# Patient Record
Sex: FEMALE | Race: WHITE | NOT HISPANIC OR LATINO | Employment: UNEMPLOYED | URBAN - NONMETROPOLITAN AREA
[De-identification: names, ages, dates, MRNs, and addresses within clinical notes are randomized per-mention and may not be internally consistent; named-entity substitution may affect disease eponyms.]

---

## 2017-01-09 ENCOUNTER — HOSPITAL ENCOUNTER (EMERGENCY)
Facility: HOSPITAL | Age: 82
Discharge: HOME | End: 2017-01-09
Attending: FAMILY MEDICINE | Admitting: FAMILY MEDICINE

## 2017-01-09 ENCOUNTER — APPOINTMENT (OUTPATIENT)
Dept: GENERAL RADIOLOGY | Facility: HOSPITAL | Age: 82
End: 2017-01-09

## 2017-01-09 VITALS
WEIGHT: 170 LBS | DIASTOLIC BLOOD PRESSURE: 64 MMHG | RESPIRATION RATE: 18 BRPM | TEMPERATURE: 98.2 F | BODY MASS INDEX: 30.12 KG/M2 | OXYGEN SATURATION: 96 % | HEART RATE: 73 BPM | HEIGHT: 63 IN | SYSTOLIC BLOOD PRESSURE: 122 MMHG

## 2017-01-09 DIAGNOSIS — J18.9 COMMUNITY ACQUIRED PNEUMONIA: Primary | ICD-10-CM

## 2017-01-09 LAB
ALBUMIN SERPL-MCNC: 4.1 G/DL (ref 3.4–4.8)
ALBUMIN/GLOB SERPL: 1.3 G/DL (ref 1.5–2.5)
ALP SERPL-CCNC: 57 U/L (ref 46–116)
ALT SERPL W P-5'-P-CCNC: 15 U/L (ref 10–36)
ANION GAP SERPL CALCULATED.3IONS-SCNC: 7.7 MMOL/L (ref 3.6–11.2)
AST SERPL-CCNC: 26 U/L (ref 10–30)
BACTERIA UR QL AUTO: ABNORMAL /HPF
BASOPHILS # BLD AUTO: 0.01 10*3/MM3 (ref 0–0.3)
BASOPHILS NFR BLD AUTO: 0.2 % (ref 0–2)
BILIRUB SERPL-MCNC: 0.7 MG/DL (ref 0.2–1.8)
BILIRUB UR QL STRIP: NEGATIVE
BUN BLD-MCNC: 18 MG/DL (ref 7–21)
BUN/CREAT SERPL: 16.2 (ref 7–25)
CALCIUM SPEC-SCNC: 9 MG/DL (ref 7.7–10)
CHLORIDE SERPL-SCNC: 101 MMOL/L (ref 99–112)
CLARITY UR: ABNORMAL
CO2 SERPL-SCNC: 29.3 MMOL/L (ref 24.3–31.9)
COLOR UR: YELLOW
CREAT BLD-MCNC: 1.11 MG/DL (ref 0.43–1.29)
DEPRECATED RDW RBC AUTO: 52.6 FL (ref 37–54)
EOSINOPHIL # BLD AUTO: 0.04 10*3/MM3 (ref 0–0.7)
EOSINOPHIL NFR BLD AUTO: 1 % (ref 0–7)
ERYTHROCYTE [DISTWIDTH] IN BLOOD BY AUTOMATED COUNT: 15.9 % (ref 11.5–14.5)
FLUAV AG NPH QL: NEGATIVE
FLUBV AG NPH QL IA: NEGATIVE
GFR SERPL CREATININE-BSD FRML MDRD: 47 ML/MIN/1.73
GLOBULIN UR ELPH-MCNC: 3.2 GM/DL
GLUCOSE BLD-MCNC: 94 MG/DL (ref 70–110)
GLUCOSE UR STRIP-MCNC: NEGATIVE MG/DL
HCT VFR BLD AUTO: 32.3 % (ref 37–47)
HGB BLD-MCNC: 10.8 G/DL (ref 12–16)
HGB UR QL STRIP.AUTO: NEGATIVE
HYALINE CASTS UR QL AUTO: ABNORMAL /LPF
IMM GRANULOCYTES # BLD: 0.02 10*3/MM3 (ref 0–0.03)
IMM GRANULOCYTES NFR BLD: 0.5 % (ref 0–0.5)
KETONES UR QL STRIP: NEGATIVE
LEUKOCYTE ESTERASE UR QL STRIP.AUTO: ABNORMAL
LYMPHOCYTES # BLD AUTO: 0.61 10*3/MM3 (ref 1–3)
LYMPHOCYTES NFR BLD AUTO: 14.9 % (ref 16–46)
MCH RBC QN AUTO: 29.9 PG (ref 27–33)
MCHC RBC AUTO-ENTMCNC: 33.4 G/DL (ref 33–37)
MCV RBC AUTO: 89.5 FL (ref 80–94)
MONOCYTES # BLD AUTO: 0.52 10*3/MM3 (ref 0.1–0.9)
MONOCYTES NFR BLD AUTO: 12.7 % (ref 0–12)
NEUTROPHILS # BLD AUTO: 2.89 10*3/MM3 (ref 1.4–6.5)
NEUTROPHILS NFR BLD AUTO: 70.7 % (ref 40–75)
NITRITE UR QL STRIP: NEGATIVE
OSMOLALITY SERPL CALC.SUM OF ELEC: 277.3 MOSM/KG (ref 273–305)
PH UR STRIP.AUTO: 5.5 [PH] (ref 5–8)
PLATELET # BLD AUTO: 130 10*3/MM3 (ref 130–400)
PMV BLD AUTO: 11 FL (ref 6–10)
POTASSIUM BLD-SCNC: 3.7 MMOL/L (ref 3.5–5.3)
PROT SERPL-MCNC: 7.3 G/DL (ref 6–8)
PROT UR QL STRIP: NEGATIVE
RBC # BLD AUTO: 3.61 10*6/MM3 (ref 4.2–5.4)
RBC # UR: ABNORMAL /HPF
REF LAB TEST METHOD: ABNORMAL
S PYO AG THROAT QL: NEGATIVE
SODIUM BLD-SCNC: 138 MMOL/L (ref 135–153)
SP GR UR STRIP: 1.02 (ref 1–1.03)
SQUAMOUS #/AREA URNS HPF: ABNORMAL /HPF
UROBILINOGEN UR QL STRIP: ABNORMAL
WBC NRBC COR # BLD: 4.09 10*3/MM3 (ref 4.5–12.5)
WBC UR QL AUTO: ABNORMAL /HPF

## 2017-01-09 PROCEDURE — 71020 HC CHEST PA AND LATERAL: CPT

## 2017-01-09 PROCEDURE — 94640 AIRWAY INHALATION TREATMENT: CPT

## 2017-01-09 PROCEDURE — 94799 UNLISTED PULMONARY SVC/PX: CPT

## 2017-01-09 PROCEDURE — 87086 URINE CULTURE/COLONY COUNT: CPT | Performed by: PHYSICIAN ASSISTANT

## 2017-01-09 PROCEDURE — 71020 XR CHEST 2 VW: CPT | Performed by: RADIOLOGY

## 2017-01-09 PROCEDURE — 99284 EMERGENCY DEPT VISIT MOD MDM: CPT

## 2017-01-09 PROCEDURE — 81001 URINALYSIS AUTO W/SCOPE: CPT | Performed by: PHYSICIAN ASSISTANT

## 2017-01-09 PROCEDURE — 87880 STREP A ASSAY W/OPTIC: CPT | Performed by: FAMILY MEDICINE

## 2017-01-09 PROCEDURE — 80053 COMPREHEN METABOLIC PANEL: CPT | Performed by: PHYSICIAN ASSISTANT

## 2017-01-09 PROCEDURE — 87081 CULTURE SCREEN ONLY: CPT | Performed by: FAMILY MEDICINE

## 2017-01-09 PROCEDURE — 85025 COMPLETE CBC W/AUTO DIFF WBC: CPT | Performed by: PHYSICIAN ASSISTANT

## 2017-01-09 PROCEDURE — 87804 INFLUENZA ASSAY W/OPTIC: CPT | Performed by: PHYSICIAN ASSISTANT

## 2017-01-09 PROCEDURE — 96365 THER/PROPH/DIAG IV INF INIT: CPT

## 2017-01-09 PROCEDURE — 25010000002 CEFTRIAXONE: Performed by: PHYSICIAN ASSISTANT

## 2017-01-09 RX ORDER — SODIUM CHLORIDE 0.9 % (FLUSH) 0.9 %
10 SYRINGE (ML) INJECTION AS NEEDED
Status: DISCONTINUED | OUTPATIENT
Start: 2017-01-09 | End: 2017-01-10 | Stop reason: HOSPADM

## 2017-01-09 RX ORDER — ALBUTEROL SULFATE 1.25 MG/3ML
2.5 SOLUTION RESPIRATORY (INHALATION) EVERY 6 HOURS PRN
Status: DISCONTINUED | OUTPATIENT
Start: 2017-01-09 | End: 2017-01-10 | Stop reason: HOSPADM

## 2017-01-09 RX ORDER — ALBUTEROL SULFATE 1.25 MG/3ML
1 SOLUTION RESPIRATORY (INHALATION) EVERY 6 HOURS PRN
Qty: 25 VIAL | Refills: 0 | Status: ON HOLD | OUTPATIENT
Start: 2017-01-09 | End: 2019-07-30

## 2017-01-09 RX ORDER — DOCUSATE SODIUM 250 MG
250 CAPSULE ORAL 2 TIMES DAILY PRN
COMMUNITY

## 2017-01-09 RX ORDER — DOXYCYCLINE HYCLATE 100 MG/1
100 TABLET, DELAYED RELEASE ORAL 2 TIMES DAILY
Qty: 20 TABLET | Refills: 0 | Status: ON HOLD | OUTPATIENT
Start: 2017-01-09 | End: 2017-01-12

## 2017-01-09 RX ORDER — BISOPROLOL FUMARATE AND HYDROCHLOROTHIAZIDE 2.5; 6.25 MG/1; MG/1
1 TABLET ORAL EVERY MORNING
Status: ON HOLD | COMMUNITY
End: 2019-07-30

## 2017-01-09 RX ORDER — QUINAPRIL 10 MG/1
10 TABLET ORAL DAILY
COMMUNITY
End: 2021-03-04 | Stop reason: ALTCHOICE

## 2017-01-09 RX ORDER — GABAPENTIN 300 MG/1
300 CAPSULE ORAL 2 TIMES DAILY
COMMUNITY

## 2017-01-09 RX ORDER — FUROSEMIDE 20 MG/1
20 TABLET ORAL DAILY
COMMUNITY
End: 2020-06-04 | Stop reason: ALTCHOICE

## 2017-01-09 RX ORDER — GUAIFENESIN/DEXTROMETHORPHAN 100-10MG/5
5 SYRUP ORAL ONCE
Status: COMPLETED | OUTPATIENT
Start: 2017-01-09 | End: 2017-01-09

## 2017-01-09 RX ORDER — DIGOXIN 125 MCG
125 TABLET ORAL EVERY MORNING
COMMUNITY
End: 2020-06-04 | Stop reason: ALTCHOICE

## 2017-01-09 RX ORDER — OXYCODONE AND ACETAMINOPHEN 10; 325 MG/1; MG/1
1 TABLET ORAL EVERY 12 HOURS PRN
COMMUNITY
End: 2021-09-19

## 2017-01-09 RX ORDER — LEVOTHYROXINE SODIUM 0.07 MG/1
75 TABLET ORAL DAILY
COMMUNITY
End: 2021-09-19

## 2017-01-09 RX ORDER — PANTOPRAZOLE SODIUM 40 MG/1
40 TABLET, DELAYED RELEASE ORAL DAILY
COMMUNITY

## 2017-01-09 RX ADMIN — GUAIFENESIN AND DEXTROMETHORPHAN 5 ML: 100; 10 SYRUP ORAL at 21:15

## 2017-01-09 RX ADMIN — ALBUTEROL SULFATE 2.5 MG: 1.25 SOLUTION RESPIRATORY (INHALATION) at 20:58

## 2017-01-09 RX ADMIN — CEFTRIAXONE 1 G: 1 INJECTION, POWDER, FOR SOLUTION INTRAMUSCULAR; INTRAVENOUS at 21:08

## 2017-01-09 NOTE — ED NOTES
Pt's family states that pt is feeling worse and she isn't going to be able to remain sitting up for much longer due to her feeling so weak and tired     Chayo Mcdowell RN  01/09/17 0090

## 2017-01-10 NOTE — ED PROVIDER NOTES
Subjective   Patient is a 83 y.o. female presenting with flu symptoms.   History provided by:  Patient   used: No    Flu Symptoms   Presenting symptoms: cough, fever, nausea and rhinorrhea    Presenting symptoms: no fatigue, no sore throat and no vomiting    Severity:  Moderate  Onset quality:  Sudden  Duration:  4 weeks  Chronicity:  New  Relieved by:  Nothing  Ineffective treatments:  Decongestant and prescription medications  Associated symptoms: nasal congestion    Associated symptoms: no chills, no decreased appetite, no neck stiffness and no syncope    Risk factors: heart disease    Risk factors: no diabetes problem and no kidney disease        Review of Systems   Constitutional: Positive for fever. Negative for chills, decreased appetite and fatigue.   HENT: Positive for congestion and rhinorrhea. Negative for sore throat.    Respiratory: Positive for cough.    Gastrointestinal: Positive for nausea. Negative for vomiting.   Musculoskeletal: Negative for neck stiffness.   All other systems reviewed and are negative.      History reviewed. No pertinent past medical history.    No Known Allergies    Past Surgical History   Procedure Laterality Date   • Breast cyst excision Left        History reviewed. No pertinent family history.    Social History     Social History   • Marital status: Unknown     Spouse name: N/A   • Number of children: N/A   • Years of education: N/A     Social History Main Topics   • Smoking status: Never Smoker   • Smokeless tobacco: None   • Alcohol use No   • Drug use: No   • Sexual activity: Not Asked     Other Topics Concern   • None     Social History Narrative   • None           Objective   Physical Exam   Constitutional: She is oriented to person, place, and time. She appears well-developed and well-nourished.   HENT:   Head: Normocephalic and atraumatic.   Right Ear: External ear normal.   Left Ear: External ear normal.   Nose: Rhinorrhea present. No sinus  tenderness or nasal deformity.   Mouth/Throat: Oropharynx is clear and moist.   Eyes: Conjunctivae and EOM are normal. Pupils are equal, round, and reactive to light. Right eye exhibits no discharge. Left eye exhibits no discharge.   Neck: Normal range of motion. Neck supple.   Cardiovascular: Normal rate, regular rhythm and normal heart sounds.    Pulmonary/Chest: Effort normal and breath sounds normal.   Abdominal: Soft. Bowel sounds are normal.   Musculoskeletal: Normal range of motion.   Neurological: She is alert and oriented to person, place, and time. She has normal reflexes.   Skin: Skin is warm and dry.   Psychiatric: She has a normal mood and affect. Her behavior is normal. Judgment and thought content normal.   Nursing note and vitals reviewed.      Procedures         ED Course  ED Course   Comment By Time   83-year-old female comes in with chief complaint cough, congestion, shortness of air times one month.  Patient states she has tried over-the-counter decongestants and was prescribed an antibiotic approximately 2 weeks ago. Da Richard PA-C 01/09 1922   I did discuss care with patient at this time patient does not wish to be admitted to the hospital for IV antibiotics and neb treatments.  I have discussed risk of not being admitted with patient. She understands and wishes to go home. I recommended patient f/u with pcp in next 1-2 days or return to E.D. If symotoms worsens.  Da Richard PA-C 01/09 2123                  MDM  Number of Diagnoses or Management Options  Community acquired pneumonia: new and requires workup     Amount and/or Complexity of Data Reviewed  Tests in the radiology section of CPT®: ordered and reviewed  Independent visualization of images, tracings, or specimens: yes    Risk of Complications, Morbidity, and/or Mortality  Presenting problems: moderate  Diagnostic procedures: moderate  Management options: moderate        Final diagnoses:   Community acquired  pneumonia            Da Richard PA-C  01/09/17 3666

## 2017-01-11 LAB — BACTERIA SPEC AEROBE CULT: NORMAL

## 2017-01-12 ENCOUNTER — HOSPITAL ENCOUNTER (INPATIENT)
Facility: HOSPITAL | Age: 82
LOS: 5 days | Discharge: HOME | End: 2017-01-18
Attending: INTERNAL MEDICINE | Admitting: INTERNAL MEDICINE

## 2017-01-12 ENCOUNTER — APPOINTMENT (OUTPATIENT)
Dept: GENERAL RADIOLOGY | Facility: HOSPITAL | Age: 82
End: 2017-01-12

## 2017-01-12 DIAGNOSIS — D64.9 ANEMIA, UNSPECIFIED TYPE: ICD-10-CM

## 2017-01-12 DIAGNOSIS — Z86.010 HX OF COLONIC POLYPS: ICD-10-CM

## 2017-01-12 DIAGNOSIS — R11.0 NAUSEA: ICD-10-CM

## 2017-01-12 DIAGNOSIS — R13.10 DYSPHAGIA, UNSPECIFIED TYPE: Primary | ICD-10-CM

## 2017-01-12 DIAGNOSIS — Z80.0 FAMILY HISTORY OF COLON CANCER: ICD-10-CM

## 2017-01-12 LAB
A-A DO2: 23.2 MMHG (ref 0–300)
ALBUMIN SERPL-MCNC: 3.9 G/DL (ref 3.4–4.8)
ALBUMIN/GLOB SERPL: 1.3 G/DL (ref 1.5–2.5)
ALP SERPL-CCNC: 60 U/L (ref 46–116)
ALT SERPL W P-5'-P-CCNC: 10 U/L (ref 10–36)
ANION GAP SERPL CALCULATED.3IONS-SCNC: 6.8 MMOL/L (ref 3.6–11.2)
ARTERIAL PATENCY WRIST A: ABNORMAL
AST SERPL-CCNC: 20 U/L (ref 10–30)
ATMOSPHERIC PRESS: 728 MMHG
BACTERIA SPEC AEROBE CULT: NORMAL
BASE EXCESS BLDA CALC-SCNC: 2.4 MMOL/L
BASOPHILS # BLD AUTO: 0.01 10*3/MM3 (ref 0–0.3)
BASOPHILS NFR BLD AUTO: 0.2 % (ref 0–2)
BDY SITE: ABNORMAL
BILIRUB SERPL-MCNC: 0.7 MG/DL (ref 0.2–1.8)
BODY TEMPERATURE: 98.6 C
BUN BLD-MCNC: 18 MG/DL (ref 7–21)
BUN/CREAT SERPL: 16.4 (ref 7–25)
CALCIUM SPEC-SCNC: 9.1 MG/DL (ref 7.7–10)
CHLORIDE SERPL-SCNC: 103 MMOL/L (ref 99–112)
CO2 SERPL-SCNC: 28.2 MMOL/L (ref 24.3–31.9)
COHGB MFR BLD: 0.6 % (ref 0–5)
CREAT BLD-MCNC: 1.1 MG/DL (ref 0.43–1.29)
DEPRECATED RDW RBC AUTO: 51.6 FL (ref 37–54)
EOSINOPHIL # BLD AUTO: 0.03 10*3/MM3 (ref 0–0.7)
EOSINOPHIL NFR BLD AUTO: 0.5 % (ref 0–7)
ERYTHROCYTE [DISTWIDTH] IN BLOOD BY AUTOMATED COUNT: 15.8 % (ref 11.5–14.5)
GFR SERPL CREATININE-BSD FRML MDRD: 47 ML/MIN/1.73
GLOBULIN UR ELPH-MCNC: 3.1 GM/DL
GLUCOSE BLD-MCNC: 103 MG/DL (ref 70–110)
HCO3 BLDA-SCNC: 26.8 MMOL/L (ref 22–26)
HCT VFR BLD AUTO: 31.6 % (ref 37–47)
HCT VFR BLD CALC: 34 % (ref 37–47)
HGB BLD-MCNC: 10.6 G/DL (ref 12–16)
HGB BLDA-MCNC: 11.5 G/DL (ref 12–16)
HOROWITZ INDEX BLD+IHG-RTO: 21 %
IMM GRANULOCYTES # BLD: 0.01 10*3/MM3 (ref 0–0.03)
IMM GRANULOCYTES NFR BLD: 0.2 % (ref 0–0.5)
LYMPHOCYTES # BLD AUTO: 0.94 10*3/MM3 (ref 1–3)
LYMPHOCYTES NFR BLD AUTO: 16.3 % (ref 16–46)
MCH RBC QN AUTO: 30.1 PG (ref 27–33)
MCHC RBC AUTO-ENTMCNC: 33.5 G/DL (ref 33–37)
MCV RBC AUTO: 89.8 FL (ref 80–94)
METHGB BLD QL: 0.2 % (ref 0–3)
MODALITY: ABNORMAL
MONOCYTES # BLD AUTO: 0.85 10*3/MM3 (ref 0.1–0.9)
MONOCYTES NFR BLD AUTO: 14.7 % (ref 0–12)
NEUTROPHILS # BLD AUTO: 3.94 10*3/MM3 (ref 1.4–6.5)
NEUTROPHILS NFR BLD AUTO: 68.1 % (ref 40–75)
NRBC BLD MANUAL-RTO: 0 /100 WBC (ref 0–0)
OSMOLALITY SERPL CALC.SUM OF ELEC: 277.8 MOSM/KG (ref 273–305)
OXYHGB MFR BLDV: 92.3 % (ref 85–100)
PCO2 BLDA: 40.9 MM HG (ref 35–45)
PH BLDA: 7.43 PH UNITS (ref 7.35–7.45)
PLATELET # BLD AUTO: 137 10*3/MM3 (ref 130–400)
PMV BLD AUTO: 11.3 FL (ref 6–10)
PO2 BLDA: 70.8 MM HG (ref 80–100)
POTASSIUM BLD-SCNC: 3.8 MMOL/L (ref 3.5–5.3)
PROT SERPL-MCNC: 7 G/DL (ref 6–8)
RBC # BLD AUTO: 3.52 10*6/MM3 (ref 4.2–5.4)
SAO2 % BLDCOA: 93 % (ref 90–100)
SODIUM BLD-SCNC: 138 MMOL/L (ref 135–153)
WBC NRBC COR # BLD: 5.78 10*3/MM3 (ref 4.5–12.5)

## 2017-01-12 PROCEDURE — 93005 ELECTROCARDIOGRAM TRACING: CPT | Performed by: INTERNAL MEDICINE

## 2017-01-12 PROCEDURE — 71020 XR CHEST PA AND LATERAL: CPT | Performed by: RADIOLOGY

## 2017-01-12 PROCEDURE — 25010000002 CEFTRIAXONE: Performed by: INTERNAL MEDICINE

## 2017-01-12 PROCEDURE — 25010000002 METHYLPREDNISOLONE PER 125 MG: Performed by: INTERNAL MEDICINE

## 2017-01-12 PROCEDURE — 94799 UNLISTED PULMONARY SVC/PX: CPT

## 2017-01-12 PROCEDURE — 85025 COMPLETE CBC W/AUTO DIFF WBC: CPT | Performed by: INTERNAL MEDICINE

## 2017-01-12 PROCEDURE — 71020 HC CHEST PA AND LATERAL: CPT

## 2017-01-12 PROCEDURE — 82805 BLOOD GASES W/O2 SATURATION: CPT | Performed by: INTERNAL MEDICINE

## 2017-01-12 PROCEDURE — 80053 COMPREHEN METABOLIC PANEL: CPT | Performed by: INTERNAL MEDICINE

## 2017-01-12 PROCEDURE — 25010000002 AZITHROMYCIN: Performed by: INTERNAL MEDICINE

## 2017-01-12 PROCEDURE — 83050 HGB METHEMOGLOBIN QUAN: CPT | Performed by: INTERNAL MEDICINE

## 2017-01-12 PROCEDURE — 94640 AIRWAY INHALATION TREATMENT: CPT

## 2017-01-12 PROCEDURE — 36600 WITHDRAWAL OF ARTERIAL BLOOD: CPT | Performed by: INTERNAL MEDICINE

## 2017-01-12 PROCEDURE — 85007 BL SMEAR W/DIFF WBC COUNT: CPT | Performed by: INTERNAL MEDICINE

## 2017-01-12 PROCEDURE — 82375 ASSAY CARBOXYHB QUANT: CPT | Performed by: INTERNAL MEDICINE

## 2017-01-12 RX ORDER — BISOPROLOL FUMARATE AND HYDROCHLOROTHIAZIDE 2.5; 6.25 MG/1; MG/1
1 TABLET ORAL EVERY MORNING
Status: CANCELLED | OUTPATIENT
Start: 2017-01-13

## 2017-01-12 RX ORDER — LISINOPRIL 10 MG/1
10 TABLET ORAL DAILY
Status: CANCELLED | OUTPATIENT
Start: 2017-01-13

## 2017-01-12 RX ORDER — LEVOTHYROXINE SODIUM 0.07 MG/1
75 TABLET ORAL EVERY EVENING
Status: CANCELLED | OUTPATIENT
Start: 2017-01-12

## 2017-01-12 RX ORDER — ALBUTEROL SULFATE 1.25 MG/3ML
1 SOLUTION RESPIRATORY (INHALATION) EVERY 6 HOURS PRN
Status: CANCELLED | OUTPATIENT
Start: 2017-01-12

## 2017-01-12 RX ORDER — IPRATROPIUM BROMIDE AND ALBUTEROL SULFATE 2.5; .5 MG/3ML; MG/3ML
3 SOLUTION RESPIRATORY (INHALATION)
Status: DISCONTINUED | OUTPATIENT
Start: 2017-01-12 | End: 2017-01-16

## 2017-01-12 RX ORDER — DOXYCYCLINE 100 MG/1
100 CAPSULE ORAL 2 TIMES DAILY
Status: CANCELLED | OUTPATIENT
Start: 2017-01-12

## 2017-01-12 RX ORDER — DOCUSATE SODIUM 250 MG
250 CAPSULE ORAL DAILY
Status: CANCELLED | OUTPATIENT
Start: 2017-01-13

## 2017-01-12 RX ORDER — DOXYCYCLINE 50 MG/1
100 CAPSULE ORAL 2 TIMES DAILY
COMMUNITY
End: 2017-01-18 | Stop reason: HOSPADM

## 2017-01-12 RX ORDER — GABAPENTIN 300 MG/1
300 CAPSULE ORAL 2 TIMES DAILY
Status: DISCONTINUED | OUTPATIENT
Start: 2017-01-12 | End: 2017-01-18 | Stop reason: HOSPADM

## 2017-01-12 RX ORDER — OXYCODONE AND ACETAMINOPHEN 10; 325 MG/1; MG/1
1 TABLET ORAL 2 TIMES DAILY
Status: CANCELLED | OUTPATIENT
Start: 2017-01-12

## 2017-01-12 RX ORDER — FUROSEMIDE 20 MG/1
20 TABLET ORAL DAILY
Status: CANCELLED | OUTPATIENT
Start: 2017-01-13

## 2017-01-12 RX ORDER — ALBUTEROL SULFATE 2.5 MG/3ML
2.5 SOLUTION RESPIRATORY (INHALATION) EVERY 6 HOURS PRN
Status: CANCELLED | OUTPATIENT
Start: 2017-01-12

## 2017-01-12 RX ORDER — DIGOXIN 125 MCG
125 TABLET ORAL EVERY MORNING
Status: CANCELLED | OUTPATIENT
Start: 2017-01-13

## 2017-01-12 RX ORDER — METHYLPREDNISOLONE SODIUM SUCCINATE 125 MG/2ML
62 INJECTION, POWDER, LYOPHILIZED, FOR SOLUTION INTRAMUSCULAR; INTRAVENOUS EVERY 6 HOURS
Status: DISCONTINUED | OUTPATIENT
Start: 2017-01-12 | End: 2017-01-14

## 2017-01-12 RX ORDER — SODIUM CHLORIDE 450 MG/100ML
50 INJECTION, SOLUTION INTRAVENOUS CONTINUOUS
Status: DISCONTINUED | OUTPATIENT
Start: 2017-01-12 | End: 2017-01-15

## 2017-01-12 RX ORDER — HYDROCHLOROTHIAZIDE 25 MG/1
25 TABLET ORAL EVERY MORNING
COMMUNITY
End: 2019-08-06 | Stop reason: HOSPADM

## 2017-01-12 RX ORDER — OXYCODONE AND ACETAMINOPHEN 10; 325 MG/1; MG/1
1 TABLET ORAL 2 TIMES DAILY
Status: DISCONTINUED | OUTPATIENT
Start: 2017-01-12 | End: 2017-01-15

## 2017-01-12 RX ORDER — LEVOTHYROXINE SODIUM 0.07 MG/1
75 TABLET ORAL EVERY EVENING
Status: DISCONTINUED | OUTPATIENT
Start: 2017-01-12 | End: 2017-01-18 | Stop reason: HOSPADM

## 2017-01-12 RX ORDER — ONDANSETRON 2 MG/ML
4 INJECTION INTRAMUSCULAR; INTRAVENOUS EVERY 6 HOURS PRN
Status: DISCONTINUED | OUTPATIENT
Start: 2017-01-12 | End: 2017-01-18 | Stop reason: HOSPADM

## 2017-01-12 RX ORDER — PANTOPRAZOLE SODIUM 40 MG/1
40 TABLET, DELAYED RELEASE ORAL 2 TIMES DAILY
Status: DISCONTINUED | OUTPATIENT
Start: 2017-01-12 | End: 2017-01-18 | Stop reason: HOSPADM

## 2017-01-12 RX ORDER — GABAPENTIN 300 MG/1
300 CAPSULE ORAL 2 TIMES DAILY
Status: CANCELLED | OUTPATIENT
Start: 2017-01-12

## 2017-01-12 RX ORDER — HYDROCHLOROTHIAZIDE 25 MG/1
25 TABLET ORAL EVERY MORNING
Status: CANCELLED | OUTPATIENT
Start: 2017-01-13

## 2017-01-12 RX ORDER — PANTOPRAZOLE SODIUM 40 MG/1
40 TABLET, DELAYED RELEASE ORAL 2 TIMES DAILY
Status: CANCELLED | OUTPATIENT
Start: 2017-01-12

## 2017-01-12 RX ADMIN — PANTOPRAZOLE SODIUM 40 MG: 40 TABLET, DELAYED RELEASE ORAL at 20:38

## 2017-01-12 RX ADMIN — CEFTRIAXONE 1 G: 1 INJECTION, POWDER, FOR SOLUTION INTRAMUSCULAR; INTRAVENOUS at 17:05

## 2017-01-12 RX ADMIN — LEVOTHYROXINE SODIUM 75 MCG: 75 TABLET ORAL at 20:38

## 2017-01-12 RX ADMIN — METHYLPREDNISOLONE SODIUM SUCCINATE 62 MG: 125 INJECTION, POWDER, FOR SOLUTION INTRAMUSCULAR; INTRAVENOUS at 17:05

## 2017-01-12 RX ADMIN — IPRATROPIUM BROMIDE AND ALBUTEROL SULFATE 3 ML: .5; 3 SOLUTION RESPIRATORY (INHALATION) at 18:57

## 2017-01-12 RX ADMIN — GABAPENTIN 300 MG: 300 CAPSULE ORAL at 20:38

## 2017-01-12 RX ADMIN — METHYLPREDNISOLONE SODIUM SUCCINATE 62 MG: 125 INJECTION, POWDER, FOR SOLUTION INTRAMUSCULAR; INTRAVENOUS at 20:42

## 2017-01-12 RX ADMIN — SODIUM CHLORIDE 75 ML/HR: 4.5 INJECTION, SOLUTION INTRAVENOUS at 16:39

## 2017-01-12 RX ADMIN — RIVAROXABAN 15 MG: 15 TABLET, FILM COATED ORAL at 20:38

## 2017-01-12 RX ADMIN — IPRATROPIUM BROMIDE AND ALBUTEROL SULFATE 3 ML: .5; 3 SOLUTION RESPIRATORY (INHALATION) at 22:30

## 2017-01-12 RX ADMIN — AZITHROMYCIN 500 MG: 500 INJECTION, POWDER, LYOPHILIZED, FOR SOLUTION INTRAVENOUS at 17:05

## 2017-01-12 RX ADMIN — OXYCODONE HYDROCHLORIDE AND ACETAMINOPHEN 1 TABLET: 10; 325 TABLET ORAL at 20:38

## 2017-01-12 RX ADMIN — SODIUM CHLORIDE 75 ML/HR: 4.5 INJECTION, SOLUTION INTRAVENOUS at 20:39

## 2017-01-13 PROBLEM — J18.9 PNEUMONIA: Status: ACTIVE | Noted: 2017-01-13

## 2017-01-13 PROBLEM — I48.91 ATRIAL FIBRILLATION (HCC): Status: ACTIVE | Noted: 2017-01-13

## 2017-01-13 PROBLEM — D64.9 ANEMIA: Status: ACTIVE | Noted: 2017-01-13

## 2017-01-13 LAB
BILIRUB UR QL STRIP: NEGATIVE
CK MB SERPL-CCNC: 0.88 NG/ML (ref 0–5)
CK MB SERPL-RTO: 2.1 % (ref 0–3)
CK SERPL-CCNC: 42 U/L (ref 24–173)
CLARITY UR: CLEAR
COLOR UR: YELLOW
GLUCOSE BLDC GLUCOMTR-MCNC: 161 MG/DL (ref 70–130)
GLUCOSE BLDC GLUCOMTR-MCNC: 168 MG/DL (ref 70–130)
GLUCOSE BLDC GLUCOMTR-MCNC: 170 MG/DL (ref 70–130)
GLUCOSE UR STRIP-MCNC: NEGATIVE MG/DL
HGB UR QL STRIP.AUTO: NEGATIVE
KETONES UR QL STRIP: NEGATIVE
LEUKOCYTE ESTERASE UR QL STRIP.AUTO: NEGATIVE
NITRITE UR QL STRIP: NEGATIVE
PH UR STRIP.AUTO: 5.5 [PH] (ref 5–8)
PROT UR QL STRIP: NEGATIVE
SP GR UR STRIP: <=1.005 (ref 1–1.03)
TROPONIN I SERPL-MCNC: 0.02 NG/ML
UROBILINOGEN UR QL STRIP: NORMAL

## 2017-01-13 PROCEDURE — 81003 URINALYSIS AUTO W/O SCOPE: CPT | Performed by: INTERNAL MEDICINE

## 2017-01-13 PROCEDURE — 94799 UNLISTED PULMONARY SVC/PX: CPT

## 2017-01-13 PROCEDURE — 84484 ASSAY OF TROPONIN QUANT: CPT | Performed by: INTERNAL MEDICINE

## 2017-01-13 PROCEDURE — 82553 CREATINE MB FRACTION: CPT | Performed by: INTERNAL MEDICINE

## 2017-01-13 PROCEDURE — 25010000002 ONDANSETRON PER 1 MG: Performed by: INTERNAL MEDICINE

## 2017-01-13 PROCEDURE — 82550 ASSAY OF CK (CPK): CPT | Performed by: INTERNAL MEDICINE

## 2017-01-13 PROCEDURE — 99222 1ST HOSP IP/OBS MODERATE 55: CPT | Performed by: INTERNAL MEDICINE

## 2017-01-13 PROCEDURE — 94640 AIRWAY INHALATION TREATMENT: CPT

## 2017-01-13 PROCEDURE — 25010000002 CEFTRIAXONE: Performed by: INTERNAL MEDICINE

## 2017-01-13 PROCEDURE — 93005 ELECTROCARDIOGRAM TRACING: CPT | Performed by: INTERNAL MEDICINE

## 2017-01-13 PROCEDURE — 25010000002 AZITHROMYCIN: Performed by: INTERNAL MEDICINE

## 2017-01-13 PROCEDURE — 25010000002 METHYLPREDNISOLONE PER 125 MG: Performed by: INTERNAL MEDICINE

## 2017-01-13 PROCEDURE — 82962 GLUCOSE BLOOD TEST: CPT

## 2017-01-13 RX ORDER — HYDROCHLOROTHIAZIDE 25 MG/1
6.25 TABLET ORAL
Status: DISCONTINUED | OUTPATIENT
Start: 2017-01-13 | End: 2017-01-18 | Stop reason: HOSPADM

## 2017-01-13 RX ORDER — HYDROCHLOROTHIAZIDE 25 MG/1
25 TABLET ORAL EVERY MORNING
Status: DISCONTINUED | OUTPATIENT
Start: 2017-01-13 | End: 2017-01-18 | Stop reason: HOSPADM

## 2017-01-13 RX ORDER — LISINOPRIL 10 MG/1
10 TABLET ORAL
Status: DISCONTINUED | OUTPATIENT
Start: 2017-01-13 | End: 2017-01-18 | Stop reason: HOSPADM

## 2017-01-13 RX ORDER — SODIUM CHLORIDE 0.9 % (FLUSH) 0.9 %
1-10 SYRINGE (ML) INJECTION AS NEEDED
Status: DISCONTINUED | OUTPATIENT
Start: 2017-01-13 | End: 2017-01-18 | Stop reason: HOSPADM

## 2017-01-13 RX ORDER — BISOPROLOL FUMARATE 5 MG/1
2.5 TABLET, FILM COATED ORAL
Status: DISCONTINUED | OUTPATIENT
Start: 2017-01-13 | End: 2017-01-14

## 2017-01-13 RX ORDER — BISOPROLOL FUMARATE AND HYDROCHLOROTHIAZIDE 2.5; 6.25 MG/1; MG/1
1 TABLET ORAL EVERY MORNING
Status: DISCONTINUED | OUTPATIENT
Start: 2017-01-13 | End: 2017-01-13 | Stop reason: SDUPTHER

## 2017-01-13 RX ORDER — ALBUTEROL SULFATE 1.25 MG/3ML
1 SOLUTION RESPIRATORY (INHALATION) EVERY 6 HOURS PRN
Status: DISCONTINUED | OUTPATIENT
Start: 2017-01-13 | End: 2017-01-18 | Stop reason: HOSPADM

## 2017-01-13 RX ORDER — DIGOXIN 125 MCG
125 TABLET ORAL
Status: DISCONTINUED | OUTPATIENT
Start: 2017-01-13 | End: 2017-01-13

## 2017-01-13 RX ORDER — DOCUSATE SODIUM 100 MG/1
200 CAPSULE, LIQUID FILLED ORAL DAILY
Status: DISCONTINUED | OUTPATIENT
Start: 2017-01-13 | End: 2017-01-18 | Stop reason: HOSPADM

## 2017-01-13 RX ORDER — FUROSEMIDE 20 MG/1
20 TABLET ORAL DAILY
Status: DISCONTINUED | OUTPATIENT
Start: 2017-01-13 | End: 2017-01-18 | Stop reason: HOSPADM

## 2017-01-13 RX ADMIN — LISINOPRIL 10 MG: 10 TABLET ORAL at 07:13

## 2017-01-13 RX ADMIN — BISOPROLOL FUMARATE 2.5 MG: 5 TABLET, FILM COATED ORAL at 07:13

## 2017-01-13 RX ADMIN — RIVAROXABAN 15 MG: 15 TABLET, FILM COATED ORAL at 17:11

## 2017-01-13 RX ADMIN — GABAPENTIN 300 MG: 300 CAPSULE ORAL at 17:11

## 2017-01-13 RX ADMIN — OXYCODONE HYDROCHLORIDE AND ACETAMINOPHEN 1 TABLET: 10; 325 TABLET ORAL at 07:13

## 2017-01-13 RX ADMIN — ONDANSETRON 4 MG: 2 INJECTION, SOLUTION INTRAMUSCULAR; INTRAVENOUS at 15:47

## 2017-01-13 RX ADMIN — LEVOTHYROXINE SODIUM 75 MCG: 75 TABLET ORAL at 17:11

## 2017-01-13 RX ADMIN — PANTOPRAZOLE SODIUM 40 MG: 40 TABLET, DELAYED RELEASE ORAL at 07:14

## 2017-01-13 RX ADMIN — OXYCODONE HYDROCHLORIDE AND ACETAMINOPHEN 1 TABLET: 10; 325 TABLET ORAL at 17:11

## 2017-01-13 RX ADMIN — FUROSEMIDE 20 MG: 20 TABLET ORAL at 07:13

## 2017-01-13 RX ADMIN — SODIUM CHLORIDE 75 ML/HR: 4.5 INJECTION, SOLUTION INTRAVENOUS at 07:16

## 2017-01-13 RX ADMIN — CEFTRIAXONE 1 G: 1 INJECTION, POWDER, FOR SOLUTION INTRAMUSCULAR; INTRAVENOUS at 17:11

## 2017-01-13 RX ADMIN — METHYLPREDNISOLONE SODIUM SUCCINATE 62 MG: 125 INJECTION, POWDER, FOR SOLUTION INTRAMUSCULAR; INTRAVENOUS at 10:19

## 2017-01-13 RX ADMIN — IPRATROPIUM BROMIDE AND ALBUTEROL SULFATE 3 ML: .5; 3 SOLUTION RESPIRATORY (INHALATION) at 02:50

## 2017-01-13 RX ADMIN — METHYLPREDNISOLONE SODIUM SUCCINATE 62 MG: 125 INJECTION, POWDER, FOR SOLUTION INTRAMUSCULAR; INTRAVENOUS at 05:55

## 2017-01-13 RX ADMIN — HYDROCHLOROTHIAZIDE 25 MG: 25 TABLET ORAL at 07:14

## 2017-01-13 RX ADMIN — ONDANSETRON 4 MG: 2 INJECTION, SOLUTION INTRAMUSCULAR; INTRAVENOUS at 09:09

## 2017-01-13 RX ADMIN — METHYLPREDNISOLONE SODIUM SUCCINATE 62 MG: 125 INJECTION, POWDER, FOR SOLUTION INTRAMUSCULAR; INTRAVENOUS at 22:07

## 2017-01-13 RX ADMIN — IPRATROPIUM BROMIDE AND ALBUTEROL SULFATE 3 ML: .5; 3 SOLUTION RESPIRATORY (INHALATION) at 15:48

## 2017-01-13 RX ADMIN — METHYLPREDNISOLONE SODIUM SUCCINATE 62 MG: 125 INJECTION, POWDER, FOR SOLUTION INTRAMUSCULAR; INTRAVENOUS at 17:11

## 2017-01-13 RX ADMIN — PANTOPRAZOLE SODIUM 40 MG: 40 TABLET, DELAYED RELEASE ORAL at 17:11

## 2017-01-13 RX ADMIN — IPRATROPIUM BROMIDE AND ALBUTEROL SULFATE 3 ML: .5; 3 SOLUTION RESPIRATORY (INHALATION) at 18:24

## 2017-01-13 RX ADMIN — DOCUSATE SODIUM 200 MG: 100 CAPSULE, LIQUID FILLED ORAL at 07:13

## 2017-01-13 RX ADMIN — AZITHROMYCIN 500 MG: 500 INJECTION, POWDER, LYOPHILIZED, FOR SOLUTION INTRAVENOUS at 17:11

## 2017-01-13 RX ADMIN — IPRATROPIUM BROMIDE AND ALBUTEROL SULFATE 3 ML: .5; 3 SOLUTION RESPIRATORY (INHALATION) at 07:09

## 2017-01-13 RX ADMIN — SODIUM CHLORIDE 75 ML/HR: 4.5 INJECTION, SOLUTION INTRAVENOUS at 22:11

## 2017-01-13 RX ADMIN — IPRATROPIUM BROMIDE AND ALBUTEROL SULFATE 3 ML: .5; 3 SOLUTION RESPIRATORY (INHALATION) at 11:12

## 2017-01-13 RX ADMIN — GABAPENTIN 300 MG: 300 CAPSULE ORAL at 07:14

## 2017-01-14 PROCEDURE — 99232 SBSQ HOSP IP/OBS MODERATE 35: CPT | Performed by: INTERNAL MEDICINE

## 2017-01-14 PROCEDURE — 25010000002 CEFTRIAXONE: Performed by: INTERNAL MEDICINE

## 2017-01-14 PROCEDURE — 25010000002 METHYLPREDNISOLONE PER 40 MG: Performed by: INTERNAL MEDICINE

## 2017-01-14 PROCEDURE — 25010000002 METHYLPREDNISOLONE PER 125 MG: Performed by: INTERNAL MEDICINE

## 2017-01-14 PROCEDURE — 94799 UNLISTED PULMONARY SVC/PX: CPT

## 2017-01-14 PROCEDURE — 25010000002 AZITHROMYCIN: Performed by: INTERNAL MEDICINE

## 2017-01-14 PROCEDURE — 94640 AIRWAY INHALATION TREATMENT: CPT

## 2017-01-14 RX ORDER — METHYLPREDNISOLONE SODIUM SUCCINATE 40 MG/ML
20 INJECTION, POWDER, LYOPHILIZED, FOR SOLUTION INTRAMUSCULAR; INTRAVENOUS EVERY 6 HOURS
Status: DISCONTINUED | OUTPATIENT
Start: 2017-01-14 | End: 2017-01-15

## 2017-01-14 RX ORDER — ONDANSETRON 2 MG/ML
4 INJECTION INTRAMUSCULAR; INTRAVENOUS EVERY 6 HOURS PRN
Status: DISCONTINUED | OUTPATIENT
Start: 2017-01-14 | End: 2017-01-18 | Stop reason: HOSPADM

## 2017-01-14 RX ADMIN — HYDROCHLOROTHIAZIDE 25 MG: 25 TABLET ORAL at 06:11

## 2017-01-14 RX ADMIN — IPRATROPIUM BROMIDE AND ALBUTEROL SULFATE 3 ML: .5; 3 SOLUTION RESPIRATORY (INHALATION) at 11:49

## 2017-01-14 RX ADMIN — GABAPENTIN 300 MG: 300 CAPSULE ORAL at 08:09

## 2017-01-14 RX ADMIN — LISINOPRIL 10 MG: 10 TABLET ORAL at 08:00

## 2017-01-14 RX ADMIN — PANTOPRAZOLE SODIUM 40 MG: 40 TABLET, DELAYED RELEASE ORAL at 17:11

## 2017-01-14 RX ADMIN — IPRATROPIUM BROMIDE AND ALBUTEROL SULFATE 3 ML: .5; 3 SOLUTION RESPIRATORY (INHALATION) at 03:29

## 2017-01-14 RX ADMIN — METHYLPREDNISOLONE SODIUM SUCCINATE 20 MG: 40 INJECTION, POWDER, FOR SOLUTION INTRAMUSCULAR; INTRAVENOUS at 11:53

## 2017-01-14 RX ADMIN — OXYCODONE HYDROCHLORIDE AND ACETAMINOPHEN 1 TABLET: 10; 325 TABLET ORAL at 17:11

## 2017-01-14 RX ADMIN — HYDROCHLOROTHIAZIDE 6.03 MG: 25 TABLET ORAL at 08:17

## 2017-01-14 RX ADMIN — DOCUSATE SODIUM 200 MG: 100 CAPSULE, LIQUID FILLED ORAL at 08:07

## 2017-01-14 RX ADMIN — FUROSEMIDE 20 MG: 20 TABLET ORAL at 08:07

## 2017-01-14 RX ADMIN — BISOPROLOL FUMARATE 2.5 MG: 5 TABLET, FILM COATED ORAL at 08:20

## 2017-01-14 RX ADMIN — AZITHROMYCIN 500 MG: 500 INJECTION, POWDER, LYOPHILIZED, FOR SOLUTION INTRAVENOUS at 18:12

## 2017-01-14 RX ADMIN — RIVAROXABAN 15 MG: 15 TABLET, FILM COATED ORAL at 17:11

## 2017-01-14 RX ADMIN — PANTOPRAZOLE SODIUM 40 MG: 40 TABLET, DELAYED RELEASE ORAL at 08:00

## 2017-01-14 RX ADMIN — CEFTRIAXONE 1 G: 1 INJECTION, POWDER, FOR SOLUTION INTRAMUSCULAR; INTRAVENOUS at 17:11

## 2017-01-14 RX ADMIN — LEVOTHYROXINE SODIUM 75 MCG: 75 TABLET ORAL at 17:11

## 2017-01-14 RX ADMIN — IPRATROPIUM BROMIDE AND ALBUTEROL SULFATE 3 ML: .5; 3 SOLUTION RESPIRATORY (INHALATION) at 23:21

## 2017-01-14 RX ADMIN — GABAPENTIN 300 MG: 300 CAPSULE ORAL at 17:11

## 2017-01-14 RX ADMIN — METHYLPREDNISOLONE SODIUM SUCCINATE 62 MG: 125 INJECTION, POWDER, FOR SOLUTION INTRAMUSCULAR; INTRAVENOUS at 04:17

## 2017-01-14 RX ADMIN — METHYLPREDNISOLONE SODIUM SUCCINATE 20 MG: 40 INJECTION, POWDER, FOR SOLUTION INTRAMUSCULAR; INTRAVENOUS at 17:12

## 2017-01-14 RX ADMIN — IPRATROPIUM BROMIDE AND ALBUTEROL SULFATE 3 ML: .5; 3 SOLUTION RESPIRATORY (INHALATION) at 06:46

## 2017-01-14 RX ADMIN — SODIUM CHLORIDE 50 ML/HR: 4.5 INJECTION, SOLUTION INTRAVENOUS at 11:40

## 2017-01-14 RX ADMIN — IPRATROPIUM BROMIDE AND ALBUTEROL SULFATE 3 ML: .5; 3 SOLUTION RESPIRATORY (INHALATION) at 15:02

## 2017-01-14 RX ADMIN — METHYLPREDNISOLONE SODIUM SUCCINATE 20 MG: 40 INJECTION, POWDER, FOR SOLUTION INTRAMUSCULAR; INTRAVENOUS at 22:53

## 2017-01-15 ENCOUNTER — APPOINTMENT (OUTPATIENT)
Dept: CARDIOLOGY | Facility: HOSPITAL | Age: 82
End: 2017-01-15
Attending: INTERNAL MEDICINE

## 2017-01-15 ENCOUNTER — APPOINTMENT (OUTPATIENT)
Dept: GENERAL RADIOLOGY | Facility: HOSPITAL | Age: 82
End: 2017-01-15

## 2017-01-15 LAB
ANION GAP SERPL CALCULATED.3IONS-SCNC: 9.8 MMOL/L (ref 3.6–11.2)
BASOPHILS # BLD AUTO: 0.01 10*3/MM3 (ref 0–0.3)
BASOPHILS NFR BLD AUTO: 0.1 % (ref 0–2)
BUN BLD-MCNC: 22 MG/DL (ref 7–21)
BUN/CREAT SERPL: 25.9 (ref 7–25)
CALCIUM SPEC-SCNC: 8.7 MG/DL (ref 7.7–10)
CHLORIDE SERPL-SCNC: 104 MMOL/L (ref 99–112)
CO2 SERPL-SCNC: 25.2 MMOL/L (ref 24.3–31.9)
CREAT BLD-MCNC: 0.85 MG/DL (ref 0.43–1.29)
DEPRECATED RDW RBC AUTO: 49.6 FL (ref 37–54)
EOSINOPHIL # BLD AUTO: 0 10*3/MM3 (ref 0–0.7)
EOSINOPHIL NFR BLD AUTO: 0 % (ref 0–7)
ERYTHROCYTE [DISTWIDTH] IN BLOOD BY AUTOMATED COUNT: 15.5 % (ref 11.5–14.5)
GFR SERPL CREATININE-BSD FRML MDRD: 64 ML/MIN/1.73
GLUCOSE BLD-MCNC: 161 MG/DL (ref 70–110)
HCT VFR BLD AUTO: 31 % (ref 37–47)
HGB BLD-MCNC: 10.1 G/DL (ref 12–16)
IMM GRANULOCYTES # BLD: 0.06 10*3/MM3 (ref 0–0.03)
IMM GRANULOCYTES NFR BLD: 0.6 % (ref 0–0.5)
LYMPHOCYTES # BLD AUTO: 0.36 10*3/MM3 (ref 1–3)
LYMPHOCYTES NFR BLD AUTO: 3.9 % (ref 16–46)
MAGNESIUM SERPL-MCNC: 2 MG/DL (ref 1.7–2.6)
MCH RBC QN AUTO: 29.9 PG (ref 27–33)
MCHC RBC AUTO-ENTMCNC: 32.6 G/DL (ref 33–37)
MCV RBC AUTO: 91.7 FL (ref 80–94)
MONOCYTES # BLD AUTO: 0.39 10*3/MM3 (ref 0.1–0.9)
MONOCYTES NFR BLD AUTO: 4.2 % (ref 0–12)
NEUTROPHILS # BLD AUTO: 8.52 10*3/MM3 (ref 1.4–6.5)
NEUTROPHILS NFR BLD AUTO: 91.2 % (ref 40–75)
OSMOLALITY SERPL CALC.SUM OF ELEC: 284.3 MOSM/KG (ref 273–305)
PLATELET # BLD AUTO: 189 10*3/MM3 (ref 130–400)
PMV BLD AUTO: 11.5 FL (ref 6–10)
POTASSIUM BLD-SCNC: 3.7 MMOL/L (ref 3.5–5.3)
RBC # BLD AUTO: 3.38 10*6/MM3 (ref 4.2–5.4)
SODIUM BLD-SCNC: 139 MMOL/L (ref 135–153)
WBC NRBC COR # BLD: 9.34 10*3/MM3 (ref 4.5–12.5)

## 2017-01-15 PROCEDURE — 25010000002 CEFTRIAXONE: Performed by: INTERNAL MEDICINE

## 2017-01-15 PROCEDURE — 99232 SBSQ HOSP IP/OBS MODERATE 35: CPT | Performed by: INTERNAL MEDICINE

## 2017-01-15 PROCEDURE — 25010000002 AZITHROMYCIN: Performed by: INTERNAL MEDICINE

## 2017-01-15 PROCEDURE — 93306 TTE W/DOPPLER COMPLETE: CPT

## 2017-01-15 PROCEDURE — 93306 TTE W/DOPPLER COMPLETE: CPT | Performed by: INTERNAL MEDICINE

## 2017-01-15 PROCEDURE — 83735 ASSAY OF MAGNESIUM: CPT | Performed by: INTERNAL MEDICINE

## 2017-01-15 PROCEDURE — 94799 UNLISTED PULMONARY SVC/PX: CPT

## 2017-01-15 PROCEDURE — 94640 AIRWAY INHALATION TREATMENT: CPT

## 2017-01-15 PROCEDURE — 71010 HC CHEST AP: CPT

## 2017-01-15 PROCEDURE — 71010 XR CHEST AP: CPT | Performed by: RADIOLOGY

## 2017-01-15 PROCEDURE — 85025 COMPLETE CBC W/AUTO DIFF WBC: CPT | Performed by: INTERNAL MEDICINE

## 2017-01-15 PROCEDURE — 80048 BASIC METABOLIC PNL TOTAL CA: CPT | Performed by: INTERNAL MEDICINE

## 2017-01-15 PROCEDURE — 63710000001 PREDNISONE PER 5 MG: Performed by: INTERNAL MEDICINE

## 2017-01-15 RX ORDER — PREDNISONE 20 MG/1
20 TABLET ORAL
Status: DISCONTINUED | OUTPATIENT
Start: 2017-01-15 | End: 2017-01-16

## 2017-01-15 RX ORDER — OXYCODONE AND ACETAMINOPHEN 10; 325 MG/1; MG/1
1 TABLET ORAL EVERY 6 HOURS PRN
Status: DISCONTINUED | OUTPATIENT
Start: 2017-01-15 | End: 2017-01-18 | Stop reason: HOSPADM

## 2017-01-15 RX ADMIN — IPRATROPIUM BROMIDE AND ALBUTEROL SULFATE 3 ML: .5; 3 SOLUTION RESPIRATORY (INHALATION) at 06:58

## 2017-01-15 RX ADMIN — RIVAROXABAN 15 MG: 15 TABLET, FILM COATED ORAL at 17:45

## 2017-01-15 RX ADMIN — FUROSEMIDE 20 MG: 20 TABLET ORAL at 08:07

## 2017-01-15 RX ADMIN — PREDNISONE 20 MG: 20 TABLET ORAL at 08:08

## 2017-01-15 RX ADMIN — GABAPENTIN 300 MG: 300 CAPSULE ORAL at 17:45

## 2017-01-15 RX ADMIN — LEVOTHYROXINE SODIUM 75 MCG: 75 TABLET ORAL at 17:45

## 2017-01-15 RX ADMIN — CEFTRIAXONE 1 G: 1 INJECTION, POWDER, FOR SOLUTION INTRAMUSCULAR; INTRAVENOUS at 17:45

## 2017-01-15 RX ADMIN — DOCUSATE SODIUM 200 MG: 100 CAPSULE, LIQUID FILLED ORAL at 08:08

## 2017-01-15 RX ADMIN — PANTOPRAZOLE SODIUM 40 MG: 40 TABLET, DELAYED RELEASE ORAL at 17:45

## 2017-01-15 RX ADMIN — IPRATROPIUM BROMIDE AND ALBUTEROL SULFATE 3 ML: .5; 3 SOLUTION RESPIRATORY (INHALATION) at 14:47

## 2017-01-15 RX ADMIN — IPRATROPIUM BROMIDE AND ALBUTEROL SULFATE 3 ML: .5; 3 SOLUTION RESPIRATORY (INHALATION) at 11:07

## 2017-01-15 RX ADMIN — HYDROCHLOROTHIAZIDE 6.25 MG: 25 TABLET ORAL at 08:08

## 2017-01-15 RX ADMIN — PANTOPRAZOLE SODIUM 40 MG: 40 TABLET, DELAYED RELEASE ORAL at 08:07

## 2017-01-15 RX ADMIN — GABAPENTIN 300 MG: 300 CAPSULE ORAL at 08:08

## 2017-01-15 RX ADMIN — AZITHROMYCIN 500 MG: 500 INJECTION, POWDER, LYOPHILIZED, FOR SOLUTION INTRAVENOUS at 18:48

## 2017-01-15 RX ADMIN — HYDROCHLOROTHIAZIDE 25 MG: 25 TABLET ORAL at 06:25

## 2017-01-15 RX ADMIN — LISINOPRIL 10 MG: 10 TABLET ORAL at 08:07

## 2017-01-15 RX ADMIN — IPRATROPIUM BROMIDE AND ALBUTEROL SULFATE 3 ML: .5; 3 SOLUTION RESPIRATORY (INHALATION) at 18:38

## 2017-01-16 PROCEDURE — 63710000001 PREDNISONE PER 5 MG: Performed by: INTERNAL MEDICINE

## 2017-01-16 PROCEDURE — 94640 AIRWAY INHALATION TREATMENT: CPT

## 2017-01-16 PROCEDURE — 94799 UNLISTED PULMONARY SVC/PX: CPT

## 2017-01-16 PROCEDURE — 99222 1ST HOSP IP/OBS MODERATE 55: CPT | Performed by: INTERNAL MEDICINE

## 2017-01-16 PROCEDURE — 99232 SBSQ HOSP IP/OBS MODERATE 35: CPT | Performed by: INTERNAL MEDICINE

## 2017-01-16 RX ORDER — CEFPODOXIME PROXETIL 200 MG/1
200 TABLET, FILM COATED ORAL EVERY 12 HOURS SCHEDULED
Status: DISCONTINUED | OUTPATIENT
Start: 2017-01-16 | End: 2017-01-18 | Stop reason: HOSPADM

## 2017-01-16 RX ORDER — IPRATROPIUM BROMIDE AND ALBUTEROL SULFATE 2.5; .5 MG/3ML; MG/3ML
3 SOLUTION RESPIRATORY (INHALATION)
Status: DISCONTINUED | OUTPATIENT
Start: 2017-01-16 | End: 2017-01-18 | Stop reason: HOSPADM

## 2017-01-16 RX ORDER — POLYETHYLENE GLYCOL 3350, SODIUM CHLORIDE, POTASSIUM CHLORIDE, SODIUM BICARBONATE, AND SODIUM SULFATE 240; 5.84; 2.98; 6.72; 22.72 G/4L; G/4L; G/4L; G/4L; G/4L
4000 POWDER, FOR SOLUTION ORAL ONCE
Status: COMPLETED | OUTPATIENT
Start: 2017-01-16 | End: 2017-01-16

## 2017-01-16 RX ADMIN — PANTOPRAZOLE SODIUM 40 MG: 40 TABLET, DELAYED RELEASE ORAL at 17:26

## 2017-01-16 RX ADMIN — PREDNISONE 15 MG: 5 TABLET ORAL at 09:04

## 2017-01-16 RX ADMIN — GABAPENTIN 300 MG: 300 CAPSULE ORAL at 17:26

## 2017-01-16 RX ADMIN — DOCUSATE SODIUM 200 MG: 100 CAPSULE, LIQUID FILLED ORAL at 09:04

## 2017-01-16 RX ADMIN — LEVOTHYROXINE SODIUM 75 MCG: 75 TABLET ORAL at 17:27

## 2017-01-16 RX ADMIN — CEFPODOXIME PROXETIL 200 MG: 200 TABLET, FILM COATED ORAL at 20:20

## 2017-01-16 RX ADMIN — IPRATROPIUM BROMIDE AND ALBUTEROL SULFATE 3 ML: .5; 3 SOLUTION RESPIRATORY (INHALATION) at 19:11

## 2017-01-16 RX ADMIN — LISINOPRIL 10 MG: 10 TABLET ORAL at 09:04

## 2017-01-16 RX ADMIN — FUROSEMIDE 20 MG: 20 TABLET ORAL at 09:03

## 2017-01-16 RX ADMIN — POLYETHYLENE GLYCOL 3350, SODIUM CHLORIDE, POTASSIUM CHLORIDE, SODIUM BICARBONATE, AND SODIUM SULFATE 4000 ML: 240; 5.84; 2.98; 6.72; 22.72 POWDER, FOR SOLUTION ORAL at 15:04

## 2017-01-16 RX ADMIN — IPRATROPIUM BROMIDE AND ALBUTEROL SULFATE 3 ML: .5; 3 SOLUTION RESPIRATORY (INHALATION) at 06:18

## 2017-01-16 RX ADMIN — CEFPODOXIME PROXETIL 200 MG: 200 TABLET, FILM COATED ORAL at 09:04

## 2017-01-16 RX ADMIN — HYDROCHLOROTHIAZIDE 25 MG: 25 TABLET ORAL at 05:30

## 2017-01-16 RX ADMIN — IPRATROPIUM BROMIDE AND ALBUTEROL SULFATE 3 ML: .5; 3 SOLUTION RESPIRATORY (INHALATION) at 11:15

## 2017-01-16 RX ADMIN — IPRATROPIUM BROMIDE AND ALBUTEROL SULFATE 3 ML: .5; 3 SOLUTION RESPIRATORY (INHALATION) at 03:10

## 2017-01-16 RX ADMIN — BISACODYL 10 MG: 5 TABLET, COATED ORAL at 15:03

## 2017-01-16 RX ADMIN — GABAPENTIN 300 MG: 300 CAPSULE ORAL at 09:04

## 2017-01-16 RX ADMIN — PANTOPRAZOLE SODIUM 40 MG: 40 TABLET, DELAYED RELEASE ORAL at 09:04

## 2017-01-16 RX ADMIN — HYDROCHLOROTHIAZIDE 6.25 MG: 25 TABLET ORAL at 09:05

## 2017-01-17 ENCOUNTER — ANESTHESIA EVENT (OUTPATIENT)
Dept: PERIOP | Facility: HOSPITAL | Age: 82
End: 2017-01-17

## 2017-01-17 ENCOUNTER — ANESTHESIA (OUTPATIENT)
Dept: PERIOP | Facility: HOSPITAL | Age: 82
End: 2017-01-17

## 2017-01-17 LAB
BH CV ECHO MEAS - % IVS THICK: 7.7 %
BH CV ECHO MEAS - % LVPW THICK: -4.8 %
BH CV ECHO MEAS - ACS: 1.8 CM
BH CV ECHO MEAS - AO ROOT AREA (BSA CORRECTED): 1.7
BH CV ECHO MEAS - AO ROOT AREA: 6.8 CM^2
BH CV ECHO MEAS - AO ROOT DIAM: 2.9 CM
BH CV ECHO MEAS - BSA(HAYCOCK): 1.8 M^2
BH CV ECHO MEAS - BSA: 1.8 M^2
BH CV ECHO MEAS - BZI_BMI: 28.9 KILOGRAMS/M^2
BH CV ECHO MEAS - BZI_METRIC_HEIGHT: 160 CM
BH CV ECHO MEAS - BZI_METRIC_WEIGHT: 73.9 KG
BH CV ECHO MEAS - CONTRAST EF 4CH: 55 ML/M^2
BH CV ECHO MEAS - EDV(CUBED): 126.7 ML
BH CV ECHO MEAS - EDV(MOD-SP4): 40 ML
BH CV ECHO MEAS - EDV(TEICH): 119.5 ML
BH CV ECHO MEAS - EF(CUBED): 65.6 %
BH CV ECHO MEAS - EF(MOD-SP4): 55 %
BH CV ECHO MEAS - EF(TEICH): 56.9 %
BH CV ECHO MEAS - ESV(CUBED): 43.6 ML
BH CV ECHO MEAS - ESV(MOD-SP4): 18 ML
BH CV ECHO MEAS - ESV(TEICH): 51.6 ML
BH CV ECHO MEAS - FS: 29.9 %
BH CV ECHO MEAS - IVS/LVPW: 0.93
BH CV ECHO MEAS - IVSD: 1.4 CM
BH CV ECHO MEAS - IVSS: 1.5 CM
BH CV ECHO MEAS - LA DIMENSION: 4.1 CM
BH CV ECHO MEAS - LA/AO: 1.4
BH CV ECHO MEAS - LV DIASTOLIC VOL/BSA (35-75): 22.6 ML/M^2
BH CV ECHO MEAS - LV MASS(C)D: 320 GRAMS
BH CV ECHO MEAS - LV MASS(C)DI: 180.5 GRAMS/M^2
BH CV ECHO MEAS - LV MASS(C)S: 195.6 GRAMS
BH CV ECHO MEAS - LV MASS(C)SI: 110.3 GRAMS/M^2
BH CV ECHO MEAS - LV SYSTOLIC VOL/BSA (12-30): 10.2 ML/M^2
BH CV ECHO MEAS - LVIDD: 5 CM
BH CV ECHO MEAS - LVIDS: 3.5 CM
BH CV ECHO MEAS - LVLD AP4: 6.2 CM
BH CV ECHO MEAS - LVLS AP4: 5.5 CM
BH CV ECHO MEAS - LVOT AREA (M): 2.5 CM^2
BH CV ECHO MEAS - LVOT AREA: 2.4 CM^2
BH CV ECHO MEAS - LVOT DIAM: 1.8 CM
BH CV ECHO MEAS - LVPWD: 1.5 CM
BH CV ECHO MEAS - LVPWS: 1.5 CM
BH CV ECHO MEAS - MV DEC SLOPE: 740.5 CM/SEC^2
BH CV ECHO MEAS - MV E MAX VEL: 117.8 CM/SEC
BH CV ECHO MEAS - MV P1/2T MAX VEL: 119.1 CM/SEC
BH CV ECHO MEAS - MV P1/2T: 47.1 MSEC
BH CV ECHO MEAS - MVA P1/2T LCG: 1.8 CM^2
BH CV ECHO MEAS - MVA(P1/2T): 4.7 CM^2
BH CV ECHO MEAS - RAP SYSTOLE: 10 MMHG
BH CV ECHO MEAS - RVDD: 1.2 CM
BH CV ECHO MEAS - RVSP: 40.4 MMHG
BH CV ECHO MEAS - SI(CUBED): 46.9 ML/M^2
BH CV ECHO MEAS - SI(MOD-SP4): 12.4 ML/M^2
BH CV ECHO MEAS - SI(TEICH): 38.3 ML/M^2
BH CV ECHO MEAS - SV(CUBED): 83.1 ML
BH CV ECHO MEAS - SV(MOD-SP4): 22 ML
BH CV ECHO MEAS - SV(TEICH): 68 ML
BH CV ECHO MEAS - TR MAX VEL: 275.5 CM/SEC

## 2017-01-17 PROCEDURE — 25010000002 PROPOFOL 10 MG/ML EMULSION: Performed by: NURSE ANESTHETIST, CERTIFIED REGISTERED

## 2017-01-17 PROCEDURE — 43239 EGD BIOPSY SINGLE/MULTIPLE: CPT | Performed by: INTERNAL MEDICINE

## 2017-01-17 PROCEDURE — 25010000002 MIDAZOLAM PER 1 MG: Performed by: NURSE ANESTHETIST, CERTIFIED REGISTERED

## 2017-01-17 PROCEDURE — 45385 COLONOSCOPY W/LESION REMOVAL: CPT | Performed by: INTERNAL MEDICINE

## 2017-01-17 PROCEDURE — 0DBP8ZZ EXCISION OF RECTUM, VIA NATURAL OR ARTIFICIAL OPENING ENDOSCOPIC: ICD-10-PCS | Performed by: INTERNAL MEDICINE

## 2017-01-17 PROCEDURE — 94799 UNLISTED PULMONARY SVC/PX: CPT

## 2017-01-17 PROCEDURE — 43248 EGD GUIDE WIRE INSERTION: CPT | Performed by: INTERNAL MEDICINE

## 2017-01-17 PROCEDURE — 0DB58ZX EXCISION OF ESOPHAGUS, VIA NATURAL OR ARTIFICIAL OPENING ENDOSCOPIC, DIAGNOSTIC: ICD-10-PCS | Performed by: INTERNAL MEDICINE

## 2017-01-17 PROCEDURE — 63710000001 PREDNISONE PER 5 MG: Performed by: INTERNAL MEDICINE

## 2017-01-17 PROCEDURE — 0DB68ZX EXCISION OF STOMACH, VIA NATURAL OR ARTIFICIAL OPENING ENDOSCOPIC, DIAGNOSTIC: ICD-10-PCS | Performed by: INTERNAL MEDICINE

## 2017-01-17 PROCEDURE — 94640 AIRWAY INHALATION TREATMENT: CPT

## 2017-01-17 PROCEDURE — 0DB98ZX EXCISION OF DUODENUM, VIA NATURAL OR ARTIFICIAL OPENING ENDOSCOPIC, DIAGNOSTIC: ICD-10-PCS | Performed by: INTERNAL MEDICINE

## 2017-01-17 PROCEDURE — 99232 SBSQ HOSP IP/OBS MODERATE 35: CPT | Performed by: INTERNAL MEDICINE

## 2017-01-17 PROCEDURE — 25010000002 ONDANSETRON PER 1 MG: Performed by: INTERNAL MEDICINE

## 2017-01-17 PROCEDURE — 88305 TISSUE EXAM BY PATHOLOGIST: CPT | Performed by: INTERNAL MEDICINE

## 2017-01-17 PROCEDURE — 25010000002 FENTANYL CITRATE (PF) 100 MCG/2ML SOLUTION: Performed by: NURSE ANESTHETIST, CERTIFIED REGISTERED

## 2017-01-17 RX ORDER — FENTANYL CITRATE 50 UG/ML
50 INJECTION, SOLUTION INTRAMUSCULAR; INTRAVENOUS
Status: DISCONTINUED | OUTPATIENT
Start: 2017-01-17 | End: 2017-01-17 | Stop reason: HOSPADM

## 2017-01-17 RX ORDER — MIDAZOLAM HYDROCHLORIDE 1 MG/ML
INJECTION INTRAMUSCULAR; INTRAVENOUS AS NEEDED
Status: DISCONTINUED | OUTPATIENT
Start: 2017-01-17 | End: 2017-01-17 | Stop reason: SURG

## 2017-01-17 RX ORDER — IPRATROPIUM BROMIDE AND ALBUTEROL SULFATE 2.5; .5 MG/3ML; MG/3ML
3 SOLUTION RESPIRATORY (INHALATION) ONCE AS NEEDED
Status: DISCONTINUED | OUTPATIENT
Start: 2017-01-17 | End: 2017-01-17 | Stop reason: HOSPADM

## 2017-01-17 RX ORDER — LIDOCAINE HYDROCHLORIDE 20 MG/ML
INJECTION, SOLUTION INFILTRATION; PERINEURAL AS NEEDED
Status: DISCONTINUED | OUTPATIENT
Start: 2017-01-17 | End: 2017-01-17 | Stop reason: SURG

## 2017-01-17 RX ORDER — PROPOFOL 10 MG/ML
VIAL (ML) INTRAVENOUS AS NEEDED
Status: DISCONTINUED | OUTPATIENT
Start: 2017-01-17 | End: 2017-01-17 | Stop reason: SURG

## 2017-01-17 RX ORDER — MEPERIDINE HYDROCHLORIDE 25 MG/ML
12.5 INJECTION INTRAMUSCULAR; INTRAVENOUS; SUBCUTANEOUS
Status: DISCONTINUED | OUTPATIENT
Start: 2017-01-17 | End: 2017-01-17 | Stop reason: HOSPADM

## 2017-01-17 RX ORDER — SODIUM CHLORIDE, SODIUM LACTATE, POTASSIUM CHLORIDE, CALCIUM CHLORIDE 600; 310; 30; 20 MG/100ML; MG/100ML; MG/100ML; MG/100ML
125 INJECTION, SOLUTION INTRAVENOUS CONTINUOUS
Status: DISCONTINUED | OUTPATIENT
Start: 2017-01-17 | End: 2017-01-18 | Stop reason: HOSPADM

## 2017-01-17 RX ORDER — SODIUM CHLORIDE 0.9 % (FLUSH) 0.9 %
1-10 SYRINGE (ML) INJECTION AS NEEDED
Status: DISCONTINUED | OUTPATIENT
Start: 2017-01-17 | End: 2017-01-17 | Stop reason: HOSPADM

## 2017-01-17 RX ORDER — OXYCODONE HYDROCHLORIDE AND ACETAMINOPHEN 5; 325 MG/1; MG/1
1 TABLET ORAL ONCE AS NEEDED
Status: DISCONTINUED | OUTPATIENT
Start: 2017-01-17 | End: 2017-01-17 | Stop reason: HOSPADM

## 2017-01-17 RX ORDER — FENTANYL CITRATE 50 UG/ML
INJECTION, SOLUTION INTRAMUSCULAR; INTRAVENOUS AS NEEDED
Status: DISCONTINUED | OUTPATIENT
Start: 2017-01-17 | End: 2017-01-17 | Stop reason: SURG

## 2017-01-17 RX ADMIN — ONDANSETRON 4 MG: 2 INJECTION, SOLUTION INTRAMUSCULAR; INTRAVENOUS at 12:16

## 2017-01-17 RX ADMIN — PANTOPRAZOLE SODIUM 40 MG: 40 TABLET, DELAYED RELEASE ORAL at 13:28

## 2017-01-17 RX ADMIN — DOCUSATE SODIUM 200 MG: 100 CAPSULE, LIQUID FILLED ORAL at 13:28

## 2017-01-17 RX ADMIN — CEFPODOXIME PROXETIL 200 MG: 200 TABLET, FILM COATED ORAL at 21:17

## 2017-01-17 RX ADMIN — LIDOCAINE HYDROCHLORIDE 60 MG: 20 INJECTION, SOLUTION INFILTRATION; PERINEURAL at 10:46

## 2017-01-17 RX ADMIN — HYDROCHLOROTHIAZIDE 6.25 MG: 25 TABLET ORAL at 13:35

## 2017-01-17 RX ADMIN — IPRATROPIUM BROMIDE AND ALBUTEROL SULFATE 3 ML: .5; 3 SOLUTION RESPIRATORY (INHALATION) at 12:25

## 2017-01-17 RX ADMIN — FENTANYL CITRATE 25 MCG: 50 INJECTION INTRAMUSCULAR; INTRAVENOUS at 10:43

## 2017-01-17 RX ADMIN — LEVOTHYROXINE SODIUM 75 MCG: 75 TABLET ORAL at 17:48

## 2017-01-17 RX ADMIN — GABAPENTIN 300 MG: 300 CAPSULE ORAL at 17:49

## 2017-01-17 RX ADMIN — FENTANYL CITRATE 25 MCG: 50 INJECTION INTRAMUSCULAR; INTRAVENOUS at 10:46

## 2017-01-17 RX ADMIN — LISINOPRIL 10 MG: 10 TABLET ORAL at 13:28

## 2017-01-17 RX ADMIN — IPRATROPIUM BROMIDE AND ALBUTEROL SULFATE 3 ML: .5; 3 SOLUTION RESPIRATORY (INHALATION) at 06:32

## 2017-01-17 RX ADMIN — PROPOFOL 30 MG: 10 INJECTION, EMULSION INTRAVENOUS at 11:05

## 2017-01-17 RX ADMIN — MIDAZOLAM HYDROCHLORIDE 2 MG: 1 INJECTION, SOLUTION INTRAMUSCULAR; INTRAVENOUS at 10:42

## 2017-01-17 RX ADMIN — FENTANYL CITRATE 25 MCG: 50 INJECTION INTRAMUSCULAR; INTRAVENOUS at 10:55

## 2017-01-17 RX ADMIN — FUROSEMIDE 20 MG: 20 TABLET ORAL at 13:29

## 2017-01-17 RX ADMIN — GABAPENTIN 300 MG: 300 CAPSULE ORAL at 13:28

## 2017-01-17 RX ADMIN — PREDNISONE 15 MG: 5 TABLET ORAL at 13:28

## 2017-01-17 RX ADMIN — PANTOPRAZOLE SODIUM 40 MG: 40 TABLET, DELAYED RELEASE ORAL at 17:49

## 2017-01-17 RX ADMIN — SODIUM CHLORIDE, POTASSIUM CHLORIDE, SODIUM LACTATE AND CALCIUM CHLORIDE: 600; 310; 30; 20 INJECTION, SOLUTION INTRAVENOUS at 10:42

## 2017-01-17 RX ADMIN — PROPOFOL 30 MG: 10 INJECTION, EMULSION INTRAVENOUS at 10:55

## 2017-01-17 RX ADMIN — IPRATROPIUM BROMIDE AND ALBUTEROL SULFATE 3 ML: .5; 3 SOLUTION RESPIRATORY (INHALATION) at 19:19

## 2017-01-17 RX ADMIN — RIVAROXABAN 15 MG: 15 TABLET, FILM COATED ORAL at 17:48

## 2017-01-17 RX ADMIN — FENTANYL CITRATE 25 MCG: 50 INJECTION INTRAMUSCULAR; INTRAVENOUS at 10:50

## 2017-01-17 RX ADMIN — IPRATROPIUM BROMIDE AND ALBUTEROL SULFATE 3 ML: .5; 3 SOLUTION RESPIRATORY (INHALATION) at 00:49

## 2017-01-17 RX ADMIN — PROPOFOL 30 MG: 10 INJECTION, EMULSION INTRAVENOUS at 11:00

## 2017-01-17 RX ADMIN — PROPOFOL 30 MG: 10 INJECTION, EMULSION INTRAVENOUS at 10:50

## 2017-01-17 RX ADMIN — PROPOFOL 30 MG: 10 INJECTION, EMULSION INTRAVENOUS at 10:46

## 2017-01-17 RX ADMIN — CEFPODOXIME PROXETIL 200 MG: 200 TABLET, FILM COATED ORAL at 13:29

## 2017-01-17 NOTE — ANESTHESIA PREPROCEDURE EVALUATION
Anesthesia Evaluation      Airway   Mallampati: II  TM distance: >3 FB  Neck ROM: full  no difficulty expected  Dental    (+) edentulous    Pulmonary - normal exam   (+) pneumonia , asthma,   Cardiovascular - normal exam  (+) hypertension, CAD, dysrhythmias, CHF,     Neuro/Psych  GI/Hepatic/Renal/Endo      Musculoskeletal     Abdominal  - normal exam   Substance History      OB/GYN          Other                             Anesthesia Plan    ASA 3     general     intravenous induction   Anesthetic plan and risks discussed with patient.

## 2017-01-17 NOTE — ANESTHESIA POSTPROCEDURE EVALUATION
Patient: Desiree LAND [place]    Procedure Summary     Date Anesthesia Start Anesthesia Stop Room / Location    01/17/17 1042 1110 [facility] 10 / [facility]       Procedure Diagnosis Surgeon Provider    COLONOSCOPY (N/A ); ESOPHAGOGASTRODUODENOSCOPY (N/A Esophagus) Nausea; Hx of colonic polyps; Family history of colon cancer; Anemia, unspecified type; Dysphagia, unspecified type  (Nausea [R11.0]; Hx of colonic polyps [Z86.010]; Family history of colon cancer [Z80.0]; Anemia, unspecified type [D64.9]; Dysphagia, unspecified type [R13.10]) MD Hugo John III, MD          Anesthesia Type: general  Last vitals  /62 (01/17/17 1112)    Temp 98 °F (36.7 °C) (01/17/17 1112)    Pulse 75 (01/17/17 1112)   Resp 20 (01/17/17 1112)    SpO2 100 % (01/17/17 1112)      Post Anesthesia Care and Evaluation    Patient location during evaluation: PHASE II  Patient participation: complete - patient participated  Level of consciousness: awake and alert  Pain score: 1  Pain management: adequate  Airway patency: patent  Anesthetic complications: No anesthetic complications    Cardiovascular status: acceptable  Respiratory status: acceptable  Hydration status: acceptable

## 2017-01-18 VITALS
DIASTOLIC BLOOD PRESSURE: 82 MMHG | WEIGHT: 162.1 LBS | OXYGEN SATURATION: 98 % | BODY MASS INDEX: 28.72 KG/M2 | HEIGHT: 63 IN | SYSTOLIC BLOOD PRESSURE: 159 MMHG | TEMPERATURE: 97.4 F | RESPIRATION RATE: 18 BRPM | HEART RATE: 63 BPM

## 2017-01-18 LAB
LAB AP CASE REPORT: NORMAL
Lab: NORMAL
PATH REPORT.FINAL DX SPEC: NORMAL

## 2017-01-18 PROCEDURE — 94640 AIRWAY INHALATION TREATMENT: CPT

## 2017-01-18 PROCEDURE — 63710000001 PREDNISONE PER 5 MG: Performed by: INTERNAL MEDICINE

## 2017-01-18 PROCEDURE — 94799 UNLISTED PULMONARY SVC/PX: CPT

## 2017-01-18 RX ORDER — CEFPODOXIME PROXETIL 200 MG/1
200 TABLET, FILM COATED ORAL EVERY 12 HOURS SCHEDULED
Qty: 10 TABLET | Refills: 0 | OUTPATIENT
Start: 2017-01-18 | End: 2019-03-14

## 2017-01-18 RX ORDER — PREDNISONE 1 MG/1
10 TABLET ORAL
Qty: 30 TABLET | Refills: 0 | Status: ON HOLD | OUTPATIENT
Start: 2017-01-18 | End: 2019-07-30

## 2017-01-18 RX ADMIN — HYDROCHLOROTHIAZIDE 25 MG: 25 TABLET ORAL at 06:04

## 2017-01-18 RX ADMIN — PANTOPRAZOLE SODIUM 40 MG: 40 TABLET, DELAYED RELEASE ORAL at 09:07

## 2017-01-18 RX ADMIN — CEFPODOXIME PROXETIL 200 MG: 200 TABLET, FILM COATED ORAL at 09:08

## 2017-01-18 RX ADMIN — LISINOPRIL 10 MG: 10 TABLET ORAL at 09:07

## 2017-01-18 RX ADMIN — FUROSEMIDE 20 MG: 20 TABLET ORAL at 09:08

## 2017-01-18 RX ADMIN — DOCUSATE SODIUM 200 MG: 100 CAPSULE, LIQUID FILLED ORAL at 09:07

## 2017-01-18 RX ADMIN — PREDNISONE 15 MG: 5 TABLET ORAL at 09:07

## 2017-01-18 RX ADMIN — GABAPENTIN 300 MG: 300 CAPSULE ORAL at 09:07

## 2017-01-18 RX ADMIN — IPRATROPIUM BROMIDE AND ALBUTEROL SULFATE 3 ML: .5; 3 SOLUTION RESPIRATORY (INHALATION) at 06:53

## 2017-01-18 RX ADMIN — IPRATROPIUM BROMIDE AND ALBUTEROL SULFATE 3 ML: .5; 3 SOLUTION RESPIRATORY (INHALATION) at 01:20

## 2018-03-16 ENCOUNTER — APPOINTMENT (OUTPATIENT)
Dept: GENERAL RADIOLOGY | Facility: HOSPITAL | Age: 83
End: 2018-03-16

## 2018-03-16 ENCOUNTER — HOSPITAL ENCOUNTER (EMERGENCY)
Facility: HOSPITAL | Age: 83
Discharge: HOME | End: 2018-03-16
Attending: EMERGENCY MEDICINE | Admitting: EMERGENCY MEDICINE

## 2018-03-16 ENCOUNTER — APPOINTMENT (OUTPATIENT)
Dept: CT IMAGING | Facility: HOSPITAL | Age: 83
End: 2018-03-16

## 2018-03-16 VITALS
HEART RATE: 107 BPM | RESPIRATION RATE: 20 BRPM | BODY MASS INDEX: 29.02 KG/M2 | DIASTOLIC BLOOD PRESSURE: 77 MMHG | OXYGEN SATURATION: 98 % | WEIGHT: 170 LBS | SYSTOLIC BLOOD PRESSURE: 132 MMHG | TEMPERATURE: 98.7 F | HEIGHT: 64 IN

## 2018-03-16 DIAGNOSIS — K52.9 GASTROENTERITIS, ACUTE: Primary | ICD-10-CM

## 2018-03-16 LAB
ALBUMIN SERPL-MCNC: 4.6 G/DL (ref 3.4–4.8)
ALBUMIN/GLOB SERPL: 1.4 G/DL (ref 1.5–2.5)
ALP SERPL-CCNC: 62 U/L (ref 35–104)
ALT SERPL W P-5'-P-CCNC: 23 U/L (ref 10–36)
ANION GAP SERPL CALCULATED.3IONS-SCNC: 11.3 MMOL/L (ref 3.6–11.2)
AST SERPL-CCNC: 28 U/L (ref 10–30)
BACTERIA UR QL AUTO: ABNORMAL /HPF
BASOPHILS # BLD AUTO: 0.01 10*3/MM3 (ref 0–0.3)
BASOPHILS NFR BLD AUTO: 0.2 % (ref 0–2)
BILIRUB SERPL-MCNC: 0.9 MG/DL (ref 0.2–1.8)
BILIRUB UR QL STRIP: ABNORMAL
BNP SERPL-MCNC: 68 PG/ML (ref 0–100)
BUN BLD-MCNC: 27 MG/DL (ref 7–21)
BUN/CREAT SERPL: 19.7 (ref 7–25)
CALCIUM SPEC-SCNC: 9.7 MG/DL (ref 7.7–10)
CHLORIDE SERPL-SCNC: 101 MMOL/L (ref 99–112)
CLARITY UR: ABNORMAL
CO2 SERPL-SCNC: 25.7 MMOL/L (ref 24.3–31.9)
COLOR UR: ABNORMAL
CREAT BLD-MCNC: 1.37 MG/DL (ref 0.43–1.29)
CRP SERPL-MCNC: 0.99 MG/DL (ref 0–0.99)
DEPRECATED RDW RBC AUTO: 48.4 FL (ref 37–54)
EOSINOPHIL # BLD AUTO: 0.04 10*3/MM3 (ref 0–0.7)
EOSINOPHIL NFR BLD AUTO: 0.7 % (ref 0–7)
ERYTHROCYTE [DISTWIDTH] IN BLOOD BY AUTOMATED COUNT: 14.9 % (ref 11.5–14.5)
GFR SERPL CREATININE-BSD FRML MDRD: 37 ML/MIN/1.73
GLOBULIN UR ELPH-MCNC: 3.2 GM/DL
GLUCOSE BLD-MCNC: 113 MG/DL (ref 70–110)
GLUCOSE UR STRIP-MCNC: NEGATIVE MG/DL
HCT VFR BLD AUTO: 39 % (ref 37–47)
HGB BLD-MCNC: 13.5 G/DL (ref 12–16)
HGB UR QL STRIP.AUTO: NEGATIVE
HOLD SPECIMEN: NORMAL
HOLD SPECIMEN: NORMAL
HYALINE CASTS UR QL AUTO: ABNORMAL /LPF
IMM GRANULOCYTES # BLD: 0.04 10*3/MM3 (ref 0–0.03)
IMM GRANULOCYTES NFR BLD: 0.7 % (ref 0–0.5)
KETONES UR QL STRIP: ABNORMAL
LEUKOCYTE ESTERASE UR QL STRIP.AUTO: ABNORMAL
LIPASE SERPL-CCNC: 37 U/L (ref 13–60)
LYMPHOCYTES # BLD AUTO: 0.59 10*3/MM3 (ref 1–3)
LYMPHOCYTES NFR BLD AUTO: 9.7 % (ref 16–46)
MCH RBC QN AUTO: 32 PG (ref 27–33)
MCHC RBC AUTO-ENTMCNC: 34.6 G/DL (ref 33–37)
MCV RBC AUTO: 92.4 FL (ref 80–94)
MONOCYTES # BLD AUTO: 0.84 10*3/MM3 (ref 0.1–0.9)
MONOCYTES NFR BLD AUTO: 13.8 % (ref 0–12)
NEUTROPHILS # BLD AUTO: 4.58 10*3/MM3 (ref 1.4–6.5)
NEUTROPHILS NFR BLD AUTO: 74.9 % (ref 40–75)
NITRITE UR QL STRIP: NEGATIVE
OSMOLALITY SERPL CALC.SUM OF ELEC: 281.6 MOSM/KG (ref 273–305)
PH UR STRIP.AUTO: <=5 [PH] (ref 5–8)
PLATELET # BLD AUTO: 180 10*3/MM3 (ref 130–400)
PMV BLD AUTO: 11.5 FL (ref 6–10)
POTASSIUM BLD-SCNC: 3.7 MMOL/L (ref 3.5–5.3)
PROT SERPL-MCNC: 7.8 G/DL (ref 6–8)
PROT UR QL STRIP: ABNORMAL
RBC # BLD AUTO: 4.22 10*6/MM3 (ref 4.2–5.4)
RBC # UR: ABNORMAL /HPF
REF LAB TEST METHOD: ABNORMAL
SODIUM BLD-SCNC: 138 MMOL/L (ref 135–153)
SP GR UR STRIP: 1.02 (ref 1–1.03)
SQUAMOUS #/AREA URNS HPF: ABNORMAL /HPF
TROPONIN I SERPL-MCNC: 0.05 NG/ML
TROPONIN I SERPL-MCNC: 0.06 NG/ML
UROBILINOGEN UR QL STRIP: ABNORMAL
WBC NRBC COR # BLD: 6.1 10*3/MM3 (ref 4.5–12.5)
WBC UR QL AUTO: ABNORMAL /HPF
WHOLE BLOOD HOLD SPECIMEN: NORMAL
WHOLE BLOOD HOLD SPECIMEN: NORMAL

## 2018-03-16 PROCEDURE — 81001 URINALYSIS AUTO W/SCOPE: CPT | Performed by: PHYSICIAN ASSISTANT

## 2018-03-16 PROCEDURE — 84484 ASSAY OF TROPONIN QUANT: CPT | Performed by: PHYSICIAN ASSISTANT

## 2018-03-16 PROCEDURE — 99283 EMERGENCY DEPT VISIT LOW MDM: CPT

## 2018-03-16 PROCEDURE — 87086 URINE CULTURE/COLONY COUNT: CPT | Performed by: PHYSICIAN ASSISTANT

## 2018-03-16 PROCEDURE — 93010 ELECTROCARDIOGRAM REPORT: CPT | Performed by: INTERNAL MEDICINE

## 2018-03-16 PROCEDURE — 83690 ASSAY OF LIPASE: CPT | Performed by: PHYSICIAN ASSISTANT

## 2018-03-16 PROCEDURE — 85025 COMPLETE CBC W/AUTO DIFF WBC: CPT | Performed by: PHYSICIAN ASSISTANT

## 2018-03-16 PROCEDURE — 71045 X-RAY EXAM CHEST 1 VIEW: CPT

## 2018-03-16 PROCEDURE — 80053 COMPREHEN METABOLIC PANEL: CPT | Performed by: PHYSICIAN ASSISTANT

## 2018-03-16 PROCEDURE — 96361 HYDRATE IV INFUSION ADD-ON: CPT

## 2018-03-16 PROCEDURE — 71045 X-RAY EXAM CHEST 1 VIEW: CPT | Performed by: RADIOLOGY

## 2018-03-16 PROCEDURE — 36415 COLL VENOUS BLD VENIPUNCTURE: CPT

## 2018-03-16 PROCEDURE — 83880 ASSAY OF NATRIURETIC PEPTIDE: CPT | Performed by: PHYSICIAN ASSISTANT

## 2018-03-16 PROCEDURE — 86140 C-REACTIVE PROTEIN: CPT | Performed by: PHYSICIAN ASSISTANT

## 2018-03-16 PROCEDURE — 93005 ELECTROCARDIOGRAM TRACING: CPT | Performed by: PHYSICIAN ASSISTANT

## 2018-03-16 PROCEDURE — 74176 CT ABD & PELVIS W/O CONTRAST: CPT

## 2018-03-16 PROCEDURE — 96360 HYDRATION IV INFUSION INIT: CPT

## 2018-03-16 PROCEDURE — 74176 CT ABD & PELVIS W/O CONTRAST: CPT | Performed by: RADIOLOGY

## 2018-03-16 RX ORDER — DICYCLOMINE HCL 20 MG
20 TABLET ORAL EVERY 6 HOURS PRN
Qty: 20 TABLET | Refills: 0 | Status: ON HOLD | OUTPATIENT
Start: 2018-03-16 | End: 2019-07-30

## 2018-03-16 RX ORDER — SODIUM CHLORIDE 0.9 % (FLUSH) 0.9 %
10 SYRINGE (ML) INJECTION AS NEEDED
Status: DISCONTINUED | OUTPATIENT
Start: 2018-03-16 | End: 2018-03-17 | Stop reason: HOSPADM

## 2018-03-16 RX ADMIN — SODIUM CHLORIDE 1000 ML: 9 INJECTION, SOLUTION INTRAVENOUS at 16:59

## 2018-03-16 RX ADMIN — SODIUM CHLORIDE 1000 ML: 9 INJECTION, SOLUTION INTRAVENOUS at 18:37

## 2018-03-16 NOTE — ED PROVIDER NOTES
Subjective     History provided by:  Patient  Weakness - Generalized   Severity:  Severe  Onset quality:  Gradual  Duration:  4 days  Timing:  Intermittent  Progression:  Worsening  Chronicity:  New  Context: dehydration    Relieved by:  Nothing  Associated symptoms: diarrhea, dizziness, nausea and shortness of breath    Associated symptoms: no abdominal pain, no chest pain, no dysuria, no fever and no vomiting    Diarrhea:     Quality:  Watery    Number of occurrences:  Multiple.     Severity:  Moderate    Duration:  4 days    Timing:  Constant    Progression:  Worsening      Review of Systems   Constitutional: Negative.  Negative for fever.   HENT: Negative.    Respiratory: Positive for shortness of breath.    Cardiovascular: Positive for palpitations. Negative for chest pain.   Gastrointestinal: Positive for diarrhea and nausea. Negative for abdominal pain and vomiting.   Endocrine: Negative.    Genitourinary: Negative.  Negative for dysuria.   Skin: Negative.    Neurological: Positive for dizziness.   Psychiatric/Behavioral: Negative.    All other systems reviewed and are negative.      Past Medical History:   Diagnosis Date   • A-fib    • Anemia 1/13/2017   • Arthritis    • Asthma    • Cancer    • CHF (congestive heart failure)    • Coronary artery disease    • History of transfusion    • Hypertension        Allergies   Allergen Reactions   • Adhesive Tape Rash       Past Surgical History:   Procedure Laterality Date   • BREAST CYST EXCISION Left    • CERVICAL POLYPECTOMY     • COLONOSCOPY N/A 1/17/2017    Procedure: COLONOSCOPY;  Surgeon: Madi Zheng III, MD;  Location: [facility];  Service:    • ENDOSCOPY N/A 1/17/2017    Procedure: ESOPHAGOGASTRODUODENOSCOPY;  Surgeon: Madi Zheng III, MD;  Location: [facility];  Service:    • GALLBLADDER SURGERY         No family history on file.    Social History     Social History   • Marital status: Unknown     Social History Main Topics   • Smoking status:  Never Smoker   • Alcohol use No   • Drug use: No   • Sexual activity: Defer     Other Topics Concern   • Not on file           Objective   Physical Exam   Constitutional: She is oriented to person, place, and time. She appears well-developed and well-nourished. No distress.   HENT:   Head: Normocephalic and atraumatic.   Nose: Nose normal.   Eyes: Conjunctivae are normal.   Neck: Normal range of motion. Neck supple. No JVD present. No tracheal deviation present.   Cardiovascular: Normal heart sounds.    No murmur heard.  Irregularly irregular rhythm   Pulmonary/Chest: Effort normal and breath sounds normal. No respiratory distress. She has no wheezes.   Abdominal: Soft. Bowel sounds are normal. There is tenderness (epigastric).   Musculoskeletal: Normal range of motion. She exhibits no edema or deformity.   Neurological: She is alert and oriented to person, place, and time. No cranial nerve deficit.   Skin: Skin is warm and dry. No rash noted. She is not diaphoretic. No erythema. No pallor.   Psychiatric: She has a normal mood and affect. Her behavior is normal. Thought content normal.   Nursing note and vitals reviewed.      Procedures         ED Course  ED Course   Comment By Time   EKG interpreted by Dr. Quintanilla: Atrial fibrillation nonsignificant ST changes. TIMBO Kruse 03/16 1639   Chest x-ray interpreted by Dr. Quintanilla: No apparent acute cardiopulmonary disease. TIMBO Kruse 03/16 1704   CT abdomen and pelvis interpreted by virtual radiology: No definite acute abnormality seen to account for symptoms.  Nonspecific small bowel pattern could be normal versus possible enteritis. TIMBO Kruse 03/16 2102   Consult to Dr. Goodman, hospitalist currently would not need to be admitted.  Follow-up in ER should symptoms worsen. TIMBO Kruse 03/16 2112                  MDM  Number of Diagnoses or Management Options  Gastroenteritis, acute: new and requires workup     Amount and/or Complexity of Data  Reviewed  Clinical lab tests: reviewed  Tests in the radiology section of CPT®: reviewed  Obtain history from someone other than the patient: yes  Discuss the patient with other providers: yes    Risk of Complications, Morbidity, and/or Mortality  Presenting problems: moderate  Diagnostic procedures: moderate  Management options: low    Patient Progress  Patient progress: stable      Final diagnoses:   Gastroenteritis, acute            TIMBO Kruse  03/17/18 0124       TIMBO Kruse  03/17/18 0146

## 2018-03-19 ENCOUNTER — PATIENT OUTREACH (OUTPATIENT)
Dept: CASE MANAGEMENT | Facility: OTHER | Age: 83
End: 2018-03-19

## 2018-03-19 ENCOUNTER — EPISODE CHANGES (OUTPATIENT)
Dept: CASE MANAGEMENT | Facility: OTHER | Age: 83
End: 2018-03-19

## 2018-03-19 LAB — BACTERIA SPEC AEROBE CULT: NORMAL

## 2018-03-19 NOTE — OUTREACH NOTE
Current Barriers & Concerns:     The main concerns and/or symptoms the patient would like to address are: gastroenteritis.    Education/instruction provided by Care Coordinator: Patient had an ED visit Saturday for gastroenteritis, patient states she is feeling much better today, and has been able to eat a little bit. CC asked about fluids - patient stated she has been keeping fluids down pretty well the whole time other than once or twice. CC encouraged patient to sip fluids, especially water as this would be good for her kidneys as well as keep her hydrated, patient voiced understanding. CC advised patient to schedule an appointment with her PCP if symptoms persist or worsen, patient voiced understanding. CC advised pt of [facility] for any non-emergent illnesses/injuries.    Follow Up Outreach Due: PRN

## 2019-03-14 ENCOUNTER — HOSPITAL ENCOUNTER (EMERGENCY)
Facility: HOSPITAL | Age: 84
Discharge: HOME | End: 2019-03-14
Attending: EMERGENCY MEDICINE | Admitting: EMERGENCY MEDICINE

## 2019-03-14 VITALS
WEIGHT: 165 LBS | BODY MASS INDEX: 28.17 KG/M2 | OXYGEN SATURATION: 98 % | SYSTOLIC BLOOD PRESSURE: 140 MMHG | RESPIRATION RATE: 16 BRPM | TEMPERATURE: 98.2 F | DIASTOLIC BLOOD PRESSURE: 62 MMHG | HEIGHT: 64 IN | HEART RATE: 70 BPM

## 2019-03-14 DIAGNOSIS — S61.511A WRIST LACERATION, RIGHT, INITIAL ENCOUNTER: Primary | ICD-10-CM

## 2019-03-14 PROCEDURE — 25010000002 TDAP 5-2.5-18.5 LF-MCG/0.5 SUSPENSION: Performed by: EMERGENCY MEDICINE

## 2019-03-14 PROCEDURE — 99284 EMERGENCY DEPT VISIT MOD MDM: CPT

## 2019-03-14 PROCEDURE — 90715 TDAP VACCINE 7 YRS/> IM: CPT | Performed by: EMERGENCY MEDICINE

## 2019-03-14 PROCEDURE — 90471 IMMUNIZATION ADMIN: CPT | Performed by: EMERGENCY MEDICINE

## 2019-03-14 RX ORDER — CEPHALEXIN 250 MG/1
250 CAPSULE ORAL 3 TIMES DAILY
Qty: 24 CAPSULE | Refills: 0 | Status: ON HOLD | OUTPATIENT
Start: 2019-03-14 | End: 2019-07-30

## 2019-03-14 RX ORDER — CEPHALEXIN 250 MG/1
500 CAPSULE ORAL ONCE
Status: COMPLETED | OUTPATIENT
Start: 2019-03-14 | End: 2019-03-14

## 2019-03-14 RX ADMIN — CEPHALEXIN 500 MG: 250 CAPSULE ORAL at 18:55

## 2019-03-14 RX ADMIN — TETANUS TOXOID, REDUCED DIPHTHERIA TOXOID AND ACELLULAR PERTUSSIS VACCINE, ADSORBED 0.5 ML: 5; 2.5; 8; 8; 2.5 SUSPENSION INTRAMUSCULAR at 18:50

## 2019-03-14 NOTE — ED PROVIDER NOTES
Subjective   Rooster spurred right wrist        Laceration   Location:  Shoulder/arm  Shoulder/arm laceration location:  R wrist  Length:  3  Quality: avulsion    Bleeding: controlled    Injury mechanism: spurred by rooster.  Pain details:     Quality:  Burning    Severity:  Mild      Review of Systems   Constitutional: Negative.    HENT: Negative.    Eyes: Negative.    Respiratory: Negative.    Cardiovascular: Negative.    Gastrointestinal: Negative.    Endocrine: Negative.    Genitourinary: Negative.    Musculoskeletal: Negative.    Skin:        Skin tears on right wrist   Allergic/Immunologic: Negative.    Neurological: Negative.    Hematological: Negative.    Psychiatric/Behavioral: Negative.        Past Medical History:   Diagnosis Date   • A-fib (CMS/HCC)    • Anemia 1/13/2017   • Arthritis    • Asthma    • Cancer (CMS/[facility])    • CHF (congestive heart failure) (CMS/[facility])    • Coronary artery disease    • History of transfusion    • Hypertension        Allergies   Allergen Reactions   • Adhesive Tape Rash       Past Surgical History:   Procedure Laterality Date   • BREAST CYST EXCISION Left    • CERVICAL POLYPECTOMY     • COLONOSCOPY N/A 1/17/2017    Procedure: COLONOSCOPY;  Surgeon: Madi Zheng III, MD;  Location: [facility];  Service:    • ENDOSCOPY N/A 1/17/2017    Procedure: ESOPHAGOGASTRODUODENOSCOPY;  Surgeon: Madi Zheng III, MD;  Location: [facility];  Service:    • GALLBLADDER SURGERY         History reviewed. No pertinent family history.    Social History     Socioeconomic History   • Marital status: Unknown     Spouse name: Not on file   • Number of children: Not on file   • Years of education: Not on file   • Highest education level: Not on file   Tobacco Use   • Smoking status: Never Smoker   • Smokeless tobacco: Never Used   Substance and Sexual Activity   • Alcohol use: No   • Drug use: No   • Sexual activity: Defer           Objective   Physical Exam   Constitutional: She appears  well-developed and well-nourished.   HENT:   Head: Normocephalic.   Eyes: Pupils are equal, round, and reactive to light.   Cardiovascular: Normal rate.   Pulmonary/Chest: Effort normal.   Abdominal: Soft.   Musculoskeletal: Normal range of motion.   Neurological: She is alert.   Skin:   Skin tears right wrist   Psychiatric: She has a normal mood and affect.   Nursing note and vitals reviewed.      Procedures           ED Course                  MDM      Final diagnoses:   Wrist laceration, right, initial encounter            Giles Chávez MD  03/15/19 8468

## 2019-03-14 NOTE — ED NOTES
Wound cleaned with normal saline, surgical scrub, 4X4,  Covered with nonstick,4x4, wrap     Meliza Jarquin  03/14/19 1908

## 2019-03-18 ENCOUNTER — PATIENT OUTREACH (OUTPATIENT)
Dept: CASE MANAGEMENT | Facility: OTHER | Age: 84
End: 2019-03-18

## 2019-03-18 NOTE — OUTREACH NOTE
CC contacted pt to follow up after ED visit on 3/14/19 after pt was spurred on the wrist by a rooster. Pt states the wound is healing up and she does not notice any redness or drainage. Pt denies fever. Patient states her son is on his way to pick the rooster up now. CC asked pt about following up with physician, pt stated she went last week to try and see him but they were very busy and she could not get in. CC advised pt that if she notices redness/drainage or other s/s infection to call CC and CC would contact physician's office to see if an appointment with any provider might be available, pt voiced understanding.

## 2019-04-08 ENCOUNTER — EPISODE CHANGES (OUTPATIENT)
Dept: CASE MANAGEMENT | Facility: OTHER | Age: 84
End: 2019-04-08

## 2019-06-04 ENCOUNTER — HOSPITAL ENCOUNTER (OUTPATIENT)
Dept: MAMMOGRAPHY | Facility: HOSPITAL | Age: 84
Discharge: HOME | End: 2019-06-04
Admitting: INTERNAL MEDICINE

## 2019-06-04 DIAGNOSIS — Z12.39 SCREENING BREAST EXAMINATION: ICD-10-CM

## 2019-06-04 PROCEDURE — 77067 SCR MAMMO BI INCL CAD: CPT

## 2019-06-04 PROCEDURE — 77063 BREAST TOMOSYNTHESIS BI: CPT | Performed by: RADIOLOGY

## 2019-06-04 PROCEDURE — 77063 BREAST TOMOSYNTHESIS BI: CPT

## 2019-06-04 PROCEDURE — 77067 SCR MAMMO BI INCL CAD: CPT | Performed by: RADIOLOGY

## 2019-07-30 ENCOUNTER — APPOINTMENT (OUTPATIENT)
Dept: CT IMAGING | Facility: HOSPITAL | Age: 84
End: 2019-07-30

## 2019-07-30 ENCOUNTER — HOSPITAL ENCOUNTER (INPATIENT)
Facility: HOSPITAL | Age: 84
LOS: 7 days | Discharge: HOME | End: 2019-08-06
Attending: EMERGENCY MEDICINE | Admitting: FAMILY MEDICINE

## 2019-07-30 DIAGNOSIS — K57.92 ACUTE DIVERTICULITIS: Primary | ICD-10-CM

## 2019-07-30 PROBLEM — Z79.01 CHRONIC ANTICOAGULATION: Chronic | Status: ACTIVE | Noted: 2019-07-30

## 2019-07-30 PROBLEM — J45.909 ASTHMA: Chronic | Status: ACTIVE | Noted: 2019-07-30

## 2019-07-30 PROBLEM — D64.9 ANEMIA: Status: RESOLVED | Noted: 2017-01-13 | Resolved: 2019-07-30

## 2019-07-30 PROBLEM — I25.10 CORONARY ARTERY DISEASE INVOLVING NATIVE HEART: Chronic | Status: ACTIVE | Noted: 2019-07-30

## 2019-07-30 PROBLEM — E03.9 HYPOTHYROIDISM: Chronic | Status: ACTIVE | Noted: 2019-07-30

## 2019-07-30 PROBLEM — I10 ESSENTIAL HYPERTENSION: Chronic | Status: ACTIVE | Noted: 2019-07-30

## 2019-07-30 PROBLEM — I50.32 DIASTOLIC CHF, CHRONIC (HCC): Chronic | Status: ACTIVE | Noted: 2019-07-30

## 2019-07-30 PROBLEM — I48.91 ATRIAL FIBRILLATION (HCC): Chronic | Status: ACTIVE | Noted: 2017-01-13

## 2019-07-30 PROBLEM — Z86.010 HISTORY OF COLONIC POLYPS: Chronic | Status: ACTIVE | Noted: 2019-07-30

## 2019-07-30 PROBLEM — K21.9 GERD WITHOUT ESOPHAGITIS: Chronic | Status: ACTIVE | Noted: 2019-07-30

## 2019-07-30 PROBLEM — J18.9 PNEUMONIA: Status: RESOLVED | Noted: 2017-01-13 | Resolved: 2019-07-30

## 2019-07-30 PROBLEM — G62.9 PERIPHERAL NEUROPATHY: Chronic | Status: ACTIVE | Noted: 2019-07-30

## 2019-07-30 PROBLEM — N18.30 CKD (CHRONIC KIDNEY DISEASE), STAGE III (HCC): Chronic | Status: ACTIVE | Noted: 2019-07-30

## 2019-07-30 LAB
ALBUMIN SERPL-MCNC: 3.9 G/DL (ref 3.5–5.2)
ALBUMIN/GLOB SERPL: 1.1 G/DL
ALP SERPL-CCNC: 61 U/L (ref 39–117)
ALT SERPL W P-5'-P-CCNC: 9 U/L (ref 1–33)
ANION GAP SERPL CALCULATED.3IONS-SCNC: 13.5 MMOL/L (ref 5–15)
AST SERPL-CCNC: 20 U/L (ref 1–32)
BASOPHILS # BLD AUTO: 0.01 10*3/MM3 (ref 0–0.2)
BASOPHILS NFR BLD AUTO: 0.1 % (ref 0–1.5)
BILIRUB SERPL-MCNC: 0.8 MG/DL (ref 0.2–1.2)
BILIRUB UR QL STRIP: NEGATIVE
BUN BLD-MCNC: 22 MG/DL (ref 8–23)
BUN/CREAT SERPL: 18 (ref 7–25)
CALCIUM SPEC-SCNC: 9.4 MG/DL (ref 8.6–10.5)
CHLORIDE SERPL-SCNC: 98 MMOL/L (ref 98–107)
CLARITY UR: CLEAR
CO2 SERPL-SCNC: 24.5 MMOL/L (ref 22–29)
COLOR UR: ABNORMAL
CREAT BLD-MCNC: 1.22 MG/DL (ref 0.57–1)
CRP SERPL-MCNC: 5.27 MG/DL (ref 0–0.5)
D-LACTATE SERPL-SCNC: 1.7 MMOL/L (ref 0.5–2)
DEPRECATED RDW RBC AUTO: 49.4 FL (ref 37–54)
DIGOXIN SERPL-MCNC: 0.6 NG/ML (ref 0.6–1.2)
EOSINOPHIL # BLD AUTO: 0.08 10*3/MM3 (ref 0–0.4)
EOSINOPHIL NFR BLD AUTO: 0.5 % (ref 0.3–6.2)
ERYTHROCYTE [DISTWIDTH] IN BLOOD BY AUTOMATED COUNT: 15.3 % (ref 12.3–15.4)
GFR SERPL CREATININE-BSD FRML MDRD: 42 ML/MIN/1.73
GLOBULIN UR ELPH-MCNC: 3.6 GM/DL
GLUCOSE BLD-MCNC: 151 MG/DL (ref 65–99)
GLUCOSE UR STRIP-MCNC: NEGATIVE MG/DL
HBA1C MFR BLD: 5.3 % (ref 4.8–5.6)
HCT VFR BLD AUTO: 36.7 % (ref 34–46.6)
HGB BLD-MCNC: 12 G/DL (ref 12–15.9)
HGB UR QL STRIP.AUTO: NEGATIVE
IMM GRANULOCYTES # BLD AUTO: 0.06 10*3/MM3 (ref 0–0.05)
IMM GRANULOCYTES NFR BLD AUTO: 0.4 % (ref 0–0.5)
KETONES UR QL STRIP: NEGATIVE
LEUKOCYTE ESTERASE UR QL STRIP.AUTO: NEGATIVE
LIPASE SERPL-CCNC: 39 U/L (ref 13–60)
LYMPHOCYTES # BLD AUTO: 1.03 10*3/MM3 (ref 0.7–3.1)
LYMPHOCYTES NFR BLD AUTO: 7 % (ref 19.6–45.3)
MCH RBC QN AUTO: 29.9 PG (ref 26.6–33)
MCHC RBC AUTO-ENTMCNC: 32.7 G/DL (ref 31.5–35.7)
MCV RBC AUTO: 91.3 FL (ref 79–97)
MONOCYTES # BLD AUTO: 1.42 10*3/MM3 (ref 0.1–0.9)
MONOCYTES NFR BLD AUTO: 9.6 % (ref 5–12)
NEUTROPHILS # BLD AUTO: 12.16 10*3/MM3 (ref 1.7–7)
NEUTROPHILS NFR BLD AUTO: 82.4 % (ref 42.7–76)
NITRITE UR QL STRIP: NEGATIVE
PH UR STRIP.AUTO: 5.5 [PH] (ref 5–8)
PLATELET # BLD AUTO: 196 10*3/MM3 (ref 140–450)
PMV BLD AUTO: 11.5 FL (ref 6–12)
POTASSIUM BLD-SCNC: 4.2 MMOL/L (ref 3.5–5.2)
PROT SERPL-MCNC: 7.5 G/DL (ref 6–8.5)
PROT UR QL STRIP: NEGATIVE
RBC # BLD AUTO: 4.02 10*6/MM3 (ref 3.77–5.28)
SODIUM BLD-SCNC: 136 MMOL/L (ref 136–145)
SP GR UR STRIP: 1.02 (ref 1–1.03)
T4 FREE SERPL-MCNC: 1.2 NG/DL (ref 0.93–1.7)
TSH SERPL DL<=0.05 MIU/L-ACNC: 4.43 MIU/ML (ref 0.27–4.2)
UROBILINOGEN UR QL STRIP: ABNORMAL
WBC NRBC COR # BLD: 14.76 10*3/MM3 (ref 3.4–10.8)

## 2019-07-30 PROCEDURE — 84481 FREE ASSAY (FT-3): CPT | Performed by: PHYSICIAN ASSISTANT

## 2019-07-30 PROCEDURE — 25010000002 PIPERACILLIN-TAZOBACTAM: Performed by: PHYSICIAN ASSISTANT

## 2019-07-30 PROCEDURE — 99223 1ST HOSP IP/OBS HIGH 75: CPT | Performed by: PHYSICIAN ASSISTANT

## 2019-07-30 PROCEDURE — 74176 CT ABD & PELVIS W/O CONTRAST: CPT | Performed by: RADIOLOGY

## 2019-07-30 PROCEDURE — 83690 ASSAY OF LIPASE: CPT | Performed by: NURSE PRACTITIONER

## 2019-07-30 PROCEDURE — 87040 BLOOD CULTURE FOR BACTERIA: CPT | Performed by: NURSE PRACTITIONER

## 2019-07-30 PROCEDURE — 36415 COLL VENOUS BLD VENIPUNCTURE: CPT

## 2019-07-30 PROCEDURE — 25010000002 ONDANSETRON PER 1 MG: Performed by: EMERGENCY MEDICINE

## 2019-07-30 PROCEDURE — 25010000002 MORPHINE PER 10 MG: Performed by: EMERGENCY MEDICINE

## 2019-07-30 PROCEDURE — 84439 ASSAY OF FREE THYROXINE: CPT | Performed by: PHYSICIAN ASSISTANT

## 2019-07-30 PROCEDURE — 83036 HEMOGLOBIN GLYCOSYLATED A1C: CPT | Performed by: PHYSICIAN ASSISTANT

## 2019-07-30 PROCEDURE — 93005 ELECTROCARDIOGRAM TRACING: CPT | Performed by: NURSE PRACTITIONER

## 2019-07-30 PROCEDURE — 86140 C-REACTIVE PROTEIN: CPT | Performed by: PHYSICIAN ASSISTANT

## 2019-07-30 PROCEDURE — 84443 ASSAY THYROID STIM HORMONE: CPT | Performed by: PHYSICIAN ASSISTANT

## 2019-07-30 PROCEDURE — 99285 EMERGENCY DEPT VISIT HI MDM: CPT

## 2019-07-30 PROCEDURE — 80162 ASSAY OF DIGOXIN TOTAL: CPT | Performed by: PHYSICIAN ASSISTANT

## 2019-07-30 PROCEDURE — 74176 CT ABD & PELVIS W/O CONTRAST: CPT

## 2019-07-30 PROCEDURE — 81003 URINALYSIS AUTO W/O SCOPE: CPT | Performed by: NURSE PRACTITIONER

## 2019-07-30 PROCEDURE — 80053 COMPREHEN METABOLIC PANEL: CPT | Performed by: NURSE PRACTITIONER

## 2019-07-30 PROCEDURE — 93010 ELECTROCARDIOGRAM REPORT: CPT | Performed by: INTERNAL MEDICINE

## 2019-07-30 PROCEDURE — 85025 COMPLETE CBC W/AUTO DIFF WBC: CPT | Performed by: NURSE PRACTITIONER

## 2019-07-30 PROCEDURE — 83605 ASSAY OF LACTIC ACID: CPT | Performed by: NURSE PRACTITIONER

## 2019-07-30 PROCEDURE — 94799 UNLISTED PULMONARY SVC/PX: CPT

## 2019-07-30 RX ORDER — ONDANSETRON 2 MG/ML
4 INJECTION INTRAMUSCULAR; INTRAVENOUS ONCE
Status: COMPLETED | OUTPATIENT
Start: 2019-07-30 | End: 2019-07-30

## 2019-07-30 RX ORDER — GABAPENTIN 300 MG/1
300 CAPSULE ORAL EVERY 12 HOURS SCHEDULED
Status: DISCONTINUED | OUTPATIENT
Start: 2019-07-30 | End: 2019-08-06 | Stop reason: HOSPADM

## 2019-07-30 RX ORDER — MORPHINE SULFATE 2 MG/ML
1 INJECTION, SOLUTION INTRAMUSCULAR; INTRAVENOUS EVERY 4 HOURS PRN
Status: DISCONTINUED | OUTPATIENT
Start: 2019-07-30 | End: 2019-07-30 | Stop reason: SDUPTHER

## 2019-07-30 RX ORDER — OXYCODONE AND ACETAMINOPHEN 10; 325 MG/1; MG/1
1 TABLET ORAL EVERY 12 HOURS PRN
Status: DISCONTINUED | OUTPATIENT
Start: 2019-07-30 | End: 2019-08-06 | Stop reason: HOSPADM

## 2019-07-30 RX ORDER — PANTOPRAZOLE SODIUM 40 MG/1
40 TABLET, DELAYED RELEASE ORAL DAILY
Status: CANCELLED | OUTPATIENT
Start: 2019-07-30

## 2019-07-30 RX ORDER — SODIUM CHLORIDE 0.9 % (FLUSH) 0.9 %
3-10 SYRINGE (ML) INJECTION AS NEEDED
Status: DISCONTINUED | OUTPATIENT
Start: 2019-07-30 | End: 2019-08-06 | Stop reason: HOSPADM

## 2019-07-30 RX ORDER — ACETAMINOPHEN 500 MG
500 TABLET ORAL EVERY 6 HOURS PRN
Status: DISCONTINUED | OUTPATIENT
Start: 2019-07-30 | End: 2019-08-06 | Stop reason: HOSPADM

## 2019-07-30 RX ORDER — HYDROCHLOROTHIAZIDE 25 MG/1
25 TABLET ORAL EVERY MORNING
Status: CANCELLED | OUTPATIENT
Start: 2019-07-31

## 2019-07-30 RX ORDER — LEVOTHYROXINE SODIUM 0.07 MG/1
75 TABLET ORAL EVERY EVENING
Status: DISCONTINUED | OUTPATIENT
Start: 2019-07-30 | End: 2019-08-06 | Stop reason: HOSPADM

## 2019-07-30 RX ORDER — SODIUM CHLORIDE 9 MG/ML
125 INJECTION, SOLUTION INTRAVENOUS CONTINUOUS
Status: DISCONTINUED | OUTPATIENT
Start: 2019-07-30 | End: 2019-07-30

## 2019-07-30 RX ORDER — DIGOXIN 125 MCG
125 TABLET ORAL EVERY MORNING
Status: DISCONTINUED | OUTPATIENT
Start: 2019-07-31 | End: 2019-08-06 | Stop reason: HOSPADM

## 2019-07-30 RX ORDER — NITROGLYCERIN 0.4 MG/1
0.4 TABLET SUBLINGUAL
Status: DISCONTINUED | OUTPATIENT
Start: 2019-07-30 | End: 2019-08-06 | Stop reason: HOSPADM

## 2019-07-30 RX ORDER — ACETAMINOPHEN 500 MG
500 TABLET ORAL EVERY 6 HOURS PRN
COMMUNITY
End: 2021-09-19

## 2019-07-30 RX ORDER — ACETAMINOPHEN 325 MG/1
650 TABLET ORAL EVERY 4 HOURS PRN
Status: DISCONTINUED | OUTPATIENT
Start: 2019-07-30 | End: 2019-07-30 | Stop reason: DRUGHIGH

## 2019-07-30 RX ORDER — CHLORZOXAZONE 500 MG/1
500 TABLET ORAL NIGHTLY PRN
Status: ON HOLD | COMMUNITY
End: 2019-09-16

## 2019-07-30 RX ORDER — SODIUM CHLORIDE 0.9 % (FLUSH) 0.9 %
10 SYRINGE (ML) INJECTION AS NEEDED
Status: DISCONTINUED | OUTPATIENT
Start: 2019-07-30 | End: 2019-08-06 | Stop reason: HOSPADM

## 2019-07-30 RX ORDER — ONDANSETRON 2 MG/ML
4 INJECTION INTRAMUSCULAR; INTRAVENOUS EVERY 6 HOURS PRN
Status: DISCONTINUED | OUTPATIENT
Start: 2019-07-30 | End: 2019-08-06 | Stop reason: HOSPADM

## 2019-07-30 RX ORDER — NITAZOXANIDE 500 MG/1
500 TABLET ORAL 2 TIMES DAILY WITH MEALS
Status: DISCONTINUED | OUTPATIENT
Start: 2019-07-30 | End: 2019-08-06 | Stop reason: HOSPADM

## 2019-07-30 RX ORDER — OMEGA-3S/DHA/EPA/FISH OIL/D3 300MG-1000
1000 CAPSULE ORAL DAILY
Status: DISCONTINUED | OUTPATIENT
Start: 2019-07-30 | End: 2019-08-06 | Stop reason: HOSPADM

## 2019-07-30 RX ORDER — PANTOPRAZOLE SODIUM 40 MG/10ML
40 INJECTION, POWDER, LYOPHILIZED, FOR SOLUTION INTRAVENOUS EVERY 12 HOURS SCHEDULED
Status: DISCONTINUED | OUTPATIENT
Start: 2019-07-30 | End: 2019-08-06 | Stop reason: HOSPADM

## 2019-07-30 RX ORDER — CHLORZOXAZONE 500 MG/1
500 TABLET ORAL NIGHTLY PRN
Status: CANCELLED | OUTPATIENT
Start: 2019-07-30

## 2019-07-30 RX ORDER — PANTOPRAZOLE SODIUM 40 MG/10ML
40 INJECTION, POWDER, LYOPHILIZED, FOR SOLUTION INTRAVENOUS
Status: DISCONTINUED | OUTPATIENT
Start: 2019-07-30 | End: 2019-07-30

## 2019-07-30 RX ORDER — FUROSEMIDE 20 MG/1
20 TABLET ORAL DAILY
Status: CANCELLED | OUTPATIENT
Start: 2019-07-30

## 2019-07-30 RX ORDER — MELATONIN
1000 DAILY
Status: ON HOLD | COMMUNITY
End: 2019-09-16

## 2019-07-30 RX ORDER — SODIUM CHLORIDE 9 MG/ML
75 INJECTION, SOLUTION INTRAVENOUS CONTINUOUS
Status: DISCONTINUED | OUTPATIENT
Start: 2019-07-30 | End: 2019-08-06 | Stop reason: HOSPADM

## 2019-07-30 RX ORDER — MORPHINE SULFATE 2 MG/ML
1 INJECTION, SOLUTION INTRAMUSCULAR; INTRAVENOUS ONCE
Status: COMPLETED | OUTPATIENT
Start: 2019-07-30 | End: 2019-07-30

## 2019-07-30 RX ORDER — LISINOPRIL 10 MG/1
10 TABLET ORAL
Status: DISCONTINUED | OUTPATIENT
Start: 2019-07-30 | End: 2019-08-06 | Stop reason: HOSPADM

## 2019-07-30 RX ORDER — SODIUM CHLORIDE 0.9 % (FLUSH) 0.9 %
3 SYRINGE (ML) INJECTION EVERY 12 HOURS SCHEDULED
Status: DISCONTINUED | OUTPATIENT
Start: 2019-07-30 | End: 2019-08-06 | Stop reason: HOSPADM

## 2019-07-30 RX ORDER — MORPHINE SULFATE 2 MG/ML
1 INJECTION, SOLUTION INTRAMUSCULAR; INTRAVENOUS EVERY 4 HOURS PRN
Status: DISCONTINUED | OUTPATIENT
Start: 2019-07-30 | End: 2019-08-06 | Stop reason: HOSPADM

## 2019-07-30 RX ORDER — DOCUSATE SODIUM 100 MG/1
200 CAPSULE, LIQUID FILLED ORAL 2 TIMES DAILY PRN
Status: DISCONTINUED | OUTPATIENT
Start: 2019-07-30 | End: 2019-08-06 | Stop reason: HOSPADM

## 2019-07-30 RX ORDER — NITAZOXANIDE 500 MG/1
500 TABLET ORAL 2 TIMES DAILY WITH MEALS
Status: ON HOLD | COMMUNITY
Start: 2019-07-22 | End: 2019-08-06

## 2019-07-30 RX ADMIN — METRONIDAZOLE 500 MG: 500 INJECTION, SOLUTION INTRAVENOUS at 08:06

## 2019-07-30 RX ADMIN — PIPERACILLIN SODIUM,TAZOBACTAM SODIUM 3.38 G: 3; .375 INJECTION, POWDER, FOR SOLUTION INTRAVENOUS at 19:15

## 2019-07-30 RX ADMIN — PIPERACILLIN SODIUM,TAZOBACTAM SODIUM 3.38 G: 3; .375 INJECTION, POWDER, FOR SOLUTION INTRAVENOUS at 13:19

## 2019-07-30 RX ADMIN — GABAPENTIN 300 MG: 300 CAPSULE ORAL at 20:21

## 2019-07-30 RX ADMIN — SODIUM CHLORIDE 1000 ML: 9 INJECTION, SOLUTION INTRAVENOUS at 06:38

## 2019-07-30 RX ADMIN — METRONIDAZOLE 500 MG: 500 INJECTION, SOLUTION INTRAVENOUS at 17:36

## 2019-07-30 RX ADMIN — PANTOPRAZOLE SODIUM 40 MG: 40 INJECTION, POWDER, FOR SOLUTION INTRAVENOUS at 20:22

## 2019-07-30 RX ADMIN — SODIUM CHLORIDE 125 ML/HR: 9 INJECTION, SOLUTION INTRAVENOUS at 10:26

## 2019-07-30 RX ADMIN — RIVAROXABAN 20 MG: 20 TABLET, FILM COATED ORAL at 17:35

## 2019-07-30 RX ADMIN — CHOLECALCIFEROL TAB 10 MCG (400 UNIT) 1000 UNITS: 10 TAB at 17:35

## 2019-07-30 RX ADMIN — MORPHINE SULFATE 1 MG: 2 INJECTION, SOLUTION INTRAMUSCULAR; INTRAVENOUS at 08:06

## 2019-07-30 RX ADMIN — SODIUM CHLORIDE, PRESERVATIVE FREE 3 ML: 5 INJECTION INTRAVENOUS at 13:20

## 2019-07-30 RX ADMIN — ONDANSETRON 4 MG: 2 INJECTION, SOLUTION INTRAMUSCULAR; INTRAVENOUS at 08:06

## 2019-07-30 RX ADMIN — LISINOPRIL 10 MG: 10 TABLET ORAL at 17:35

## 2019-07-30 RX ADMIN — PANTOPRAZOLE SODIUM 40 MG: 40 INJECTION, POWDER, FOR SOLUTION INTRAVENOUS at 13:20

## 2019-07-30 RX ADMIN — LEVOTHYROXINE SODIUM 75 MCG: 0.07 TABLET ORAL at 17:35

## 2019-07-31 LAB
ANION GAP SERPL CALCULATED.3IONS-SCNC: 12 MMOL/L (ref 5–15)
BASOPHILS # BLD AUTO: 0.01 10*3/MM3 (ref 0–0.2)
BASOPHILS NFR BLD AUTO: 0.1 % (ref 0–1.5)
BUN BLD-MCNC: 16 MG/DL (ref 8–23)
BUN/CREAT SERPL: 13.2 (ref 7–25)
CALCIUM SPEC-SCNC: 8.4 MG/DL (ref 8.6–10.5)
CHLORIDE SERPL-SCNC: 103 MMOL/L (ref 98–107)
CO2 SERPL-SCNC: 23 MMOL/L (ref 22–29)
CREAT BLD-MCNC: 1.21 MG/DL (ref 0.57–1)
CRP SERPL-MCNC: 13.64 MG/DL (ref 0–0.5)
DEPRECATED RDW RBC AUTO: 49.8 FL (ref 37–54)
EOSINOPHIL # BLD AUTO: 0.03 10*3/MM3 (ref 0–0.4)
EOSINOPHIL NFR BLD AUTO: 0.3 % (ref 0.3–6.2)
ERYTHROCYTE [DISTWIDTH] IN BLOOD BY AUTOMATED COUNT: 15.4 % (ref 12.3–15.4)
GFR SERPL CREATININE-BSD FRML MDRD: 42 ML/MIN/1.73
GLUCOSE BLD-MCNC: 121 MG/DL (ref 65–99)
HCT VFR BLD AUTO: 30.7 % (ref 34–46.6)
HGB BLD-MCNC: 9.8 G/DL (ref 12–15.9)
IMM GRANULOCYTES # BLD AUTO: 0.02 10*3/MM3 (ref 0–0.05)
IMM GRANULOCYTES NFR BLD AUTO: 0.2 % (ref 0–0.5)
LYMPHOCYTES # BLD AUTO: 0.83 10*3/MM3 (ref 0.7–3.1)
LYMPHOCYTES NFR BLD AUTO: 8.2 % (ref 19.6–45.3)
MAGNESIUM SERPL-MCNC: 2 MG/DL (ref 1.6–2.4)
MCH RBC QN AUTO: 30.2 PG (ref 26.6–33)
MCHC RBC AUTO-ENTMCNC: 31.9 G/DL (ref 31.5–35.7)
MCV RBC AUTO: 94.8 FL (ref 79–97)
MONOCYTES # BLD AUTO: 1.19 10*3/MM3 (ref 0.1–0.9)
MONOCYTES NFR BLD AUTO: 11.7 % (ref 5–12)
NEUTROPHILS # BLD AUTO: 8.1 10*3/MM3 (ref 1.7–7)
NEUTROPHILS NFR BLD AUTO: 79.5 % (ref 42.7–76)
PHOSPHATE SERPL-MCNC: 3.1 MG/DL (ref 2.5–4.5)
PLATELET # BLD AUTO: 166 10*3/MM3 (ref 140–450)
PMV BLD AUTO: 11.5 FL (ref 6–12)
POTASSIUM BLD-SCNC: 3.5 MMOL/L (ref 3.5–5.2)
RBC # BLD AUTO: 3.24 10*6/MM3 (ref 3.77–5.28)
SODIUM BLD-SCNC: 138 MMOL/L (ref 136–145)
T3FREE SERPL-MCNC: 2.06 PG/ML (ref 2–4.4)
WBC NRBC COR # BLD: 10.18 10*3/MM3 (ref 3.4–10.8)

## 2019-07-31 PROCEDURE — 25010000002 PIPERACILLIN-TAZOBACTAM: Performed by: PHYSICIAN ASSISTANT

## 2019-07-31 PROCEDURE — 99232 SBSQ HOSP IP/OBS MODERATE 35: CPT | Performed by: FAMILY MEDICINE

## 2019-07-31 PROCEDURE — 86140 C-REACTIVE PROTEIN: CPT | Performed by: PHYSICIAN ASSISTANT

## 2019-07-31 PROCEDURE — 85025 COMPLETE CBC W/AUTO DIFF WBC: CPT | Performed by: FAMILY MEDICINE

## 2019-07-31 PROCEDURE — 84100 ASSAY OF PHOSPHORUS: CPT | Performed by: PHYSICIAN ASSISTANT

## 2019-07-31 PROCEDURE — 94799 UNLISTED PULMONARY SVC/PX: CPT

## 2019-07-31 PROCEDURE — 80048 BASIC METABOLIC PNL TOTAL CA: CPT | Performed by: FAMILY MEDICINE

## 2019-07-31 PROCEDURE — 83735 ASSAY OF MAGNESIUM: CPT | Performed by: PHYSICIAN ASSISTANT

## 2019-07-31 RX ORDER — DOCUSATE SODIUM 100 MG/1
100 CAPSULE, LIQUID FILLED ORAL 2 TIMES DAILY
Status: DISCONTINUED | OUTPATIENT
Start: 2019-07-31 | End: 2019-08-06 | Stop reason: HOSPADM

## 2019-07-31 RX ADMIN — CHOLECALCIFEROL TAB 10 MCG (400 UNIT) 1000 UNITS: 10 TAB at 08:31

## 2019-07-31 RX ADMIN — GABAPENTIN 300 MG: 300 CAPSULE ORAL at 20:14

## 2019-07-31 RX ADMIN — LISINOPRIL 10 MG: 10 TABLET ORAL at 08:31

## 2019-07-31 RX ADMIN — ACETAMINOPHEN 500 MG: 500 TABLET ORAL at 17:16

## 2019-07-31 RX ADMIN — PIPERACILLIN SODIUM,TAZOBACTAM SODIUM 3.38 G: 3; .375 INJECTION, POWDER, FOR SOLUTION INTRAVENOUS at 11:56

## 2019-07-31 RX ADMIN — SODIUM CHLORIDE, PRESERVATIVE FREE 3 ML: 5 INJECTION INTRAVENOUS at 09:32

## 2019-07-31 RX ADMIN — PIPERACILLIN SODIUM,TAZOBACTAM SODIUM 3.38 G: 3; .375 INJECTION, POWDER, FOR SOLUTION INTRAVENOUS at 18:00

## 2019-07-31 RX ADMIN — METRONIDAZOLE 500 MG: 500 INJECTION, SOLUTION INTRAVENOUS at 08:31

## 2019-07-31 RX ADMIN — PANTOPRAZOLE SODIUM 40 MG: 40 INJECTION, POWDER, FOR SOLUTION INTRAVENOUS at 20:15

## 2019-07-31 RX ADMIN — DOCUSATE SODIUM 100 MG: 100 CAPSULE ORAL at 20:15

## 2019-07-31 RX ADMIN — LEVOTHYROXINE SODIUM 75 MCG: 0.07 TABLET ORAL at 16:37

## 2019-07-31 RX ADMIN — PIPERACILLIN SODIUM,TAZOBACTAM SODIUM 3.38 G: 3; .375 INJECTION, POWDER, FOR SOLUTION INTRAVENOUS at 03:22

## 2019-07-31 RX ADMIN — PANTOPRAZOLE SODIUM 40 MG: 40 INJECTION, POWDER, FOR SOLUTION INTRAVENOUS at 08:31

## 2019-07-31 RX ADMIN — RIVAROXABAN 20 MG: 20 TABLET, FILM COATED ORAL at 16:37

## 2019-07-31 RX ADMIN — METRONIDAZOLE 500 MG: 500 INJECTION, SOLUTION INTRAVENOUS at 00:25

## 2019-07-31 RX ADMIN — METRONIDAZOLE 500 MG: 500 INJECTION, SOLUTION INTRAVENOUS at 16:37

## 2019-07-31 RX ADMIN — GABAPENTIN 300 MG: 300 CAPSULE ORAL at 08:31

## 2019-07-31 RX ADMIN — SODIUM CHLORIDE 75 ML/HR: 900 INJECTION INTRAVENOUS at 03:22

## 2019-07-31 RX ADMIN — DIGOXIN 125 MCG: 125 TABLET ORAL at 06:13

## 2019-07-31 RX ADMIN — SODIUM CHLORIDE, PRESERVATIVE FREE 3 ML: 5 INJECTION INTRAVENOUS at 20:14

## 2019-08-01 LAB
ANION GAP SERPL CALCULATED.3IONS-SCNC: 9.1 MMOL/L (ref 5–15)
BASOPHILS # BLD AUTO: 0.01 10*3/MM3 (ref 0–0.2)
BASOPHILS NFR BLD AUTO: 0.1 % (ref 0–1.5)
BUN BLD-MCNC: 12 MG/DL (ref 8–23)
BUN/CREAT SERPL: 11.8 (ref 7–25)
CALCIUM SPEC-SCNC: 8.2 MG/DL (ref 8.6–10.5)
CHLORIDE SERPL-SCNC: 104 MMOL/L (ref 98–107)
CO2 SERPL-SCNC: 23.9 MMOL/L (ref 22–29)
CREAT BLD-MCNC: 1.02 MG/DL (ref 0.57–1)
CRP SERPL-MCNC: 13.27 MG/DL (ref 0–0.5)
DEPRECATED RDW RBC AUTO: 50.3 FL (ref 37–54)
EOSINOPHIL # BLD AUTO: 0.06 10*3/MM3 (ref 0–0.4)
EOSINOPHIL NFR BLD AUTO: 0.5 % (ref 0.3–6.2)
ERYTHROCYTE [DISTWIDTH] IN BLOOD BY AUTOMATED COUNT: 15.4 % (ref 12.3–15.4)
GFR SERPL CREATININE-BSD FRML MDRD: 52 ML/MIN/1.73
GLUCOSE BLD-MCNC: 122 MG/DL (ref 65–99)
HCT VFR BLD AUTO: 29.7 % (ref 34–46.6)
HGB BLD-MCNC: 9.4 G/DL (ref 12–15.9)
IMM GRANULOCYTES # BLD AUTO: 0.03 10*3/MM3 (ref 0–0.05)
IMM GRANULOCYTES NFR BLD AUTO: 0.3 % (ref 0–0.5)
LYMPHOCYTES # BLD AUTO: 0.8 10*3/MM3 (ref 0.7–3.1)
LYMPHOCYTES NFR BLD AUTO: 6.8 % (ref 19.6–45.3)
MCH RBC QN AUTO: 29.8 PG (ref 26.6–33)
MCHC RBC AUTO-ENTMCNC: 31.6 G/DL (ref 31.5–35.7)
MCV RBC AUTO: 94.3 FL (ref 79–97)
MONOCYTES # BLD AUTO: 1.03 10*3/MM3 (ref 0.1–0.9)
MONOCYTES NFR BLD AUTO: 8.8 % (ref 5–12)
NEUTROPHILS # BLD AUTO: 9.82 10*3/MM3 (ref 1.7–7)
NEUTROPHILS NFR BLD AUTO: 83.5 % (ref 42.7–76)
PLATELET # BLD AUTO: 157 10*3/MM3 (ref 140–450)
PMV BLD AUTO: 11.6 FL (ref 6–12)
POTASSIUM BLD-SCNC: 3.4 MMOL/L (ref 3.5–5.2)
RBC # BLD AUTO: 3.15 10*6/MM3 (ref 3.77–5.28)
SODIUM BLD-SCNC: 137 MMOL/L (ref 136–145)
WBC NRBC COR # BLD: 11.75 10*3/MM3 (ref 3.4–10.8)

## 2019-08-01 PROCEDURE — 85025 COMPLETE CBC W/AUTO DIFF WBC: CPT | Performed by: FAMILY MEDICINE

## 2019-08-01 PROCEDURE — 25010000002 ONDANSETRON PER 1 MG: Performed by: FAMILY MEDICINE

## 2019-08-01 PROCEDURE — 99232 SBSQ HOSP IP/OBS MODERATE 35: CPT | Performed by: FAMILY MEDICINE

## 2019-08-01 PROCEDURE — 80048 BASIC METABOLIC PNL TOTAL CA: CPT | Performed by: FAMILY MEDICINE

## 2019-08-01 PROCEDURE — 86140 C-REACTIVE PROTEIN: CPT | Performed by: FAMILY MEDICINE

## 2019-08-01 PROCEDURE — 25010000002 PIPERACILLIN-TAZOBACTAM: Performed by: PHYSICIAN ASSISTANT

## 2019-08-01 RX ADMIN — PANTOPRAZOLE SODIUM 40 MG: 40 INJECTION, POWDER, FOR SOLUTION INTRAVENOUS at 08:50

## 2019-08-01 RX ADMIN — LEVOTHYROXINE SODIUM 75 MCG: 0.07 TABLET ORAL at 16:20

## 2019-08-01 RX ADMIN — GABAPENTIN 300 MG: 300 CAPSULE ORAL at 08:50

## 2019-08-01 RX ADMIN — METRONIDAZOLE 500 MG: 500 INJECTION, SOLUTION INTRAVENOUS at 23:52

## 2019-08-01 RX ADMIN — SODIUM CHLORIDE, PRESERVATIVE FREE 3 ML: 5 INJECTION INTRAVENOUS at 08:59

## 2019-08-01 RX ADMIN — DIGOXIN 125 MCG: 125 TABLET ORAL at 05:33

## 2019-08-01 RX ADMIN — SODIUM CHLORIDE 75 ML/HR: 900 INJECTION INTRAVENOUS at 20:47

## 2019-08-01 RX ADMIN — PANTOPRAZOLE SODIUM 40 MG: 40 INJECTION, POWDER, FOR SOLUTION INTRAVENOUS at 20:47

## 2019-08-01 RX ADMIN — DOCUSATE SODIUM 100 MG: 100 CAPSULE ORAL at 08:51

## 2019-08-01 RX ADMIN — PIPERACILLIN SODIUM,TAZOBACTAM SODIUM 3.38 G: 3; .375 INJECTION, POWDER, FOR SOLUTION INTRAVENOUS at 11:39

## 2019-08-01 RX ADMIN — DOCUSATE SODIUM 100 MG: 100 CAPSULE ORAL at 20:47

## 2019-08-01 RX ADMIN — GABAPENTIN 300 MG: 300 CAPSULE ORAL at 20:47

## 2019-08-01 RX ADMIN — METRONIDAZOLE 500 MG: 500 INJECTION, SOLUTION INTRAVENOUS at 01:13

## 2019-08-01 RX ADMIN — RIVAROXABAN 15 MG: 15 TABLET, FILM COATED ORAL at 16:20

## 2019-08-01 RX ADMIN — PIPERACILLIN SODIUM,TAZOBACTAM SODIUM 3.38 G: 3; .375 INJECTION, POWDER, FOR SOLUTION INTRAVENOUS at 03:07

## 2019-08-01 RX ADMIN — LISINOPRIL 10 MG: 10 TABLET ORAL at 08:51

## 2019-08-01 RX ADMIN — PIPERACILLIN SODIUM,TAZOBACTAM SODIUM 3.38 G: 3; .375 INJECTION, POWDER, FOR SOLUTION INTRAVENOUS at 16:20

## 2019-08-01 RX ADMIN — CHOLECALCIFEROL TAB 10 MCG (400 UNIT) 1000 UNITS: 10 TAB at 08:51

## 2019-08-01 RX ADMIN — METRONIDAZOLE 500 MG: 500 INJECTION, SOLUTION INTRAVENOUS at 08:50

## 2019-08-01 RX ADMIN — METRONIDAZOLE 500 MG: 500 INJECTION, SOLUTION INTRAVENOUS at 16:21

## 2019-08-01 RX ADMIN — ONDANSETRON 4 MG: 2 INJECTION, SOLUTION INTRAMUSCULAR; INTRAVENOUS at 20:47

## 2019-08-01 NOTE — PLAN OF CARE
Problem: Bowel Disease, Inflammatory (Adult)  Goal: Signs and Symptoms of Listed Potential Problems Will be Absent, Minimized or Managed (Bowel Disease, Inflammatory)  Outcome: Ongoing (interventions implemented as appropriate)

## 2019-08-01 NOTE — PROGRESS NOTES
[facility] HOSPITALIST PROGRESS NOTE     Patient Identification:  Name:  Desiree Matos  Age:  85 y.o.  Sex:  female  :  10/13/1933  MRN:  [id number]  Visit Number:  [id number]  ROOM: [address] [place] [address]     Primary Care Provider:  Duane Pollard MD    Length of stay in inpatient status:  2    Subjective     Chief Compliant:    Chief Complaint   Patient presents with   • Abdominal Pain       History of Presenting Illness: 85-year-old female who was admitted with acute diverticulitis.  Patient continues to have some left lower quadrant tenderness.  She states that is improved but still quite tender.  No new complaints otherwise    Objective     Current Hospital Meds:  cholecalciferol 1,000 Units Oral Daily   digoxin 125 mcg Oral QAM   docusate sodium 100 mg Oral BID   gabapentin 300 mg Oral Q12H   levothyroxine 75 mcg Oral Q PM   lisinopril 10 mg Oral Q24H   metroNIDAZOLE 500 mg Intravenous Q8H   nitazoxanide 500 mg Oral BID With Meals   pantoprazole 40 mg Intravenous Q12H   piperacillin-tazobactam 3.375 g Intravenous Q8H   rivaroxaban 15 mg Oral Q PM   sodium chloride 3 mL Intravenous Q12H     Pharmacy to Dose Zosyn     sodium chloride 75 mL/hr Last Rate: 75 mL/hr (19 0322)     ----------------------------------------------------------------------------------------------------------------------  Vital Signs:  Temp:  [97.5 °F (36.4 °C)-98.6 °F (37 °C)] 98.2 °F (36.8 °C)  Heart Rate:  [71-93] 84  Resp:  [18-20] 18  BP: (102-116)/(50-56) 116/56  SpO2:  [95 %-99 %] 98 %  on   ;   Device (Oxygen Therapy): room air  Body mass index is 29.23 kg/m².    Wt Readings from Last 3 Encounters:   19 74.8 kg (165 lb)   19 74.8 kg (165 lb)   18 77.1 kg (170 lb)     Intake & Output (last 3 days)       701 -  -  -  07 -  0700    P.O.  360 720 360    I.V. (mL/kg)  990 (13.3)      IV Piggyback  1400 500     Total Intake(mL/kg)   2750 (36.9) 1220 (16.3) 360 (4.8)    Urine (mL/kg/hr)  450 (0.3) 1200 (0.7) 600 (1.2)    Total Output  450 1200 600    Net  +2300 +20 -240            Urine Unmeasured Occurrence  1 x          Diet Full Liquid  ----------------------------------------------------------------------------------------------------------------------  Physical exam:  Constitutional: Elderly female no distress  HEENT: Cephalic atraumatic  Neck: Supple  Cardiovascular: Regular rate and rhythm  Pulmonary/Chest: Clear to auscultation  Abdominal: Positive bowel sounds, soft but tender in the left lower quadrant..   Musculoskeletal: No arthropathy  Neurological: No focal deficits  Skin: No rashes  Peripheral vascular:  Genitourinary:  ----------------------------------------------------------------------------------------------------------------------    Last echocardiogram:  Results for orders placed during the hospital encounter of 01/12/17   Adult Transthoracic Echo Complete    Narrative · Normal left ventricular cavity size and wall thickness noted. All left   ventricular wall segments contract normally.  · Estimated EF appears to be in the range of 61 - 65%.  · The aortic valve is abnormal in structure. The valve exhibits sclerosis.   There is calcification of the noncoronary cusp, left coronary cusp and   right coronary cusp present. No aortic valve regurgitation is present.   Mild aortic valve stenosis is present.  · The mitral valve is abnormal in structure. There are myxomatous changes   of the mitral valve apparatus present. Mild mitral valve regurgitation is   present. No significant mitral valve stenosis is present.  · The tricuspid valve is normal. No tricuspid valve stenosis is present.   No tricuspid valve regurgitation is present. Estimated right ventricular   systolic pressure from tricuspid regurgitation is mildly elevated (35-45   mmHg).  · The pulmonic valve is structurally normal. There is no significant   pulmonic valve  stenosis present. There is no pulmonic valve regurgitation   present.  · There is no evidence of pericardial effusion.        ----------------------------------------------------------------------------------------------------------------------  Results from last 7 days   Lab Units 08/01/19 0444 07/31/19 0319 07/30/19  0636   CRP mg/dL 13.27* 13.64* 5.27*   LACTATE mmol/L  --   --  1.7   WBC 10*3/mm3 11.75* 10.18 14.76*   HEMOGLOBIN g/dL 9.4* 9.8* 12.0   HEMATOCRIT % 29.7* 30.7* 36.7   MCV fL 94.3 94.8 91.3   MCHC g/dL 31.6 31.9 32.7   PLATELETS 10*3/mm3 157 166 196         Results from last 7 days   Lab Units 08/01/19 0444 07/31/19 0319 07/30/19  0636   SODIUM mmol/L 137 138 136   POTASSIUM mmol/L 3.4* 3.5 4.2   MAGNESIUM mg/dL  --  2.0  --    CHLORIDE mmol/L 104 103 98   CO2 mmol/L 23.9 23.0 24.5   BUN mg/dL 12 16 22   CREATININE mg/dL 1.02* 1.21* 1.22*   EGFR IF NONAFRICN AM mL/min/1.73 52* 42* 42*   CALCIUM mg/dL 8.2* 8.4* 9.4   PHOSPHORUS mg/dL  --  3.1  --    GLUCOSE mg/dL 122* 121* 151*   ALBUMIN g/dL  --   --  3.90   BILIRUBIN mg/dL  --   --  0.8   ALK PHOS U/L  --   --  61   AST (SGOT) U/L  --   --  20   ALT (SGPT) U/L  --   --  9   Estimated Creatinine Clearance: 39.1 mL/min (A) (by C-G formula based on SCr of 1.02 mg/dL (H)).  No results found for: AMMONIA              Hemoglobin A1C   Date/Time Value Ref Range Status   07/30/2019 0636 5.30 4.80 - 5.60 % Final     Lab Results   Component Value Date    TSH 4.430 (H) 07/30/2019    FREET4 1.20 07/30/2019     No results found for: PREGTESTUR, PREGSERUM, HCG, HCGQUANT  Pain Management Panel     There is no flowsheet data to display.        Brief Urine Lab Results  (Last result in the past 365 days)      Color   Clarity   Blood   Leuk Est   Nitrite   Protein   CREAT   Urine HCG        07/30/19 0644 Dark Yellow Clear Negative Negative Negative Negative             Blood Culture   Date Value Ref Range Status   07/30/2019 No growth at 2 days  Preliminary    07/30/2019 No growth at 2 days  Preliminary     Results from last 7 days   Lab Units 07/30/19  0644   NITRITE UA  Negative              Results from last 7 days   Lab Units 08/01/19  0444 07/31/19  0319 07/30/19  0636   LACTATE mmol/L  --   --  1.7   CRP mg/dL 13.27* 13.64* 5.27*       I have personally looked at the labs and they are summarized above.  ----------------------------------------------------------------------------------------------------------------------  Detailed radiology reports for the last 24 hours:    Imaging Results (last 24 hours)     ** No results found for the last 24 hours. **        Final impressions for the last 30 days of radiology reports:    Ct Abdomen Pelvis Without Contrast    Result Date: 7/30/2019  Impression: 1. Acute sigmoid diverticulitis. No CT evidence of perforation or mature abscess. 2. Irregular wall thickening just along the distal margins of the inflamed segment possibly due to inflammation but follow-up with lower endoscopy when patient's clinical status permits to exclude other etiologies. 3. Other nonacute findings as above.  This report was finalized on 7/30/2019 7:53 AM by Dr. Walker Torres MD.      I have personally looked at the radiology images and read the final radiology report.    Assessment & Plan      Acute diverticulitis--we will continue IV antibiotics for now.  If patient is not appreciably improved by tomorrow morning may have to consider reimaging.    H. pylori infection continue Flagyl, Zosyn, Protonix  Atrial fibrillation--rate controlled.  Continue Xarelto    Coronary artery disease--stable    Congestive heart failure secondary diastolic dysfunction--compensated    Stage III chronic kidney disease--stable    Hypothyroidism--continue Synthroid    VTE Prophylaxis:   Mechanical Order History:     None      Pharmalogical Order History:     Ordered     Dose Route Frequency Stop    07/31/19 8249  rivaroxaban (XARELTO) tablet 15 mg      15 mg PO Every  Evening --    07/30/19 1526  rivaroxaban (XARELTO) tablet 20 mg  Status:  Discontinued      20 mg PO Every Evening 07/31/19 1719              Akash Neville MD  [facility]  08/01/19  1:37 PM

## 2019-08-01 NOTE — NURSING NOTE
"Transitional Care Note    Enrolled in [facility] Transitional Care Note    Enrolled in [facility] Transitional Care Services under theTCM Model to be followed for 6 weeks post discharge.  BTC will assist with support and education at time of transition home from hospital.  Hospital  will follow throughout the stay at [facility] [place].  Home  will visit within 48 hours of discharge and follow with home vitals and telephone contact for 6 weeks.      Patient admitted to [facility] [place] via Emergency Department, complaints of abd pain admitted for acute diverticulitis.    Patient lying in bed.  Pleasant and talkative.    Explained transition to home program and Patient is agreeable to home visits.  Home  will be Melissa Barba RN    Clinical Assessment Instrument Scores:  >Short Portable Mental Status 10, normal mental function  >Geriatric Depression Scale 5, normal  >IADL 4, dependent with some ADL's  >GUIDO-ADL 6, independent with self-care  >Overall Quality of Life \"fair\"  >Subjective Health Rating \"fair\"  >Symptom Bother Scale 21  >General Anxiety Scale  4  >REAL SF 0 indicates reads on 3rd grade level or below  "

## 2019-08-01 NOTE — PLAN OF CARE
Problem: Pain, Chronic (Adult)  Goal: Acceptable Pain/Comfort Level and Functional Ability  Outcome: Ongoing (interventions implemented as appropriate)      Problem: Fall Risk (Adult)  Goal: Absence of Fall  Outcome: Ongoing (interventions implemented as appropriate)      Problem: Pain, Acute (Adult)  Goal: Acceptable Pain Control/Comfort Level  Outcome: Ongoing (interventions implemented as appropriate)

## 2019-08-02 ENCOUNTER — APPOINTMENT (OUTPATIENT)
Dept: CT IMAGING | Facility: HOSPITAL | Age: 84
End: 2019-08-02

## 2019-08-02 LAB
ANION GAP SERPL CALCULATED.3IONS-SCNC: 11.2 MMOL/L (ref 5–15)
BASOPHILS # BLD AUTO: 0.01 10*3/MM3 (ref 0–0.2)
BASOPHILS NFR BLD AUTO: 0.1 % (ref 0–1.5)
BUN BLD-MCNC: 10 MG/DL (ref 8–23)
BUN/CREAT SERPL: 11.1 (ref 7–25)
CALCIUM SPEC-SCNC: 8.3 MG/DL (ref 8.6–10.5)
CHLORIDE SERPL-SCNC: 103 MMOL/L (ref 98–107)
CO2 SERPL-SCNC: 21.8 MMOL/L (ref 22–29)
CREAT BLD-MCNC: 0.9 MG/DL (ref 0.57–1)
CRP SERPL-MCNC: 14.79 MG/DL (ref 0–0.5)
DEPRECATED RDW RBC AUTO: 50.4 FL (ref 37–54)
EOSINOPHIL # BLD AUTO: 0.04 10*3/MM3 (ref 0–0.4)
EOSINOPHIL NFR BLD AUTO: 0.3 % (ref 0.3–6.2)
ERYTHROCYTE [DISTWIDTH] IN BLOOD BY AUTOMATED COUNT: 15.3 % (ref 12.3–15.4)
GFR SERPL CREATININE-BSD FRML MDRD: 60 ML/MIN/1.73
GLUCOSE BLD-MCNC: 118 MG/DL (ref 65–99)
HCT VFR BLD AUTO: 30.9 % (ref 34–46.6)
HGB BLD-MCNC: 9.8 G/DL (ref 12–15.9)
IMM GRANULOCYTES # BLD AUTO: 0.03 10*3/MM3 (ref 0–0.05)
IMM GRANULOCYTES NFR BLD AUTO: 0.2 % (ref 0–0.5)
LYMPHOCYTES # BLD AUTO: 0.87 10*3/MM3 (ref 0.7–3.1)
LYMPHOCYTES NFR BLD AUTO: 6.4 % (ref 19.6–45.3)
MCH RBC QN AUTO: 30.2 PG (ref 26.6–33)
MCHC RBC AUTO-ENTMCNC: 31.7 G/DL (ref 31.5–35.7)
MCV RBC AUTO: 95.1 FL (ref 79–97)
MONOCYTES # BLD AUTO: 1.14 10*3/MM3 (ref 0.1–0.9)
MONOCYTES NFR BLD AUTO: 8.3 % (ref 5–12)
NEUTROPHILS # BLD AUTO: 11.58 10*3/MM3 (ref 1.7–7)
NEUTROPHILS NFR BLD AUTO: 84.7 % (ref 42.7–76)
PLATELET # BLD AUTO: 176 10*3/MM3 (ref 140–450)
PMV BLD AUTO: 11.4 FL (ref 6–12)
POTASSIUM BLD-SCNC: 3.7 MMOL/L (ref 3.5–5.2)
RBC # BLD AUTO: 3.25 10*6/MM3 (ref 3.77–5.28)
SODIUM BLD-SCNC: 136 MMOL/L (ref 136–145)
WBC NRBC COR # BLD: 13.67 10*3/MM3 (ref 3.4–10.8)

## 2019-08-02 PROCEDURE — 25010000002 PIPERACILLIN-TAZOBACTAM: Performed by: PHYSICIAN ASSISTANT

## 2019-08-02 PROCEDURE — 86140 C-REACTIVE PROTEIN: CPT | Performed by: FAMILY MEDICINE

## 2019-08-02 PROCEDURE — 80048 BASIC METABOLIC PNL TOTAL CA: CPT | Performed by: FAMILY MEDICINE

## 2019-08-02 PROCEDURE — 85025 COMPLETE CBC W/AUTO DIFF WBC: CPT | Performed by: FAMILY MEDICINE

## 2019-08-02 PROCEDURE — 99221 1ST HOSP IP/OBS SF/LOW 40: CPT | Performed by: SURGERY

## 2019-08-02 PROCEDURE — 74176 CT ABD & PELVIS W/O CONTRAST: CPT

## 2019-08-02 PROCEDURE — 25010000002 MEROPENEM: Performed by: FAMILY MEDICINE

## 2019-08-02 PROCEDURE — 74176 CT ABD & PELVIS W/O CONTRAST: CPT | Performed by: RADIOLOGY

## 2019-08-02 PROCEDURE — 99233 SBSQ HOSP IP/OBS HIGH 50: CPT | Performed by: FAMILY MEDICINE

## 2019-08-02 RX ADMIN — PIPERACILLIN SODIUM,TAZOBACTAM SODIUM 3.38 G: 3; .375 INJECTION, POWDER, FOR SOLUTION INTRAVENOUS at 11:40

## 2019-08-02 RX ADMIN — DOCUSATE SODIUM 100 MG: 100 CAPSULE ORAL at 21:11

## 2019-08-02 RX ADMIN — MEROPENEM 1 G: 1 INJECTION, POWDER, FOR SOLUTION INTRAVENOUS at 14:33

## 2019-08-02 RX ADMIN — CHOLECALCIFEROL TAB 10 MCG (400 UNIT) 1000 UNITS: 10 TAB at 07:49

## 2019-08-02 RX ADMIN — DOCUSATE SODIUM 100 MG: 100 CAPSULE ORAL at 07:49

## 2019-08-02 RX ADMIN — SODIUM CHLORIDE 75 ML/HR: 900 INJECTION INTRAVENOUS at 22:09

## 2019-08-02 RX ADMIN — GABAPENTIN 300 MG: 300 CAPSULE ORAL at 21:11

## 2019-08-02 RX ADMIN — LEVOTHYROXINE SODIUM 75 MCG: 0.07 TABLET ORAL at 07:49

## 2019-08-02 RX ADMIN — GABAPENTIN 300 MG: 300 CAPSULE ORAL at 08:09

## 2019-08-02 RX ADMIN — PANTOPRAZOLE SODIUM 40 MG: 40 INJECTION, POWDER, FOR SOLUTION INTRAVENOUS at 07:49

## 2019-08-02 RX ADMIN — METRONIDAZOLE 500 MG: 500 INJECTION, SOLUTION INTRAVENOUS at 08:08

## 2019-08-02 RX ADMIN — PANTOPRAZOLE SODIUM 40 MG: 40 INJECTION, POWDER, FOR SOLUTION INTRAVENOUS at 21:11

## 2019-08-02 RX ADMIN — DIGOXIN 125 MCG: 125 TABLET ORAL at 07:49

## 2019-08-02 RX ADMIN — LISINOPRIL 10 MG: 10 TABLET ORAL at 07:48

## 2019-08-02 RX ADMIN — PIPERACILLIN SODIUM,TAZOBACTAM SODIUM 3.38 G: 3; .375 INJECTION, POWDER, FOR SOLUTION INTRAVENOUS at 03:58

## 2019-08-02 NOTE — PROGRESS NOTES
[facility] HOSPITALIST PROGRESS NOTE     Patient Identification:  Name:  Desiree Matos  Age:  85 y.o.  Sex:  female  :  10/13/1933  MRN:  [id number]  Visit Number:  [id number]  ROOM: [address] [place] [address]     Primary Care Provider:  Duane Pollard MD    Length of stay in inpatient status:  3    Subjective     Chief Compliant:    Chief Complaint   Patient presents with   • Abdominal Pain       History of Presenting Illness: 85-year-old female who was admitted few days ago with acute sigmoid diverticulitis.  Patient has been on IV Zosyn/Flagyl.  Patient is only had minimal improvement over the last couple of days.  Still with poor appetite.  Still slightly tender to palpation.    Objective     Current Hospital Meds:  cholecalciferol 1,000 Units Oral Daily   digoxin 125 mcg Oral QAM   docusate sodium 100 mg Oral BID   gabapentin 300 mg Oral Q12H   levothyroxine 75 mcg Oral Q PM   lisinopril 10 mg Oral Q24H   metroNIDAZOLE 500 mg Intravenous Q8H   nitazoxanide 500 mg Oral BID With Meals   pantoprazole 40 mg Intravenous Q12H   piperacillin-tazobactam 3.375 g Intravenous Q8H   rivaroxaban 15 mg Oral Q PM   sodium chloride 3 mL Intravenous Q12H     sodium chloride 75 mL/hr Last Rate: 75 mL/hr (19)     ----------------------------------------------------------------------------------------------------------------------  Vital Signs:  Temp:  [97.6 °F (36.4 °C)-98.7 °F (37.1 °C)] 97.9 °F (36.6 °C)  Heart Rate:  [82-90] 85  Resp:  [18] 18  BP: (118-150)/(60-70) 132/69  SpO2:  [90 %-97 %] 94 %  on   ;   Device (Oxygen Therapy): room air  Body mass index is 29.94 kg/m².    Wt Readings from Last 3 Encounters:   19 76.7 kg (169 lb)   19 74.8 kg (165 lb)   18 77.1 kg (170 lb)     Intake & Output (last 3 days)       701 -  07 -  07 -  07 -  0700    P.O.  360    I.V. (mL/kg) 990 (13.3)       IV Piggyback 1400 500   100    Total Intake(mL/kg) 2750 (36.9) 1220 (16.3) 1080 (14.1) 460 (6)    Urine (mL/kg/hr) 450 (0.3) 1200 (0.7) 1400 (0.8) 300 (0.8)    Total Output 450 1200 1400 300    Net +2300 +20 -320 +160            Urine Unmeasured Occurrence 1 x           Diet Soft Texture; Chopped; Cardiac  ----------------------------------------------------------------------------------------------------------------------  Physical exam:  Constitutional: Elderly female in no distress  HEENT: Normal cephalic atraumatic  Neck: Supple  Cardiovascular: Regular rate and rhythm  Pulmonary/Chest: Clear to auscultation  Abdominal: Positive bowel sounds, tender in the left lower quadrant..   Musculoskeletal: No arthropathy   neurological: No focal deficits  Skin: Rashes  Peripheral vascular:  Genitourinary:  ----------------------------------------------------------------------------------------------------------------------    Last echocardiogram:  Results for orders placed during the hospital encounter of 01/12/17   Adult Transthoracic Echo Complete    Narrative · Normal left ventricular cavity size and wall thickness noted. All left   ventricular wall segments contract normally.  · Estimated EF appears to be in the range of 61 - 65%.  · The aortic valve is abnormal in structure. The valve exhibits sclerosis.   There is calcification of the noncoronary cusp, left coronary cusp and   right coronary cusp present. No aortic valve regurgitation is present.   Mild aortic valve stenosis is present.  · The mitral valve is abnormal in structure. There are myxomatous changes   of the mitral valve apparatus present. Mild mitral valve regurgitation is   present. No significant mitral valve stenosis is present.  · The tricuspid valve is normal. No tricuspid valve stenosis is present.   No tricuspid valve regurgitation is present. Estimated right ventricular   systolic pressure from tricuspid regurgitation is mildly elevated (35-45   mmHg).  · The pulmonic  valve is structurally normal. There is no significant   pulmonic valve stenosis present. There is no pulmonic valve regurgitation   present.  · There is no evidence of pericardial effusion.        ----------------------------------------------------------------------------------------------------------------------  Results from last 7 days   Lab Units 08/02/19 0122 08/01/19 0444 07/31/19 0319 07/30/19  0636   CRP mg/dL 14.79* 13.27* 13.64* 5.27*   LACTATE mmol/L  --   --   --  1.7   WBC 10*3/mm3 13.67* 11.75* 10.18 14.76*   HEMOGLOBIN g/dL 9.8* 9.4* 9.8* 12.0   HEMATOCRIT % 30.9* 29.7* 30.7* 36.7   MCV fL 95.1 94.3 94.8 91.3   MCHC g/dL 31.7 31.6 31.9 32.7   PLATELETS 10*3/mm3 176 157 166 196         Results from last 7 days   Lab Units 08/02/19 0122 08/01/19 0444 07/31/19 0319 07/30/19  0636   SODIUM mmol/L 136 137 138 136   POTASSIUM mmol/L 3.7 3.4* 3.5 4.2   MAGNESIUM mg/dL  --   --  2.0  --    CHLORIDE mmol/L 103 104 103 98   CO2 mmol/L 21.8* 23.9 23.0 24.5   BUN mg/dL 10 12 16 22   CREATININE mg/dL 0.90 1.02* 1.21* 1.22*   EGFR IF NONAFRICN AM mL/min/1.73 60* 52* 42* 42*   CALCIUM mg/dL 8.3* 8.2* 8.4* 9.4   PHOSPHORUS mg/dL  --   --  3.1  --    GLUCOSE mg/dL 118* 122* 121* 151*   ALBUMIN g/dL  --   --   --  3.90   BILIRUBIN mg/dL  --   --   --  0.8   ALK PHOS U/L  --   --   --  61   AST (SGOT) U/L  --   --   --  20   ALT (SGPT) U/L  --   --   --  9   Estimated Creatinine Clearance: 44.8 mL/min (by C-G formula based on SCr of 0.9 mg/dL).  No results found for: AMMONIA              No results found for: HGBA1C, POCGLU  Lab Results   Component Value Date    TSH 4.430 (H) 07/30/2019    FREET4 1.20 07/30/2019     No results found for: PREGTESTUR, PREGSERUM, HCG, HCGQUANT  Pain Management Panel     There is no flowsheet data to display.        Brief Urine Lab Results  (Last result in the past 365 days)      Color   Clarity   Blood   Leuk Est   Nitrite   Protein   CREAT   Urine HCG        07/30/19 0644 Dark  Yellow Clear Negative Negative Negative Negative             Blood Culture   Date Value Ref Range Status   07/30/2019 No growth at 3 days  Preliminary   07/30/2019 No growth at 3 days  Preliminary     Results from last 7 days   Lab Units 07/30/19  0644   NITRITE UA  Negative              Results from last 7 days   Lab Units 08/02/19  0122 08/01/19  0444 07/31/19  0319 07/30/19  0636   LACTATE mmol/L  --   --   --  1.7   CRP mg/dL 14.79* 13.27* 13.64* 5.27*       I have personally looked at the labs and they are summarized above.  ----------------------------------------------------------------------------------------------------------------------  Detailed radiology reports for the last 24 hours:    Imaging Results (last 24 hours)     ** No results found for the last 24 hours. **        Final impressions for the last 30 days of radiology reports:    Ct Abdomen Pelvis Without Contrast    Result Date: 7/30/2019  Impression: 1. Acute sigmoid diverticulitis. No CT evidence of perforation or mature abscess. 2. Irregular wall thickening just along the distal margins of the inflamed segment possibly due to inflammation but follow-up with lower endoscopy when patient's clinical status permits to exclude other etiologies. 3. Other nonacute findings as above.  This report was finalized on 7/30/2019 7:53 AM by Dr. Walker Torres MD.      I have personally looked at the radiology images and read the final radiology report.    Assessment & Plan    Acute sigmoid diverticulitis--patient with slow improvement.  Will repeat CT scan with oral contrast for evaluation for possible abscess formation.  Obtain surgical consultation for help with evaluation.  Discontinue Zosyn.  Placed on Merrem  VTE Prophylaxis:   Mechanical Order History:     None      Pharmalogical Order History:     Ordered     Dose Route Frequency Stop    07/31/19 1719  rivaroxaban (XARELTO) tablet 15 mg      15 mg PO Every Evening --    07/30/19 1526  rivaroxaban  (XARELTO) tablet 20 mg  Status:  Discontinued      20 mg PO Every Evening 07/31/19 6592          The patient is high risk due to the following diagnoses/reasons: Advanced age and reticulitis    Akash Neville MD  [facility]  08/02/19  11:47 AM

## 2019-08-02 NOTE — PROGRESS NOTES
Discharge Planning Assessment   [facility]     Patient Name: Desiree Matos  MRN: [id number]  Today's Date: 8/2/2019    Admit Date: 7/30/2019        Discharge Plan     Row Name 08/02/19 1304       Plan    Plan  Pt admitted on 7/30/19.  Pt lives at home with Granddaughter Petrona and Great Granddaughter and plans to return home at discharge.  Pt currently does not utilize home health services.  Pt currently utilizes cane, rolling walker, bedside commode and rolator via [facility] .  SS will follow.             Lor Euceda

## 2019-08-02 NOTE — CONSULTS
Consulting physician:  Dr. Madden    Referring physician: Dr Krishna     Date of consultation: 08/02/19     Chief complaint doverticulitis    Subjective     Patient is a 85 y.o. female who was admitted on 7/30/19 with diverticulitis.  The patient has failed to improve and surgical consult is requested.  Repeat CT done today. Patient states not passing gas. No nausea or vomiting. No prior episodes of diverticulitis.  Patient has been ambulating to bathroom but not otherwise.  Normally very active at home.      Review of Systems  Review of Systems - General ROS: negative for - weight loss  Psychological ROS: negative for - behavioral disorder  Ophthalmic ROS: negative for - dry eyes  ENT ROS: negative for - vertigo or vocal changes  Hematological and Lymphatic ROS: negative for - swollen lymph nodes, DVT, PE.   Respiratory ROS: negative for - sputum changes or stridor.  Cardiovascular ROS: positive for - irregular heartbeat or murmur. Patient on xarelto for afib  Gastrointestinal ROS: negative for - blood in stools or change in stools  Genito-Urinary ROS: negative for - hematuria or incontinence  Musculoskeletal ROS: negative for - gait disturbance      History  Past Medical History:   Diagnosis Date   • A-fib (CMS/HCC)    • Anemia 1/13/2017   • Arthritis    • Asthma    • Cancer (CMS/HCC)    • CHF (congestive heart failure) (CMS/HCC)    • Chronic anticoagulation, Xarelto 7/30/2019   • Coronary artery disease    • GERD without esophagitis 7/30/2019   • History of colonic polyps 7/30/2019   • History of transfusion    • Hypertension    • Hypothyroidism 7/30/2019   • Peripheral neuropathy 7/30/2019     Past Surgical History:   Procedure Laterality Date   • BREAST CYST EXCISION Left    • CERVICAL POLYPECTOMY     • COLONOSCOPY N/A 1/17/2017    Procedure: COLONOSCOPY;  Surgeon: Madi Zheng III, MD;  Location: [facility];  Service:    • ENDOSCOPY N/A 1/17/2017    Procedure: ESOPHAGOGASTRODUODENOSCOPY;  Surgeon:  Madi Zheng III, MD;  Location: [facility];  Service:    • GALLBLADDER SURGERY       History reviewed. No pertinent family history.  Social History     Tobacco Use   • Smoking status: Never Smoker   • Smokeless tobacco: Never Used   Substance Use Topics   • Alcohol use: No   • Drug use: No     Medications Prior to Admission   Medication Sig Dispense Refill Last Dose   • acetaminophen (TYLENOL) 500 MG tablet Take 500 mg by mouth Every 6 (Six) Hours As Needed for Mild Pain , Headache or Fever.   Past Week at Unknown time   • chlorzoxazone (PARAFON FORTE) 500 MG tablet Take 500 mg by mouth At Night As Needed for Muscle Spasms.   Past Week at Unknown time   • cholecalciferol (VITAMIN D3) 1000 units tablet Take 1,000 Units by mouth Daily.   2019 at Unknown time   • digoxin (LANOXIN) 125 MCG tablet Take 125 mcg by mouth Every Morning.   2019 at Unknown time   • docusate sodium (COLACE) 250 MG capsule Take 250 mg by mouth 2 (Two) Times a Day As Needed for Constipation.   Past Week at Unknown time   • furosemide (LASIX) 20 MG tablet Take 20 mg by mouth Daily.   2019 at Unknown time   • gabapentin (NEURONTIN) 300 MG capsule Take 300 mg by mouth 2 (Two) Times a Day.   2019 at Unknown time   • hydrochlorothiazide (HYDRODIURIL) 25 MG tablet Take 25 mg by mouth Every Morning.   2019 at Unknown time   • levothyroxine (SYNTHROID, LEVOTHROID) 75 MCG tablet Take 75 mcg by mouth Every Evening.   2019 at Unknown time   • [] nitazoxanide (ALINIA) 500 MG tablet Take 500 mg by mouth 2 (Two) Times a Day With Meals. Prior to [facility] Admission, Patient was on: Has 8 tabs left   2019 at Unknown time   • oxyCODONE-acetaminophen (PERCOCET)  MG per tablet Take 1 tablet by mouth Every 12 (Twelve) Hours As Needed for Moderate Pain .   2019 at Unknown time   • pantoprazole (PROTONIX) 40 MG EC tablet Take 40 mg by mouth Daily.   2019 at Unknown time   • quinapril (ACCUPRIL) 10 MG  tablet Take 10 mg by mouth Daily.   7/29/2019 at Unknown time   • rivaroxaban (XARELTO) 20 MG tablet Take 20 mg by mouth Every Evening.   7/29/2019 at Unknown time     Allergies:  Adhesive tape    Objective     Vital Signs  Temp:  [97.6 °F (36.4 °C)-98.7 °F (37.1 °C)] 97.9 °F (36.6 °C)  Heart Rate:  [82-89] 85  Resp:  [18] 18  BP: (118-150)/(62-70) 132/69    Physical Exam:  General:  This is a WD WN elderly female in no acute distress  Vital signs stable, afebrile  HEENT exam:  WNL. Sclerae are anicteric.  EOMI  Neck:  supple, FROM.  No JVD.  Trachea midline  Lungs:  Respiratory effort normal. Auscultation: Clear, without wheezes, rhonchi, rales  Heart:  Regular rate and rhythm, without murmur, gallop, rub.  No pedal edema  Abdomen: Bowel sounds are present and fairly active.  Patient reports tenderness fairly severe to palpation in all quadrants even in the upper quadrants which although the abdomen is soft patient states is still very painful  Musculoskeletal:  muscle strength/tone is normal.    Psyc:  alert, oriented x 3.  Mood and affect are appropriate  skin:  Warm with good turgor.  Without rash or lesion  extremities:  Examination of the extremities revealed no cyanosis, clubbing or edema.    Results Review:       Results from last 7 days   Lab Units 08/02/19  0122 08/01/19  0444 07/31/19  0319 07/30/19  0636   CRP mg/dL 14.79* 13.27* 13.64* 5.27*   LACTATE mmol/L  --   --   --  1.7   WBC 10*3/mm3 13.67* 11.75* 10.18 14.76*   HEMOGLOBIN g/dL 9.8* 9.4* 9.8* 12.0   HEMATOCRIT % 30.9* 29.7* 30.7* 36.7   PLATELETS 10*3/mm3 176 157 166 196         Results from last 7 days   Lab Units 08/02/19  0122 08/01/19  0444 07/31/19  0319 07/30/19  0636   SODIUM mmol/L 136 137 138 136   POTASSIUM mmol/L 3.7 3.4* 3.5 4.2   MAGNESIUM mg/dL  --   --  2.0  --    CHLORIDE mmol/L 103 104 103 98   CO2 mmol/L 21.8* 23.9 23.0 24.5   BUN mg/dL 10 12 16 22   CREATININE mg/dL 0.90 1.02* 1.21* 1.22*   EGFR IF NONAFRICN AM mL/min/1.73 60*  52* 42* 42*   CALCIUM mg/dL 8.3* 8.2* 8.4* 9.4   GLUCOSE mg/dL 118* 122* 121* 151*   ALBUMIN g/dL  --   --   --  3.90   BILIRUBIN mg/dL  --   --   --  0.8   ALK PHOS U/L  --   --   --  61   AST (SGOT) U/L  --   --   --  20   ALT (SGPT) U/L  --   --   --  9   Estimated Creatinine Clearance: 44.8 mL/min (by C-G formula based on SCr of 0.9 mg/dL).  No results found for: AMMONIA      Blood Culture   Date Value Ref Range Status   07/30/2019 No growth at 3 days  Preliminary   07/30/2019 No growth at 3 days  Preliminary                Imaging:  Imaging Results (last 24 hours)     Procedure Component Value Units Date/Time    CT Abdomen Pelvis Without Contrast [[id number]] Updated:  08/02/19 1518          CT scan images were reviewed.  Currently port is not available.  Patient still has fairly marked changes of diverticulitis although I do not see any free fluid, any free air or any walled off abscess      Impression:  Patient Active Problem List   Diagnosis Code   • Atrial fibrillation (CMS/[facility]) I48.91   • Acute diverticulitis K57.92   • Chronic anticoagulation, Xarelto Z79.01   • Essential hypertension I10   • Hypothyroidism E03.9   • History of colonic polyps Z86.010   • Coronary artery disease involving native heart I25.10   • Diastolic CHF, chronic (CMS/[facility]) I50.32   • GERD without esophagitis K21.9   • Peripheral neuropathy G62.9   • Asthma J45.909   • CKD (chronic kidney disease), stage III (CMS/[facility]) N18.3     Impression: Diverticulitis which has not responded to oral antibiotics    Plan:  Agree with change in antibiotics  N.p.o. except for ice  Hold xarelto    Discussion:      Britney Madden MD  08/02/19  4:08 PM    Time: Time spent:    Please note that portions of this note were completed with a voice recognition program.

## 2019-08-02 NOTE — PLAN OF CARE
Problem: Patient Care Overview  Goal: Individualization and Mutuality  Outcome: Ongoing (interventions implemented as appropriate)    Goal: Discharge Needs Assessment  Outcome: Ongoing (interventions implemented as appropriate)      Problem: Pain, Chronic (Adult)  Goal: Acceptable Pain/Comfort Level and Functional Ability  Outcome: Ongoing (interventions implemented as appropriate)

## 2019-08-02 NOTE — PLAN OF CARE
Problem: Patient Care Overview  Goal: Plan of Care Review  Outcome: Ongoing (interventions implemented as appropriate)      Problem: Pain, Chronic (Adult)  Goal: Acceptable Pain/Comfort Level and Functional Ability  Outcome: Ongoing (interventions implemented as appropriate)      Problem: Fall Risk (Adult)  Goal: Absence of Fall  Outcome: Ongoing (interventions implemented as appropriate)      Problem: Pain, Acute (Adult)  Goal: Acceptable Pain Control/Comfort Level  Outcome: Ongoing (interventions implemented as appropriate)      Problem: Asthma (Adult)  Goal: Signs and Symptoms of Listed Potential Problems Will be Absent, Minimized or Managed (Asthma)  Outcome: Ongoing (interventions implemented as appropriate)      Problem: Bowel Disease, Inflammatory (Adult)  Goal: Signs and Symptoms of Listed Potential Problems Will be Absent, Minimized or Managed (Bowel Disease, Inflammatory)  Outcome: Ongoing (interventions implemented as appropriate)

## 2019-08-03 LAB
ANION GAP SERPL CALCULATED.3IONS-SCNC: 10 MMOL/L (ref 5–15)
BASOPHILS # BLD AUTO: 0.01 10*3/MM3 (ref 0–0.2)
BASOPHILS NFR BLD AUTO: 0.1 % (ref 0–1.5)
BUN BLD-MCNC: 8 MG/DL (ref 8–23)
BUN/CREAT SERPL: 9.9 (ref 7–25)
CALCIUM SPEC-SCNC: 8.1 MG/DL (ref 8.6–10.5)
CHLORIDE SERPL-SCNC: 105 MMOL/L (ref 98–107)
CO2 SERPL-SCNC: 23 MMOL/L (ref 22–29)
CREAT BLD-MCNC: 0.81 MG/DL (ref 0.57–1)
DEPRECATED RDW RBC AUTO: 49.7 FL (ref 37–54)
EOSINOPHIL # BLD AUTO: 0.1 10*3/MM3 (ref 0–0.4)
EOSINOPHIL NFR BLD AUTO: 1.1 % (ref 0.3–6.2)
ERYTHROCYTE [DISTWIDTH] IN BLOOD BY AUTOMATED COUNT: 15.1 % (ref 12.3–15.4)
GFR SERPL CREATININE-BSD FRML MDRD: 67 ML/MIN/1.73
GLUCOSE BLD-MCNC: 97 MG/DL (ref 65–99)
HCT VFR BLD AUTO: 27.8 % (ref 34–46.6)
HGB BLD-MCNC: 8.5 G/DL (ref 12–15.9)
IMM GRANULOCYTES # BLD AUTO: 0.03 10*3/MM3 (ref 0–0.05)
IMM GRANULOCYTES NFR BLD AUTO: 0.3 % (ref 0–0.5)
LYMPHOCYTES # BLD AUTO: 0.9 10*3/MM3 (ref 0.7–3.1)
LYMPHOCYTES NFR BLD AUTO: 9.5 % (ref 19.6–45.3)
MCH RBC QN AUTO: 29.2 PG (ref 26.6–33)
MCHC RBC AUTO-ENTMCNC: 30.6 G/DL (ref 31.5–35.7)
MCV RBC AUTO: 95.5 FL (ref 79–97)
MONOCYTES # BLD AUTO: 0.81 10*3/MM3 (ref 0.1–0.9)
MONOCYTES NFR BLD AUTO: 8.6 % (ref 5–12)
NEUTROPHILS # BLD AUTO: 7.62 10*3/MM3 (ref 1.7–7)
NEUTROPHILS NFR BLD AUTO: 80.4 % (ref 42.7–76)
PLATELET # BLD AUTO: 156 10*3/MM3 (ref 140–450)
PMV BLD AUTO: 11.4 FL (ref 6–12)
POTASSIUM BLD-SCNC: 3.3 MMOL/L (ref 3.5–5.2)
RBC # BLD AUTO: 2.91 10*6/MM3 (ref 3.77–5.28)
SODIUM BLD-SCNC: 138 MMOL/L (ref 136–145)
WBC NRBC COR # BLD: 9.47 10*3/MM3 (ref 3.4–10.8)

## 2019-08-03 PROCEDURE — 94799 UNLISTED PULMONARY SVC/PX: CPT

## 2019-08-03 PROCEDURE — 80048 BASIC METABOLIC PNL TOTAL CA: CPT | Performed by: FAMILY MEDICINE

## 2019-08-03 PROCEDURE — 85025 COMPLETE CBC W/AUTO DIFF WBC: CPT | Performed by: FAMILY MEDICINE

## 2019-08-03 PROCEDURE — 99232 SBSQ HOSP IP/OBS MODERATE 35: CPT | Performed by: SURGERY

## 2019-08-03 PROCEDURE — 99232 SBSQ HOSP IP/OBS MODERATE 35: CPT | Performed by: FAMILY MEDICINE

## 2019-08-03 PROCEDURE — 25010000002 MEROPENEM: Performed by: FAMILY MEDICINE

## 2019-08-03 RX ORDER — POTASSIUM CHLORIDE 7.45 MG/ML
10 INJECTION INTRAVENOUS
Status: DISCONTINUED | OUTPATIENT
Start: 2019-08-03 | End: 2019-08-06 | Stop reason: HOSPADM

## 2019-08-03 RX ORDER — POTASSIUM CHLORIDE 20 MEQ/1
40 TABLET, EXTENDED RELEASE ORAL AS NEEDED
Status: DISCONTINUED | OUTPATIENT
Start: 2019-08-03 | End: 2019-08-06 | Stop reason: HOSPADM

## 2019-08-03 RX ORDER — POTASSIUM CHLORIDE 20 MEQ/1
40 TABLET, EXTENDED RELEASE ORAL EVERY 4 HOURS
Status: COMPLETED | OUTPATIENT
Start: 2019-08-03 | End: 2019-08-03

## 2019-08-03 RX ORDER — POTASSIUM CHLORIDE 1.5 G/1.77G
40 POWDER, FOR SOLUTION ORAL AS NEEDED
Status: DISCONTINUED | OUTPATIENT
Start: 2019-08-03 | End: 2019-08-06 | Stop reason: HOSPADM

## 2019-08-03 RX ADMIN — POTASSIUM CHLORIDE 40 MEQ: 1500 TABLET, EXTENDED RELEASE ORAL at 21:20

## 2019-08-03 RX ADMIN — DOCUSATE SODIUM 100 MG: 100 CAPSULE ORAL at 08:31

## 2019-08-03 RX ADMIN — LISINOPRIL 10 MG: 10 TABLET ORAL at 08:31

## 2019-08-03 RX ADMIN — MEROPENEM 1 G: 1 INJECTION, POWDER, FOR SOLUTION INTRAVENOUS at 13:13

## 2019-08-03 RX ADMIN — MEROPENEM 1 G: 1 INJECTION, POWDER, FOR SOLUTION INTRAVENOUS at 01:23

## 2019-08-03 RX ADMIN — PANTOPRAZOLE SODIUM 40 MG: 40 INJECTION, POWDER, FOR SOLUTION INTRAVENOUS at 21:22

## 2019-08-03 RX ADMIN — POTASSIUM CHLORIDE 40 MEQ: 1500 TABLET, EXTENDED RELEASE ORAL at 16:18

## 2019-08-03 RX ADMIN — DOCUSATE SODIUM 100 MG: 100 CAPSULE ORAL at 21:22

## 2019-08-03 RX ADMIN — GABAPENTIN 300 MG: 300 CAPSULE ORAL at 21:22

## 2019-08-03 RX ADMIN — CHOLECALCIFEROL TAB 10 MCG (400 UNIT) 1000 UNITS: 10 TAB at 08:31

## 2019-08-03 RX ADMIN — LEVOTHYROXINE SODIUM 75 MCG: 0.07 TABLET ORAL at 16:18

## 2019-08-03 RX ADMIN — SODIUM CHLORIDE 75 ML/HR: 900 INJECTION INTRAVENOUS at 17:37

## 2019-08-03 RX ADMIN — PANTOPRAZOLE SODIUM 40 MG: 40 INJECTION, POWDER, FOR SOLUTION INTRAVENOUS at 08:31

## 2019-08-03 RX ADMIN — DIGOXIN 125 MCG: 125 TABLET ORAL at 06:25

## 2019-08-03 NOTE — PLAN OF CARE
Problem: Patient Care Overview  Goal: Plan of Care Review  Outcome: Ongoing (interventions implemented as appropriate)    Goal: Individualization and Mutuality  Outcome: Ongoing (interventions implemented as appropriate)    Goal: Discharge Needs Assessment  Outcome: Ongoing (interventions implemented as appropriate)      Problem: Fall Risk (Adult)  Goal: Absence of Fall  Outcome: Ongoing (interventions implemented as appropriate)

## 2019-08-03 NOTE — PROGRESS NOTES
[facility] HOSPITALIST PROGRESS NOTE     Patient Identification:  Name:  Desiree Matos  Age:  85 y.o.  Sex:  female  :  10/13/1933  MRN:  [id number]  Visit Number:  [id number]  ROOM: [address] [place] [address]     Primary Care Provider:  Duane Pollard MD    Length of stay in inpatient status:  4    Subjective     Chief Compliant:    Chief Complaint   Patient presents with   • Abdominal Pain       History of Presenting Illness: 85-year-old female with acute diverticulitis.  Patient states she is feeling quite a bit better today.  States she is not quite as tender.  No new complaints otherwise    Objective     Current Hospital Meds:  cholecalciferol 1,000 Units Oral Daily   digoxin 125 mcg Oral QAM   docusate sodium 100 mg Oral BID   gabapentin 300 mg Oral Q12H   levothyroxine 75 mcg Oral Q PM   lisinopril 10 mg Oral Q24H   meropenem 1 g Intravenous Q12H   nitazoxanide 500 mg Oral BID With Meals   pantoprazole 40 mg Intravenous Q12H   sodium chloride 3 mL Intravenous Q12H     sodium chloride 75 mL/hr Last Rate: 75 mL/hr (199)     ----------------------------------------------------------------------------------------------------------------------  Vital Signs:  Temp:  [98.2 °F (36.8 °C)-99.3 °F (37.4 °C)] 98.2 °F (36.8 °C)  Heart Rate:  [64-88] 85  Resp:  [18-20] 18  BP: (104-138)/(56-72) 119/63  SpO2:  [91 %-99 %] 91 %  on   ;   Device (Oxygen Therapy): room air  Body mass index is 30.68 kg/m².    Wt Readings from Last 3 Encounters:   19 78.6 kg (173 lb 3 oz)   19 74.8 kg (165 lb)   18 77.1 kg (170 lb)     Intake & Output (last 3 days)        -  -  -  07    P.O. 720 1080 960     I.V. (mL/kg)        IV Piggyback 500  300     Total Intake(mL/kg) 1220 (16.3) 1080 (14.1) 1260 (16)     Urine (mL/kg/hr) 1200 (0.7) 1400 (0.8) 1050 (0.6)     Total Output 1200 1400 1050     Net +20 -320 +210                  Diet Clear Liquid  ----------------------------------------------------------------------------------------------------------------------  Physical exam:  Constitutional: No acute distress  HEENT: Normocephalic atraumatic  Neck: Supple  Cardiovascular: Regular rate and rhythm  Pulmonary/Chest: Clear to auscultation  Abdominal:  .  Positive bowel sounds soft.  Much less tenderness on palpation  Musculoskeletal: No arthropathy  Neurological: No focal deficits  Skin: No rash  Peripheral vascular:  Genitourinary:  ----------------------------------------------------------------------------------------------------------------------    Last echocardiogram:  Results for orders placed during the hospital encounter of 01/12/17   Adult Transthoracic Echo Complete    Narrative · Normal left ventricular cavity size and wall thickness noted. All left   ventricular wall segments contract normally.  · Estimated EF appears to be in the range of 61 - 65%.  · The aortic valve is abnormal in structure. The valve exhibits sclerosis.   There is calcification of the noncoronary cusp, left coronary cusp and   right coronary cusp present. No aortic valve regurgitation is present.   Mild aortic valve stenosis is present.  · The mitral valve is abnormal in structure. There are myxomatous changes   of the mitral valve apparatus present. Mild mitral valve regurgitation is   present. No significant mitral valve stenosis is present.  · The tricuspid valve is normal. No tricuspid valve stenosis is present.   No tricuspid valve regurgitation is present. Estimated right ventricular   systolic pressure from tricuspid regurgitation is mildly elevated (35-45   mmHg).  · The pulmonic valve is structurally normal. There is no significant   pulmonic valve stenosis present. There is no pulmonic valve regurgitation   present.  · There is no evidence of pericardial effusion.         ----------------------------------------------------------------------------------------------------------------------  Results from last 7 days   Lab Units 08/03/19 0438 08/02/19 0122 08/01/19 0444 07/31/19 0319 07/30/19 0636   CRP mg/dL  --  14.79* 13.27* 13.64* 5.27*   LACTATE mmol/L  --   --   --   --  1.7   WBC 10*3/mm3 9.47 13.67* 11.75* 10.18 14.76*   HEMOGLOBIN g/dL 8.5* 9.8* 9.4* 9.8* 12.0   HEMATOCRIT % 27.8* 30.9* 29.7* 30.7* 36.7   MCV fL 95.5 95.1 94.3 94.8 91.3   MCHC g/dL 30.6* 31.7 31.6 31.9 32.7   PLATELETS 10*3/mm3 156 176 157 166 196         Results from last 7 days   Lab Units 08/03/19 0438 08/02/19 0122 08/01/19 0444 07/31/19 0319 07/30/19  0636   SODIUM mmol/L 138 136 137 138 136   POTASSIUM mmol/L 3.3* 3.7 3.4* 3.5 4.2   MAGNESIUM mg/dL  --   --   --  2.0  --    CHLORIDE mmol/L 105 103 104 103 98   CO2 mmol/L 23.0 21.8* 23.9 23.0 24.5   BUN mg/dL 8 10 12 16 22   CREATININE mg/dL 0.81 0.90 1.02* 1.21* 1.22*   EGFR IF NONAFRICN AM mL/min/1.73 67 60* 52* 42* 42*   CALCIUM mg/dL 8.1* 8.3* 8.2* 8.4* 9.4   PHOSPHORUS mg/dL  --   --   --  3.1  --    GLUCOSE mg/dL 97 118* 122* 121* 151*   ALBUMIN g/dL  --   --   --   --  3.90   BILIRUBIN mg/dL  --   --   --   --  0.8   ALK PHOS U/L  --   --   --   --  61   AST (SGOT) U/L  --   --   --   --  20   ALT (SGPT) U/L  --   --   --   --  9   Estimated Creatinine Clearance: 50.4 mL/min (by C-G formula based on SCr of 0.81 mg/dL).  No results found for: AMMONIA              No results found for: HGBA1C, POCGLU  Lab Results   Component Value Date    TSH 4.430 (H) 07/30/2019    FREET4 1.20 07/30/2019     No results found for: PREGTESTUR, PREGSERUM, HCG, HCGQUANT  Pain Management Panel     There is no flowsheet data to display.        Brief Urine Lab Results  (Last result in the past 365 days)      Color   Clarity   Blood   Leuk Est   Nitrite   Protein   CREAT   Urine HCG        07/30/19 0644 Dark Yellow Clear Negative Negative Negative Negative              Blood Culture   Date Value Ref Range Status   07/30/2019 No growth at 4 days  Preliminary   07/30/2019 No growth at 4 days  Preliminary     Results from last 7 days   Lab Units 07/30/19  0644   NITRITE UA  Negative              Results from last 7 days   Lab Units 08/02/19  0122 08/01/19  0444 07/31/19  0319 07/30/19  0636   LACTATE mmol/L  --   --   --  1.7   CRP mg/dL 14.79* 13.27* 13.64* 5.27*       I have personally looked at the labs and they are summarized above.  ----------------------------------------------------------------------------------------------------------------------  Detailed radiology reports for the last 24 hours:    Imaging Results (last 24 hours)     Procedure Component Value Units Date/Time    CT Abdomen Pelvis Without Contrast [[id number]] Collected:  08/02/19 1625     Updated:  08/02/19 1631    Narrative:       EXAM: CT ABDOMEN PELVIS WO CONTRAST-            TECHNIQUE: Multiple axial CT images were obtained from lung bases  through pubic symphysis WITHOUT administration of IV contrast.  Reformatted images in the coronal and/or sagittal plane(s) were  generated from the axial data set to facilitate diagnostic accuracy  and/or surgical planning.  Oral Contrast:NONE.     Radiation dose reduction techniques were utilized per ALARA protocol.  Automated exposure control was initiated through either or [employer] or  DoseRight software packages by  protocol.       DOSE:     Clinical information  acute diverticulitis//possible abscess; K57.92-Diverticulitis of  intestine, part unspecified, without perforation or abscess without  bleeding      Comparison  Comparison: 07/30/2019     Findings  LOWER THORAX: CARDIOMEGALY WITH BILATERAL SMALL PLEURAL EFFUSIONS AND  BIBASILAR AIRSPACE DISEASE.     ABDOMEN:        LIVER: Homogeneous. No focal hepatic mass or ductal dilatation.        GALLBLADDER: PRIOR CHOLECYSTECTOMY.        PANCREAS: Unremarkable. No mass or ductal dilatation.         SPLEEN: Homogeneous. No splenomegaly.        ADRENALS: No mass.        KIDNEYS/URETERS: PROMINENCE OF THE LEFT RENAL COLLECTING SYSTEM IN  PART RELATED TO EXTRARENAL PELVIS BUT ALSO MAY BE DUE TO SECONDARY  INFLAMMATION OF THE LEFT URETER GIVEN ITS PROXIMITY TO THE INFLAMED  SEGMENT OF BOWEL. NO OBSTRUCTING RENAL OR URETERAL STONES. SCARRING OF  THE RIGHT KIDNEY WITH ATROPHY AND NONOBSTRUCTING STONES AND COMPENSATORY  HYPERTROPHY OF LEFT KIDNEY WITH STABLE NONOBSTRUCTING STONES.        GI TRACT: SCATTERED AIR-FLUID LEVELS THROUGHOUT SMALL BOWEL AND LARGE  BOWEL IN A PATTERN MOST COMPATIBLE WITH GENERALIZED ILEUS IN THE SETTING  OF PATIENT'S ACUTE SIGMOID DIVERTICULITIS. THE DEGREE OF INFLAMMATION  INVOLVING THE SIGMOID COLON HAS OVERALL INCREASED SLIGHTLY BUT THERE  REMAINS NO EVIDENCE OF PERFORATION OR MATURE ABSCESS. LARGE HIATAL  HERNIA.        PERITONEUM: SMALL AMOUNTS OF FREE FLUID LOWER PELVIS ARE AGAIN NOTED  BUT APPEAR NONLOCULATED.        MESENTERY: Unremarkable.        LYMPH NODES: No lymphadenopathy.        VASCULATURE: No evidence of aneurysm.        ABDOMINAL WALL: No focal hernia or mass.           OTHER: None.     PELVIS:        BLADDER: No focal mass or significant wall thickening        REPRODUCTIVE: Unremarkable as visualized.        APPENDIX: Nondistended. No surrounding inflammation.     BONES: STABLE DEGENERATIVE CHANGES THORACOLUMBAR SPINE AND CHANGES OF  PRIOR POSTERIOR SPINAL FUSION.       Impression:       Impression:  1. Some increase in the degree of inflammation with increased length of  involvement of sigmoid diverticulitis.  2. There remains no evidence of significant perforation or abscess.  3. Generalized ileus and small free fluid lower pelvis.  4. Prominence of the left renal collecting system in part related to  secondary inflammation of the distal left ureter given its proximity to  inflammatory process detailed above.  5. Cardiomegaly with small pleural effusions may reflect  fluid overload  state.  6. Other nonacute findings as above.     This report was finalized on 8/2/2019 4:28 PM by Dr. Walker Torres MD.           Final impressions for the last 30 days of radiology reports:    Ct Abdomen Pelvis Without Contrast    Result Date: 8/2/2019  Impression: 1. Some increase in the degree of inflammation with increased length of involvement of sigmoid diverticulitis. 2. There remains no evidence of significant perforation or abscess. 3. Generalized ileus and small free fluid lower pelvis. 4. Prominence of the left renal collecting system in part related to secondary inflammation of the distal left ureter given its proximity to inflammatory process detailed above. 5. Cardiomegaly with small pleural effusions may reflect fluid overload state. 6. Other nonacute findings as above.  This report was finalized on 8/2/2019 4:28 PM by Dr. Walker Torres MD.      Ct Abdomen Pelvis Without Contrast    Result Date: 7/30/2019  Impression: 1. Acute sigmoid diverticulitis. No CT evidence of perforation or mature abscess. 2. Irregular wall thickening just along the distal margins of the inflamed segment possibly due to inflammation but follow-up with lower endoscopy when patient's clinical status permits to exclude other etiologies. 3. Other nonacute findings as above.  This report was finalized on 7/30/2019 7:53 AM by Dr. Walker Torres MD.      I have personally looked at the radiology images and read the final radiology report.    Assessment & Plan    Acute diverticulitis--continue Merrem.  Patient start on clear liquids today.  Appreciate general surgery input    Chronic atrial fibrillation--continue Xarelto.  Rate is controlled    History of coronary artery disease--medical management.    Chronic kidney disease--stable    History of diastolic congestive heart failure--compensated    VTE Prophylaxis:   Mechanical Order History:     None      Pharmalogical Order History:     Ordered     Dose Route  Frequency Stop    07/31/19 1719  rivaroxaban (XARELTO) tablet 15 mg  Status:  Discontinued      15 mg PO Every Evening 08/02/19 1625    07/30/19 1526  rivaroxaban (XARELTO) tablet 20 mg  Status:  Discontinued      20 mg PO Every Evening 07/31/19 1719              Akash Neville MD  [facility]  08/03/19  12:54 PM

## 2019-08-03 NOTE — PROGRESS NOTES
LOS: 4 days   Patient Care Team:  Duane Pollard MD as PCP - General (Internal Medicine)    Surgery follow up for diverticulitis    Subjective     Interval History:  Patient states she feels a little bit better today.  She did pass some gas and have a couple of bowel movements this morning.  She states she is not hungry.    History taken from patient.      Review of Systems:   Review of systems negative except for history of present illness    Objective     Vital Signs  Temp:  [98.2 °F (36.8 °C)-99.3 °F (37.4 °C)] 98.2 °F (36.8 °C)  Heart Rate:  [64-88] 85  Resp:  [18-20] 18  BP: (104-138)/(56-72) 119/63    Physical Exam:  General:  This is a WD WN elderly female in no acute distress  Vital signs stable, afebrile  HEENT exam:  WNL. Sclera are anicteric.  EOMI  Neck:  supple, FROM.  No JVD.  Trachea midline  Lungs:  Respiratory effort normal. Auscultation: Clear, without wheezes, rhonchi, rales  Heart:  Regular rate and rhythm, without murmur, gallop, rub.  No pedal edema  Abdomen: Hypoactive bowel sounds.  Patient still has marked tenderness in the lower abdomen left greater than right with guarding.  However the upper abdomen is significantly less tender today.  Musculoskeletal:  muscle strength/tone is normal.    Psyc:  alert, oriented x 3.  Mood and affect are appropriate  skin:  Warm with good turgor.  Without rash or lesion  extremities:  Examination of the extremities revealed no cyanosis, clubbing or edema.     Results Review:        Results from last 7 days   Lab Units 08/03/19  0438 08/02/19  0122 08/01/19  0444 07/31/19  0319 07/30/19  0636   CRP mg/dL  --  14.79* 13.27* 13.64* 5.27*   LACTATE mmol/L  --   --   --   --  1.7   WBC 10*3/mm3 9.47 13.67* 11.75* 10.18 14.76*   HEMOGLOBIN g/dL 8.5* 9.8* 9.4* 9.8* 12.0   HEMATOCRIT % 27.8* 30.9* 29.7* 30.7* 36.7   PLATELETS 10*3/mm3 156 176 157 166 196         Results from last 7 days   Lab Units 08/03/19  0438 08/02/19  0122 08/01/19  0444  07/31/19  0319 07/30/19  0636   SODIUM mmol/L 138 136 137 138 136   POTASSIUM mmol/L 3.3* 3.7 3.4* 3.5 4.2   MAGNESIUM mg/dL  --   --   --  2.0  --    CHLORIDE mmol/L 105 103 104 103 98   CO2 mmol/L 23.0 21.8* 23.9 23.0 24.5   BUN mg/dL 8 10 12 16 22   CREATININE mg/dL 0.81 0.90 1.02* 1.21* 1.22*   EGFR IF NONAFRICN AM mL/min/1.73 67 60* 52* 42* 42*   CALCIUM mg/dL 8.1* 8.3* 8.2* 8.4* 9.4   GLUCOSE mg/dL 97 118* 122* 121* 151*   ALBUMIN g/dL  --   --   --   --  3.90   BILIRUBIN mg/dL  --   --   --   --  0.8   ALK PHOS U/L  --   --   --   --  61   AST (SGOT) U/L  --   --   --   --  20   ALT (SGPT) U/L  --   --   --   --  9   Estimated Creatinine Clearance: 50.4 mL/min (by C-G formula based on SCr of 0.81 mg/dL).  No results found for: AMMONIA      Blood Culture   Date Value Ref Range Status   07/30/2019 No growth at 4 days  Preliminary   07/30/2019 No growth at 4 days  Preliminary                Imaging:  Imaging Results (last 24 hours)     Procedure Component Value Units Date/Time    CT Abdomen Pelvis Without Contrast [[id number]] Collected:  08/02/19 1625     Updated:  08/02/19 1631    Narrative:       EXAM: CT ABDOMEN PELVIS WO CONTRAST-            TECHNIQUE: Multiple axial CT images were obtained from lung bases  through pubic symphysis WITHOUT administration of IV contrast.  Reformatted images in the coronal and/or sagittal plane(s) were  generated from the axial data set to facilitate diagnostic accuracy  and/or surgical planning.  Oral Contrast:NONE.     Radiation dose reduction techniques were utilized per ALARA protocol.  Automated exposure control was initiated through either or [employer] or  DoseRight software packages by  protocol.       DOSE:     Clinical information  acute diverticulitis//possible abscess; K57.92-Diverticulitis of  intestine, part unspecified, without perforation or abscess without  bleeding      Comparison  Comparison: 07/30/2019     Findings  LOWER THORAX: CARDIOMEGALY WITH  BILATERAL SMALL PLEURAL EFFUSIONS AND  BIBASILAR AIRSPACE DISEASE.     ABDOMEN:        LIVER: Homogeneous. No focal hepatic mass or ductal dilatation.        GALLBLADDER: PRIOR CHOLECYSTECTOMY.        PANCREAS: Unremarkable. No mass or ductal dilatation.        SPLEEN: Homogeneous. No splenomegaly.        ADRENALS: No mass.        KIDNEYS/URETERS: PROMINENCE OF THE LEFT RENAL COLLECTING SYSTEM IN  PART RELATED TO EXTRARENAL PELVIS BUT ALSO MAY BE DUE TO SECONDARY  INFLAMMATION OF THE LEFT URETER GIVEN ITS PROXIMITY TO THE INFLAMED  SEGMENT OF BOWEL. NO OBSTRUCTING RENAL OR URETERAL STONES. SCARRING OF  THE RIGHT KIDNEY WITH ATROPHY AND NONOBSTRUCTING STONES AND COMPENSATORY  HYPERTROPHY OF LEFT KIDNEY WITH STABLE NONOBSTRUCTING STONES.        GI TRACT: SCATTERED AIR-FLUID LEVELS THROUGHOUT SMALL BOWEL AND LARGE  BOWEL IN A PATTERN MOST COMPATIBLE WITH GENERALIZED ILEUS IN THE SETTING  OF PATIENT'S ACUTE SIGMOID DIVERTICULITIS. THE DEGREE OF INFLAMMATION  INVOLVING THE SIGMOID COLON HAS OVERALL INCREASED SLIGHTLY BUT THERE  REMAINS NO EVIDENCE OF PERFORATION OR MATURE ABSCESS. LARGE HIATAL  HERNIA.        PERITONEUM: SMALL AMOUNTS OF FREE FLUID LOWER PELVIS ARE AGAIN NOTED  BUT APPEAR NONLOCULATED.        MESENTERY: Unremarkable.        LYMPH NODES: No lymphadenopathy.        VASCULATURE: No evidence of aneurysm.        ABDOMINAL WALL: No focal hernia or mass.           OTHER: None.     PELVIS:        BLADDER: No focal mass or significant wall thickening        REPRODUCTIVE: Unremarkable as visualized.        APPENDIX: Nondistended. No surrounding inflammation.     BONES: STABLE DEGENERATIVE CHANGES THORACOLUMBAR SPINE AND CHANGES OF  PRIOR POSTERIOR SPINAL FUSION.       Impression:       Impression:  1. Some increase in the degree of inflammation with increased length of  involvement of sigmoid diverticulitis.  2. There remains no evidence of significant perforation or abscess.  3. Generalized ileus and small free  fluid lower pelvis.  4. Prominence of the left renal collecting system in part related to  secondary inflammation of the distal left ureter given its proximity to  inflammatory process detailed above.  5. Cardiomegaly with small pleural effusions may reflect fluid overload  state.  6. Other nonacute findings as above.     This report was finalized on 8/2/2019 4:28 PM by Dr. Walker Torres MD.                  Impression:  Acute diverticulitis which is failed to improve despite several days of IV antibiotics.    Plan:  Start clear liquid diet as patient is clinically improved today    Britney Madden MD  08/03/19  11:47 AM      Please note that portions of this note were completed with a voice recognition program.

## 2019-08-03 NOTE — PLAN OF CARE
Problem: Patient Care Overview  Goal: Plan of Care Review  Outcome: Ongoing (interventions implemented as appropriate)    Goal: Individualization and Mutuality  Outcome: Ongoing (interventions implemented as appropriate)    Goal: Discharge Needs Assessment  Outcome: Ongoing (interventions implemented as appropriate)    Goal: Interprofessional Rounds/Family Conf  Outcome: Ongoing (interventions implemented as appropriate)      Problem: Pain, Chronic (Adult)  Goal: Acceptable Pain/Comfort Level and Functional Ability  Outcome: Ongoing (interventions implemented as appropriate)      Problem: Fall Risk (Adult)  Goal: Absence of Fall  Outcome: Ongoing (interventions implemented as appropriate)      Problem: Pain, Acute (Adult)  Goal: Acceptable Pain Control/Comfort Level  Outcome: Ongoing (interventions implemented as appropriate)      Problem: Asthma (Adult)  Goal: Signs and Symptoms of Listed Potential Problems Will be Absent, Minimized or Managed (Asthma)  Outcome: Ongoing (interventions implemented as appropriate)      Problem: Bowel Disease, Inflammatory (Adult)  Goal: Signs and Symptoms of Listed Potential Problems Will be Absent, Minimized or Managed (Bowel Disease, Inflammatory)  Outcome: Ongoing (interventions implemented as appropriate)

## 2019-08-04 LAB
ANION GAP SERPL CALCULATED.3IONS-SCNC: 10.5 MMOL/L (ref 5–15)
BACTERIA SPEC AEROBE CULT: NORMAL
BACTERIA SPEC AEROBE CULT: NORMAL
BASOPHILS # BLD AUTO: 0.01 10*3/MM3 (ref 0–0.2)
BASOPHILS NFR BLD AUTO: 0.2 % (ref 0–1.5)
BUN BLD-MCNC: 9 MG/DL (ref 8–23)
BUN/CREAT SERPL: 12 (ref 7–25)
CALCIUM SPEC-SCNC: 8.2 MG/DL (ref 8.6–10.5)
CHLORIDE SERPL-SCNC: 105 MMOL/L (ref 98–107)
CO2 SERPL-SCNC: 23.5 MMOL/L (ref 22–29)
CREAT BLD-MCNC: 0.75 MG/DL (ref 0.57–1)
CRP SERPL-MCNC: 7.65 MG/DL (ref 0–0.5)
DEPRECATED RDW RBC AUTO: 49.6 FL (ref 37–54)
EOSINOPHIL # BLD AUTO: 0.12 10*3/MM3 (ref 0–0.4)
EOSINOPHIL NFR BLD AUTO: 1.8 % (ref 0.3–6.2)
ERYTHROCYTE [DISTWIDTH] IN BLOOD BY AUTOMATED COUNT: 14.9 % (ref 12.3–15.4)
GFR SERPL CREATININE-BSD FRML MDRD: 73 ML/MIN/1.73
GLUCOSE BLD-MCNC: 85 MG/DL (ref 65–99)
HCT VFR BLD AUTO: 27.6 % (ref 34–46.6)
HGB BLD-MCNC: 8.4 G/DL (ref 12–15.9)
IMM GRANULOCYTES # BLD AUTO: 0.01 10*3/MM3 (ref 0–0.05)
IMM GRANULOCYTES NFR BLD AUTO: 0.2 % (ref 0–0.5)
LYMPHOCYTES # BLD AUTO: 0.97 10*3/MM3 (ref 0.7–3.1)
LYMPHOCYTES NFR BLD AUTO: 14.8 % (ref 19.6–45.3)
MCH RBC QN AUTO: 29.2 PG (ref 26.6–33)
MCHC RBC AUTO-ENTMCNC: 30.4 G/DL (ref 31.5–35.7)
MCV RBC AUTO: 95.8 FL (ref 79–97)
MONOCYTES # BLD AUTO: 0.5 10*3/MM3 (ref 0.1–0.9)
MONOCYTES NFR BLD AUTO: 7.6 % (ref 5–12)
NEUTROPHILS # BLD AUTO: 4.96 10*3/MM3 (ref 1.7–7)
NEUTROPHILS NFR BLD AUTO: 75.4 % (ref 42.7–76)
PLATELET # BLD AUTO: 159 10*3/MM3 (ref 140–450)
PMV BLD AUTO: 11.2 FL (ref 6–12)
POTASSIUM BLD-SCNC: 4.3 MMOL/L (ref 3.5–5.2)
RBC # BLD AUTO: 2.88 10*6/MM3 (ref 3.77–5.28)
SODIUM BLD-SCNC: 139 MMOL/L (ref 136–145)
WBC NRBC COR # BLD: 6.57 10*3/MM3 (ref 3.4–10.8)

## 2019-08-04 PROCEDURE — 25010000002 MEROPENEM: Performed by: FAMILY MEDICINE

## 2019-08-04 PROCEDURE — 86140 C-REACTIVE PROTEIN: CPT | Performed by: FAMILY MEDICINE

## 2019-08-04 PROCEDURE — 85025 COMPLETE CBC W/AUTO DIFF WBC: CPT | Performed by: FAMILY MEDICINE

## 2019-08-04 PROCEDURE — 80048 BASIC METABOLIC PNL TOTAL CA: CPT | Performed by: FAMILY MEDICINE

## 2019-08-04 PROCEDURE — 99232 SBSQ HOSP IP/OBS MODERATE 35: CPT | Performed by: SURGERY

## 2019-08-04 PROCEDURE — 94799 UNLISTED PULMONARY SVC/PX: CPT

## 2019-08-04 PROCEDURE — 99232 SBSQ HOSP IP/OBS MODERATE 35: CPT | Performed by: FAMILY MEDICINE

## 2019-08-04 RX ADMIN — SODIUM CHLORIDE, PRESERVATIVE FREE 3 ML: 5 INJECTION INTRAVENOUS at 08:27

## 2019-08-04 RX ADMIN — DOCUSATE SODIUM 100 MG: 100 CAPSULE ORAL at 08:26

## 2019-08-04 RX ADMIN — PANTOPRAZOLE SODIUM 40 MG: 40 INJECTION, POWDER, FOR SOLUTION INTRAVENOUS at 08:26

## 2019-08-04 RX ADMIN — PANTOPRAZOLE SODIUM 40 MG: 40 INJECTION, POWDER, FOR SOLUTION INTRAVENOUS at 21:13

## 2019-08-04 RX ADMIN — DIGOXIN 125 MCG: 125 TABLET ORAL at 05:14

## 2019-08-04 RX ADMIN — LEVOTHYROXINE SODIUM 75 MCG: 0.07 TABLET ORAL at 16:53

## 2019-08-04 RX ADMIN — LISINOPRIL 10 MG: 10 TABLET ORAL at 08:26

## 2019-08-04 RX ADMIN — GABAPENTIN 300 MG: 300 CAPSULE ORAL at 08:26

## 2019-08-04 RX ADMIN — SODIUM CHLORIDE 75 ML/HR: 900 INJECTION INTRAVENOUS at 00:46

## 2019-08-04 RX ADMIN — CHOLECALCIFEROL TAB 10 MCG (400 UNIT) 1000 UNITS: 10 TAB at 08:26

## 2019-08-04 RX ADMIN — GABAPENTIN 300 MG: 300 CAPSULE ORAL at 21:13

## 2019-08-04 RX ADMIN — MEROPENEM 1 G: 1 INJECTION, POWDER, FOR SOLUTION INTRAVENOUS at 16:05

## 2019-08-04 RX ADMIN — SODIUM CHLORIDE 75 ML/HR: 900 INJECTION INTRAVENOUS at 22:10

## 2019-08-04 RX ADMIN — MEROPENEM 1 G: 1 INJECTION, POWDER, FOR SOLUTION INTRAVENOUS at 00:46

## 2019-08-04 NOTE — PROGRESS NOTES
[facility] HOSPITALIST PROGRESS NOTE     Patient Identification:  Name:  Desiree Matos  Age:  85 y.o.  Sex:  female  :  10/13/1933  MRN:  [id number]  Visit Number:  [id number]  ROOM: [address] [place] [address]     Primary Care Provider:  Duane Pollard MD    Length of stay in inpatient status:  5    Subjective     Chief Compliant:    Chief Complaint   Patient presents with   • Abdominal Pain       History of Presenting Illness: 85-year-old female with sigmoid diverticulitis.  Patient states that she feels somewhat better this morning.  Is only mild tenderness in the left lower quadrant.  No fever overnight.  No other acute complaints    Objective     Current Hospital Meds:  cholecalciferol 1,000 Units Oral Daily   digoxin 125 mcg Oral QAM   docusate sodium 100 mg Oral BID   gabapentin 300 mg Oral Q12H   levothyroxine 75 mcg Oral Q PM   lisinopril 10 mg Oral Q24H   meropenem 1 g Intravenous Q12H   nitazoxanide 500 mg Oral BID With Meals   pantoprazole 40 mg Intravenous Q12H   sodium chloride 3 mL Intravenous Q12H     sodium chloride 75 mL/hr Last Rate: 75 mL/hr (19 0046)     ----------------------------------------------------------------------------------------------------------------------  Vital Signs:  Temp:  [97.4 °F (36.3 °C)-98.6 °F (37 °C)] 97.4 °F (36.3 °C)  Heart Rate:  [63-85] 76  Resp:  [18] 18  BP: (110-149)/(63-74) 140/74  SpO2:  [91 %-97 %] 94 %  on   ;   Device (Oxygen Therapy): room air  Body mass index is 30.9 kg/m².    Wt Readings from Last 3 Encounters:   19 79.1 kg (174 lb 7 oz)   19 74.8 kg (165 lb)   18 77.1 kg (170 lb)     Intake & Output (last 3 days)       701 -  07 -  -  -  0700    P.O. 1080 960 480 240    I.V. (mL/kg)   900 (11.4)     IV Piggyback  300      Total Intake(mL/kg) 1080 (14.1) 1260 (16) 1380 (17.4) 240 (3)    Urine (mL/kg/hr) 1400 (0.8) 1050 (0.6)      Total Output 1400 1050       Net -320 +210 +1380 +240            Urine Unmeasured Occurrence   4 x     Stool Unmeasured Occurrence   2 x         Diet Clear Liquid  ----------------------------------------------------------------------------------------------------------------------  Physical exam:  Constitutional: Elderly female in no distress  HEENT: Normocephalic atraumatic  Neck: Supple  Cardiovascular: Regular rate and rhythm  Pulmonary/Chest: Clear to auscultation  Abdominal: Positive bowel sounds soft minimal tenderness left lower quadrant.  No rebound no guarding.   Musculoskeletal: No arthropathy  Neurological: No focal deficits  Skin: No rashes  Peripheral vascular:  Genitourinary:  ----------------------------------------------------------------------------------------------------------------------    Last echocardiogram:  Results for orders placed during the hospital encounter of 01/12/17   Adult Transthoracic Echo Complete    Narrative · Normal left ventricular cavity size and wall thickness noted. All left   ventricular wall segments contract normally.  · Estimated EF appears to be in the range of 61 - 65%.  · The aortic valve is abnormal in structure. The valve exhibits sclerosis.   There is calcification of the noncoronary cusp, left coronary cusp and   right coronary cusp present. No aortic valve regurgitation is present.   Mild aortic valve stenosis is present.  · The mitral valve is abnormal in structure. There are myxomatous changes   of the mitral valve apparatus present. Mild mitral valve regurgitation is   present. No significant mitral valve stenosis is present.  · The tricuspid valve is normal. No tricuspid valve stenosis is present.   No tricuspid valve regurgitation is present. Estimated right ventricular   systolic pressure from tricuspid regurgitation is mildly elevated (35-45   mmHg).  · The pulmonic valve is structurally normal. There is no significant   pulmonic valve stenosis present. There is no pulmonic  valve regurgitation   present.  · There is no evidence of pericardial effusion.        ----------------------------------------------------------------------------------------------------------------------  Results from last 7 days   Lab Units 08/04/19 0405 08/03/19 0438 08/02/19 0122 08/01/19 0444 07/30/19  0636   CRP mg/dL 7.65*  --  14.79* 13.27*   < > 5.27*   LACTATE mmol/L  --   --   --   --   --  1.7   WBC 10*3/mm3 6.57 9.47 13.67* 11.75*   < > 14.76*   HEMOGLOBIN g/dL 8.4* 8.5* 9.8* 9.4*   < > 12.0   HEMATOCRIT % 27.6* 27.8* 30.9* 29.7*   < > 36.7   MCV fL 95.8 95.5 95.1 94.3   < > 91.3   MCHC g/dL 30.4* 30.6* 31.7 31.6   < > 32.7   PLATELETS 10*3/mm3 159 156 176 157   < > 196    < > = values in this interval not displayed.         Results from last 7 days   Lab Units 08/04/19 0405 08/03/19 0438 08/02/19 0122 07/31/19 0319 07/30/19  0636   SODIUM mmol/L 139 138 136   < > 138 136   POTASSIUM mmol/L 4.3 3.3* 3.7   < > 3.5 4.2   MAGNESIUM mg/dL  --   --   --   --  2.0  --    CHLORIDE mmol/L 105 105 103   < > 103 98   CO2 mmol/L 23.5 23.0 21.8*   < > 23.0 24.5   BUN mg/dL 9 8 10   < > 16 22   CREATININE mg/dL 0.75 0.81 0.90   < > 1.21* 1.22*   EGFR IF NONAFRICN AM mL/min/1.73 73 67 60*   < > 42* 42*   CALCIUM mg/dL 8.2* 8.1* 8.3*   < > 8.4* 9.4   PHOSPHORUS mg/dL  --   --   --   --  3.1  --    GLUCOSE mg/dL 85 97 118*   < > 121* 151*   ALBUMIN g/dL  --   --   --   --   --  3.90   BILIRUBIN mg/dL  --   --   --   --   --  0.8   ALK PHOS U/L  --   --   --   --   --  61   AST (SGOT) U/L  --   --   --   --   --  20   ALT (SGPT) U/L  --   --   --   --   --  9    < > = values in this interval not displayed.   Estimated Creatinine Clearance: 51.2 mL/min (by C-G formula based on SCr of 0.75 mg/dL).  No results found for: AMMONIA              No results found for: HGBA1C, POCGLU  Lab Results   Component Value Date    TSH 4.430 (H) 07/30/2019    FREET4 1.20 07/30/2019     No results found for: PREGTESTUR,  PREGSERUM, HCG, HCGQUANT  Pain Management Panel     There is no flowsheet data to display.        Brief Urine Lab Results  (Last result in the past 365 days)      Color   Clarity   Blood   Leuk Est   Nitrite   Protein   CREAT   Urine HCG        07/30/19 0644 Dark Yellow Clear Negative Negative Negative Negative             Blood Culture   Date Value Ref Range Status   07/30/2019 No growth at 5 days  Final   07/30/2019 No growth at 5 days  Final     Results from last 7 days   Lab Units 07/30/19  0644   NITRITE UA  Negative              Results from last 7 days   Lab Units 08/04/19  0405 08/02/19  0122 08/01/19  0444 07/31/19  0319 07/30/19  0636   LACTATE mmol/L  --   --   --   --  1.7   CRP mg/dL 7.65* 14.79* 13.27* 13.64* 5.27*       I have personally looked at the labs and they are summarized above.  ----------------------------------------------------------------------------------------------------------------------  Detailed radiology reports for the last 24 hours:    Imaging Results (last 24 hours)     ** No results found for the last 24 hours. **        Final impressions for the last 30 days of radiology reports:    Ct Abdomen Pelvis Without Contrast    Result Date: 8/2/2019  Impression: 1. Some increase in the degree of inflammation with increased length of involvement of sigmoid diverticulitis. 2. There remains no evidence of significant perforation or abscess. 3. Generalized ileus and small free fluid lower pelvis. 4. Prominence of the left renal collecting system in part related to secondary inflammation of the distal left ureter given its proximity to inflammatory process detailed above. 5. Cardiomegaly with small pleural effusions may reflect fluid overload state. 6. Other nonacute findings as above.  This report was finalized on 8/2/2019 4:28 PM by Dr. Walker Torres MD.      Ct Abdomen Pelvis Without Contrast    Result Date: 7/30/2019  Impression: 1. Acute sigmoid diverticulitis. No CT evidence of  perforation or mature abscess. 2. Irregular wall thickening just along the distal margins of the inflamed segment possibly due to inflammation but follow-up with lower endoscopy when patient's clinical status permits to exclude other etiologies. 3. Other nonacute findings as above.  This report was finalized on 7/30/2019 7:53 AM by Dr. Walker Torres MD.      I have personally looked at the radiology images and read the final radiology report.    Assessment & Plan      Acute diverticulitis--continue IV Merrem.  Consider advancing diet slowly today.    Chronic atrial fibrillation--Xarelto held per surgery recommendations.  Rate is controlled    History of coronary artery disease--stable.  No chest pain.  Continue medical management    Chronic kidney disease--stable    History of diastolic congestive heart failure--        VTE Prophylaxis:   Mechanical Order History:     None      Pharmalogical Order History:     Ordered     Dose Route Frequency Stop    07/31/19 1719  rivaroxaban (XARELTO) tablet 15 mg  Status:  Discontinued      15 mg PO Every Evening 08/02/19 1625    07/30/19 1526  rivaroxaban (XARELTO) tablet 20 mg  Status:  Discontinued      20 mg PO Every Evening 07/31/19 1719              Akash Neville MD  [facility]  08/04/19  9:21 AM

## 2019-08-04 NOTE — PROGRESS NOTES
LOS: 5 days   Patient Care Team:  Duane Pollard MD as PCP - General (Internal Medicine)    Surgery follow up for diverticulitis    Subjective     Interval History:  Patient states she feels better today.  She did pass some gas and have a couple of bowel movements.  The patient was advanced to a regular diet.  She is anxious to go home.  History taken from patient.      Review of Systems:   Review of systems negative except for history of present illness    Objective     Vital Signs  Temp:  [97.4 °F (36.3 °C)-98.6 °F (37 °C)] 97.8 °F (36.6 °C)  Heart Rate:  [63-80] 76  Resp:  [18] 18  BP: (110-149)/(68-76) 136/76    Physical Exam:  General:  This is a WD WN elderly female in no acute distress  Vital signs stable, afebrile  HEENT exam:  WNL. Sclera are anicteric.  EOMI  Neck:  supple, FROM.  No JVD.  Trachea midline  Lungs:  Respiratory effort normal. Auscultation: Clear, without wheezes, rhonchi, rales  Heart:  Regular rate and rhythm, without murmur, gallop, rub.  No pedal edema  Abdomen: Hypoactive bowel sounds.  Significant improvement in left lower quadrant tenderness with no obvious guarding today.    Musculoskeletal:  muscle strength/tone is normal.    Psyc:  alert, oriented x 3.  Mood and affect are appropriate  skin:  Warm with good turgor.  Without rash or lesion  extremities:  Examination of the extremities revealed no cyanosis, clubbing or edema.     Results Review:        Results from last 7 days   Lab Units 08/04/19  0405 08/03/19  0438 08/02/19  0122 08/01/19  0444  07/30/19  0636   CRP mg/dL 7.65*  --  14.79* 13.27*   < > 5.27*   LACTATE mmol/L  --   --   --   --   --  1.7   WBC 10*3/mm3 6.57 9.47 13.67* 11.75*   < > 14.76*   HEMOGLOBIN g/dL 8.4* 8.5* 9.8* 9.4*   < > 12.0   HEMATOCRIT % 27.6* 27.8* 30.9* 29.7*   < > 36.7   PLATELETS 10*3/mm3 159 156 176 157   < > 196    < > = values in this interval not displayed.         Results from last 7 days   Lab Units 08/04/19  7375 08/03/19  1330  08/02/19  0122  07/31/19  0319 07/30/19  0636   SODIUM mmol/L 139 138 136   < > 138 136   POTASSIUM mmol/L 4.3 3.3* 3.7   < > 3.5 4.2   MAGNESIUM mg/dL  --   --   --   --  2.0  --    CHLORIDE mmol/L 105 105 103   < > 103 98   CO2 mmol/L 23.5 23.0 21.8*   < > 23.0 24.5   BUN mg/dL 9 8 10   < > 16 22   CREATININE mg/dL 0.75 0.81 0.90   < > 1.21* 1.22*   EGFR IF NONAFRICN AM mL/min/1.73 73 67 60*   < > 42* 42*   CALCIUM mg/dL 8.2* 8.1* 8.3*   < > 8.4* 9.4   GLUCOSE mg/dL 85 97 118*   < > 121* 151*   ALBUMIN g/dL  --   --   --   --   --  3.90   BILIRUBIN mg/dL  --   --   --   --   --  0.8   ALK PHOS U/L  --   --   --   --   --  61   AST (SGOT) U/L  --   --   --   --   --  20   ALT (SGPT) U/L  --   --   --   --   --  9    < > = values in this interval not displayed.   Estimated Creatinine Clearance: 51.2 mL/min (by C-G formula based on SCr of 0.75 mg/dL).  No results found for: AMMONIA      Blood Culture   Date Value Ref Range Status   07/30/2019 No growth at 4 days  Preliminary   07/30/2019 No growth at 4 days  Preliminary                Imaging:  Imaging Results (last 24 hours)     ** No results found for the last 24 hours. **               Impression:  Acute diverticulitis, uncomplicated, improved with Merrem    Plan:  Patient on solid food.  Can be converted to oral antibiotics per medical service.  Surgery to sign off.  Please reconsult as needed    Britney Madden MD  08/04/19  1:43 PM      Please note that portions of this note were completed with a voice recognition program.

## 2019-08-05 LAB
ANION GAP SERPL CALCULATED.3IONS-SCNC: 11.1 MMOL/L (ref 5–15)
BASOPHILS # BLD AUTO: 0.01 10*3/MM3 (ref 0–0.2)
BASOPHILS NFR BLD AUTO: 0.1 % (ref 0–1.5)
BUN BLD-MCNC: 8 MG/DL (ref 8–23)
BUN/CREAT SERPL: 12.1 (ref 7–25)
CALCIUM SPEC-SCNC: 8.3 MG/DL (ref 8.6–10.5)
CHLORIDE SERPL-SCNC: 105 MMOL/L (ref 98–107)
CO2 SERPL-SCNC: 22.9 MMOL/L (ref 22–29)
CREAT BLD-MCNC: 0.66 MG/DL (ref 0.57–1)
CRP SERPL-MCNC: 3.43 MG/DL (ref 0–0.5)
DEPRECATED RDW RBC AUTO: 48.6 FL (ref 37–54)
EOSINOPHIL # BLD AUTO: 0.12 10*3/MM3 (ref 0–0.4)
EOSINOPHIL NFR BLD AUTO: 1.7 % (ref 0.3–6.2)
ERYTHROCYTE [DISTWIDTH] IN BLOOD BY AUTOMATED COUNT: 14.9 % (ref 12.3–15.4)
GFR SERPL CREATININE-BSD FRML MDRD: 85 ML/MIN/1.73
GLUCOSE BLD-MCNC: 87 MG/DL (ref 65–99)
HCT VFR BLD AUTO: 27.2 % (ref 34–46.6)
HGB BLD-MCNC: 8.7 G/DL (ref 12–15.9)
IMM GRANULOCYTES # BLD AUTO: 0.03 10*3/MM3 (ref 0–0.05)
IMM GRANULOCYTES NFR BLD AUTO: 0.4 % (ref 0–0.5)
LYMPHOCYTES # BLD AUTO: 0.96 10*3/MM3 (ref 0.7–3.1)
LYMPHOCYTES NFR BLD AUTO: 13.6 % (ref 19.6–45.3)
MCH RBC QN AUTO: 30.1 PG (ref 26.6–33)
MCHC RBC AUTO-ENTMCNC: 32 G/DL (ref 31.5–35.7)
MCV RBC AUTO: 94.1 FL (ref 79–97)
MONOCYTES # BLD AUTO: 0.66 10*3/MM3 (ref 0.1–0.9)
MONOCYTES NFR BLD AUTO: 9.3 % (ref 5–12)
NEUTROPHILS # BLD AUTO: 5.3 10*3/MM3 (ref 1.7–7)
NEUTROPHILS NFR BLD AUTO: 74.9 % (ref 42.7–76)
PLATELET # BLD AUTO: 180 10*3/MM3 (ref 140–450)
PMV BLD AUTO: 11.3 FL (ref 6–12)
POTASSIUM BLD-SCNC: 4 MMOL/L (ref 3.5–5.2)
RBC # BLD AUTO: 2.89 10*6/MM3 (ref 3.77–5.28)
SODIUM BLD-SCNC: 139 MMOL/L (ref 136–145)
WBC NRBC COR # BLD: 7.08 10*3/MM3 (ref 3.4–10.8)

## 2019-08-05 PROCEDURE — 99232 SBSQ HOSP IP/OBS MODERATE 35: CPT | Performed by: INTERNAL MEDICINE

## 2019-08-05 PROCEDURE — 25010000002 MEROPENEM: Performed by: FAMILY MEDICINE

## 2019-08-05 PROCEDURE — 85025 COMPLETE CBC W/AUTO DIFF WBC: CPT | Performed by: FAMILY MEDICINE

## 2019-08-05 PROCEDURE — 94799 UNLISTED PULMONARY SVC/PX: CPT

## 2019-08-05 PROCEDURE — 86140 C-REACTIVE PROTEIN: CPT | Performed by: FAMILY MEDICINE

## 2019-08-05 PROCEDURE — 80048 BASIC METABOLIC PNL TOTAL CA: CPT | Performed by: FAMILY MEDICINE

## 2019-08-05 RX ORDER — AMOXICILLIN AND CLAVULANATE POTASSIUM 875; 125 MG/1; MG/1
1 TABLET, FILM COATED ORAL 2 TIMES DAILY
Status: DISCONTINUED | OUTPATIENT
Start: 2019-08-05 | End: 2019-08-06 | Stop reason: HOSPADM

## 2019-08-05 RX ADMIN — DOCUSATE SODIUM 200 MG: 100 CAPSULE ORAL at 07:46

## 2019-08-05 RX ADMIN — DIGOXIN 125 MCG: 125 TABLET ORAL at 05:50

## 2019-08-05 RX ADMIN — GABAPENTIN 300 MG: 300 CAPSULE ORAL at 07:46

## 2019-08-05 RX ADMIN — LISINOPRIL 10 MG: 10 TABLET ORAL at 07:45

## 2019-08-05 RX ADMIN — MEROPENEM 1 G: 1 INJECTION, POWDER, FOR SOLUTION INTRAVENOUS at 01:56

## 2019-08-05 RX ADMIN — SODIUM CHLORIDE 75 ML/HR: 900 INJECTION INTRAVENOUS at 15:39

## 2019-08-05 RX ADMIN — DOCUSATE SODIUM 100 MG: 100 CAPSULE ORAL at 20:40

## 2019-08-05 RX ADMIN — PANTOPRAZOLE SODIUM 40 MG: 40 INJECTION, POWDER, FOR SOLUTION INTRAVENOUS at 20:40

## 2019-08-05 RX ADMIN — SODIUM CHLORIDE, PRESERVATIVE FREE 3 ML: 5 INJECTION INTRAVENOUS at 07:47

## 2019-08-05 RX ADMIN — RIVAROXABAN 20 MG: 20 TABLET, FILM COATED ORAL at 15:31

## 2019-08-05 RX ADMIN — PANTOPRAZOLE SODIUM 40 MG: 40 INJECTION, POWDER, FOR SOLUTION INTRAVENOUS at 07:45

## 2019-08-05 RX ADMIN — LEVOTHYROXINE SODIUM 75 MCG: 0.07 TABLET ORAL at 15:35

## 2019-08-05 RX ADMIN — AMOXICILLIN AND CLAVULANATE POTASSIUM 1 TABLET: 875; 125 TABLET, FILM COATED ORAL at 20:40

## 2019-08-05 RX ADMIN — CHOLECALCIFEROL TAB 10 MCG (400 UNIT) 1000 UNITS: 10 TAB at 07:45

## 2019-08-05 RX ADMIN — GABAPENTIN 300 MG: 300 CAPSULE ORAL at 20:40

## 2019-08-05 NOTE — PLAN OF CARE
Problem: Patient Care Overview  Goal: Plan of Care Review  Outcome: Ongoing (interventions implemented as appropriate)    Goal: Individualization and Mutuality  Outcome: Ongoing (interventions implemented as appropriate)    Goal: Discharge Needs Assessment  Outcome: Ongoing (interventions implemented as appropriate)    Goal: Interprofessional Rounds/Family Conf  Outcome: Ongoing (interventions implemented as appropriate)      Problem: Fall Risk (Adult)  Goal: Absence of Fall  Outcome: Ongoing (interventions implemented as appropriate)      Problem: Pain, Acute (Adult)  Goal: Acceptable Pain Control/Comfort Level  Outcome: Ongoing (interventions implemented as appropriate)      Problem: Asthma (Adult)  Goal: Signs and Symptoms of Listed Potential Problems Will be Absent, Minimized or Managed (Asthma)  Outcome: Ongoing (interventions implemented as appropriate)      Problem: Bowel Disease, Inflammatory (Adult)  Goal: Signs and Symptoms of Listed Potential Problems Will be Absent, Minimized or Managed (Bowel Disease, Inflammatory)  Outcome: Ongoing (interventions implemented as appropriate)

## 2019-08-05 NOTE — PROGRESS NOTES
Discharge Planning Assessment  [facility]     Patient Name: Desiree Matos  MRN: [id number]  Today's Date: 8/5/2019    Admit Date: 7/30/2019      Discharge Plan     Row Name 08/05/19 1520       Plan    Plan  Pt admitted on 7/30/19.  Pt lives at home with Granddaughter Petrona and Great Granddaughter and plans to return home at discharge.  Pt currently does not utilize home health services.  Pt currently utilizes cane, rolling walker, bedside commode and rolator via [facility] .  SS will follow.         Expected Discharge Date and Time     Expected Discharge Date Expected Discharge Time    Aug 2, 2019           RAFIA Zuluaga

## 2019-08-05 NOTE — PROGRESS NOTES
Subjective     History:   Desiree Matos is a 85 y.o. female admitted on 7/30/2019 secondary to Acute diverticulitis     Procedures: None    CC: Follow up diverticulitis     Patient seen and examined with TAMRA De Anda. Awake and alert. States she feels overall improved. Diet is being advanced today and she has tolerated it well. Denies nausea or vomiting. Reports 2 loose stools today. Reports chronic dyspnea. Denies CP or palpitations. Denies abdominal pain. No acute events overnight per RN.     History taken from: patient, chart, and RN.      Objective     Vital Signs  Temp:  [97.6 °F (36.4 °C)-98.2 °F (36.8 °C)] 98.2 °F (36.8 °C)  Heart Rate:  [70-83] 76  Resp:  [18] 18  BP: (133-159)/(74-89) 134/86    Intake/Output Summary (Last 24 hours) at 8/5/2019 1928  Last data filed at 8/5/2019 1825  Gross per 24 hour   Intake 360 ml   Output 750 ml   Net -390 ml         Physical Exam:  General:    Awake, alert, in no acute distress   Heart:      Normal S1 and S2. Irregular. No significant murmur appreciated.    Lungs:     Respirations regular, even and unlabored. Lungs clear to auscultation B/L. No wheezes, rales or rhonchi.   Abdomen:   Soft. Mild generalized TTP, worse in LUQ and LLQ. No guarding, rebound tenderness or organomegaly noted. Bowel sounds present x 4.   Extremities:  No clubbing, cyanosis or edema noted. Moves UE and LE equally B/L.     Results Review:    Results from last 7 days   Lab Units 08/05/19  0409 08/04/19  0405 08/03/19  0438 08/02/19  0122 08/01/19  0444 07/31/19  0319 07/30/19  0636   WBC 10*3/mm3 7.08 6.57 9.47 13.67* 11.75* 10.18 14.76*   HEMOGLOBIN g/dL 8.7* 8.4* 8.5* 9.8* 9.4* 9.8* 12.0   PLATELETS 10*3/mm3 180 159 156 176 157 166 196     Results from last 7 days   Lab Units 08/05/19  0409 08/04/19  0405 08/03/19  0438 08/02/19  0122 08/01/19  0444 07/31/19  0319 07/30/19  0636   SODIUM mmol/L 139 139 138 136 137 138 136   POTASSIUM mmol/L 4.0 4.3 3.3* 3.7 3.4* 3.5 4.2   CHLORIDE mmol/L 105  105 105 103 104 103 98   CO2 mmol/L 22.9 23.5 23.0 21.8* 23.9 23.0 24.5   BUN mg/dL 8 9 8 10 12 16 22   CREATININE mg/dL 0.66 0.75 0.81 0.90 1.02* 1.21* 1.22*   CALCIUM mg/dL 8.3* 8.2* 8.1* 8.3* 8.2* 8.4* 9.4   GLUCOSE mg/dL 87 85 97 118* 122* 121* 151*     Results from last 7 days   Lab Units 07/30/19  0636   BILIRUBIN mg/dL 0.8   ALK PHOS U/L 61   AST (SGOT) U/L 20   ALT (SGPT) U/L 9     Results from last 7 days   Lab Units 07/31/19  0319   MAGNESIUM mg/dL 2.0               Imaging Results (last 24 hours)     ** No results found for the last 24 hours. **            Medications:    amoxicillin-clavulanate 1 tablet Oral BID   cholecalciferol 1,000 Units Oral Daily   digoxin 125 mcg Oral QAM   docusate sodium 100 mg Oral BID   gabapentin 300 mg Oral Q12H   levothyroxine 75 mcg Oral Q PM   lisinopril 10 mg Oral Q24H   nitazoxanide 500 mg Oral BID With Meals   pantoprazole 40 mg Intravenous Q12H   rivaroxaban 20 mg Oral Daily With Dinner   sodium chloride 3 mL Intravenous Q12H       sodium chloride 75 mL/hr Last Rate: 75 mL/hr (08/05/19 1539)           Assessment/Plan    Sepsis (present on admission): Likely 2/2 diverticulitis. WBC and CRP improved from upon admission. Cultures with NGTD. Currently on Merrem. Transition to Augmentin. Repeat labs in the AM.      Acute sigmoid diverticulitis: Clinically improving. Advance diet as tolerated. Transition from Merrem to Augmentin. Surgery has now signed off with input appreciated.     Afib: Will resume Xarelto as her diverticulitis is improving and surgery has signed off. Monitor on telemetry.     CAD: Appears clinically stable. Cont to monitor.     Diastolic CHF: Appears compensated at present.     CKD III: Appears stable at present.     Hypokalemia: Improved and stable today.     Normocytic anemia: Appears stable today. Cont to monitor.     DVT PPX: Doris Bryant DO  08/05/19  7:28 PM

## 2019-08-06 VITALS
OXYGEN SATURATION: 94 % | TEMPERATURE: 98 F | HEART RATE: 64 BPM | DIASTOLIC BLOOD PRESSURE: 74 MMHG | BODY MASS INDEX: 31.77 KG/M2 | SYSTOLIC BLOOD PRESSURE: 155 MMHG | WEIGHT: 179.3 LBS | RESPIRATION RATE: 18 BRPM | HEIGHT: 63 IN

## 2019-08-06 LAB
ALBUMIN SERPL-MCNC: 2.74 G/DL (ref 3.5–5.2)
ALBUMIN/GLOB SERPL: 0.9 G/DL
ALP SERPL-CCNC: 58 U/L (ref 39–117)
ALT SERPL W P-5'-P-CCNC: 6 U/L (ref 1–33)
ANION GAP SERPL CALCULATED.3IONS-SCNC: 11.5 MMOL/L (ref 5–15)
AST SERPL-CCNC: 12 U/L (ref 1–32)
BASOPHILS # BLD AUTO: 0.01 10*3/MM3 (ref 0–0.2)
BASOPHILS NFR BLD AUTO: 0.1 % (ref 0–1.5)
BILIRUB SERPL-MCNC: 0.5 MG/DL (ref 0.2–1.2)
BUN BLD-MCNC: 7 MG/DL (ref 8–23)
BUN/CREAT SERPL: 9.9 (ref 7–25)
CALCIUM SPEC-SCNC: 8.3 MG/DL (ref 8.6–10.5)
CHLORIDE SERPL-SCNC: 104 MMOL/L (ref 98–107)
CO2 SERPL-SCNC: 23.5 MMOL/L (ref 22–29)
CREAT BLD-MCNC: 0.71 MG/DL (ref 0.57–1)
CRP SERPL-MCNC: 2.41 MG/DL (ref 0–0.5)
DEPRECATED RDW RBC AUTO: 48.4 FL (ref 37–54)
EOSINOPHIL # BLD AUTO: 0.1 10*3/MM3 (ref 0–0.4)
EOSINOPHIL NFR BLD AUTO: 1.4 % (ref 0.3–6.2)
ERYTHROCYTE [DISTWIDTH] IN BLOOD BY AUTOMATED COUNT: 14.9 % (ref 12.3–15.4)
GFR SERPL CREATININE-BSD FRML MDRD: 78 ML/MIN/1.73
GLOBULIN UR ELPH-MCNC: 3 GM/DL
GLUCOSE BLD-MCNC: 86 MG/DL (ref 65–99)
HCT VFR BLD AUTO: 28.4 % (ref 34–46.6)
HGB BLD-MCNC: 9 G/DL (ref 12–15.9)
IMM GRANULOCYTES # BLD AUTO: 0.05 10*3/MM3 (ref 0–0.05)
IMM GRANULOCYTES NFR BLD AUTO: 0.7 % (ref 0–0.5)
LYMPHOCYTES # BLD AUTO: 0.78 10*3/MM3 (ref 0.7–3.1)
LYMPHOCYTES NFR BLD AUTO: 10.9 % (ref 19.6–45.3)
MCH RBC QN AUTO: 29.6 PG (ref 26.6–33)
MCHC RBC AUTO-ENTMCNC: 31.7 G/DL (ref 31.5–35.7)
MCV RBC AUTO: 93.4 FL (ref 79–97)
MONOCYTES # BLD AUTO: 0.79 10*3/MM3 (ref 0.1–0.9)
MONOCYTES NFR BLD AUTO: 11 % (ref 5–12)
NEUTROPHILS # BLD AUTO: 5.45 10*3/MM3 (ref 1.7–7)
NEUTROPHILS NFR BLD AUTO: 75.9 % (ref 42.7–76)
PLATELET # BLD AUTO: 188 10*3/MM3 (ref 140–450)
PMV BLD AUTO: 11.2 FL (ref 6–12)
POTASSIUM BLD-SCNC: 3.9 MMOL/L (ref 3.5–5.2)
PROT SERPL-MCNC: 5.7 G/DL (ref 6–8.5)
RBC # BLD AUTO: 3.04 10*6/MM3 (ref 3.77–5.28)
SODIUM BLD-SCNC: 139 MMOL/L (ref 136–145)
WBC NRBC COR # BLD: 7.18 10*3/MM3 (ref 3.4–10.8)

## 2019-08-06 PROCEDURE — 86140 C-REACTIVE PROTEIN: CPT | Performed by: INTERNAL MEDICINE

## 2019-08-06 PROCEDURE — 80053 COMPREHEN METABOLIC PANEL: CPT | Performed by: INTERNAL MEDICINE

## 2019-08-06 PROCEDURE — 99239 HOSP IP/OBS DSCHRG MGMT >30: CPT | Performed by: INTERNAL MEDICINE

## 2019-08-06 PROCEDURE — 85025 COMPLETE CBC W/AUTO DIFF WBC: CPT | Performed by: INTERNAL MEDICINE

## 2019-08-06 RX ORDER — NITAZOXANIDE 500 MG/1
500 TABLET ORAL 2 TIMES DAILY WITH MEALS
Refills: 0 | Status: ON HOLD
Start: 2019-08-06 | End: 2019-09-16

## 2019-08-06 RX ORDER — AMOXICILLIN AND CLAVULANATE POTASSIUM 875; 125 MG/1; MG/1
1 TABLET, FILM COATED ORAL 2 TIMES DAILY
Qty: 12 TABLET | Refills: 0 | Status: SHIPPED | OUTPATIENT
Start: 2019-08-06 | End: 2019-08-12

## 2019-08-06 RX ADMIN — AMOXICILLIN AND CLAVULANATE POTASSIUM 1 TABLET: 875; 125 TABLET, FILM COATED ORAL at 08:04

## 2019-08-06 RX ADMIN — GABAPENTIN 300 MG: 300 CAPSULE ORAL at 08:04

## 2019-08-06 RX ADMIN — SODIUM CHLORIDE 75 ML/HR: 900 INJECTION INTRAVENOUS at 05:20

## 2019-08-06 RX ADMIN — CHOLECALCIFEROL TAB 10 MCG (400 UNIT) 1000 UNITS: 10 TAB at 08:04

## 2019-08-06 RX ADMIN — SODIUM CHLORIDE, PRESERVATIVE FREE 3 ML: 5 INJECTION INTRAVENOUS at 08:04

## 2019-08-06 RX ADMIN — DIGOXIN 125 MCG: 125 TABLET ORAL at 05:22

## 2019-08-06 RX ADMIN — LISINOPRIL 10 MG: 10 TABLET ORAL at 08:04

## 2019-08-06 RX ADMIN — DOCUSATE SODIUM 100 MG: 100 CAPSULE ORAL at 08:04

## 2019-08-06 RX ADMIN — PANTOPRAZOLE SODIUM 40 MG: 40 INJECTION, POWDER, FOR SOLUTION INTRAVENOUS at 08:04

## 2019-08-06 NOTE — PHARMACY PATIENT ASSISTANCE
Pharmacy checked on the cost of patient's home medication Xarelto. Per insurance the patient's copay is $3.80. No issues anticipated at this time.    Thank you,  Haylie Downing, Pharmacy Intern  08/06/19  9:22 AM

## 2019-08-06 NOTE — DISCHARGE SUMMARY
Date of Admission: 7/30/2019    Date of Discharge: 8/6/2019     PCP: Duane Pollard MD    Admission Diagnosis:   Please see admission H&P    Discharge Diagnosis:   Sepsis (present on admission)  Acute sigmoid diverticulitis  Chronic Afib, rate controlled  CAD  Diastolic CHF, compensated  CKD III  Hypokalemia  Normocytic anemia  Advanced age    Procedures Performed: None     Consults:   Consults     Date and Time Order Name Status Description    8/2/2019 1147 Inpatient General Surgery Consult Completed             History of Present Illness:  Desiree Matos is a 85 y.o. female who presented to [facility] ED with CC of abdominal pain. Please see admission H&P for complete details.    In the ED, workup revealed acute sigmoid diverticulitis. Cultures were obtained and IV antibiotics initiated. She was given Zofran and IV fluids as well.        Hospital Course  Desiree Matos was admitted to the telemetry floor for further evaluation and treatment. She was made NPO and continued on maintenance IV fluids as well as IV antibiotics including Zosyn. PRN analgesics and antiemetics were made available as well.     Repeat labs revealed hypokalemia and the hospital's electrolyte replacement protocols were initiated. She initially only showed mild clinical improvement with persistent abdominal pain with poor appetite once she was started on liquids with orders to advance her diet as tolerated. Repeat CT was obtained revealing some degree of inflammation in the sigmoid colon with generalized ileus. However, there was no evidence of perforation or abscess. Zosyn was escalated to [facility] general surgery was consulted for further input. Surgery recommended to hold her Xarelto in the event surgical intervention should be required.     However, she began showing clinical improvement over the course of a few days. Her diet was slowly advanced as tolerated as her abdominal pain improved. Her inflammatory markers began improving and  surgery later signed off as no surgical intervention was indicated. Her Xarelto was then resumed. She was also transitioned from [facility] to Augmentin and remained clinically stable. Ms. Matos was seen and examined with TAMRA De Anda on 8/6/19. She remained hemodynamically stable with stable AM labs. She was tolerating a regular diet with significant improvement in her abdominal pain and improvement in her loose stools. She was eager to go home and deemed medically stable for discharge. A course of Augmentin was prescribed. She was instructed to follow up with her PCP in 1 week.       Condition on Discharge:  Stable    Vital Signs  Vitals:    08/06/19 0615   BP: 155/74   Pulse: 64   Resp: 18   Temp: 98 °F (36.7 °C)   SpO2: 94%       Physical Exam:  General:    Awake, alert, in no acute distress   Heart:      Normal S1 and S2. Irregularly irregular. No significant murmur appreciated   Lungs:     Respirations regular, even and unlabored. Lungs clear to auscultation B/L. No wheezes, rales or rhonchi.   Abdomen:   Soft and nontender. No guarding, rebound tenderness or  organomegaly noted. Bowel sounds present x 4.   Extremities:  No clubbing, cyanosis or edema noted. Moves UE and LE equally B/L.     Discharge Disposition:   home      Discharge Medications:     Discharge Medications      New Medications      Instructions Start Date   amoxicillin-clavulanate 875-125 MG per tablet  Commonly known as:  AUGMENTIN   1 tablet, Oral, 2 Times Daily         Continue These Medications      Instructions Start Date   acetaminophen 500 MG tablet  Commonly known as:  TYLENOL   500 mg, Oral, Every 6 Hours PRN      chlorzoxazone 500 MG tablet  Commonly known as:  PARAFON FORTE   500 mg, Oral, Nightly PRN      cholecalciferol 1000 units tablet  Commonly known as:  VITAMIN D3   1,000 Units, Oral, Daily      digoxin 125 MCG tablet  Commonly known as:  LANOXIN   125 mcg, Oral, Every Morning      docusate sodium 250 MG capsule  Commonly known  as:  COLACE   250 mg, Oral, 2 Times Daily PRN      furosemide 20 MG tablet  Commonly known as:  LASIX   20 mg, Oral, Daily      gabapentin 300 MG capsule  Commonly known as:  NEURONTIN   300 mg, Oral, 2 Times Daily      levothyroxine 75 MCG tablet  Commonly known as:  SYNTHROID, LEVOTHROID   75 mcg, Oral, Every Evening      nitazoxanide 500 MG tablet  Commonly known as:  ALINIA   500 mg, Oral, 2 Times Daily With Meals, Prior to Congregational Admission, Patient was on: Has 8 tabs left       oxyCODONE-acetaminophen  MG per tablet  Commonly known as:  PERCOCET   1 tablet, Oral, Every 12 Hours PRN      pantoprazole 40 MG EC tablet  Commonly known as:  PROTONIX   40 mg, Oral, Daily      quinapril 10 MG tablet  Commonly known as:  ACCUPRIL   10 mg, Oral, Daily      rivaroxaban 20 MG tablet  Commonly known as:  XARELTO   20 mg, Oral, Every Evening         Stop These Medications    hydrochlorothiazide 25 MG tablet  Commonly known as:  HYDRODIURIL              Discharge Diet:   Dietary Orders (From admission, onward)    Start     Ordered    08/06/19 0801  Diet Regular  Diet Effective Now     Question:  Diet Texture / Consistency  Answer:  Regular    08/06/19 0800          Activity at Discharge:  activity as tolerated    Follow-up Appointments:  Additional Instructions for the Follow-ups that You Need to Schedule     Discharge Follow-up with PCP   As directed       Currently Documented PCP:    Duane Pollard MD    PCP Phone Number:    [phone]     Follow Up Details:  Follow up with Dr. Pollard in 1 week.           Follow-up Information     Duane Pollard MD .    Specialty:  Internal Medicine  Why:  Follow up with Dr. Pollard in 1 week.  Contact information:  [address] [facility] [address] [phone]                       Test Results Pending at Discharge:  None     Hong Bryant DO  08/06/19  8:45 AM      Time: Greater than 30 minutes spent on this discharge.

## 2019-08-06 NOTE — PROGRESS NOTES
Discharge Planning Assessment   [facility]     Patient Name: Desiree Matos  MRN: [id number]  Today's Date: 8/6/2019    Admit Date: 7/30/2019    Discharge Plan     Row Name 08/06/19 0925       Plan    Patient/Family in Agreement with Plan  yes    Final Discharge Disposition Code  01 - home or self-care    Final Note  Pt is being discharged home on this date. No new orders noted. Pt was enrolled in [facility]. No other needs identified.      Sylvia Allison

## 2019-08-07 ENCOUNTER — READMISSION MANAGEMENT (OUTPATIENT)
Dept: CALL CENTER | Facility: HOSPITAL | Age: 84
End: 2019-08-07

## 2019-08-07 NOTE — OUTREACH NOTE
Prep Survey      Responses   Facility patient discharged from?  [facility]   Is patient eligible?  Yes   Discharge diagnosis  diverticulitis   Does the patient have one of the following disease processes/diagnoses(primary or secondary)?  Other   Does the patient have Home health ordered?  No   Is there a DME ordered?  No   Comments regarding appointments  see AVS   Medication alerts for this patient  STOP [facility]   Prep survey completed?  Yes          Marisa Calderon RN

## 2019-08-08 ENCOUNTER — READMISSION MANAGEMENT (OUTPATIENT)
Dept: CALL CENTER | Facility: HOSPITAL | Age: 84
End: 2019-08-08

## 2019-08-08 NOTE — OUTREACH NOTE
Medical Week 1 Survey      Responses   Facility patient discharged from?  [facility]   Does the patient have one of the following disease processes/diagnoses(primary or secondary)?  Other   Is there a successful TCM telephone encounter documented?  No   Week 1 attempt successful?  Yes   Call start time  0913   Revoke  Decline to participate   Call end time  0914          Jojo Rivera, RN

## 2019-08-09 ENCOUNTER — APPOINTMENT (OUTPATIENT)
Dept: CT IMAGING | Facility: HOSPITAL | Age: 84
End: 2019-08-09

## 2019-08-09 ENCOUNTER — HOSPITAL ENCOUNTER (EMERGENCY)
Facility: HOSPITAL | Age: 84
Discharge: HOME | End: 2019-08-09
Attending: EMERGENCY MEDICINE | Admitting: EMERGENCY MEDICINE

## 2019-08-09 ENCOUNTER — EPISODE CHANGES (OUTPATIENT)
Dept: CASE MANAGEMENT | Facility: OTHER | Age: 84
End: 2019-08-09

## 2019-08-09 VITALS
TEMPERATURE: 97.7 F | HEIGHT: 63 IN | BODY MASS INDEX: 29.23 KG/M2 | OXYGEN SATURATION: 96 % | SYSTOLIC BLOOD PRESSURE: 177 MMHG | HEART RATE: 94 BPM | WEIGHT: 165 LBS | DIASTOLIC BLOOD PRESSURE: 94 MMHG | RESPIRATION RATE: 16 BRPM

## 2019-08-09 DIAGNOSIS — N30.90 CYSTITIS: Primary | ICD-10-CM

## 2019-08-09 LAB
ALBUMIN SERPL-MCNC: 3.54 G/DL (ref 3.5–5.2)
ALBUMIN/GLOB SERPL: 1.1 G/DL
ALP SERPL-CCNC: 61 U/L (ref 39–117)
ALT SERPL W P-5'-P-CCNC: 9 U/L (ref 1–33)
ANION GAP SERPL CALCULATED.3IONS-SCNC: 11.7 MMOL/L (ref 5–15)
APTT PPP: 34.4 SECONDS (ref 23.8–36.1)
AST SERPL-CCNC: 16 U/L (ref 1–32)
BACTERIA UR QL AUTO: ABNORMAL /HPF
BASOPHILS # BLD AUTO: 0.01 10*3/MM3 (ref 0–0.2)
BASOPHILS NFR BLD AUTO: 0.2 % (ref 0–1.5)
BILIRUB SERPL-MCNC: 0.7 MG/DL (ref 0.2–1.2)
BILIRUB UR QL STRIP: NEGATIVE
BUN BLD-MCNC: 8 MG/DL (ref 8–23)
BUN/CREAT SERPL: 10.3 (ref 7–25)
CALCIUM SPEC-SCNC: 9.2 MG/DL (ref 8.6–10.5)
CHLORIDE SERPL-SCNC: 97 MMOL/L (ref 98–107)
CLARITY UR: CLEAR
CO2 SERPL-SCNC: 29.3 MMOL/L (ref 22–29)
COLOR UR: YELLOW
CREAT BLD-MCNC: 0.78 MG/DL (ref 0.57–1)
DEPRECATED RDW RBC AUTO: 50.1 FL (ref 37–54)
EOSINOPHIL # BLD AUTO: 0.11 10*3/MM3 (ref 0–0.4)
EOSINOPHIL NFR BLD AUTO: 1.8 % (ref 0.3–6.2)
ERYTHROCYTE [DISTWIDTH] IN BLOOD BY AUTOMATED COUNT: 15.5 % (ref 12.3–15.4)
GFR SERPL CREATININE-BSD FRML MDRD: 70 ML/MIN/1.73
GLOBULIN UR ELPH-MCNC: 3.3 GM/DL
GLUCOSE BLD-MCNC: 104 MG/DL (ref 65–99)
GLUCOSE UR STRIP-MCNC: NEGATIVE MG/DL
HCT VFR BLD AUTO: 31.9 % (ref 34–46.6)
HGB BLD-MCNC: 10 G/DL (ref 12–15.9)
HGB UR QL STRIP.AUTO: ABNORMAL
HYALINE CASTS UR QL AUTO: ABNORMAL /LPF
IMM GRANULOCYTES # BLD AUTO: 0.04 10*3/MM3 (ref 0–0.05)
IMM GRANULOCYTES NFR BLD AUTO: 0.7 % (ref 0–0.5)
INR PPP: 1.25 (ref 0.9–1.1)
KETONES UR QL STRIP: NEGATIVE
LEUKOCYTE ESTERASE UR QL STRIP.AUTO: NEGATIVE
LYMPHOCYTES # BLD AUTO: 0.73 10*3/MM3 (ref 0.7–3.1)
LYMPHOCYTES NFR BLD AUTO: 12.2 % (ref 19.6–45.3)
MAGNESIUM SERPL-MCNC: 1.7 MG/DL (ref 1.6–2.4)
MCH RBC QN AUTO: 29.1 PG (ref 26.6–33)
MCHC RBC AUTO-ENTMCNC: 31.3 G/DL (ref 31.5–35.7)
MCV RBC AUTO: 92.7 FL (ref 79–97)
MONOCYTES # BLD AUTO: 0.77 10*3/MM3 (ref 0.1–0.9)
MONOCYTES NFR BLD AUTO: 12.9 % (ref 5–12)
NEUTROPHILS # BLD AUTO: 4.33 10*3/MM3 (ref 1.7–7)
NEUTROPHILS NFR BLD AUTO: 72.2 % (ref 42.7–76)
NITRITE UR QL STRIP: NEGATIVE
PH UR STRIP.AUTO: 7.5 [PH] (ref 5–8)
PLATELET # BLD AUTO: 211 10*3/MM3 (ref 140–450)
PMV BLD AUTO: 11.3 FL (ref 6–12)
POTASSIUM BLD-SCNC: 4 MMOL/L (ref 3.5–5.2)
PROT SERPL-MCNC: 6.8 G/DL (ref 6–8.5)
PROT UR QL STRIP: NEGATIVE
PROTHROMBIN TIME: 16.3 SECONDS (ref 11–15.4)
RBC # BLD AUTO: 3.44 10*6/MM3 (ref 3.77–5.28)
RBC # UR: ABNORMAL /HPF
REF LAB TEST METHOD: ABNORMAL
SODIUM BLD-SCNC: 138 MMOL/L (ref 136–145)
SP GR UR STRIP: 1.01 (ref 1–1.03)
SQUAMOUS #/AREA URNS HPF: ABNORMAL /HPF
UROBILINOGEN UR QL STRIP: ABNORMAL
WBC NRBC COR # BLD: 5.99 10*3/MM3 (ref 3.4–10.8)
WBC UR QL AUTO: ABNORMAL /HPF

## 2019-08-09 PROCEDURE — 80053 COMPREHEN METABOLIC PANEL: CPT | Performed by: PHYSICIAN ASSISTANT

## 2019-08-09 PROCEDURE — 85610 PROTHROMBIN TIME: CPT | Performed by: PHYSICIAN ASSISTANT

## 2019-08-09 PROCEDURE — 74176 CT ABD & PELVIS W/O CONTRAST: CPT

## 2019-08-09 PROCEDURE — 85730 THROMBOPLASTIN TIME PARTIAL: CPT | Performed by: PHYSICIAN ASSISTANT

## 2019-08-09 PROCEDURE — 83735 ASSAY OF MAGNESIUM: CPT | Performed by: PHYSICIAN ASSISTANT

## 2019-08-09 PROCEDURE — 85025 COMPLETE CBC W/AUTO DIFF WBC: CPT | Performed by: PHYSICIAN ASSISTANT

## 2019-08-09 PROCEDURE — 74176 CT ABD & PELVIS W/O CONTRAST: CPT | Performed by: RADIOLOGY

## 2019-08-09 PROCEDURE — 99283 EMERGENCY DEPT VISIT LOW MDM: CPT

## 2019-08-09 PROCEDURE — 81001 URINALYSIS AUTO W/SCOPE: CPT | Performed by: PHYSICIAN ASSISTANT

## 2019-08-09 RX ORDER — SODIUM CHLORIDE 0.9 % (FLUSH) 0.9 %
10 SYRINGE (ML) INJECTION AS NEEDED
Status: DISCONTINUED | OUTPATIENT
Start: 2019-08-09 | End: 2019-08-09 | Stop reason: HOSPADM

## 2019-08-09 RX ORDER — CEFDINIR 300 MG/1
300 CAPSULE ORAL 2 TIMES DAILY
Qty: 14 CAPSULE | Refills: 0 | Status: SHIPPED | OUTPATIENT
Start: 2019-08-09 | End: 2019-08-16

## 2019-08-09 NOTE — ED NOTES
Discharge instructions reviewed with patient, patient instructed to return to ED if symptoms worsen or if any new problems arise. Patient verbalizes understanding of discharge instructions, patient ambulatory out of ED with family. No acute distress noted.       Jennifer Whitehead RN  08/09/19 4843

## 2019-08-09 NOTE — ED PROVIDER NOTES
Subjective   85-year-old female presents the ED today for urinary urgency and frequency with dysuria.  This is been going on for the last 3 days.  She states it started after she was discharged from the hospital.  She was recently admitted on July 30 through August 6 for acute diverticulitis.  She states she received a lot of fluids while in the hospital and she thought this was the cause of her symptoms.  She states her symptoms have not been getting any better and she started to have dysuria last night.  She denies any fever.  She denies any nausea, vomiting or diarrhea.  She denies any abdominal pain.  She was discharged from the hospital on Augmentin.  She states she only has 2 or 3 days left of this medication.        History provided by:  Patient  Difficulty Urinating   Pain quality:  Burning  Pain severity:  Moderate  Onset quality:  Gradual  Duration:  3 days  Timing:  Constant  Progression:  Worsening  Chronicity:  New  Recent urinary tract infections: no    Relieved by:  Nothing  Worsened by:  Nothing  Urinary symptoms: frequent urination    Associated symptoms: no abdominal pain, no fever, no flank pain, no nausea and no vomiting    Risk factors: renal disease        Review of Systems   Constitutional: Negative for appetite change and fever.   HENT: Negative.    Eyes: Negative.    Respiratory: Negative.    Cardiovascular: Negative.    Gastrointestinal: Negative for abdominal pain, nausea and vomiting.   Genitourinary: Positive for difficulty urinating, dysuria, frequency and urgency. Negative for flank pain.   Musculoskeletal: Negative.    Skin: Negative.    Neurological: Negative.    Psychiatric/Behavioral: Negative.    All other systems reviewed and are negative.      Past Medical History:   Diagnosis Date   • A-fib (CMS/[facility])    • Anemia 1/13/2017   • Arthritis    • Asthma    • Cancer (CMS/[facility])    • CHF (congestive heart failure) (CMS/[facility])    • Chronic anticoagulation, Xarelto 7/30/2019   • Coronary  artery disease    • GERD without esophagitis 7/30/2019   • History of colonic polyps 7/30/2019   • History of transfusion    • Hypertension    • Hypothyroidism 7/30/2019   • Peripheral neuropathy 7/30/2019       Allergies   Allergen Reactions   • Adhesive Tape Rash       Past Surgical History:   Procedure Laterality Date   • BREAST CYST EXCISION Left    • CERVICAL POLYPECTOMY     • COLONOSCOPY N/A 1/17/2017    Procedure: COLONOSCOPY;  Surgeon: Madi Zheng III, MD;  Location: [facility] OR;  Service:    • ENDOSCOPY N/A 1/17/2017    Procedure: ESOPHAGOGASTRODUODENOSCOPY;  Surgeon: Madi Zheng III, MD;  Location: [facility] OR;  Service:    • GALLBLADDER SURGERY         History reviewed. No pertinent family history.    Social History     Socioeconomic History   • Marital status:      Spouse name: Not on file   • Number of children: Not on file   • Years of education: Not on file   • Highest education level: Not on file   Tobacco Use   • Smoking status: Never Smoker   • Smokeless tobacco: Never Used   Substance and Sexual Activity   • Alcohol use: No   • Drug use: No   • Sexual activity: Defer           Objective   Physical Exam   Constitutional: She is oriented to person, place, and time. She appears well-developed and well-nourished. No distress.   HENT:   Head: Normocephalic and atraumatic.   Right Ear: External ear normal.   Left Ear: External ear normal.   Nose: Nose normal.   Mouth/Throat: Oropharynx is clear and moist.   Eyes: Conjunctivae and EOM are normal. Pupils are equal, round, and reactive to light.   Neck: Normal range of motion. Neck supple.   Cardiovascular: Normal rate, regular rhythm, normal heart sounds and intact distal pulses.   Pulmonary/Chest: Effort normal and breath sounds normal.   Abdominal: Soft. Bowel sounds are normal. There is no tenderness. There is no rebound and no guarding.   Musculoskeletal: Normal range of motion. She exhibits edema (mild swelling to bilateral feet  and ankles). She exhibits no tenderness.   Neurological: She is alert and oriented to person, place, and time.   Skin: Skin is warm and dry. Capillary refill takes less than 2 seconds.   Psychiatric: She has a normal mood and affect. Her behavior is normal. Judgment and thought content normal.   Nursing note and vitals reviewed.      Procedures           ED Course  ED Course as of Aug 09 1342   Fri Aug 09, 2019   1217 No sign of infection on urinalysis.  Patient does have blood in her urine.  Will obtain labs and a CT abd/pelvis to further evaluate.  She denies any recent change in her diuretic medicine.  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  Cholecystectomy clips. Sigmoid colonic diverticulosis with some minimal  stranding around the sigmoid colon that could represent acute  diverticulitis. Small bilateral pleural effusions.      []   1341 CT scan is consistent with cystitis.  At this time will start the patient on Omnicef to cover for a bladder infection especially given her symptoms.  She will follow up outpatient and will return to the ED if her symptoms change or worsen.  []      ED Course User Index  [] Cheryl Corral PA                  MDM  Number of Diagnoses or Management Options  Cystitis:      Amount and/or Complexity of Data Reviewed  Clinical lab tests: reviewed  Tests in the radiology section of CPT®: reviewed  Decide to obtain previous medical records or to obtain history from someone other than the patient: yes    Patient Progress  Patient progress: stable        Final diagnoses:   Cystitis            Cheryl Corral PA  08/09/19 3317

## 2019-08-13 ENCOUNTER — PATIENT OUTREACH (OUTPATIENT)
Dept: CASE MANAGEMENT | Facility: OTHER | Age: 84
End: 2019-08-13

## 2019-08-13 NOTE — OUTREACH NOTE
Care Coordination Assessment    Assessment completed with:  patient  Enrolled in care management program:  No  Living arrangement:  children  Support system:  children  Type of residence:  private residence  Home care services:  Yes (Comment: Patient noted as enrolled in [facility])

## 2019-08-13 NOTE — OUTREACH NOTE
Patient Outreach Note    RN-CC outreach to patient.  Patient had ED visit at [facility] on 8/9/2019.  Diagnosis description of Cystitis.  Patient was prescribed Cefdinir 300mg oral 2 times daily and instructed to follow with PCP Latrice after discharge.  Patient reports feeling much better and compliant with prescribed mediations.  Antibiotic regimen and side effects reviewed with patient.  Patient reports tolerating the antibiotic well and v/u of importance of completing regimen.  She also reported having followed with PCP Latrice today.  She has additional f/u scheduled in one month.      Chart review notations indicate patient is followed by [facility].  CM outreach discontinued.      Tiffany Franks RN    8/13/2019, 2:29 PM

## 2019-09-13 ENCOUNTER — HOSPITAL ENCOUNTER (EMERGENCY)
Facility: HOSPITAL | Age: 84
Discharge: LEFT BEFORE DISCHARGE | End: 2019-09-13

## 2019-09-13 VITALS
DIASTOLIC BLOOD PRESSURE: 70 MMHG | BODY MASS INDEX: 27.31 KG/M2 | OXYGEN SATURATION: 98 % | SYSTOLIC BLOOD PRESSURE: 151 MMHG | RESPIRATION RATE: 18 BRPM | TEMPERATURE: 97.4 F | WEIGHT: 160 LBS | HEART RATE: 75 BPM | HEIGHT: 64 IN

## 2019-09-16 ENCOUNTER — APPOINTMENT (OUTPATIENT)
Dept: CT IMAGING | Facility: HOSPITAL | Age: 84
End: 2019-09-16

## 2019-09-16 ENCOUNTER — HOSPITAL ENCOUNTER (INPATIENT)
Facility: HOSPITAL | Age: 84
LOS: 2 days | Discharge: HOME | End: 2019-09-18
Attending: EMERGENCY MEDICINE | Admitting: FAMILY MEDICINE

## 2019-09-16 DIAGNOSIS — K57.92 DIVERTICULITIS: Primary | ICD-10-CM

## 2019-09-16 LAB
027 TOXIN: NORMAL
ALBUMIN SERPL-MCNC: 4.16 G/DL (ref 3.5–5.2)
ALBUMIN/GLOB SERPL: 1.3 G/DL
ALP SERPL-CCNC: 66 U/L (ref 39–117)
ALT SERPL W P-5'-P-CCNC: 9 U/L (ref 1–33)
ANION GAP SERPL CALCULATED.3IONS-SCNC: 11.5 MMOL/L (ref 5–15)
AST SERPL-CCNC: 13 U/L (ref 1–32)
BASOPHILS # BLD AUTO: 0.01 10*3/MM3 (ref 0–0.2)
BASOPHILS NFR BLD AUTO: 0.1 % (ref 0–1.5)
BILIRUB SERPL-MCNC: 1.3 MG/DL (ref 0.2–1.2)
BUN BLD-MCNC: 11 MG/DL (ref 8–23)
BUN/CREAT SERPL: 12 (ref 7–25)
C DIFF TOX GENS STL QL NAA+PROBE: NEGATIVE
CALCIUM SPEC-SCNC: 9.2 MG/DL (ref 8.6–10.5)
CHLORIDE SERPL-SCNC: 102 MMOL/L (ref 98–107)
CO2 SERPL-SCNC: 24.5 MMOL/L (ref 22–29)
CREAT BLD-MCNC: 0.92 MG/DL (ref 0.57–1)
CRP SERPL-MCNC: 7.58 MG/DL (ref 0–0.5)
D-LACTATE SERPL-SCNC: 1 MMOL/L (ref 0.5–2)
DEPRECATED RDW RBC AUTO: 49.8 FL (ref 37–54)
DIGOXIN SERPL-MCNC: 0.7 NG/ML (ref 0.6–1.2)
EOSINOPHIL # BLD AUTO: 0.05 10*3/MM3 (ref 0–0.4)
EOSINOPHIL NFR BLD AUTO: 0.5 % (ref 0.3–6.2)
ERYTHROCYTE [DISTWIDTH] IN BLOOD BY AUTOMATED COUNT: 15.6 % (ref 12.3–15.4)
GFR SERPL CREATININE-BSD FRML MDRD: 58 ML/MIN/1.73
GLOBULIN UR ELPH-MCNC: 3.1 GM/DL
GLUCOSE BLD-MCNC: 121 MG/DL (ref 65–99)
HCT VFR BLD AUTO: 34.6 % (ref 34–46.6)
HGB BLD-MCNC: 11.8 G/DL (ref 12–15.9)
IMM GRANULOCYTES # BLD AUTO: 0.02 10*3/MM3 (ref 0–0.05)
IMM GRANULOCYTES NFR BLD AUTO: 0.2 % (ref 0–0.5)
LYMPHOCYTES # BLD AUTO: 0.88 10*3/MM3 (ref 0.7–3.1)
LYMPHOCYTES NFR BLD AUTO: 8.1 % (ref 19.6–45.3)
MCH RBC QN AUTO: 30.1 PG (ref 26.6–33)
MCHC RBC AUTO-ENTMCNC: 34.1 G/DL (ref 31.5–35.7)
MCV RBC AUTO: 88.3 FL (ref 79–97)
MONOCYTES # BLD AUTO: 1.53 10*3/MM3 (ref 0.1–0.9)
MONOCYTES NFR BLD AUTO: 14 % (ref 5–12)
NEUTROPHILS # BLD AUTO: 8.43 10*3/MM3 (ref 1.7–7)
NEUTROPHILS NFR BLD AUTO: 77.1 % (ref 42.7–76)
PLATELET # BLD AUTO: 183 10*3/MM3 (ref 140–450)
PMV BLD AUTO: 11.7 FL (ref 6–12)
POTASSIUM BLD-SCNC: 3.9 MMOL/L (ref 3.5–5.2)
PROT SERPL-MCNC: 7.3 G/DL (ref 6–8.5)
RBC # BLD AUTO: 3.92 10*6/MM3 (ref 3.77–5.28)
SODIUM BLD-SCNC: 138 MMOL/L (ref 136–145)
WBC NRBC COR # BLD: 10.92 10*3/MM3 (ref 3.4–10.8)

## 2019-09-16 PROCEDURE — 99223 1ST HOSP IP/OBS HIGH 75: CPT | Performed by: PHYSICIAN ASSISTANT

## 2019-09-16 PROCEDURE — 87040 BLOOD CULTURE FOR BACTERIA: CPT | Performed by: EMERGENCY MEDICINE

## 2019-09-16 PROCEDURE — 83605 ASSAY OF LACTIC ACID: CPT | Performed by: PHYSICIAN ASSISTANT

## 2019-09-16 PROCEDURE — 87493 C DIFF AMPLIFIED PROBE: CPT | Performed by: PHYSICIAN ASSISTANT

## 2019-09-16 PROCEDURE — 74177 CT ABD & PELVIS W/CONTRAST: CPT | Performed by: RADIOLOGY

## 2019-09-16 PROCEDURE — 25010000002 HEPARIN (PORCINE) PER 1000 UNITS: Performed by: PHYSICIAN ASSISTANT

## 2019-09-16 PROCEDURE — 93010 ELECTROCARDIOGRAM REPORT: CPT | Performed by: INTERNAL MEDICINE

## 2019-09-16 PROCEDURE — 85025 COMPLETE CBC W/AUTO DIFF WBC: CPT | Performed by: PHYSICIAN ASSISTANT

## 2019-09-16 PROCEDURE — 25010000002 CEFTRIAXONE: Performed by: PHYSICIAN ASSISTANT

## 2019-09-16 PROCEDURE — 80162 ASSAY OF DIGOXIN TOTAL: CPT | Performed by: PHYSICIAN ASSISTANT

## 2019-09-16 PROCEDURE — 0 IOVERSOL 68 % SOLUTION: Performed by: EMERGENCY MEDICINE

## 2019-09-16 PROCEDURE — 74177 CT ABD & PELVIS W/CONTRAST: CPT

## 2019-09-16 PROCEDURE — 86140 C-REACTIVE PROTEIN: CPT | Performed by: PHYSICIAN ASSISTANT

## 2019-09-16 PROCEDURE — 99284 EMERGENCY DEPT VISIT MOD MDM: CPT

## 2019-09-16 PROCEDURE — 80053 COMPREHEN METABOLIC PANEL: CPT | Performed by: PHYSICIAN ASSISTANT

## 2019-09-16 PROCEDURE — 93005 ELECTROCARDIOGRAM TRACING: CPT | Performed by: FAMILY MEDICINE

## 2019-09-16 RX ORDER — DIGOXIN 125 MCG
125 TABLET ORAL
Status: DISCONTINUED | OUTPATIENT
Start: 2019-09-16 | End: 2019-09-18 | Stop reason: HOSPADM

## 2019-09-16 RX ORDER — SODIUM CHLORIDE 0.9 % (FLUSH) 0.9 %
10 SYRINGE (ML) INJECTION EVERY 12 HOURS SCHEDULED
Status: DISCONTINUED | OUTPATIENT
Start: 2019-09-16 | End: 2019-09-18 | Stop reason: HOSPADM

## 2019-09-16 RX ORDER — PANTOPRAZOLE SODIUM 40 MG/1
40 TABLET, DELAYED RELEASE ORAL DAILY
Status: DISCONTINUED | OUTPATIENT
Start: 2019-09-16 | End: 2019-09-18 | Stop reason: HOSPADM

## 2019-09-16 RX ORDER — DOCUSATE SODIUM 100 MG/1
200 CAPSULE, LIQUID FILLED ORAL 2 TIMES DAILY PRN
Status: DISCONTINUED | OUTPATIENT
Start: 2019-09-16 | End: 2019-09-18

## 2019-09-16 RX ORDER — HYDROCHLOROTHIAZIDE 25 MG/1
25 TABLET ORAL DAILY
COMMUNITY
End: 2021-10-05 | Stop reason: HOSPADM

## 2019-09-16 RX ORDER — MORPHINE SULFATE 2 MG/ML
2 INJECTION, SOLUTION INTRAMUSCULAR; INTRAVENOUS EVERY 4 HOURS PRN
Status: DISCONTINUED | OUTPATIENT
Start: 2019-09-16 | End: 2019-09-18 | Stop reason: HOSPADM

## 2019-09-16 RX ORDER — GABAPENTIN 300 MG/1
300 CAPSULE ORAL NIGHTLY
Status: DISCONTINUED | OUTPATIENT
Start: 2019-09-16 | End: 2019-09-18 | Stop reason: HOSPADM

## 2019-09-16 RX ORDER — HYDROCHLOROTHIAZIDE 25 MG/1
25 TABLET ORAL NIGHTLY
Status: CANCELLED | OUTPATIENT
Start: 2019-09-16

## 2019-09-16 RX ORDER — LEVOTHYROXINE SODIUM 0.07 MG/1
75 TABLET ORAL DAILY
Status: DISCONTINUED | OUTPATIENT
Start: 2019-09-16 | End: 2019-09-18 | Stop reason: HOSPADM

## 2019-09-16 RX ORDER — PROMETHAZINE HYDROCHLORIDE 25 MG/1
25 TABLET ORAL EVERY 6 HOURS PRN
Status: DISCONTINUED | OUTPATIENT
Start: 2019-09-16 | End: 2019-09-18 | Stop reason: HOSPADM

## 2019-09-16 RX ORDER — HEPARIN SODIUM 5000 [USP'U]/ML
5000 INJECTION, SOLUTION INTRAVENOUS; SUBCUTANEOUS EVERY 12 HOURS SCHEDULED
Status: DISCONTINUED | OUTPATIENT
Start: 2019-09-16 | End: 2019-09-18 | Stop reason: HOSPADM

## 2019-09-16 RX ORDER — OXYCODONE AND ACETAMINOPHEN 10; 325 MG/1; MG/1
1 TABLET ORAL EVERY 12 HOURS PRN
Status: CANCELLED | OUTPATIENT
Start: 2019-09-16

## 2019-09-16 RX ORDER — ACETAMINOPHEN 500 MG
500 TABLET ORAL EVERY 6 HOURS PRN
Status: DISCONTINUED | OUTPATIENT
Start: 2019-09-16 | End: 2019-09-18 | Stop reason: HOSPADM

## 2019-09-16 RX ORDER — SODIUM CHLORIDE 0.9 % (FLUSH) 0.9 %
10 SYRINGE (ML) INJECTION AS NEEDED
Status: DISCONTINUED | OUTPATIENT
Start: 2019-09-16 | End: 2019-09-18 | Stop reason: HOSPADM

## 2019-09-16 RX ORDER — FUROSEMIDE 20 MG/1
20 TABLET ORAL DAILY
Status: CANCELLED | OUTPATIENT
Start: 2019-09-16

## 2019-09-16 RX ORDER — LISINOPRIL 10 MG/1
10 TABLET ORAL
Status: CANCELLED | OUTPATIENT
Start: 2019-09-16

## 2019-09-16 RX ADMIN — SODIUM CHLORIDE, PRESERVATIVE FREE 10 ML: 5 INJECTION INTRAVENOUS at 20:51

## 2019-09-16 RX ADMIN — DIGOXIN 125 MCG: 125 TABLET ORAL at 20:51

## 2019-09-16 RX ADMIN — CEFTRIAXONE 1 G: 1 INJECTION, POWDER, FOR SOLUTION INTRAMUSCULAR; INTRAVENOUS at 14:06

## 2019-09-16 RX ADMIN — LEVOTHYROXINE SODIUM 75 MCG: 75 TABLET ORAL at 20:51

## 2019-09-16 RX ADMIN — GABAPENTIN 300 MG: 300 CAPSULE ORAL at 20:50

## 2019-09-16 RX ADMIN — IOVERSOL 100 ML: 678 INJECTION INTRA-ARTERIAL; INTRAVENOUS at 13:07

## 2019-09-16 RX ADMIN — HEPARIN SODIUM 5000 UNITS: 5000 INJECTION INTRAVENOUS; SUBCUTANEOUS at 22:28

## 2019-09-16 RX ADMIN — SODIUM CHLORIDE 1000 ML: 9 INJECTION, SOLUTION INTRAVENOUS at 09:33

## 2019-09-16 RX ADMIN — PANTOPRAZOLE SODIUM 40 MG: 40 TABLET, DELAYED RELEASE ORAL at 20:51

## 2019-09-16 RX ADMIN — METRONIDAZOLE 500 MG: 500 INJECTION, SOLUTION INTRAVENOUS at 15:52

## 2019-09-16 NOTE — ED NOTES
Patient reports that she started experiencing abdominal pain and vomiting last Thursday. She states that the vomiting has eased since Thursday. She still complains of upper abdominal pain and vomiting. She has a history of diverticulitis, she reports its not the same. She denies any fever. Patient has not had any vomiting or diarrhea episodes since she has been present to ED.      Michael Vza RN  09/16/19 1013

## 2019-09-16 NOTE — PLAN OF CARE
Problem: Patient Care Overview  Goal: Plan of Care Review  Outcome: Ongoing (interventions implemented as appropriate)    Goal: Discharge Needs Assessment  Outcome: Ongoing (interventions implemented as appropriate)      Problem: Fall Risk (Adult)  Goal: Identify Related Risk Factors and Signs and Symptoms  Outcome: Ongoing (interventions implemented as appropriate)    Goal: Absence of Fall  Outcome: Ongoing (interventions implemented as appropriate)      Problem: Skin Injury Risk (Adult)  Goal: Identify Related Risk Factors and Signs and Symptoms  Outcome: Ongoing (interventions implemented as appropriate)    Goal: Skin Health and Integrity  Outcome: Ongoing (interventions implemented as appropriate)      Problem: Pain, Acute (Adult)  Goal: Identify Related Risk Factors and Signs and Symptoms  Outcome: Outcome(s) achieved Date Met: 09/16/19    Goal: Acceptable Pain Control/Comfort Level  Outcome: Ongoing (interventions implemented as appropriate)

## 2019-09-16 NOTE — ED PROVIDER NOTES
Subjective     History provided by:  Patient   used: No    Abdominal Pain   Pain location:  Suprapubic  Pain quality: aching and dull    Pain radiates to:  Does not radiate  Pain severity:  Mild  Onset quality:  Gradual  Duration:  4 days  Timing:  Constant  Progression:  Worsening  Chronicity:  New  Context: recent illness    Relieved by:  None tried  Worsened by:  Nothing  Ineffective treatments:  None tried  Associated symptoms: diarrhea, nausea and vomiting    Associated symptoms: no chest pain, no chills, no cough, no dysuria, no fever, no shortness of breath and no sore throat    Risk factors: being elderly        Review of Systems   Constitutional: Negative for chills and fever.   HENT: Negative for congestion, ear pain and sore throat.    Respiratory: Negative for cough, shortness of breath and wheezing.    Cardiovascular: Negative for chest pain.   Gastrointestinal: Positive for abdominal pain, diarrhea, nausea and vomiting.   Genitourinary: Negative for dysuria and flank pain.   Skin: Negative for rash.   Neurological: Negative for headaches.   Psychiatric/Behavioral: The patient is not nervous/anxious.    All other systems reviewed and are negative.      Past Medical History:   Diagnosis Date   • A-fib (CMS/[facility])    • Anemia 1/13/2017   • Arthritis    • Asthma    • Cancer (CMS/[facility])    • CHF (congestive heart failure) (CMS/[facility])    • Chronic anticoagulation, Xarelto 7/30/2019   • Coronary artery disease    • GERD without esophagitis 7/30/2019   • History of colonic polyps 7/30/2019   • History of transfusion    • Hypertension    • Hypothyroidism 7/30/2019   • Peripheral neuropathy 7/30/2019       Allergies   Allergen Reactions   • Adhesive Tape Rash       Past Surgical History:   Procedure Laterality Date   • BREAST CYST EXCISION Left    • CERVICAL POLYPECTOMY     • COLONOSCOPY N/A 1/17/2017    Procedure: COLONOSCOPY;  Surgeon: Madi Zheng III, MD;  Location: [facility];  Service:     • ENDOSCOPY N/A 1/17/2017    Procedure: ESOPHAGOGASTRODUODENOSCOPY;  Surgeon: Madi Zheng III, MD;  Location: [facility] OR;  Service:    • GALLBLADDER SURGERY         History reviewed. No pertinent family history.    Social History     Socioeconomic History   • Marital status:      Spouse name: Not on file   • Number of children: Not on file   • Years of education: Not on file   • Highest education level: Not on file   Tobacco Use   • Smoking status: Never Smoker   • Smokeless tobacco: Never Used   Substance and Sexual Activity   • Alcohol use: No   • Drug use: No   • Sexual activity: Defer           Objective   Physical Exam   Constitutional: She is oriented to person, place, and time. She appears well-developed and well-nourished.   HENT:   Head: Normocephalic.   Mouth/Throat: Oropharynx is clear and moist.   Neck: Neck supple.   Cardiovascular: Normal rate and regular rhythm.   Pulmonary/Chest: Effort normal and breath sounds normal.   Abdominal: Soft. Bowel sounds are normal. There is generalized tenderness and tenderness in the suprapubic area.   Musculoskeletal: Normal range of motion.   Neurological: She is alert and oriented to person, place, and time.   Skin: Skin is warm and dry.   Psychiatric: She has a normal mood and affect. Her behavior is normal. Judgment and thought content normal.   Nursing note and vitals reviewed.      Procedures           ED Course  ED Course as of Sep 16 1832   Mon Sep 16, 2019   1341 Paged hospitalist   [LC]      ED Course User Index  [LC] Claudette Chávez PA                  [facility]    Final diagnoses:   Diverticulitis              Claudette Chávez PA  09/16/19 1522

## 2019-09-16 NOTE — ED NOTES
IV contrast consent signed by daughter. Patient verbalized she has had contrast previously with no complications.      Michael Vaz, RN  09/16/19 3180

## 2019-09-17 LAB
ANION GAP SERPL CALCULATED.3IONS-SCNC: 10.3 MMOL/L (ref 5–15)
BASOPHILS # BLD AUTO: 0.01 10*3/MM3 (ref 0–0.2)
BASOPHILS NFR BLD AUTO: 0.2 % (ref 0–1.5)
BUN BLD-MCNC: 10 MG/DL (ref 8–23)
BUN/CREAT SERPL: 12.8 (ref 7–25)
CALCIUM SPEC-SCNC: 8.6 MG/DL (ref 8.6–10.5)
CHLORIDE SERPL-SCNC: 105 MMOL/L (ref 98–107)
CO2 SERPL-SCNC: 22.7 MMOL/L (ref 22–29)
CREAT BLD-MCNC: 0.78 MG/DL (ref 0.57–1)
DEPRECATED RDW RBC AUTO: 50 FL (ref 37–54)
EOSINOPHIL # BLD AUTO: 0.08 10*3/MM3 (ref 0–0.4)
EOSINOPHIL NFR BLD AUTO: 1.5 % (ref 0.3–6.2)
ERYTHROCYTE [DISTWIDTH] IN BLOOD BY AUTOMATED COUNT: 15.4 % (ref 12.3–15.4)
GFR SERPL CREATININE-BSD FRML MDRD: 70 ML/MIN/1.73
GLUCOSE BLD-MCNC: 84 MG/DL (ref 65–99)
HCT VFR BLD AUTO: 29.8 % (ref 34–46.6)
HGB BLD-MCNC: 10 G/DL (ref 12–15.9)
IMM GRANULOCYTES # BLD AUTO: 0.02 10*3/MM3 (ref 0–0.05)
IMM GRANULOCYTES NFR BLD AUTO: 0.4 % (ref 0–0.5)
LYMPHOCYTES # BLD AUTO: 1.18 10*3/MM3 (ref 0.7–3.1)
LYMPHOCYTES NFR BLD AUTO: 21.7 % (ref 19.6–45.3)
MCH RBC QN AUTO: 30.1 PG (ref 26.6–33)
MCHC RBC AUTO-ENTMCNC: 33.6 G/DL (ref 31.5–35.7)
MCV RBC AUTO: 89.8 FL (ref 79–97)
MONOCYTES # BLD AUTO: 0.76 10*3/MM3 (ref 0.1–0.9)
MONOCYTES NFR BLD AUTO: 14 % (ref 5–12)
NEUTROPHILS # BLD AUTO: 3.38 10*3/MM3 (ref 1.7–7)
NEUTROPHILS NFR BLD AUTO: 62.2 % (ref 42.7–76)
PLATELET # BLD AUTO: 143 10*3/MM3 (ref 140–450)
PMV BLD AUTO: 11.5 FL (ref 6–12)
POTASSIUM BLD-SCNC: 3.4 MMOL/L (ref 3.5–5.2)
POTASSIUM BLD-SCNC: 4.3 MMOL/L (ref 3.5–5.2)
RBC # BLD AUTO: 3.32 10*6/MM3 (ref 3.77–5.28)
SODIUM BLD-SCNC: 138 MMOL/L (ref 136–145)
WBC NRBC COR # BLD: 5.43 10*3/MM3 (ref 3.4–10.8)

## 2019-09-17 PROCEDURE — 85025 COMPLETE CBC W/AUTO DIFF WBC: CPT | Performed by: PHYSICIAN ASSISTANT

## 2019-09-17 PROCEDURE — 80048 BASIC METABOLIC PNL TOTAL CA: CPT | Performed by: PHYSICIAN ASSISTANT

## 2019-09-17 PROCEDURE — 99233 SBSQ HOSP IP/OBS HIGH 50: CPT | Performed by: FAMILY MEDICINE

## 2019-09-17 PROCEDURE — 84132 ASSAY OF SERUM POTASSIUM: CPT | Performed by: FAMILY MEDICINE

## 2019-09-17 PROCEDURE — 25010000002 HEPARIN (PORCINE) PER 1000 UNITS: Performed by: PHYSICIAN ASSISTANT

## 2019-09-17 PROCEDURE — 25010000002 CEFTRIAXONE: Performed by: PHYSICIAN ASSISTANT

## 2019-09-17 RX ORDER — POTASSIUM CHLORIDE 7.45 MG/ML
10 INJECTION INTRAVENOUS
Status: DISCONTINUED | OUTPATIENT
Start: 2019-09-17 | End: 2019-09-18 | Stop reason: HOSPADM

## 2019-09-17 RX ORDER — POTASSIUM CHLORIDE 1.5 G/1.77G
40 POWDER, FOR SOLUTION ORAL AS NEEDED
Status: DISCONTINUED | OUTPATIENT
Start: 2019-09-17 | End: 2019-09-18 | Stop reason: HOSPADM

## 2019-09-17 RX ORDER — POTASSIUM CHLORIDE 20 MEQ/1
40 TABLET, EXTENDED RELEASE ORAL AS NEEDED
Status: DISCONTINUED | OUTPATIENT
Start: 2019-09-17 | End: 2019-09-18 | Stop reason: HOSPADM

## 2019-09-17 RX ORDER — POTASSIUM CHLORIDE 20 MEQ/1
40 TABLET, EXTENDED RELEASE ORAL EVERY 4 HOURS
Status: COMPLETED | OUTPATIENT
Start: 2019-09-17 | End: 2019-09-17

## 2019-09-17 RX ADMIN — GABAPENTIN 300 MG: 300 CAPSULE ORAL at 21:11

## 2019-09-17 RX ADMIN — METRONIDAZOLE 500 MG: 500 INJECTION, SOLUTION INTRAVENOUS at 00:00

## 2019-09-17 RX ADMIN — METRONIDAZOLE 500 MG: 500 INJECTION, SOLUTION INTRAVENOUS at 16:50

## 2019-09-17 RX ADMIN — METRONIDAZOLE 500 MG: 500 INJECTION, SOLUTION INTRAVENOUS at 09:09

## 2019-09-17 RX ADMIN — PANTOPRAZOLE SODIUM 40 MG: 40 TABLET, DELAYED RELEASE ORAL at 09:09

## 2019-09-17 RX ADMIN — METRONIDAZOLE 500 MG: 500 INJECTION, SOLUTION INTRAVENOUS at 23:27

## 2019-09-17 RX ADMIN — POTASSIUM CHLORIDE 40 MEQ: 1500 TABLET, EXTENDED RELEASE ORAL at 14:22

## 2019-09-17 RX ADMIN — SODIUM CHLORIDE, PRESERVATIVE FREE 10 ML: 5 INJECTION INTRAVENOUS at 09:09

## 2019-09-17 RX ADMIN — LEVOTHYROXINE SODIUM 75 MCG: 75 TABLET ORAL at 09:09

## 2019-09-17 RX ADMIN — DIGOXIN 125 MCG: 125 TABLET ORAL at 11:53

## 2019-09-17 RX ADMIN — SODIUM CHLORIDE, PRESERVATIVE FREE 10 ML: 5 INJECTION INTRAVENOUS at 21:12

## 2019-09-17 RX ADMIN — HEPARIN SODIUM 5000 UNITS: 5000 INJECTION INTRAVENOUS; SUBCUTANEOUS at 09:08

## 2019-09-17 RX ADMIN — HEPARIN SODIUM 5000 UNITS: 5000 INJECTION INTRAVENOUS; SUBCUTANEOUS at 21:11

## 2019-09-17 RX ADMIN — POTASSIUM CHLORIDE 40 MEQ: 1500 TABLET, EXTENDED RELEASE ORAL at 17:06

## 2019-09-17 RX ADMIN — CEFTRIAXONE 1 G: 1 INJECTION, POWDER, FOR SOLUTION INTRAMUSCULAR; INTRAVENOUS at 09:14

## 2019-09-17 NOTE — PROGRESS NOTES
[facility] HOSPITALIST    PROGRESS NOTE    Name:  Desiree Matos   Age:  85 y.o.  Sex:  female  :  10/13/1933  MRN:  [id number]   Visit Number:  [id number]  Admission Date:  2019  Date Of Service:  19  Primary Care Physician:  Duane Pollard MD     LOS: 1 day :  Patient Care Team:  Duane Pollard MD as PCP - General (Internal Medicine)  Tiffany Franks RN as Community Care Coordinator (Population Health):    Chief Complaint:    Nausea   Vomiting  Diarrhea  Abdominal pain      Subjective / Interval History:     Patient seen and examined at bedside  No adverse events overnight  Abdominal pain improving    Review of Systems:     General ROS: Patient denies any fevers, chills or loss of consciousness.  Respiratory ROS: Denies cough or shortness of breath.  Cardiovascular ROS: Denies chest pain or palpitations. No history of exertional chest pain.  Gastrointestinal ROS: Denies nausea and vomiting. Denies any abdominal pain. No diarrhea.  Neurological ROS: Denies any focal weakness. No loss of consciousness. Denies any numbness. Denies neck pain.  Dermatological ROS: Denies any redness or pruritis.    Vital Signs:    Temp:  [97.6 °F (36.4 °C)-98.1 °F (36.7 °C)] 97.8 °F (36.6 °C)  Heart Rate:  [] 66  Resp:  [16-18] 18  BP: (120-142)/(53-73) 142/72    Intake and output:    I/O last 3 completed shifts:  In: 1100 [IV Piggyback:1100]  Out: -   No intake/output data recorded.    Physical Examination:    General Appearance:  Alert and cooperative, not in any acute distress.   Head:  Atraumatic and normocephalic, without obvious abnormality.   Eyes:          PERRLA, conjunctivae and sclerae normal, no Icterus. No pallor. Extra-occular movements are within normal limits.   Neck: Supple, trachea midline, no thyromegaly, no carotid bruit.   Lungs:   Chest shape is normal. Breath sounds heard bilaterally equally.  No crackles or wheezing. No Pleural rub or bronchial breathing.   Heart:   Normal S1 and S2, no murmur, no gallop, no rub. No JVD   Abdomen:   Normal bowel sounds, no masses, no organomegaly. Soft       non-tender, non-distended, no guarding, no rebound tenderness.   Extremities: Moves all extremities well, no edema, no cyanosis, no            clubbing.   Skin: No bleeding, bruising or rash.   Neurologic: Awake, alert and oriented times 3. Moves all 4 extremities equally.   Laboratory results:    Results from last 7 days   Lab Units 09/17/19  0546 09/16/19  0930   SODIUM mmol/L 138 138   POTASSIUM mmol/L 3.4* 3.9   CHLORIDE mmol/L 105 102   CO2 mmol/L 22.7 24.5   BUN mg/dL 10 11   CREATININE mg/dL 0.78 0.92   CALCIUM mg/dL 8.6 9.2   BILIRUBIN mg/dL  --  1.3*   ALK PHOS U/L  --  66   ALT (SGPT) U/L  --  9   AST (SGOT) U/L  --  13   GLUCOSE mg/dL 84 121*     Results from last 7 days   Lab Units 09/17/19  0546 09/16/19  0930   WBC 10*3/mm3 5.43 10.92*   HEMOGLOBIN g/dL 10.0* 11.8*   HEMATOCRIT % 29.8* 34.6   PLATELETS 10*3/mm3 143 183             Results from last 7 days   Lab Units 09/16/19  1401 09/16/19  1355   BLOODCX  No growth at 24 hours No growth at 24 hours       I have reviewed the patient's laboratory results.    Radiology results:    Imaging Results (last 24 hours)     ** No results found for the last 24 hours. **          I have reviewed the patient's radiology reports.    Medication Review:     I have reviewed the patients active and prn medications.       Diverticulitis      Assessment:  Plan:  1)  Acute sigmoid diverticulitis:  Continue IV Rocephin and Flagyl    Lactate WNL  Blood cultures drawn on admission        2)  Chronic anticoagulation on Xarelto: hold xarelto for 72 hours in wake of blood per rectum per patient     3)  Chronic atrial fibrillation:  EKG & telemetry monitoring.  [id number]  5     continue digoxin.     4)  Diastolic CHF chronic: daily weights.   Echocardiogram  EF  61-65% mild MVR  Jan 2017.      5)  Chronic kidney disease stage III: Creatinine WNL.  Monitor daily     6)   Coronary artery disease: telemetry       Discharge planning underway  Patient has strong family support at home  Denies opportunity to attend SNF for rehab      Geovanny Verdugo DO  09/17/19  2:56 PM

## 2019-09-17 NOTE — PROGRESS NOTES
Discharge Planning Assessment  [facility]     Patient Name: Desiree Matos  MRN: [id number]  Today's Date: 9/17/2019    Admit Date: 9/16/2019    Discharge Needs Assessment     Row Name 09/17/19 1101       Living Environment    Lives With  grandchild(holly);child(holly), adult Pt lives with daughter, granddaughter, & great-granddtr.     Primary Care Provided by  self;child(holly);other (see comments)    Provides Primary Care For  no one    Family Caregiver if Needed  child(holly), adult    Quality of Family Relationships  helpful;involved;supportive    Able to Return to Prior Arrangements  yes       Transition Planning    Patient/Family Anticipates Transition to  home with family    Transportation Anticipated  family or friend will provide       Discharge Needs Assessment    Equipment Currently Used at Home  walker, standard;rollator;cane, straight;commode Pt has a walker, cane, and bedside commode from [facility] and a rollator & shower chair from family.     Outpatient/Agency/Support Group Needs  -- Pt does not utilize home health services.         Discharge Plan     Row Name 09/17/19 1104       Plan    Plan Pt lives at home with daughter, granddtr, & great-granddtr and she plans to return home at discharge. Pt has a walker, rollator, cane, bedside commode and shower chair at home. Pt does not utilize home health services. Pt does not have a POA or living will. Pt has good family support. PCP is Dr. Pollard and pt also see's Dr. Peterson. Pt utilizes [facility] and does not have issues with copays. SS to follow and assist as needed with discharge planning.     Patient/Family in Agreement with Plan  yes          Demographic Summary     Row Name 09/17/19 1100       General Information    Referral Source  nursing    Reason for Consult  -- SS received consult d/c planning; advanced age; has DME. SS spoke to pt and daughter, Brie Santillan.      MIGEL Self

## 2019-09-17 NOTE — PLAN OF CARE
Problem: Patient Care Overview  Goal: Plan of Care Review  Outcome: Ongoing (interventions implemented as appropriate)      Problem: Fall Risk (Adult)  Goal: Identify Related Risk Factors and Signs and Symptoms  Outcome: Ongoing (interventions implemented as appropriate)    Goal: Absence of Fall  Outcome: Ongoing (interventions implemented as appropriate)      Problem: Skin Injury Risk (Adult)  Goal: Identify Related Risk Factors and Signs and Symptoms  Outcome: Ongoing (interventions implemented as appropriate)    Goal: Skin Health and Integrity  Outcome: Ongoing (interventions implemented as appropriate)      Problem: Pain, Acute (Adult)  Goal: Acceptable Pain Control/Comfort Level  Outcome: Ongoing (interventions implemented as appropriate)

## 2019-09-17 NOTE — PLAN OF CARE
Problem: Fall Risk (Adult)  Goal: Identify Related Risk Factors and Signs and Symptoms  Outcome: Ongoing (interventions implemented as appropriate)   09/16/19 1456 09/17/19 0037   Fall Risk (Adult)   Related Risk Factors (Fall Risk) gait/mobility problems --    Signs and Symptoms (Fall Risk) --  presence of risk factors     Goal: Absence of Fall  Outcome: Ongoing (interventions implemented as appropriate)   09/17/19 1037   Fall Risk (Adult)   Absence of Fall making progress toward outcome       Problem: Skin Injury Risk (Adult)  Goal: Identify Related Risk Factors and Signs and Symptoms  Outcome: Ongoing (interventions implemented as appropriate)   09/16/19 1456   Skin Injury Risk (Adult)   Related Risk Factors (Skin Injury Risk) mobility impaired     Goal: Skin Health and Integrity  Outcome: Ongoing (interventions implemented as appropriate)   09/17/19 1037   Skin Injury Risk (Adult)   Skin Health and Integrity making progress toward outcome       Problem: Pain, Acute (Adult)  Goal: Acceptable Pain Control/Comfort Level  Outcome: Ongoing (interventions implemented as appropriate)   09/17/19 1037   Pain, Acute (Adult)   Acceptable Pain Control/Comfort Level making progress toward outcome

## 2019-09-18 VITALS
RESPIRATION RATE: 18 BRPM | BODY MASS INDEX: 28.79 KG/M2 | DIASTOLIC BLOOD PRESSURE: 64 MMHG | HEART RATE: 73 BPM | HEIGHT: 64 IN | TEMPERATURE: 97.8 F | WEIGHT: 168.6 LBS | SYSTOLIC BLOOD PRESSURE: 144 MMHG | OXYGEN SATURATION: 97 %

## 2019-09-18 LAB
ANION GAP SERPL CALCULATED.3IONS-SCNC: 13.2 MMOL/L (ref 5–15)
BASOPHILS # BLD AUTO: 0.01 10*3/MM3 (ref 0–0.2)
BASOPHILS NFR BLD AUTO: 0.2 % (ref 0–1.5)
BUN BLD-MCNC: 12 MG/DL (ref 8–23)
BUN/CREAT SERPL: 15.6 (ref 7–25)
CALCIUM SPEC-SCNC: 8.6 MG/DL (ref 8.6–10.5)
CHLORIDE SERPL-SCNC: 105 MMOL/L (ref 98–107)
CO2 SERPL-SCNC: 20.8 MMOL/L (ref 22–29)
CREAT BLD-MCNC: 0.77 MG/DL (ref 0.57–1)
DEPRECATED RDW RBC AUTO: 49.4 FL (ref 37–54)
EOSINOPHIL # BLD AUTO: 0.09 10*3/MM3 (ref 0–0.4)
EOSINOPHIL NFR BLD AUTO: 1.5 % (ref 0.3–6.2)
ERYTHROCYTE [DISTWIDTH] IN BLOOD BY AUTOMATED COUNT: 15.2 % (ref 12.3–15.4)
GFR SERPL CREATININE-BSD FRML MDRD: 71 ML/MIN/1.73
GLUCOSE BLD-MCNC: 89 MG/DL (ref 65–99)
HCT VFR BLD AUTO: 29.9 % (ref 34–46.6)
HGB BLD-MCNC: 9.9 G/DL (ref 12–15.9)
IMM GRANULOCYTES # BLD AUTO: 0.02 10*3/MM3 (ref 0–0.05)
IMM GRANULOCYTES NFR BLD AUTO: 0.3 % (ref 0–0.5)
LYMPHOCYTES # BLD AUTO: 1.04 10*3/MM3 (ref 0.7–3.1)
LYMPHOCYTES NFR BLD AUTO: 16.9 % (ref 19.6–45.3)
MCH RBC QN AUTO: 30.2 PG (ref 26.6–33)
MCHC RBC AUTO-ENTMCNC: 33.1 G/DL (ref 31.5–35.7)
MCV RBC AUTO: 91.2 FL (ref 79–97)
MONOCYTES # BLD AUTO: 0.66 10*3/MM3 (ref 0.1–0.9)
MONOCYTES NFR BLD AUTO: 10.7 % (ref 5–12)
NEUTROPHILS # BLD AUTO: 4.35 10*3/MM3 (ref 1.7–7)
NEUTROPHILS NFR BLD AUTO: 70.4 % (ref 42.7–76)
PLATELET # BLD AUTO: 159 10*3/MM3 (ref 140–450)
PMV BLD AUTO: 11.5 FL (ref 6–12)
POTASSIUM BLD-SCNC: 4.4 MMOL/L (ref 3.5–5.2)
RBC # BLD AUTO: 3.28 10*6/MM3 (ref 3.77–5.28)
SODIUM BLD-SCNC: 139 MMOL/L (ref 136–145)
WBC NRBC COR # BLD: 6.17 10*3/MM3 (ref 3.4–10.8)

## 2019-09-18 PROCEDURE — 25010000002 CEFTRIAXONE: Performed by: PHYSICIAN ASSISTANT

## 2019-09-18 PROCEDURE — 99238 HOSP IP/OBS DSCHRG MGMT 30/<: CPT | Performed by: FAMILY MEDICINE

## 2019-09-18 PROCEDURE — 25010000002 HEPARIN (PORCINE) PER 1000 UNITS: Performed by: PHYSICIAN ASSISTANT

## 2019-09-18 PROCEDURE — 85025 COMPLETE CBC W/AUTO DIFF WBC: CPT | Performed by: PHYSICIAN ASSISTANT

## 2019-09-18 PROCEDURE — 80048 BASIC METABOLIC PNL TOTAL CA: CPT | Performed by: PHYSICIAN ASSISTANT

## 2019-09-18 RX ORDER — PROMETHAZINE HYDROCHLORIDE 25 MG/1
25 TABLET ORAL EVERY 6 HOURS PRN
Qty: 20 TABLET | Refills: 0 | Status: SHIPPED | OUTPATIENT
Start: 2019-09-18 | End: 2020-09-03

## 2019-09-18 RX ORDER — HYDROCODONE BITARTRATE AND ACETAMINOPHEN 5; 325 MG/1; MG/1
1 TABLET ORAL EVERY 6 HOURS PRN
Qty: 20 TABLET | Refills: 0 | Status: SHIPPED | OUTPATIENT
Start: 2019-09-18 | End: 2020-03-25 | Stop reason: ALTCHOICE

## 2019-09-18 RX ORDER — METRONIDAZOLE 500 MG/1
500 TABLET ORAL 3 TIMES DAILY
Qty: 21 TABLET | Refills: 0 | Status: SHIPPED | OUTPATIENT
Start: 2019-09-18 | End: 2020-03-25

## 2019-09-18 RX ORDER — DOCUSATE SODIUM 100 MG/1
200 CAPSULE, LIQUID FILLED ORAL 2 TIMES DAILY
Status: DISCONTINUED | OUTPATIENT
Start: 2019-09-18 | End: 2019-09-18 | Stop reason: HOSPADM

## 2019-09-18 RX ADMIN — SODIUM CHLORIDE, PRESERVATIVE FREE 10 ML: 5 INJECTION INTRAVENOUS at 08:26

## 2019-09-18 RX ADMIN — LEVOTHYROXINE SODIUM 75 MCG: 75 TABLET ORAL at 08:26

## 2019-09-18 RX ADMIN — HEPARIN SODIUM 5000 UNITS: 5000 INJECTION INTRAVENOUS; SUBCUTANEOUS at 08:31

## 2019-09-18 RX ADMIN — CEFTRIAXONE 1 G: 1 INJECTION, POWDER, FOR SOLUTION INTRAMUSCULAR; INTRAVENOUS at 08:26

## 2019-09-18 RX ADMIN — DIGOXIN 125 MCG: 125 TABLET ORAL at 11:00

## 2019-09-18 RX ADMIN — METRONIDAZOLE 500 MG: 500 INJECTION, SOLUTION INTRAVENOUS at 08:26

## 2019-09-18 RX ADMIN — METRONIDAZOLE 500 MG: 500 INJECTION, SOLUTION INTRAVENOUS at 15:14

## 2019-09-18 RX ADMIN — PANTOPRAZOLE SODIUM 40 MG: 40 TABLET, DELAYED RELEASE ORAL at 08:26

## 2019-09-18 NOTE — DISCHARGE SUMMARY
[facility] HOSPITALIST   DISCHARGE SUMMARY      Name:  Desiree Matos   Age:  85 y.o.  Sex:  female  :  10/13/1933  MRN:  [id number]   Visit Number:  [id number]    Admission Date:  2019  Date of Discharge:  2019  Primary Care Physician:  Duane Pollard MD    Discharge Diagnoses:           Diverticulitis      Presenting Problem:    Diverticulitis [K57.92]  Diverticulitis [K57.92]     Consults:     Consults     No orders found from 2019 to 2019.        Consulting Physician(s)             None            Procedures Performed:    none         History of presenting illness:  Desiree Matos is a 85 y.o. female with past medical history significant for diverticulitis, atrial fibrillation, chronic anticoagulation with Xarelto, coronary artery disease, thyroidism, gastroesophageal reflux disease.  She was most recently admitted to this facility from 2019 through 2019 with sepsis present on admission secondary to acute sigmoid diverticulitis during which time she was treated with initially Zosyn that was escalated to Merrem and eventually transition to oral Augmentin at the time of discharge.  She was followed by general surgery during said admission.  She presented to the emergency department of [facility] on 2019 with reports of severe lower abdominal pain, vomiting, and diarrhea since 2019 with inability to tolerate oral intake.     She reports that she began having nausea, vomiting and diarrhea on 2019 with abdominal pain that was severe in nature in the bilateral lower quadrants following  within the 24 hours initial onset.  He reports the pain is severe but not as severe as her last bout of diverticulitis.  She reports that up until her initial bout in 2019 she did not have prior history of diverticulitis.  She tells me that she has had clear appearing watery stool that has also had blood present at times  that she describes as bright red and inspects.  She denies chest pain, dyspnea, cough and wheeze.  She denies fever and chills.  Reports she did take her home Xarelto last evening.     Upon arrival to the ED, vital signs were temperature 98.4 °F, pulse 90, respiration rate 20, and blood pressure 140/86 with room air oxygen saturation of 95%.  CT of her abdomen and pelvis revealed mild diverticulitis and a small amount of perihepatic and perisplenic fluid with follow-up CT recommended.      Hospital Course:    Patient was prescribed rocephin and flagyl IV, tolerated well, had diarrhea when admitted that resolved and she actually complained of constipation.  Pain resolved after short period in hospital.  Nausea was not an issue, patient wanted food after 24 hours in hospital, fed her, she tolerated meals well, patient ended up having a bowel movement today the day of discharge.  Asked to be discharged she agreed to take flagyl for 7 days tid.    Vital Signs:    Temp:  [97.5 °F (36.4 °C)-98.1 °F (36.7 °C)] 97.5 °F (36.4 °C)  Heart Rate:  [61-80] 80  Resp:  [18] 18  BP: (132-161)/(49-87) 157/84    Physical Exam:    General Appearance:  Alert and cooperative, not in any acute distress.   Head:  Atraumatic and normocephalic, without obvious abnormality.   Eyes:          PERRLA, conjunctivae and sclerae normal, no Icterus. No pallor. Extra-occular movements are within normal limits.   Ears:  Ears appear intact with no abnormalities noted.   Throat: No oral lesions, no thrush, oral mucosa moist.   Neck: Supple, trachea midline, no thyromegaly, no carotid bruit.   Back:   No kyphoscoliosis present. No tenderness to palpation,   range of motion normal.   Lungs:   Chest shape is normal. Breath sounds heard bilaterally equally.  No crackles or wheezing. No Pleural rub or bronchial breathing.   Heart:  Normal S1 and S2, no murmur, no gallop, no rub. No JVD.   Abdomen:   Normal bowel sounds, no masses, no organomegaly. Soft      non-tender, non-distended, no guarding, no rebound tenderness.   Extremities: Moves all extremities well, no edema, no cyanosis, no clubbing.   Pulses: Pulses palpable and equal bilaterally.   Skin: No bleeding, bruising or rash.   Lymph nodes: No palpable adenopathy.   Neurologic: Alert and oriented x 3. Moves all four limbs equally. No tremors. No facial asymetry.       Pertinent Lab Results:     Results from last 7 days   Lab Units 09/18/19  0225 09/17/19  2054 09/17/19  0546 09/16/19  0930   SODIUM mmol/L 139  --  138 138   POTASSIUM mmol/L 4.4 4.3 3.4* 3.9   CHLORIDE mmol/L 105  --  105 102   CO2 mmol/L 20.8*  --  22.7 24.5   BUN mg/dL 12  --  10 11   CREATININE mg/dL 0.77  --  0.78 0.92   CALCIUM mg/dL 8.6  --  8.6 9.2   BILIRUBIN mg/dL  --   --   --  1.3*   ALK PHOS U/L  --   --   --  66   ALT (SGPT) U/L  --   --   --  9   AST (SGOT) U/L  --   --   --  13   GLUCOSE mg/dL 89  --  84 121*     Results from last 7 days   Lab Units 09/18/19 0225 09/17/19  0546 09/16/19  0930   WBC 10*3/mm3 6.17 5.43 10.92*   HEMOGLOBIN g/dL 9.9* 10.0* 11.8*   HEMATOCRIT % 29.9* 29.8* 34.6   PLATELETS 10*3/mm3 159 143 183                                   Invalid input(s): USDES,  BLOODU, NITRITITE, BACT, EP  Pain Management Panel     There is no flowsheet data to display.        Results from last 7 days   Lab Units 09/16/19  1401 09/16/19  1355   BLOODCX  No growth at 2 days No growth at 2 days       Pertinent Radiology Results:    Imaging Results (all)     Procedure Component Value Units Date/Time    CT Abdomen Pelvis With Contrast [[id number]] Collected:  09/16/19 1307     Updated:  09/16/19 1311    Narrative:       CT ABDOMEN PELVIS W CONTRAST-     CLINICAL INDICATION: abd pain        COMPARISON: 08/09/2019     TECHNIQUE: Axial images were acquired from the lung bases through the  pubic symphysis after IV and oral contrast.  Reformatted images were created in both the coronal and sagittal planes.     Radiation dose reduction  techniques were utilized per ALARA protocol.  Automated exposure control was initiated through either or [employer] or  DoseRight software packages by  protocol.           FINDINGS:   Moderate hiatal hernia is present     The liver is homogeneous. There is no evidence of focal hepatic mass     The spleen is homogeneous     There is no peripancreatic stranding or pancreatic head mass.     There is no adrenal enlargement.     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 The lower pole only does  appear to be dilated..      Trace free fluid around the liver and spleen.     Extensive sigmoid colon wall thickening and adjacent stranding most  likely representing diverticulitis. Follow-up suggested             Impression:       :   1. Sigmoid diverticulitis  2. Small amount perihepatic and perisplenic fluid  3. 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 Dilatation of the lower  pole.                 This report was finalized on 9/16/2019 1:09 PM by Dr. Fly Gao MD.             Condition on Discharge:      Stable.    Code status during the hospital stay:    Code Status and Medical Interventions:   Ordered at: 09/16/19 1431     Code Status:    CPR     Medical Interventions (Level of Support Prior to Arrest):    Full       Discharge Disposition:    Home or Self Care    Discharge Medications:       Discharge Medications      New Medications      Instructions Start Date   HYDROcodone-acetaminophen 5-325 MG per tablet  Commonly known as:  NORCO   1 tablet, Oral, Every 6 Hours PRN      metroNIDAZOLE 500 MG tablet  Commonly known as:  FLAGYL   500 mg, Oral, 3 Times Daily      promethazine 25 MG tablet  Commonly known as:  PHENERGAN   25 mg, Oral, Every 6 Hours PRN         Continue These Medications      Instructions Start Date   acetaminophen 500 MG tablet  Commonly known as:  TYLENOL   500 mg, Oral, Every 6 Hours PRN      digoxin 125 MCG tablet  Commonly known as:  LANOXIN   125 mcg, Oral, Every Morning      docusate  sodium 250 MG capsule  Commonly known as:  COLACE   250 mg, Oral, 2 Times Daily PRN      furosemide 20 MG tablet  Commonly known as:  LASIX   20 mg, Oral, Daily      gabapentin 300 MG capsule  Commonly known as:  NEURONTIN   300 mg, Oral, Nightly      hydrochlorothiazide 25 MG tablet  Commonly known as:  HYDRODIURIL   25 mg, Oral, Nightly      levothyroxine 75 MCG tablet  Commonly known as:  SYNTHROID, LEVOTHROID   75 mcg, Oral, Daily      oxyCODONE-acetaminophen  MG per tablet  Commonly known as:  PERCOCET   1 tablet, Oral, Every 12 Hours PRN      pantoprazole 40 MG EC tablet  Commonly known as:  PROTONIX   40 mg, Oral, Daily      quinapril 10 MG tablet  Commonly known as:  ACCUPRIL   10 mg, Oral, Daily      rivaroxaban 20 MG tablet  Commonly known as:  XARELTO   20 mg, Oral, Every Evening             Discharge Diet:     regular but advance as tolerated    Activity at Discharge:   As tolerated      Follow-up Appointments:    Follow-up Information     Duane Pollard MD .    Specialty:  Internal Medicine  Contact information:  [address] [place] [id number] [phone]                   No future appointments.        Test Results Pending at Discharge:     Order Current Status    Blood Culture - Blood, Arm, Left Preliminary result    Blood Culture - Blood, Arm, Right Preliminary result             Geovanny Verdugo DO  09/18/19  3:49 PM    Time spent: 25 min

## 2019-09-18 NOTE — PROGRESS NOTES
Discharge Planning Assessment   [facility]     Patient Name: Desiree Matos  MRN: [id number]  Today's Date: 9/18/2019    Admit Date: 9/16/2019    Discharge Plan     Row Name 09/18/19 1139       Plan    Plan Pt lives at home with her daughter, granddaughter and great granddaughter and plans to return home at discharge. Pt has a walker, rollator, cane, bed side commode and shower chair at home. Pt does not currently utilize home health services. Pt has good family support. SS will continue to follow.      MIGEL Chiu

## 2019-09-18 NOTE — PLAN OF CARE
Problem: Fall Risk (Adult)  Goal: Identify Related Risk Factors and Signs and Symptoms  Outcome: Ongoing (interventions implemented as appropriate)   09/16/19 1456 09/17/19 0037   Fall Risk (Adult)   Related Risk Factors (Fall Risk) gait/mobility problems --    Signs and Symptoms (Fall Risk) --  presence of risk factors     Goal: Absence of Fall  Outcome: Ongoing (interventions implemented as appropriate)   09/18/19 1010   Fall Risk (Adult)   Absence of Fall making progress toward outcome       Problem: Skin Injury Risk (Adult)  Goal: Identify Related Risk Factors and Signs and Symptoms  Outcome: Ongoing (interventions implemented as appropriate)   09/16/19 1456   Skin Injury Risk (Adult)   Related Risk Factors (Skin Injury Risk) mobility impaired     Goal: Skin Health and Integrity  Outcome: Ongoing (interventions implemented as appropriate)   09/18/19 1010   Skin Injury Risk (Adult)   Skin Health and Integrity making progress toward outcome       Problem: Pain, Acute (Adult)  Goal: Acceptable Pain Control/Comfort Level  Outcome: Ongoing (interventions implemented as appropriate)   09/18/19 1010   Pain, Acute (Adult)   Acceptable Pain Control/Comfort Level making progress toward outcome

## 2019-09-19 ENCOUNTER — EPISODE CHANGES (OUTPATIENT)
Dept: CASE MANAGEMENT | Facility: OTHER | Age: 84
End: 2019-09-19

## 2019-09-19 ENCOUNTER — READMISSION MANAGEMENT (OUTPATIENT)
Dept: CALL CENTER | Facility: HOSPITAL | Age: 84
End: 2019-09-19

## 2019-09-19 NOTE — OUTREACH NOTE
Patients must be seen every 3 months for their presription medication review and refill required by [place] law.  Patient has been advised on the potential for addiction  and dependence to their prescribed scheduled medication.  NEVIN was obtained today and reviewed. This was scanned into the patient's chart.     Prep Survey      Responses   Facility patient discharged from?  [facility]   Is patient eligible?  Yes   Discharge diagnosis  diverticulitis   Does the patient have one of the following disease processes/diagnoses(primary or secondary)?  Other   Does the patient have Home health ordered?  No   Is there a DME ordered?  No   Comments regarding appointments  see AVS   Prep survey completed?  Yes          Marisa Calderon RN

## 2019-09-20 ENCOUNTER — READMISSION MANAGEMENT (OUTPATIENT)
Dept: CALL CENTER | Facility: HOSPITAL | Age: 84
End: 2019-09-20

## 2019-09-20 NOTE — OUTREACH NOTE
Medical Week 1 Survey      Responses   Facility patient discharged from?  [facility]   Does the patient have one of the following disease processes/diagnoses(primary or secondary)?  Other   Is there a successful TCM telephone encounter documented?  No   Week 1 attempt successful?  Yes   Call start time  0953   Call end time  0957   Discharge diagnosis  diverticulitis   Person spoke with today (if not patient) and relationship  Talita-daughter   Meds reviewed with patient/caregiver?  Yes   Does the patient have all medications ordered at discharge?  Yes   Is the patient taking all medications as directed (includes completed medication regime)?  Yes   Does the patient have a primary care provider?   Yes   Does the patient have an appointment with their PCP within 7 days of discharge?  Greater than 7 days   Comments regarding PCP  Appt 9/27   Has home health visited the patient within 72 hours of discharge?  N/A   Psychosocial issues?  No   Did the patient receive a copy of their discharge instructions?  Yes   Nursing interventions  Reviewed instructions with patient   What is the patient's perception of their health status since discharge?  Improving   Additional teach back comments  Eating without nausea   Week 1 call completed?  Yes          Yamilet Burns RN

## 2019-09-21 LAB
BACTERIA SPEC AEROBE CULT: NORMAL
BACTERIA SPEC AEROBE CULT: NORMAL

## 2019-09-27 ENCOUNTER — READMISSION MANAGEMENT (OUTPATIENT)
Dept: CALL CENTER | Facility: HOSPITAL | Age: 84
End: 2019-09-27

## 2019-09-27 NOTE — OUTREACH NOTE
Medical Week 2 Survey      Responses   Facility patient discharged from?  [facility]   Does the patient have one of the following disease processes/diagnoses(primary or secondary)?  Other   Week 2 attempt successful?  Yes   Call start time  1501   Discharge diagnosis  diverticulitis   Call end time  1503   Is patient permission given to speak with other caregiver?  Yes   List who call center can speak with  Daughter   Meds reviewed with patient/caregiver?  Yes   Is the patient taking all medications as directed (includes completed medication regime)?  Yes   Has the patient kept scheduled appointments due by today?  Yes   Has home health visited the patient within 72 hours of discharge?  N/A   What is the patient's perception of their health status since discharge?  Worsening   Week 2 Call Completed?  Yes   Wrap up additional comments  Had appt 9/23 but was not hurting and today pain is back. Not as bad as before. Afebrile.  Instructed to call MD because might need another round of ATBX.           Daria Brown RN

## 2019-10-04 ENCOUNTER — READMISSION MANAGEMENT (OUTPATIENT)
Dept: CALL CENTER | Facility: HOSPITAL | Age: 84
End: 2019-10-04

## 2019-10-04 NOTE — OUTREACH NOTE
Medical Week 3 Survey      Responses   Facility patient discharged from?  [facility]   Does the patient have one of the following disease processes/diagnoses(primary or secondary)?  Other   Week 3 attempt successful?  Yes   Call start time  1624   Call end time  1625   Discharge diagnosis  diverticulitis   Meds reviewed with patient/caregiver?  Yes   Is the patient taking all medications as directed (includes completed medication regime)?  Yes   Has the patient kept scheduled appointments due by today?  Yes   Psychosocial issues?  No   Comments  Denies abd pain or further issues.    What is the patient's perception of their health status since discharge?  Returned to baseline/stable   Is the patient/caregiver able to teach back signs and symptoms related to disease process for when to call PCP?  Yes   Is the patient/caregiver able to teach back signs and symptoms related to disease process for when to call 911?  Yes   Week 3 Call Completed?  Yes   Revoked  No further contact(revokes)-requires comment   Graduated/Revoked comments  baseline          Lili Dailey RN

## 2019-10-06 ENCOUNTER — EPISODE CHANGES (OUTPATIENT)
Dept: CASE MANAGEMENT | Facility: OTHER | Age: 84
End: 2019-10-06

## 2019-12-07 ENCOUNTER — HOSPITAL ENCOUNTER (EMERGENCY)
Facility: HOSPITAL | Age: 84
Discharge: HOME | End: 2019-12-07
Attending: EMERGENCY MEDICINE | Admitting: EMERGENCY MEDICINE

## 2019-12-07 VITALS
HEIGHT: 64 IN | SYSTOLIC BLOOD PRESSURE: 129 MMHG | TEMPERATURE: 97.6 F | WEIGHT: 160 LBS | RESPIRATION RATE: 16 BRPM | OXYGEN SATURATION: 98 % | HEART RATE: 61 BPM | DIASTOLIC BLOOD PRESSURE: 64 MMHG | BODY MASS INDEX: 27.31 KG/M2

## 2019-12-07 DIAGNOSIS — S91.011A LACERATION OF RIGHT ANKLE, INITIAL ENCOUNTER: Primary | ICD-10-CM

## 2019-12-07 PROCEDURE — 99283 EMERGENCY DEPT VISIT LOW MDM: CPT

## 2019-12-07 RX ORDER — LIDOCAINE HYDROCHLORIDE 10 MG/ML
INJECTION, SOLUTION EPIDURAL; INFILTRATION; INTRACAUDAL; PERINEURAL
Status: COMPLETED
Start: 2019-12-07 | End: 2019-12-07

## 2019-12-07 RX ORDER — LIDOCAINE HYDROCHLORIDE 10 MG/ML
5 INJECTION, SOLUTION EPIDURAL; INFILTRATION; INTRACAUDAL; PERINEURAL ONCE
Status: COMPLETED | OUTPATIENT
Start: 2019-12-07 | End: 2019-12-07

## 2019-12-07 RX ORDER — CEPHALEXIN 500 MG/1
500 CAPSULE ORAL 2 TIMES DAILY
Qty: 18 CAPSULE | Refills: 0 | Status: SHIPPED | OUTPATIENT
Start: 2019-12-07 | End: 2020-09-03

## 2019-12-07 RX ADMIN — LIDOCAINE HYDROCHLORIDE 5 ML: 10 INJECTION, SOLUTION EPIDURAL; INFILTRATION; INTRACAUDAL at 13:02

## 2019-12-07 RX ADMIN — LIDOCAINE HYDROCHLORIDE 5 ML: 10 INJECTION, SOLUTION EPIDURAL; INFILTRATION; INTRACAUDAL; PERINEURAL at 13:02

## 2019-12-07 NOTE — ED NOTES
Wound was cleansed post suture placement. The wound was dressed with non adherent dressing, and secured with siria. Distal pulses remain palpable and strong, distal sensory and motor remains intact. The dressing is clean, dry and intact. Patient tolerated well.      Maryjane Jain RN  12/07/19 3384

## 2019-12-07 NOTE — ED NOTES
Wound irrigated and cleansed with sterile water and Hibiclens. The patient tolerated well. 4x4 gauze placed to wound to control bleeding. Awaiting laceration repair.      Maryjane Jain RN  12/07/19 9259

## 2019-12-07 NOTE — ED PROVIDER NOTES
Subjective   Patient presents to [facility] with ankle laceration.       used: No    Ankle Injury   Severity:  Mild  Onset quality:  Gradual  Timing:  Constant  Chronicity:  New  Context:  Fell off bottom ladder, cutting right ankle.      Review of Systems   Constitutional: Positive for activity change.   HENT: Negative.    Eyes: Negative.    Respiratory: Negative.    Cardiovascular: Negative.    Gastrointestinal: Negative.    Endocrine: Negative.    Genitourinary: Negative.    Musculoskeletal: Negative.    Skin:        Laceration right ankle   Allergic/Immunologic: Negative.    Neurological: Negative.    Hematological: Negative.    Psychiatric/Behavioral: Negative.        Past Medical History:   Diagnosis Date   • A-fib (CMS/HCC)    • Anemia 1/13/2017   • Arthritis    • Asthma    • Cancer (CMS/[facility])    • CHF (congestive heart failure) (CMS/[facility])    • Chronic anticoagulation, Xarelto 7/30/2019   • Coronary artery disease    • GERD without esophagitis 7/30/2019   • History of colonic polyps 7/30/2019   • History of transfusion    • Hypertension    • Hypothyroidism 7/30/2019   • Peripheral neuropathy 7/30/2019       Allergies   Allergen Reactions   • Adhesive Tape Rash       Past Surgical History:   Procedure Laterality Date   • BREAST CYST EXCISION Left    • CERVICAL POLYPECTOMY     • COLONOSCOPY N/A 1/17/2017    Procedure: COLONOSCOPY;  Surgeon: Madi Zheng III, MD;  Location: [facility];  Service:    • ENDOSCOPY N/A 1/17/2017    Procedure: ESOPHAGOGASTRODUODENOSCOPY;  Surgeon: Madi Zheng III, MD;  Location: [facility];  Service:    • GALLBLADDER SURGERY         No family history on file.    Social History     Socioeconomic History   • Marital status:      Spouse name: Not on file   • Number of children: Not on file   • Years of education: Not on file   • Highest education level: Not on file   Tobacco Use   • Smoking status: Never Smoker   • Smokeless tobacco: Never Used   Substance  and Sexual Activity   • Alcohol use: No   • Drug use: No   • Sexual activity: Defer           Objective   Physical Exam   Constitutional: She appears well-developed and well-nourished.   HENT:   Head: Normocephalic and atraumatic.   Eyes: Pupils are equal, round, and reactive to light.   Neck: Normal range of motion.   Cardiovascular: Normal rate.   Pulmonary/Chest: Effort normal.   Abdominal: Soft.   Musculoskeletal: Normal range of motion.   Neurological: She is alert.   Skin:   12cm laceration right lower leg/ ankle area   Psychiatric: She has a normal mood and affect.   Nursing note and vitals reviewed.      Laceration Repair  Date/Time: 12/7/2019 1:23 PM  Performed by: Giles Chávez MD  Authorized by: Giles Chávez MD     Consent:     Consent obtained:  Verbal and written    Consent given by:  Patient    Risks discussed:  Infection, need for additional repair, poor cosmetic result, pain, poor wound healing and vascular damage    Alternatives discussed:  No treatment, delayed treatment, observation and referral  Anesthesia (see MAR for exact dosages):     Anesthesia method:  Local infiltration    Local anesthetic:  Lidocaine 1% w/o epi  Laceration details:     Length (cm):  12    Depth (mm):  3  Repair type:     Repair type:  Intermediate  Pre-procedure details:     Preparation:  Patient was prepped and draped in usual sterile fashion  Exploration:     Hemostasis achieved with:  Direct pressure    Contaminated: no    Treatment:     Area cleansed with:  Hibiclens    Amount of cleaning:  Standard    Visualized foreign bodies/material removed: no    Skin repair:     Repair method:  Sutures    Suture size:  4-0    Number of sutures:  8  Approximation:     Approximation:  Loose  Post-procedure details:     Dressing:  Non-adherent dressing    Patient tolerance of procedure:  Tolerated well, no immediate complications  Comments:      Very fragile skin some areas couldn't be sutured because skin tears with  tension.               ED Course                      No data recorded                        MDM    Final diagnoses:   Laceration of right ankle, initial encounter              Giles Chávez MD  12/07/19 1325       Giles Chávez MD  12/07/19 1334       Giles Chávez MD  12/07/19 5254

## 2019-12-07 NOTE — ED NOTES
Patient reports that she received tetanus shot one year ago. Provider to be made aware.     Maryjane Jain RN  12/07/19 0270

## 2019-12-07 NOTE — ED NOTES
Patient presents to ED via POV with her family complaining of laceration to right lower leg after falling off ladder. Reports that she had made it to the second step of the ladder, lost her balance and scraped her knee against the stairs. The patient denies any pain or injuries anywhere other than RLE. Denies hitting her head or losing consciousness.  Patient reports that she is on Xarelto and she could not get the bleeding to stop at home. The patient noted to have 7cm x 5 cm to right lower leg. [place] wound appears to be swollen, red and bruised. Patient skin appears soft and lose around the wound and edges appear jagged. Subcutaneous tissue is noted to the center of wound. Gauze applied at this time to control bleeding.      Maryjane Jain RN  12/07/19 [id number]

## 2020-03-06 ENCOUNTER — HOSPITAL ENCOUNTER (OUTPATIENT)
Dept: CARDIOLOGY | Facility: HOSPITAL | Age: 85
Discharge: HOME | End: 2020-03-06
Admitting: OPHTHALMOLOGY

## 2020-03-06 ENCOUNTER — TRANSCRIBE ORDERS (OUTPATIENT)
Dept: ADMINISTRATIVE | Facility: HOSPITAL | Age: 85
End: 2020-03-06

## 2020-03-06 ENCOUNTER — LAB (OUTPATIENT)
Dept: LAB | Facility: HOSPITAL | Age: 85
End: 2020-03-06

## 2020-03-06 DIAGNOSIS — Z01.818 PREOPERATIVE TESTING: ICD-10-CM

## 2020-03-06 DIAGNOSIS — Z01.818 PREOPERATIVE TESTING: Primary | ICD-10-CM

## 2020-03-06 PROCEDURE — 36415 COLL VENOUS BLD VENIPUNCTURE: CPT

## 2020-03-06 PROCEDURE — 80053 COMPREHEN METABOLIC PANEL: CPT

## 2020-03-06 PROCEDURE — 93005 ELECTROCARDIOGRAM TRACING: CPT | Performed by: OPHTHALMOLOGY

## 2020-03-06 PROCEDURE — 93010 ELECTROCARDIOGRAM REPORT: CPT | Performed by: INTERNAL MEDICINE

## 2020-03-07 LAB
ALBUMIN SERPL-MCNC: 4.3 G/DL (ref 3.5–5.2)
ALBUMIN/GLOB SERPL: 1.6 G/DL
ALP SERPL-CCNC: 52 U/L (ref 39–117)
ALT SERPL W P-5'-P-CCNC: 7 U/L (ref 1–33)
ANION GAP SERPL CALCULATED.3IONS-SCNC: 14.9 MMOL/L (ref 5–15)
AST SERPL-CCNC: 13 U/L (ref 1–32)
BILIRUB SERPL-MCNC: 0.4 MG/DL (ref 0.2–1.2)
BUN BLD-MCNC: 19 MG/DL (ref 8–23)
BUN/CREAT SERPL: 18.3 (ref 7–25)
CALCIUM SPEC-SCNC: 9.3 MG/DL (ref 8.6–10.5)
CHLORIDE SERPL-SCNC: 96 MMOL/L (ref 98–107)
CO2 SERPL-SCNC: 26.1 MMOL/L (ref 22–29)
CREAT BLD-MCNC: 1.04 MG/DL (ref 0.57–1)
GFR SERPL CREATININE-BSD FRML MDRD: 50 ML/MIN/1.73
GLOBULIN UR ELPH-MCNC: 2.7 GM/DL
GLUCOSE BLD-MCNC: 98 MG/DL (ref 65–99)
POTASSIUM BLD-SCNC: 4.1 MMOL/L (ref 3.5–5.2)
PROT SERPL-MCNC: 7 G/DL (ref 6–8.5)
SODIUM BLD-SCNC: 137 MMOL/L (ref 136–145)

## 2020-03-25 ENCOUNTER — OFFICE VISIT (OUTPATIENT)
Dept: CARDIOLOGY | Facility: CLINIC | Age: 85
End: 2020-03-25

## 2020-03-25 VITALS
SYSTOLIC BLOOD PRESSURE: 156 MMHG | RESPIRATION RATE: 16 BRPM | HEART RATE: 65 BPM | HEIGHT: 64 IN | BODY MASS INDEX: 28 KG/M2 | DIASTOLIC BLOOD PRESSURE: 73 MMHG | WEIGHT: 164 LBS

## 2020-03-25 DIAGNOSIS — I48.20 ATRIAL FIBRILLATION, CHRONIC (HCC): ICD-10-CM

## 2020-03-25 DIAGNOSIS — R07.2 PRECORDIAL PAIN: ICD-10-CM

## 2020-03-25 DIAGNOSIS — I10 ESSENTIAL HYPERTENSION: Chronic | ICD-10-CM

## 2020-03-25 DIAGNOSIS — R06.09 DYSPNEA ON EXERTION: ICD-10-CM

## 2020-03-25 DIAGNOSIS — Z01.810 PREOPERATIVE CARDIOVASCULAR EXAMINATION: Primary | ICD-10-CM

## 2020-03-25 PROCEDURE — 93000 ELECTROCARDIOGRAM COMPLETE: CPT | Performed by: INTERNAL MEDICINE

## 2020-03-25 PROCEDURE — 99204 OFFICE O/P NEW MOD 45 MIN: CPT | Performed by: INTERNAL MEDICINE

## 2020-03-25 NOTE — PROGRESS NOTES
Duane Pollard MD  Desiree Matos  10/13/1933  03/25/2020    Patient Active Problem List   Diagnosis   • Atrial fibrillation (CMS/HCC)   • Acute diverticulitis   • Chronic anticoagulation, Xarelto   • Essential hypertension   • Hypothyroidism   • History of colonic polyps   • Coronary artery disease involving native heart   • Diastolic CHF, chronic (CMS/HCC)   • GERD without esophagitis   • Peripheral neuropathy   • Asthma   • CKD (chronic kidney disease), stage III (CMS/HCC)   • Diverticulitis       Dear Duane Pollard MD:    Suman Matos is a 86 y.o. female with the problems as listed above, presents    Chief complaint: Preoperative cardiac evaluation and cardiac clearance prior to undergoing surgery for skin cancer.    History of Present Illness: Ms. Armenta is a pleasant 86-year-old  female with history of chronic atrial fibrillation but no history of known coronary artery disease, is here seeking preoperative cardiac evaluation cardiac clearance prior to undergoing surgery for skin cancer.  On further questioning she denies any history of known coronary artery disease although her EKG shows evidence of possible old inferior wall and anteroseptal myocardial infarction.  She has some occasional chest pains.  She has dyspnea with mild exertion which has progressively gotten worse over the last few years with 2 pillow orthopnea and chronic bilateral leg edema.  She has some intermittent palpitations, dizziness but no syncope.  She denies any bleeding problems from the blood thinner.  She also complains of intermittent chest pains on the left side of her chest which are sharp to dull pains that seem to come and go at random with no consistent relation to exertion and relieved spontaneously.  They are of mild intensity.  There is some associated shortness of breath but no sweating.      Allergies   Allergen Reactions   • Adhesive Tape Rash   :      Current Outpatient Medications:     •  digoxin (LANOXIN) 125 MCG tablet, Take 125 mcg by mouth Every Morning., Disp: , Rfl:   •  furosemide (LASIX) 20 MG tablet, Take 20 mg by mouth Daily., Disp: , Rfl:   •  gabapentin (NEURONTIN) 300 MG capsule, Take 300 mg by mouth 2 (Two) Times a Day., Disp: , Rfl:   •  hydrochlorothiazide (HYDRODIURIL) 25 MG tablet, Take 25 mg by mouth Every Night., Disp: , Rfl:   •  levothyroxine (SYNTHROID, LEVOTHROID) 75 MCG tablet, Take 75 mcg by mouth Daily., Disp: , Rfl:   •  oxyCODONE-acetaminophen (PERCOCET)  MG per tablet, Take 1 tablet by mouth Every 12 (Twelve) Hours As Needed for Moderate Pain ., Disp: , Rfl:   •  pantoprazole (PROTONIX) 40 MG EC tablet, Take 40 mg by mouth Daily., Disp: , Rfl:   •  promethazine (PHENERGAN) 25 MG tablet, Take 1 tablet by mouth Every 6 (Six) Hours As Needed for Nausea or Vomiting., Disp: 20 tablet, Rfl: 0  •  quinapril (ACCUPRIL) 10 MG tablet, Take 10 mg by mouth Daily., Disp: , Rfl:   •  acetaminophen (TYLENOL) 500 MG tablet, Take 500 mg by mouth Every 6 (Six) Hours As Needed for Mild Pain , Headache or Fever., Disp: , Rfl:   •  cephalexin (KEFLEX) 500 MG capsule, Take 1 capsule by mouth 2 (Two) Times a Day., Disp: 18 capsule, Rfl: 0  •  docusate sodium (COLACE) 250 MG capsule, Take 250 mg by mouth 2 (Two) Times a Day As Needed for Constipation., Disp: , Rfl:   •  rivaroxaban (Xarelto) 15 MG tablet, Take 1 tablet by mouth Daily., Disp: 30 tablet, Rfl: 5    Past Medical History:   Diagnosis Date   • A-fib (CMS/HCC)    • Anemia 1/13/2017   • Arthritis    • Asthma    • Cancer (CMS/HCC)    • CHF (congestive heart failure) (CMS/HCC)    • Chronic anticoagulation, Xarelto 7/30/2019   • Coronary artery disease    • GERD without esophagitis 7/30/2019   • History of colonic polyps 7/30/2019   • History of transfusion    • Hypertension    • Hypothyroidism 7/30/2019   • Peripheral neuropathy 7/30/2019     Past Surgical History:   Procedure Laterality Date   • BACK SURGERY     • BREAST CYST  EXCISION Left    • CERVICAL POLYPECTOMY     • COLONOSCOPY N/A 1/17/2017    Procedure: COLONOSCOPY;  Surgeon: Madi Zheng III, MD;  Location:  [facility];  Service:    • D&C AND LAPAROSCOPY     • ENDOSCOPY N/A 1/17/2017    Procedure: ESOPHAGOGASTRODUODENOSCOPY;  Surgeon: Madi Zheng III, MD;  Location:  [facility];  Service:    • GALLBLADDER SURGERY     • VEIN LIGATION AND STRIPPING       Family History   Problem Relation Age of Onset   • Heart disease Mother    • Heart attack Sister    • Heart disease Sister    • Heart attack Brother    • Heart disease Brother    • Heart disease Brother    • Heart attack Paternal Grandfather      Social History     Tobacco Use   • Smoking status: Never Smoker   • Smokeless tobacco: Never Used   Substance Use Topics   • Alcohol use: No   • Drug use: No       Review of Systems   Constitution: Positive for decreased appetite and malaise/fatigue. Negative for chills, diaphoresis and fever.   HENT: Positive for hearing loss.    Cardiovascular: Positive for chest pain, leg swelling and palpitations. Negative for orthopnea and paroxysmal nocturnal dyspnea.   Respiratory: Positive for shortness of breath and wheezing. Negative for cough and hemoptysis.    Endocrine: Negative for cold intolerance and heat intolerance.   Hematologic/Lymphatic: Does not bruise/bleed easily.   Skin: Positive for dry skin and rash.   Musculoskeletal: Positive for back pain, joint pain, muscle cramps, muscle weakness and stiffness. Negative for myalgias.   Gastrointestinal: Positive for change in bowel habit, nausea and vomiting. Negative for abdominal pain, constipation and diarrhea.   Genitourinary: Negative for dysuria and hematuria.   Neurological: Positive for headaches and light-headedness. Negative for dizziness, focal weakness and numbness.   Psychiatric/Behavioral: Positive for depression and memory loss. The patient has insomnia and is nervous/anxious.    All other systems reviewed and are  "negative.      Objective   Blood pressure 156/73, pulse 65, resp. rate 16, height 162.6 cm (64\"), weight 74.4 kg (164 lb), not currently breastfeeding.  Body mass index is 28.15 kg/m².    Physical Exam   Constitutional: She is oriented to person, place, and time. She appears well-developed and well-nourished.   HENT:   Mouth/Throat: Oropharynx is clear and moist.   Eyes: Pupils are equal, round, and reactive to light. EOM are normal.   Neck: Neck supple. No JVD present. No tracheal deviation present. No thyromegaly present.   Cardiovascular: Normal rate, S1 normal and S2 normal. An irregularly irregular rhythm present. Exam reveals no gallop and no friction rub.   No murmur heard.  Pulmonary/Chest: Effort normal and breath sounds normal.   Abdominal: Soft. Bowel sounds are normal. She exhibits no mass. There is no tenderness.   Musculoskeletal: Normal range of motion. She exhibits edema (1+ right leg edema.).   Lymphadenopathy:     She has no cervical adenopathy.   Neurological: She is alert and oriented to person, place, and time.   Skin: Skin is warm and dry. No rash noted.   Psychiatric: She has a normal mood and affect.       Lab Results   Component Value Date     03/06/2020    K 4.1 03/06/2020    CL 96 (L) 03/06/2020    CO2 26.1 03/06/2020    BUN 19 03/06/2020    CREATININE 1.04 (H) 03/06/2020    GLUCOSE 98 03/06/2020    CALCIUM 9.3 03/06/2020    AST 13 03/06/2020    ALT 7 03/06/2020    ALKPHOS 52 03/06/2020    LABIL2 1.2 (L) 06/24/2015     Lab Results   Component Value Date    CKTOTAL 42 01/13/2017     Lab Results   Component Value Date    WBC 6.17 09/18/2019    HGB 9.9 (L) 09/18/2019    HCT 29.9 (L) 09/18/2019     09/18/2019     Lab Results   Component Value Date    INR 1.25 (H) 08/09/2019     Lab Results   Component Value Date    MG 1.7 08/09/2019     Lab Results   Component Value Date    TSH 4.430 (H) 07/30/2019      Lab Results   Component Value Date    BNP 68.0 03/16/2018     Desiree Matos "   Echocardiogram   Order# [id number]   Reading physician:   Eduardo Slaughter MD Ordering physician: Eduardo Slaughter MD Study date: 1/15/17   Patient Information     Patient Name  Desiree Matos MRN  [id number] Sex  Female  (Age)  10/13/1933 (86 y.o.)   Sedation Narrator Report     Interpretation Summary     · Normal left ventricular cavity size and wall thickness noted. All left ventricular wall segments contract normally.  · Estimated EF appears to be in the range of 61 - 65%.  · The aortic valve is abnormal in structure. The valve exhibits sclerosis. There is calcification of the noncoronary cusp, left coronary cusp and right coronary cusp present. No aortic valve regurgitation is present. Mild aortic valve stenosis is present.  · The mitral valve is abnormal in structure. There are myxomatous changes of the mitral valve apparatus present. Mild mitral valve regurgitation is present. No significant mitral valve stenosis is present.  · The tricuspid valve is normal. No tricuspid valve stenosis is present. No tricuspid valve regurgitation is present. Estimated right ventricular systolic pressure from tricuspid regurgitation is mildly elevated (35-45 mmHg).  · The pulmonic valve is structurally normal. There is no significant pulmonic valve stenosis present. There is no pulmonic valve regurgitation present.  · There is no evidence of pericardial effusion.         ECG 12 Lead  Date/Time: 3/25/2020 12:34 PM  Performed by: Eduardo Slaughter MD  Authorized by: Eduardo Slaughter MD   Comparison: compared with previous ECG from 3/6/2020  Similar to previous ECG  Rhythm: atrial fibrillation  BPM: 52  Conduction: conduction normal  Comments: Possible old anteroseptal and inferior wall myocardial infarction which was seen on previous EKGs.            Assessment/Plan :   Diagnosis Plan   1. Preoperative cardiovascular examination     2. Dyspnea on exertion (NYHA class III)  Adult Transthoracic Echo   3. Precordial pain with  some typical and some atypical features for angina pectoris  Stress Test With Myocardial Perfusion (1 Day)   4. Atrial fibrillation, chronic with baseline bradycardia in the 50s with some intermittent dizziness but no syncope.     5. Essential hypertension with mild elevated systolic blood pressure today.          Recommendations:    Orders Placed This Encounter   Procedures   • Stress Test With Myocardial Perfusion (1 Day)   • ECG 12 Lead   • Adult Transthoracic Echo Complete W/ Cont if Necessary Per Protocol        1. Since her estimated GFR was 50 on recent check, will go ahead and decrease her Xarelto to 15 mg daily.  2. We will evaluate her chest pains further with a Lexiscan sestamibi study  3. We will evaluate her dyspnea with an echo Doppler study.  4. If the ischemic evaluation is okay and if her LV systolic function is preserved, then she should be an acceptable cardiac risk for the planned surgical procedure.  5. I have asked her to take the digoxin every other day.    Return in about 2 weeks (around 4/8/2020).    As always, Duane Pollard MD  I appreciate very much the opportunity to participate in the cardiovascular care of your patients. Please do not hesitate to call me with any questions with regards to Desiree Matos's evaluation and management.       With Best Regards,        Eduardo Slaughter MD, [facility]    Please note that portions of this note were completed with a voice recognition program.

## 2020-04-28 ENCOUNTER — HOSPITAL ENCOUNTER (OUTPATIENT)
Dept: NUCLEAR MEDICINE | Facility: HOSPITAL | Age: 85
Discharge: HOME | End: 2020-04-28

## 2020-04-28 ENCOUNTER — HOSPITAL ENCOUNTER (OUTPATIENT)
Dept: CARDIOLOGY | Facility: HOSPITAL | Age: 85
Discharge: HOME | End: 2020-04-28

## 2020-04-28 DIAGNOSIS — R06.09 DYSPNEA ON EXERTION: ICD-10-CM

## 2020-04-28 DIAGNOSIS — R07.2 PRECORDIAL PAIN: ICD-10-CM

## 2020-04-28 PROCEDURE — 0 TECHNETIUM SESTAMIBI: Performed by: INTERNAL MEDICINE

## 2020-04-28 PROCEDURE — 25010000002 REGADENOSON 0.4 MG/5ML SOLUTION: Performed by: INTERNAL MEDICINE

## 2020-04-28 PROCEDURE — 93306 TTE W/DOPPLER COMPLETE: CPT | Performed by: INTERNAL MEDICINE

## 2020-04-28 PROCEDURE — A9500 TC99M SESTAMIBI: HCPCS | Performed by: INTERNAL MEDICINE

## 2020-04-28 PROCEDURE — 93017 CV STRESS TEST TRACING ONLY: CPT

## 2020-04-28 PROCEDURE — 78452 HT MUSCLE IMAGE SPECT MULT: CPT | Performed by: INTERNAL MEDICINE

## 2020-04-28 PROCEDURE — 93018 CV STRESS TEST I&R ONLY: CPT | Performed by: INTERNAL MEDICINE

## 2020-04-28 PROCEDURE — 93306 TTE W/DOPPLER COMPLETE: CPT

## 2020-04-28 PROCEDURE — 78452 HT MUSCLE IMAGE SPECT MULT: CPT

## 2020-04-28 RX ADMIN — TECHNETIUM TC 99M SESTAMIBI 1 DOSE: 1 INJECTION INTRAVENOUS at 09:22

## 2020-04-28 RX ADMIN — REGADENOSON 0.4 MG: 0.08 INJECTION, SOLUTION INTRAVENOUS at 09:22

## 2020-04-28 RX ADMIN — TECHNETIUM TC 99M SESTAMIBI 1 DOSE: 1 INJECTION INTRAVENOUS at 08:05

## 2020-04-29 LAB
BH CV ECHO MEAS - % IVS THICK: 0.4 %
BH CV ECHO MEAS - % LVPW THICK: -1.5 %
BH CV ECHO MEAS - ACS: 1.8 CM
BH CV ECHO MEAS - AO MAX PG (FULL): 9.6 MMHG
BH CV ECHO MEAS - AO MAX PG: 12.1 MMHG
BH CV ECHO MEAS - AO MEAN PG (FULL): 4 MMHG
BH CV ECHO MEAS - AO MEAN PG: 5 MMHG
BH CV ECHO MEAS - AO ROOT AREA (BSA CORRECTED): 1.8
BH CV ECHO MEAS - AO ROOT AREA: 8 CM^2
BH CV ECHO MEAS - AO ROOT DIAM: 3.2 CM
BH CV ECHO MEAS - AO V2 MAX: 174 CM/SEC
BH CV ECHO MEAS - AO V2 MEAN: 96.5 CM/SEC
BH CV ECHO MEAS - AO V2 VTI: 37.3 CM
BH CV ECHO MEAS - AVA(I,A): 1.8 CM^2
BH CV ECHO MEAS - AVA(I,D): 1.8 CM^2
BH CV ECHO MEAS - AVA(V,A): 1.6 CM^2
BH CV ECHO MEAS - AVA(V,D): 1.6 CM^2
BH CV ECHO MEAS - BSA(HAYCOCK): 1.9 M^2
BH CV ECHO MEAS - BSA: 1.8 M^2
BH CV ECHO MEAS - BZI_BMI: 28.2 KILOGRAMS/M^2
BH CV ECHO MEAS - BZI_METRIC_HEIGHT: 162.6 CM
BH CV ECHO MEAS - BZI_METRIC_WEIGHT: 74.4 KG
BH CV ECHO MEAS - EDV(CUBED): 56.8 ML
BH CV ECHO MEAS - EDV(MOD-SP4): 45.9 ML
BH CV ECHO MEAS - EDV(TEICH): 63.7 ML
BH CV ECHO MEAS - EF(CUBED): 67.1 %
BH CV ECHO MEAS - EF(MOD-SP4): 53.6 %
BH CV ECHO MEAS - EF(TEICH): 59.3 %
BH CV ECHO MEAS - ESV(CUBED): 18.7 ML
BH CV ECHO MEAS - ESV(MOD-SP4): 21.3 ML
BH CV ECHO MEAS - ESV(TEICH): 25.9 ML
BH CV ECHO MEAS - FS: 30.9 %
BH CV ECHO MEAS - IVS/LVPW: 0.95
BH CV ECHO MEAS - IVSD: 1.3 CM
BH CV ECHO MEAS - IVSS: 1.3 CM
BH CV ECHO MEAS - LA DIMENSION: 4.6 CM
BH CV ECHO MEAS - LA/AO: 1.4
BH CV ECHO MEAS - LV DIASTOLIC VOL/BSA (35-75): 25.5 ML/M^2
BH CV ECHO MEAS - LV MASS(C)D: 175.5 GRAMS
BH CV ECHO MEAS - LV MASS(C)DI: 97.6 GRAMS/M^2
BH CV ECHO MEAS - LV MASS(C)S: 104.4 GRAMS
BH CV ECHO MEAS - LV MASS(C)SI: 58.1 GRAMS/M^2
BH CV ECHO MEAS - LV MAX PG: 2.6 MMHG
BH CV ECHO MEAS - LV MEAN PG: 1 MMHG
BH CV ECHO MEAS - LV SYSTOLIC VOL/BSA (12-30): 11.8 ML/M^2
BH CV ECHO MEAS - LV V1 MAX: 80 CM/SEC
BH CV ECHO MEAS - LV V1 MEAN: 48.1 CM/SEC
BH CV ECHO MEAS - LV V1 VTI: 19.2 CM
BH CV ECHO MEAS - LVIDD: 3.8 CM
BH CV ECHO MEAS - LVIDS: 2.7 CM
BH CV ECHO MEAS - LVLD AP4: 5.8 CM
BH CV ECHO MEAS - LVLS AP4: 5.4 CM
BH CV ECHO MEAS - LVOT AREA (M): 3.5 CM^2
BH CV ECHO MEAS - LVOT AREA: 3.5 CM^2
BH CV ECHO MEAS - LVOT DIAM: 2.1 CM
BH CV ECHO MEAS - LVPWD: 1.3 CM
BH CV ECHO MEAS - LVPWS: 1.3 CM
BH CV ECHO MEAS - MV A MAX VEL: 42.7 CM/SEC
BH CV ECHO MEAS - MV E MAX VEL: 127 CM/SEC
BH CV ECHO MEAS - MV E/A: 3
BH CV ECHO MEAS - PA ACC TIME: 0.09 SEC
BH CV ECHO MEAS - PA PR(ACCEL): 39.4 MMHG
BH CV ECHO MEAS - RAP SYSTOLE: 10 MMHG
BH CV ECHO MEAS - RVSP: 30.4 MMHG
BH CV ECHO MEAS - SI(AO): 166.9 ML/M^2
BH CV ECHO MEAS - SI(CUBED): 21.2 ML/M^2
BH CV ECHO MEAS - SI(LVOT): 37 ML/M^2
BH CV ECHO MEAS - SI(MOD-SP4): 13.7 ML/M^2
BH CV ECHO MEAS - SI(TEICH): 21 ML/M^2
BH CV ECHO MEAS - SV(AO): 300 ML
BH CV ECHO MEAS - SV(CUBED): 38.1 ML
BH CV ECHO MEAS - SV(LVOT): 66.5 ML
BH CV ECHO MEAS - SV(MOD-SP4): 24.6 ML
BH CV ECHO MEAS - SV(TEICH): 37.8 ML
BH CV ECHO MEAS - TR MAX VEL: 225.7 CM/SEC
BH CV NUCLEAR PRIOR STUDY: 3
BH CV STRESS BP STAGE 1: NORMAL
BH CV STRESS BP STAGE 2: NORMAL
BH CV STRESS COMMENTS STAGE 1: NORMAL
BH CV STRESS COMMENTS STAGE 2: NORMAL
BH CV STRESS DOSE REGADENOSON STAGE 1: 0.4
BH CV STRESS DURATION MIN STAGE 1: 0
BH CV STRESS DURATION MIN STAGE 2: 4
BH CV STRESS DURATION SEC STAGE 1: 10
BH CV STRESS DURATION SEC STAGE 2: 0
BH CV STRESS HR STAGE 1: 66
BH CV STRESS HR STAGE 2: 77
BH CV STRESS PROTOCOL 1: NORMAL
BH CV STRESS RECOVERY BP: NORMAL MMHG
BH CV STRESS RECOVERY HR: 74 BPM
BH CV STRESS STAGE 1: 1
BH CV STRESS STAGE 2: 2
LV EF NUC BP: 55 %
MAXIMAL PREDICTED HEART RATE: 134 BPM
PERCENT MAX PREDICTED HR: 61.19 %
STRESS BASELINE BP: NORMAL MMHG
STRESS BASELINE HR: 60 BPM
STRESS PERCENT HR: 72 %
STRESS POST PEAK BP: NORMAL MMHG
STRESS POST PEAK HR: 82 BPM
STRESS TARGET HR: 114 BPM

## 2020-05-05 ENCOUNTER — EPISODE CHANGES (OUTPATIENT)
Dept: CASE MANAGEMENT | Facility: OTHER | Age: 85
End: 2020-05-05

## 2020-05-07 ENCOUNTER — PREP FOR SURGERY (OUTPATIENT)
Dept: OTHER | Facility: HOSPITAL | Age: 85
End: 2020-05-07

## 2020-05-07 ENCOUNTER — OFFICE VISIT (OUTPATIENT)
Dept: CARDIOLOGY | Facility: CLINIC | Age: 85
End: 2020-05-07

## 2020-05-07 VITALS
BODY MASS INDEX: 27.69 KG/M2 | HEIGHT: 64 IN | HEART RATE: 89 BPM | SYSTOLIC BLOOD PRESSURE: 114 MMHG | WEIGHT: 162.2 LBS | DIASTOLIC BLOOD PRESSURE: 74 MMHG | RESPIRATION RATE: 16 BRPM

## 2020-05-07 DIAGNOSIS — R94.39 ABNORMAL NUCLEAR STRESS TEST: Primary | ICD-10-CM

## 2020-05-07 DIAGNOSIS — I48.20 ATRIAL FIBRILLATION, CHRONIC (HCC): ICD-10-CM

## 2020-05-07 DIAGNOSIS — I20.9 ANGINA, CLASS III (HCC): Primary | ICD-10-CM

## 2020-05-07 DIAGNOSIS — I50.32 DIASTOLIC CHF, CHRONIC (HCC): ICD-10-CM

## 2020-05-07 DIAGNOSIS — I10 ESSENTIAL HYPERTENSION: ICD-10-CM

## 2020-05-07 DIAGNOSIS — R94.39 ABNORMAL NUCLEAR STRESS TEST: ICD-10-CM

## 2020-05-07 PROCEDURE — 99214 OFFICE O/P EST MOD 30 MIN: CPT | Performed by: INTERNAL MEDICINE

## 2020-05-07 RX ORDER — ASPIRIN 81 MG/1
81 TABLET ORAL DAILY
Qty: 30 TABLET | Refills: 2 | Status: SHIPPED | OUTPATIENT
Start: 2020-05-07 | End: 2020-09-03 | Stop reason: ALTCHOICE

## 2020-05-07 RX ORDER — ISOSORBIDE MONONITRATE 30 MG/1
30 TABLET, EXTENDED RELEASE ORAL EVERY MORNING
Qty: 30 TABLET | Refills: 3 | Status: SHIPPED | OUTPATIENT
Start: 2020-05-07 | End: 2021-03-04 | Stop reason: HOSPADM

## 2020-05-07 NOTE — PROGRESS NOTES
Duane Pollard MD  Desiree Matos  10/13/1933  2020    Patient Active Problem List   Diagnosis   • Atrial fibrillation, chronic   • Acute diverticulitis   • Chronic anticoagulation, Xarelto   • Essential hypertension   • Hypothyroidism   • History of colonic polyps   • Coronary artery disease involving native heart   • Diastolic CHF, chronic (CMS/HCC)   • GERD without esophagitis   • Peripheral neuropathy   • Asthma   • CKD (chronic kidney disease), stage III (CMS/HCC)   • Diverticulitis   • Abnormal nuclear stress test with medium size, mild degree of anterior wall myocardial ischemia.       Dear Duane Pollard MD:    Suman Matos is a 86 y.o. female with the problems as listed above, presents    Chief Complaint   Patient presents with   • Results     stress findings   • Palpitations     races,skips       History of Present Illness: Ms. Armenta is a pleasant 86-year-old  female with complains of intermittent chest pains and increasing shortness of breath with her EKG recently revealing possible old inferior wall and anteroseptal myocardial infarction.  She was being evaluated to give clearance prior to undergoing surgery for skin cancer.  She is here today to discuss the results of the nuclear stress test and echo Doppler study.  On further questioning she says she continues to have intermittent chest pains which are felt as sharp to dull pains across her chest associated with shortness of breath.  She also has been having progressively worsening dyspnea over the last few months to the point that she gets short of breath just walking across her house now.  Her recent Lexiscan sestamibi study revealed medium size, mild degree of anterior wall myocardial ischemia.  She has a strong family history of premature coronary artery disease with 1 of her brothers having had CABG in his 50s and 1 of her sisters having had CABG in her 30s and  in her 50s with heart  problems.      Allergies   Allergen Reactions   • Adhesive Tape Rash   :      Current Outpatient Medications:   •  acetaminophen (TYLENOL) 500 MG tablet, Take 500 mg by mouth Every 6 (Six) Hours As Needed for Mild Pain , Headache or Fever., Disp: , Rfl:   •  digoxin (LANOXIN) 125 MCG tablet, Take 125 mcg by mouth Every Morning., Disp: , Rfl:   •  docusate sodium (COLACE) 250 MG capsule, Take 250 mg by mouth 2 (Two) Times a Day As Needed for Constipation., Disp: , Rfl:   •  furosemide (LASIX) 20 MG tablet, Take 20 mg by mouth Daily., Disp: , Rfl:   •  gabapentin (NEURONTIN) 300 MG capsule, Take 300 mg by mouth 2 (Two) Times a Day., Disp: , Rfl:   •  hydrochlorothiazide (HYDRODIURIL) 25 MG tablet, Take 25 mg by mouth Every Night., Disp: , Rfl:   •  levothyroxine (SYNTHROID, LEVOTHROID) 75 MCG tablet, Take 75 mcg by mouth Daily., Disp: , Rfl:   •  oxyCODONE-acetaminophen (PERCOCET)  MG per tablet, Take 1 tablet by mouth Every 12 (Twelve) Hours As Needed for Moderate Pain ., Disp: , Rfl:   •  pantoprazole (PROTONIX) 40 MG EC tablet, Take 40 mg by mouth Daily., Disp: , Rfl:   •  promethazine (PHENERGAN) 25 MG tablet, Take 1 tablet by mouth Every 6 (Six) Hours As Needed for Nausea or Vomiting., Disp: 20 tablet, Rfl: 0  •  quinapril (ACCUPRIL) 10 MG tablet, Take 10 mg by mouth Daily., Disp: , Rfl:   •  rivaroxaban (Xarelto) 15 MG tablet, Take 1 tablet by mouth Daily., Disp: 30 tablet, Rfl: 5  •  cephalexin (KEFLEX) 500 MG capsule, Take 1 capsule by mouth 2 (Two) Times a Day., Disp: 18 capsule, Rfl: 0      The following portions of the patient's history were reviewed and updated as appropriate: allergies, current medications, past family history, past medical history, past social history, past surgical history and problem list.    Social History     Tobacco Use   • Smoking status: Never Smoker   • Smokeless tobacco: Never Used   Substance Use Topics   • Alcohol use: No   • Drug use: No       Review of Systems  "  Cardiovascular: Positive for palpitations. Negative for chest pain.   Respiratory: Positive for shortness of breath.    Neurological: Positive for dizziness and loss of balance.       Objective   Vitals:    05/07/20 1419   BP: 114/74   Pulse: 89   Resp: 16   Weight: 73.6 kg (162 lb 3.2 oz)   Height: 162.6 cm (64\")     Body mass index is 27.84 kg/m².    Physical Exam   Constitutional: She is oriented to person, place, and time. She appears well-developed and well-nourished.   HENT:   Mouth/Throat: Oropharynx is clear and moist.   Eyes: Pupils are equal, round, and reactive to light. EOM are normal.   Neck: Neck supple. No JVD present. No tracheal deviation present. No thyromegaly present.   Cardiovascular: Normal rate, regular rhythm, S1 normal and S2 normal. Exam reveals no gallop and no friction rub.   No murmur heard.  Pulses:       Radial pulses are 2+ on the right side, and 2+ on the left side.        Dorsalis pedis pulses are 1+ on the right side, and 1+ on the left side.        Posterior tibial pulses are 1+ on the right side, and 1+ on the left side.   Pulmonary/Chest: Effort normal and breath sounds normal.   Abdominal: Soft. Bowel sounds are normal. She exhibits no mass. There is no tenderness.   Musculoskeletal: Normal range of motion. She exhibits no edema.   Lymphadenopathy:     She has no cervical adenopathy.   Neurological: She is alert and oriented to person, place, and time.   Skin: Skin is warm and dry. No rash noted.   Psychiatric: She has a normal mood and affect.       Lab Results   Component Value Date     03/06/2020    K 4.1 03/06/2020    CL 96 (L) 03/06/2020    CO2 26.1 03/06/2020    BUN 19 03/06/2020    CREATININE 1.04 (H) 03/06/2020    GLUCOSE 98 03/06/2020    CALCIUM 9.3 03/06/2020    AST 13 03/06/2020    ALT 7 03/06/2020    ALKPHOS 52 03/06/2020    LABIL2 1.2 (L) 06/24/2015     Lab Results   Component Value Date    CKTOTAL 42 01/13/2017     Lab Results   Component Value Date    WBC " 6.17 09/18/2019    HGB 9.9 (L) 09/18/2019    HCT 29.9 (L) 09/18/2019     09/18/2019     Lab Results   Component Value Date    INR 1.25 (H) 08/09/2019     Lab Results   Component Value Date    MG 1.7 08/09/2019     Lab Results   Component Value Date    TSH 4.430 (H) 07/30/2019      Lab Results   Component Value Date    BNP 68.0 03/16/2018       Assessment/Plan :   Diagnosis Plan   1. Angina, class III (CMS/HCC)     2. Abnormal nuclear stress test with medium size, mild degree of anterior wall myocardial ischemia.     3. Atrial fibrillation, chronic     4. Essential hypertension     5. Diastolic CHF, chronic.        Recommendations:  1. I have discussed with her about the results of the nuclear stress test and the possibility that she may have some coronary blockages.  I have discussed with her about the options of trial of medical therapy versus further cardiac evaluation with cardiac catheterization.  I discussed the procedure of cardiac catheterization, potential risks, benefits and alternate methods of management.  She expressed understanding of same and is wanted to proceed.  We will try to schedule this for next Tuesday.  I told her to stop taking Xarelto 2 days before the procedure.  Patient expressed understanding.  2. We will start her on low-dose aspirin 81 mg daily and Imdur 30 mg daily.  Not able to add a beta-blocker due to history of bradycardia.    3. I told her to hold the Lasix for a couple of days before the cardiac catheterization procedure.    Return in about 3 weeks (around 5/28/2020).    As always, Vishal I appreciate very much the opportunity to participate in the cardiovascular care of your patients. Please do not hesitate to call me with any questions with regards to Desiree Matos's evaluation and management.         With Best Regards,        Eduardo Slaughter MD, [facility]    Please note that portions of this note were completed with a voice recognition program.

## 2020-05-07 NOTE — H&P (VIEW-ONLY)
Duane Pollard MD  Desiree Matos  10/13/1933  2020    Patient Active Problem List   Diagnosis   • Atrial fibrillation, chronic   • Acute diverticulitis   • Chronic anticoagulation, Xarelto   • Essential hypertension   • Hypothyroidism   • History of colonic polyps   • Coronary artery disease involving native heart   • Diastolic CHF, chronic (CMS/HCC)   • GERD without esophagitis   • Peripheral neuropathy   • Asthma   • CKD (chronic kidney disease), stage III (CMS/HCC)   • Diverticulitis   • Abnormal nuclear stress test with medium size, mild degree of anterior wall myocardial ischemia.       Dear Duane Pollard MD:    Suman Matos is a 86 y.o. female with the problems as listed above, presents    Chief Complaint   Patient presents with   • Results     stress findings   • Palpitations     races,skips       History of Present Illness: Ms. Armenta is a pleasant 86-year-old  female with complains of intermittent chest pains and increasing shortness of breath with her EKG recently revealing possible old inferior wall and anteroseptal myocardial infarction.  She was being evaluated to give clearance prior to undergoing surgery for skin cancer.  She is here today to discuss the results of the nuclear stress test and echo Doppler study.  On further questioning she says she continues to have intermittent chest pains which are felt as sharp to dull pains across her chest associated with shortness of breath.  She also has been having progressively worsening dyspnea over the last few months to the point that she gets short of breath just walking across her house now.  Her recent Lexiscan sestamibi study revealed medium size, mild degree of anterior wall myocardial ischemia.  She has a strong family history of premature coronary artery disease with 1 of her brothers having had CABG in his 50s and 1 of her sisters having had CABG in her 30s and  in her 50s with heart  problems.      Allergies   Allergen Reactions   • Adhesive Tape Rash   :      Current Outpatient Medications:   •  acetaminophen (TYLENOL) 500 MG tablet, Take 500 mg by mouth Every 6 (Six) Hours As Needed for Mild Pain , Headache or Fever., Disp: , Rfl:   •  digoxin (LANOXIN) 125 MCG tablet, Take 125 mcg by mouth Every Morning., Disp: , Rfl:   •  docusate sodium (COLACE) 250 MG capsule, Take 250 mg by mouth 2 (Two) Times a Day As Needed for Constipation., Disp: , Rfl:   •  furosemide (LASIX) 20 MG tablet, Take 20 mg by mouth Daily., Disp: , Rfl:   •  gabapentin (NEURONTIN) 300 MG capsule, Take 300 mg by mouth 2 (Two) Times a Day., Disp: , Rfl:   •  hydrochlorothiazide (HYDRODIURIL) 25 MG tablet, Take 25 mg by mouth Every Night., Disp: , Rfl:   •  levothyroxine (SYNTHROID, LEVOTHROID) 75 MCG tablet, Take 75 mcg by mouth Daily., Disp: , Rfl:   •  oxyCODONE-acetaminophen (PERCOCET)  MG per tablet, Take 1 tablet by mouth Every 12 (Twelve) Hours As Needed for Moderate Pain ., Disp: , Rfl:   •  pantoprazole (PROTONIX) 40 MG EC tablet, Take 40 mg by mouth Daily., Disp: , Rfl:   •  promethazine (PHENERGAN) 25 MG tablet, Take 1 tablet by mouth Every 6 (Six) Hours As Needed for Nausea or Vomiting., Disp: 20 tablet, Rfl: 0  •  quinapril (ACCUPRIL) 10 MG tablet, Take 10 mg by mouth Daily., Disp: , Rfl:   •  rivaroxaban (Xarelto) 15 MG tablet, Take 1 tablet by mouth Daily., Disp: 30 tablet, Rfl: 5  •  cephalexin (KEFLEX) 500 MG capsule, Take 1 capsule by mouth 2 (Two) Times a Day., Disp: 18 capsule, Rfl: 0      The following portions of the patient's history were reviewed and updated as appropriate: allergies, current medications, past family history, past medical history, past social history, past surgical history and problem list.    Social History     Tobacco Use   • Smoking status: Never Smoker   • Smokeless tobacco: Never Used   Substance Use Topics   • Alcohol use: No   • Drug use: No       Review of Systems  "  Cardiovascular: Positive for palpitations. Negative for chest pain.   Respiratory: Positive for shortness of breath.    Neurological: Positive for dizziness and loss of balance.       Objective   Vitals:    05/07/20 1419   BP: 114/74   Pulse: 89   Resp: 16   Weight: 73.6 kg (162 lb 3.2 oz)   Height: 162.6 cm (64\")     Body mass index is 27.84 kg/m².    Physical Exam   Constitutional: She is oriented to person, place, and time. She appears well-developed and well-nourished.   HENT:   Mouth/Throat: Oropharynx is clear and moist.   Eyes: Pupils are equal, round, and reactive to light. EOM are normal.   Neck: Neck supple. No JVD present. No tracheal deviation present. No thyromegaly present.   Cardiovascular: Normal rate, regular rhythm, S1 normal and S2 normal. Exam reveals no gallop and no friction rub.   No murmur heard.  Pulses:       Radial pulses are 2+ on the right side, and 2+ on the left side.        Dorsalis pedis pulses are 1+ on the right side, and 1+ on the left side.        Posterior tibial pulses are 1+ on the right side, and 1+ on the left side.   Pulmonary/Chest: Effort normal and breath sounds normal.   Abdominal: Soft. Bowel sounds are normal. She exhibits no mass. There is no tenderness.   Musculoskeletal: Normal range of motion. She exhibits no edema.   Lymphadenopathy:     She has no cervical adenopathy.   Neurological: She is alert and oriented to person, place, and time.   Skin: Skin is warm and dry. No rash noted.   Psychiatric: She has a normal mood and affect.       Lab Results   Component Value Date     03/06/2020    K 4.1 03/06/2020    CL 96 (L) 03/06/2020    CO2 26.1 03/06/2020    BUN 19 03/06/2020    CREATININE 1.04 (H) 03/06/2020    GLUCOSE 98 03/06/2020    CALCIUM 9.3 03/06/2020    AST 13 03/06/2020    ALT 7 03/06/2020    ALKPHOS 52 03/06/2020    LABIL2 1.2 (L) 06/24/2015     Lab Results   Component Value Date    CKTOTAL 42 01/13/2017     Lab Results   Component Value Date    WBC " 6.17 09/18/2019    HGB 9.9 (L) 09/18/2019    HCT 29.9 (L) 09/18/2019     09/18/2019     Lab Results   Component Value Date    INR 1.25 (H) 08/09/2019     Lab Results   Component Value Date    MG 1.7 08/09/2019     Lab Results   Component Value Date    TSH 4.430 (H) 07/30/2019      Lab Results   Component Value Date    BNP 68.0 03/16/2018       Assessment/Plan :   Diagnosis Plan   1. Angina, class III (CMS/HCC)     2. Abnormal nuclear stress test with medium size, mild degree of anterior wall myocardial ischemia.     3. Atrial fibrillation, chronic     4. Essential hypertension     5. Diastolic CHF, chronic.        Recommendations:  1. I have discussed with her about the results of the nuclear stress test and the possibility that she may have some coronary blockages.  I have discussed with her about the options of trial of medical therapy versus further cardiac evaluation with cardiac catheterization.  I discussed the procedure of cardiac catheterization, potential risks, benefits and alternate methods of management.  She expressed understanding of same and is wanted to proceed.  We will try to schedule this for next Tuesday.  I told her to stop taking Xarelto 2 days before the procedure.  Patient expressed understanding.  2. We will start her on low-dose aspirin 81 mg daily and Imdur 30 mg daily.  Not able to add a beta-blocker due to history of bradycardia.    3. I told her to hold the Lasix for a couple of days before the cardiac catheterization procedure.    Return in about 3 weeks (around 5/28/2020).    As always, Vishal I appreciate very much the opportunity to participate in the cardiovascular care of your patients. Please do not hesitate to call me with any questions with regards to Desiere Matos's evaluation and management.         With Best Regards,        Eduardo Slaughter MD, [facility]    Please note that portions of this note were completed with a voice recognition program.

## 2020-05-14 ENCOUNTER — PREP FOR SURGERY (OUTPATIENT)
Dept: OTHER | Facility: HOSPITAL | Age: 85
End: 2020-05-14

## 2020-05-14 DIAGNOSIS — R94.39 ABNORMAL NUCLEAR STRESS TEST: Primary | ICD-10-CM

## 2020-05-15 ENCOUNTER — HOSPITAL ENCOUNTER (OUTPATIENT)
Dept: CARDIOLOGY | Facility: HOSPITAL | Age: 85
Setting detail: HOSPITAL OUTPATIENT SURGERY
Discharge: HOME | End: 2020-05-15
Admitting: INTERNAL MEDICINE

## 2020-05-15 ENCOUNTER — LAB (OUTPATIENT)
Dept: LAB | Facility: HOSPITAL | Age: 85
End: 2020-05-15

## 2020-05-15 DIAGNOSIS — R94.39 ABNORMAL NUCLEAR STRESS TEST: ICD-10-CM

## 2020-05-15 LAB
ALBUMIN SERPL-MCNC: 4.1 G/DL (ref 3.5–5.2)
ALBUMIN/GLOB SERPL: 1.5 G/DL
ALP SERPL-CCNC: 56 U/L (ref 39–117)
ALT SERPL W P-5'-P-CCNC: 8 U/L (ref 1–33)
ANION GAP SERPL CALCULATED.3IONS-SCNC: 13.3 MMOL/L (ref 5–15)
AST SERPL-CCNC: 16 U/L (ref 1–32)
BASOPHILS # BLD AUTO: 0.01 10*3/MM3 (ref 0–0.2)
BASOPHILS NFR BLD AUTO: 0.2 % (ref 0–1.5)
BILIRUB SERPL-MCNC: 0.7 MG/DL (ref 0.2–1.2)
BUN BLD-MCNC: 18 MG/DL (ref 8–23)
BUN/CREAT SERPL: 16.1 (ref 7–25)
CALCIUM SPEC-SCNC: 9.3 MG/DL (ref 8.6–10.5)
CHLORIDE SERPL-SCNC: 102 MMOL/L (ref 98–107)
CO2 SERPL-SCNC: 23.7 MMOL/L (ref 22–29)
CREAT BLD-MCNC: 1.12 MG/DL (ref 0.57–1)
DEPRECATED RDW RBC AUTO: 49.8 FL (ref 37–54)
EOSINOPHIL # BLD AUTO: 0.13 10*3/MM3 (ref 0–0.4)
EOSINOPHIL NFR BLD AUTO: 2 % (ref 0.3–6.2)
ERYTHROCYTE [DISTWIDTH] IN BLOOD BY AUTOMATED COUNT: 14.8 % (ref 12.3–15.4)
GFR SERPL CREATININE-BSD FRML MDRD: 46 ML/MIN/1.73
GLOBULIN UR ELPH-MCNC: 2.8 GM/DL
GLUCOSE BLD-MCNC: 112 MG/DL (ref 65–99)
HCT VFR BLD AUTO: 34.9 % (ref 34–46.6)
HGB BLD-MCNC: 11.4 G/DL (ref 12–15.9)
IMM GRANULOCYTES # BLD AUTO: 0.04 10*3/MM3 (ref 0–0.05)
IMM GRANULOCYTES NFR BLD AUTO: 0.6 % (ref 0–0.5)
LYMPHOCYTES # BLD AUTO: 1.1 10*3/MM3 (ref 0.7–3.1)
LYMPHOCYTES NFR BLD AUTO: 16.6 % (ref 19.6–45.3)
MCH RBC QN AUTO: 29.9 PG (ref 26.6–33)
MCHC RBC AUTO-ENTMCNC: 32.7 G/DL (ref 31.5–35.7)
MCV RBC AUTO: 91.6 FL (ref 79–97)
MONOCYTES # BLD AUTO: 0.66 10*3/MM3 (ref 0.1–0.9)
MONOCYTES NFR BLD AUTO: 10 % (ref 5–12)
NEUTROPHILS # BLD AUTO: 4.68 10*3/MM3 (ref 1.7–7)
NEUTROPHILS NFR BLD AUTO: 70.6 % (ref 42.7–76)
NRBC BLD AUTO-RTO: 0 /100 WBC (ref 0–0.2)
PLATELET # BLD AUTO: 189 10*3/MM3 (ref 140–450)
PMV BLD AUTO: 11.7 FL (ref 6–12)
POTASSIUM BLD-SCNC: 4.2 MMOL/L (ref 3.5–5.2)
PROT SERPL-MCNC: 6.9 G/DL (ref 6–8.5)
RBC # BLD AUTO: 3.81 10*6/MM3 (ref 3.77–5.28)
SODIUM BLD-SCNC: 139 MMOL/L (ref 136–145)
WBC NRBC COR # BLD: 6.62 10*3/MM3 (ref 3.4–10.8)

## 2020-05-15 PROCEDURE — 85025 COMPLETE CBC W/AUTO DIFF WBC: CPT

## 2020-05-15 PROCEDURE — 93005 ELECTROCARDIOGRAM TRACING: CPT | Performed by: INTERNAL MEDICINE

## 2020-05-15 PROCEDURE — 36415 COLL VENOUS BLD VENIPUNCTURE: CPT

## 2020-05-15 PROCEDURE — 80053 COMPREHEN METABOLIC PANEL: CPT

## 2020-05-15 PROCEDURE — 93010 ELECTROCARDIOGRAM REPORT: CPT | Performed by: INTERNAL MEDICINE

## 2020-05-19 ENCOUNTER — HOSPITAL ENCOUNTER (OUTPATIENT)
Facility: HOSPITAL | Age: 85
Setting detail: HOSPITAL OUTPATIENT SURGERY
Discharge: HOME | End: 2020-05-19
Attending: INTERNAL MEDICINE | Admitting: INTERNAL MEDICINE

## 2020-05-19 VITALS
DIASTOLIC BLOOD PRESSURE: 74 MMHG | BODY MASS INDEX: 28.17 KG/M2 | TEMPERATURE: 96.7 F | HEIGHT: 64 IN | OXYGEN SATURATION: 100 % | HEART RATE: 64 BPM | WEIGHT: 165 LBS | SYSTOLIC BLOOD PRESSURE: 155 MMHG | RESPIRATION RATE: 17 BRPM

## 2020-05-19 DIAGNOSIS — R94.39 ABNORMAL NUCLEAR STRESS TEST: ICD-10-CM

## 2020-05-19 LAB — SARS-COV-2 RNA RESP QL NAA+PROBE: NOT DETECTED

## 2020-05-19 PROCEDURE — C1894 INTRO/SHEATH, NON-LASER: HCPCS | Performed by: INTERNAL MEDICINE

## 2020-05-19 PROCEDURE — 93458 L HRT ARTERY/VENTRICLE ANGIO: CPT | Performed by: INTERNAL MEDICINE

## 2020-05-19 PROCEDURE — 99153 MOD SED SAME PHYS/QHP EA: CPT | Performed by: INTERNAL MEDICINE

## 2020-05-19 PROCEDURE — 25010000002 HEPARIN (PORCINE) PER 1000 UNITS: Performed by: INTERNAL MEDICINE

## 2020-05-19 PROCEDURE — 25010000002 FENTANYL CITRATE (PF) 100 MCG/2ML SOLUTION: Performed by: INTERNAL MEDICINE

## 2020-05-19 PROCEDURE — 0 IOPAMIDOL PER 1 ML: Performed by: INTERNAL MEDICINE

## 2020-05-19 PROCEDURE — C1769 GUIDE WIRE: HCPCS | Performed by: INTERNAL MEDICINE

## 2020-05-19 PROCEDURE — 99152 MOD SED SAME PHYS/QHP 5/>YRS: CPT | Performed by: INTERNAL MEDICINE

## 2020-05-19 PROCEDURE — 25010000002 MIDAZOLAM PER 1 MG: Performed by: INTERNAL MEDICINE

## 2020-05-19 PROCEDURE — 87635 SARS-COV-2 COVID-19 AMP PRB: CPT | Performed by: INTERNAL MEDICINE

## 2020-05-19 PROCEDURE — C9803 HOPD COVID-19 SPEC COLLECT: HCPCS

## 2020-05-19 RX ORDER — MIDAZOLAM HYDROCHLORIDE 1 MG/ML
INJECTION INTRAMUSCULAR; INTRAVENOUS AS NEEDED
Status: DISCONTINUED | OUTPATIENT
Start: 2020-05-19 | End: 2020-05-19 | Stop reason: HOSPADM

## 2020-05-19 RX ORDER — LIDOCAINE HYDROCHLORIDE 20 MG/ML
INJECTION, SOLUTION INFILTRATION; PERINEURAL AS NEEDED
Status: DISCONTINUED | OUTPATIENT
Start: 2020-05-19 | End: 2020-05-19 | Stop reason: HOSPADM

## 2020-05-19 RX ORDER — SODIUM CHLORIDE 9 MG/ML
100 INJECTION, SOLUTION INTRAVENOUS CONTINUOUS
Status: DISCONTINUED | OUTPATIENT
Start: 2020-05-19 | End: 2020-05-19 | Stop reason: HOSPADM

## 2020-05-19 RX ORDER — HEPARIN SODIUM 1000 [USP'U]/ML
INJECTION, SOLUTION INTRAVENOUS; SUBCUTANEOUS AS NEEDED
Status: DISCONTINUED | OUTPATIENT
Start: 2020-05-19 | End: 2020-05-19 | Stop reason: HOSPADM

## 2020-05-19 RX ORDER — SODIUM CHLORIDE 9 MG/ML
INJECTION, SOLUTION INTRAVENOUS CONTINUOUS PRN
Status: COMPLETED | OUTPATIENT
Start: 2020-05-19 | End: 2020-05-19

## 2020-05-19 RX ORDER — FENTANYL CITRATE 50 UG/ML
INJECTION, SOLUTION INTRAMUSCULAR; INTRAVENOUS AS NEEDED
Status: DISCONTINUED | OUTPATIENT
Start: 2020-05-19 | End: 2020-05-19 | Stop reason: HOSPADM

## 2020-05-19 NOTE — DISCHARGE INSTR - ACTIVITY
No driving for 24hours. No washing dishes or submerging wrist in water for 3-5 days, or puncture wound is healed. No lifting over 5lbs with affected hand.

## 2020-06-04 ENCOUNTER — OFFICE VISIT (OUTPATIENT)
Dept: CARDIOLOGY | Facility: CLINIC | Age: 85
End: 2020-06-04

## 2020-06-04 VITALS
SYSTOLIC BLOOD PRESSURE: 139 MMHG | TEMPERATURE: 99.4 F | HEART RATE: 71 BPM | HEIGHT: 64 IN | BODY MASS INDEX: 28.99 KG/M2 | OXYGEN SATURATION: 98 % | DIASTOLIC BLOOD PRESSURE: 77 MMHG | WEIGHT: 169.8 LBS

## 2020-06-04 DIAGNOSIS — R06.09 DYSPNEA ON EXERTION: ICD-10-CM

## 2020-06-04 DIAGNOSIS — I25.10 ASCVD (ARTERIOSCLEROTIC CARDIOVASCULAR DISEASE): Primary | ICD-10-CM

## 2020-06-04 DIAGNOSIS — I50.32 DIASTOLIC CHF, CHRONIC (HCC): ICD-10-CM

## 2020-06-04 DIAGNOSIS — I48.20 ATRIAL FIBRILLATION, CHRONIC (HCC): ICD-10-CM

## 2020-06-04 DIAGNOSIS — I10 ESSENTIAL HYPERTENSION: ICD-10-CM

## 2020-06-04 PROCEDURE — 99213 OFFICE O/P EST LOW 20 MIN: CPT | Performed by: INTERNAL MEDICINE

## 2020-06-04 RX ORDER — METOPROLOL SUCCINATE 25 MG/1
25 TABLET, EXTENDED RELEASE ORAL DAILY
Qty: 90 TABLET | Refills: 3 | Status: SHIPPED | OUTPATIENT
Start: 2020-06-04 | End: 2021-09-19

## 2020-06-04 RX ORDER — BUMETANIDE 1 MG/1
1 TABLET ORAL DAILY
Qty: 30 TABLET | Refills: 2 | Status: ON HOLD | OUTPATIENT
Start: 2020-06-04 | End: 2021-09-30 | Stop reason: SDUPTHER

## 2020-06-04 NOTE — PROGRESS NOTES
Duane Pollard MD  Desiree Matos  10/13/1933  06/04/2020    Patient Active Problem List   Diagnosis   • Atrial fibrillation, chronic   • Acute diverticulitis   • Chronic anticoagulation, Xarelto   • Essential hypertension   • Hypothyroidism   • History of colonic polyps   • Coronary artery disease involving native heart   • Diastolic CHF, chronic (CMS/HCC)   • GERD without esophagitis   • Peripheral neuropathy   • Asthma   • CKD (chronic kidney disease), stage III (CMS/HCC)   • Diverticulitis   • Abnormal nuclear stress test with medium size, mild degree of anterior wall myocardial ischemia.       Dear Duane Pollard MD:    Suman Matos is a 86 y.o. female with the problems as listed above, presents    · Chief Complaint   Patient presents with   • Follow-up   • Med Management   • Shortness of Breath   • Edema     Feet       History of Present Illness: Pleasant 86-year-old  female with history of chest pains and shortness of breath for which she was evaluated with a nuclear stress test that revealed anterior wall ischemia.  Her coronary angiography revealed 60% stenosis in the proximal circumflex with only mild disease noted in the LAD and RCA.  She is being managed medically.  She is here for regular cardiology follow-up.  On today's visit she denies any complaints of chest pains.  She has chronic dyspnea with mild to moderate exertion with chronic bilateral leg edema which seems to be lipid worse than before.  She has dizziness on standing up quickly.  No syncope.  She has some intermittent palpitations.  She denies any bleeding problems with Xarelto.    Allergies   Allergen Reactions   • Adhesive Tape Rash   :      Current Outpatient Medications:   •  acetaminophen (TYLENOL) 500 MG tablet, Take 500 mg by mouth Every 6 (Six) Hours As Needed for Mild Pain , Headache or Fever., Disp: , Rfl:   •  aspirin 81 MG EC tablet, Take 1 tablet by mouth Daily., Disp: 30 tablet, Rfl: 2  •   cephalexin (KEFLEX) 500 MG capsule, Take 1 capsule by mouth 2 (Two) Times a Day., Disp: 18 capsule, Rfl: 0  •  docusate sodium (COLACE) 250 MG capsule, Take 250 mg by mouth 2 (Two) Times a Day As Needed for Constipation., Disp: , Rfl:   •  gabapentin (NEURONTIN) 300 MG capsule, Take 300 mg by mouth 2 (Two) Times a Day., Disp: , Rfl:   •  hydrochlorothiazide (HYDRODIURIL) 25 MG tablet, Take 25 mg by mouth Every Night., Disp: , Rfl:   •  isosorbide mononitrate (IMDUR) 30 MG 24 hr tablet, Take 1 tablet by mouth Every Morning., Disp: 30 tablet, Rfl: 3  •  levothyroxine (SYNTHROID, LEVOTHROID) 75 MCG tablet, Take 75 mcg by mouth Daily., Disp: , Rfl:   •  oxyCODONE-acetaminophen (PERCOCET)  MG per tablet, Take 1 tablet by mouth Every 12 (Twelve) Hours As Needed for Moderate Pain ., Disp: , Rfl:   •  pantoprazole (PROTONIX) 40 MG EC tablet, Take 40 mg by mouth Daily., Disp: , Rfl:   •  promethazine (PHENERGAN) 25 MG tablet, Take 1 tablet by mouth Every 6 (Six) Hours As Needed for Nausea or Vomiting., Disp: 20 tablet, Rfl: 0  •  quinapril (ACCUPRIL) 10 MG tablet, Take 10 mg by mouth Daily., Disp: , Rfl:   •  rivaroxaban (Xarelto) 15 MG tablet, Take 1 tablet by mouth Daily., Disp: 30 tablet, Rfl: 5  •  bumetanide (BUMEX) 1 MG tablet, Take 1 tablet by mouth Daily., Disp: 30 tablet, Rfl: 2  •  metoprolol succinate XL (TOPROL-XL) 25 MG 24 hr tablet, Take 1 tablet by mouth Daily., Disp: 90 tablet, Rfl: 3      The following portions of the patient's history were reviewed and updated as appropriate: allergies, current medications, past family history, past medical history, past social history, past surgical history and problem list.    Social History     Tobacco Use   • Smoking status: Never Smoker   • Smokeless tobacco: Never Used   Substance Use Topics   • Alcohol use: No   • Drug use: No       Review of Systems   Cardiovascular: Positive for chest pain, leg swelling and palpitations. Negative for syncope.   Respiratory:  "Positive for shortness of breath.    Neurological: Positive for dizziness.       Objective   Vitals:    06/04/20 1251   BP: 139/77   BP Location: Left arm   Patient Position: Sitting   Cuff Size: Adult   Pulse: 71   Temp: 99.4 °F (37.4 °C)   TempSrc: Infrared   SpO2: 98%   Weight: 77 kg (169 lb 12.8 oz)   Height: 162.6 cm (64\")     Body mass index is 29.15 kg/m².        Physical Exam   Constitutional: She is oriented to person, place, and time. She appears well-developed and well-nourished.   HENT:   Mouth/Throat: Oropharynx is clear and moist.   Eyes: Pupils are equal, round, and reactive to light. EOM are normal.   Neck: Neck supple. No JVD present. No tracheal deviation present. No thyromegaly present.   Cardiovascular: Normal rate, regular rhythm, S1 normal and S2 normal. Exam reveals no gallop and no friction rub.   No murmur heard.  Pulmonary/Chest: Effort normal and breath sounds normal.   Abdominal: Soft. Bowel sounds are normal. She exhibits no mass. There is no tenderness.   Musculoskeletal: Normal range of motion. She exhibits no edema.   Lymphadenopathy:     She has no cervical adenopathy.   Neurological: She is alert and oriented to person, place, and time.   Skin: Skin is warm and dry. No rash noted.   Psychiatric: She has a normal mood and affect.         Coronary anatomy findings:     LM: Is a large calibre vessel , normal take off from left cusp, divides into LAD and Lcx. No significant stenosis     LAD: Mild luminal irregularities , no focal stenosis, large D1, mild diffuse disease with no high grade stenosis      LCX: Moderate calibre vessel, proximal portion had tubular stenosis 60% stenosis, remaining Lcx continues as small OM branch with no significant stenosis      RCA: Large calibre, dominant artery, normal take off from right cusp.  Mild luminal irregularities with no significant stenosis. R PDA and PL had mild diffuse disease with no stenosis      Left Ventriculography:     LV systolic " function was normal with visual estimated EF of 60%. No wall motion abnormalities.   No significant mitral regurgitation noted.      LVEDP: 28 mmHg  No gradient across the aortic valve on pull back.            Final Impression:  Moderate non obstructive CAD      Desiree Matos   Echo Complete w/Doppler and Color Flow   Order# [id number]   Reading physician:   Eduardo Slaughter MD Ordering physician:   Eduardo Slaughter MD Study date: 20   Patient Information     Patient Name  Desiree Matos MRN  [id number] Sex  Female  (Age)  10/13/1933 (86 y.o.)   Interpretation Summary     · Normal left ventricular cavity size noted. All left ventricular wall segments contract normally.  · Left ventricular wall thickness is consistent with mild concentric hypertrophy.  · Estimated EF appears to be in the range of 61 - 65%.  · The aortic valve is structurally normal. No aortic valve regurgitation is present. No aortic valve stenosis is present.  · The mitral valve is normal in structure. Moderate mitral valve regurgitation is present. No significant mitral valve stenosis is present.  · The tricuspid valve is normal. Mild tricuspid valve regurgitation is present. Estimated right ventricular systolic pressure from tricuspid regurgitation is normal (<35 mmHg).  · There is no evidence of pericardial effusion.          Recommendations:  Since pt has only moderate stenosis with no significant ischemia on stress and no significant angina recommend with medical rx.   Her dyspnea could be contributed from diastolic CHF, discussed with Dr Slaughter.         Jack Couch MD, [facility]  Interventional Cardiology      Lab Results   Component Value Date     05/15/2020    K 4.2 05/15/2020     05/15/2020    CO2 23.7 05/15/2020    BUN 18 05/15/2020    CREATININE 1.12 (H) 05/15/2020    GLUCOSE 112 (H) 05/15/2020    CALCIUM 9.3 05/15/2020    AST 16 05/15/2020    ALT 8 05/15/2020    ALKPHOS 56 05/15/2020    LABIL2 1.2 (L) 2015      Lab Results   Component Value Date    CKTOTAL 42 01/13/2017     Lab Results   Component Value Date    WBC 6.62 05/15/2020    HGB 11.4 (L) 05/15/2020    HCT 34.9 05/15/2020     05/15/2020     Lab Results   Component Value Date    INR 1.25 (H) 08/09/2019     Lab Results   Component Value Date    MG 1.7 08/09/2019     Lab Results   Component Value Date    TSH 4.430 (H) 07/30/2019       Assessment/Plan  :   Diagnosis Plan   1. ASCVD (arteriosclerotic cardiovascular disease) with about 60% stenosis in the proximal left circumflex coronary artery in May 2020.  Clinically stable.     2. Dyspnea on exertion     3. Essential hypertension     4. Diastolic CHF, chronic (CMS/[facility])     5. Atrial fibrillation, chronic          Recommendations:  1. We will discontinue the furosemide and try bumetanide 1 mg daily for her chronic diastolic heart failure.  2. Continue with low-dose aspirin and Xarelto.  3. Discontinue digoxin and start Toprol-XL 25 mg daily.  4. Check BMP in 1 week.    Return in about 3 months (around 9/4/2020).    As always, Vishal  I appreciate very much the opportunity to participate in the cardiovascular care of your patients. Please do not hesitate to call me with any questions with regards to Desiree Matos's evaluation and management        With Best Regards,        Eduardo Slaughter MD, [facility]    Please note that portions of this note were completed with a voice recognition program.

## 2020-06-05 ENCOUNTER — PATIENT OUTREACH (OUTPATIENT)
Dept: CASE MANAGEMENT | Facility: OTHER | Age: 85
End: 2020-06-05

## 2020-06-05 NOTE — OUTREACH NOTE
Patient Outreach Note    RN-ACM outreached to patient due to being high risk of complications due to Covid-19. ACM spoke with patient's daughter/cg, Brie who reports patient has been doing well; had a cardiology apt yesterday and some meds were changed. Pt now on Bumex and Metoprolol rather than lasix and digoxin. ACM discussed med changed with cg who voices understanding to all. Cg states she was not allowed to accompany patient in to the exam room at her cardiology apt; cg requests that ACM ask Dr. Slaughter's office if she can be allowed to come in with patient at next visit; states that if not, the patient states she will not return. ACM advised that she can ask but that they are probably following guidelines of [place]. Cg states another office had allowed her to accompany the patient and she would like to see if cardiology would as well. Management of HF discussed, cg voiced understanding to all, feels patient is doing well; declines HRCM at this time.     Naya Simons, TAMRA  Ambulatory     6/5/2020, 11:50

## 2020-06-05 NOTE — OUTREACH NOTE
Care Coordination Note    RN-ACDARRELL spoke with Lilian at Dr. Slaughter's office to ask if patient's daughter could be allowed to accompany her into the exam room at next visit (Sept). Per Lilian, they are following [facility] guidelines and anytime a patient needs a cg present due to being a minor or [place], they do allow it, but they cannot allow this for patients who don't genuinely need a cg present. Lilian advised that the rules may be different by the time patient returns in September.     Naya Simons RN  Ambulatory     6/5/2020, 11:58

## 2020-06-12 ENCOUNTER — LAB (OUTPATIENT)
Dept: LAB | Facility: HOSPITAL | Age: 85
End: 2020-06-12

## 2020-06-12 DIAGNOSIS — I50.32 DIASTOLIC CHF, CHRONIC (HCC): ICD-10-CM

## 2020-06-12 DIAGNOSIS — R94.39 ABNORMAL NUCLEAR STRESS TEST: ICD-10-CM

## 2020-06-12 LAB
ALBUMIN SERPL-MCNC: 3.7 G/DL (ref 3.5–5.2)
ALBUMIN/GLOB SERPL: 1.2 G/DL
ALP SERPL-CCNC: 51 U/L (ref 39–117)
ALT SERPL W P-5'-P-CCNC: 10 U/L (ref 1–33)
ANION GAP SERPL CALCULATED.3IONS-SCNC: 7.9 MMOL/L (ref 5–15)
AST SERPL-CCNC: 13 U/L (ref 1–32)
BASOPHILS # BLD AUTO: 0.01 10*3/MM3 (ref 0–0.2)
BASOPHILS NFR BLD AUTO: 0.2 % (ref 0–1.5)
BILIRUB SERPL-MCNC: 0.4 MG/DL (ref 0.2–1.2)
BUN BLD-MCNC: 22 MG/DL (ref 8–23)
BUN/CREAT SERPL: 16.4 (ref 7–25)
CALCIUM SPEC-SCNC: 8.9 MG/DL (ref 8.6–10.5)
CHLORIDE SERPL-SCNC: 104 MMOL/L (ref 98–107)
CO2 SERPL-SCNC: 28.1 MMOL/L (ref 22–29)
CREAT BLD-MCNC: 1.34 MG/DL (ref 0.57–1)
DEPRECATED RDW RBC AUTO: 48 FL (ref 37–54)
EOSINOPHIL # BLD AUTO: 0.16 10*3/MM3 (ref 0–0.4)
EOSINOPHIL NFR BLD AUTO: 2.6 % (ref 0.3–6.2)
ERYTHROCYTE [DISTWIDTH] IN BLOOD BY AUTOMATED COUNT: 14.6 % (ref 12.3–15.4)
GFR SERPL CREATININE-BSD FRML MDRD: 38 ML/MIN/1.73
GLOBULIN UR ELPH-MCNC: 3.1 GM/DL
GLUCOSE BLD-MCNC: 115 MG/DL (ref 65–99)
HCT VFR BLD AUTO: 31.1 % (ref 34–46.6)
HGB BLD-MCNC: 10.1 G/DL (ref 12–15.9)
IMM GRANULOCYTES # BLD AUTO: 0.03 10*3/MM3 (ref 0–0.05)
IMM GRANULOCYTES NFR BLD AUTO: 0.5 % (ref 0–0.5)
LYMPHOCYTES # BLD AUTO: 1.17 10*3/MM3 (ref 0.7–3.1)
LYMPHOCYTES NFR BLD AUTO: 18.8 % (ref 19.6–45.3)
MCH RBC QN AUTO: 29.6 PG (ref 26.6–33)
MCHC RBC AUTO-ENTMCNC: 32.5 G/DL (ref 31.5–35.7)
MCV RBC AUTO: 91.2 FL (ref 79–97)
MONOCYTES # BLD AUTO: 0.65 10*3/MM3 (ref 0.1–0.9)
MONOCYTES NFR BLD AUTO: 10.5 % (ref 5–12)
NEUTROPHILS # BLD AUTO: 4.19 10*3/MM3 (ref 1.7–7)
NEUTROPHILS NFR BLD AUTO: 67.4 % (ref 42.7–76)
NRBC BLD AUTO-RTO: 0.2 /100 WBC (ref 0–0.2)
PLATELET # BLD AUTO: 159 10*3/MM3 (ref 140–450)
PMV BLD AUTO: 12.3 FL (ref 6–12)
POTASSIUM BLD-SCNC: 4.2 MMOL/L (ref 3.5–5.2)
PROT SERPL-MCNC: 6.8 G/DL (ref 6–8.5)
RBC # BLD AUTO: 3.41 10*6/MM3 (ref 3.77–5.28)
SODIUM BLD-SCNC: 140 MMOL/L (ref 136–145)
WBC NRBC COR # BLD: 6.21 10*3/MM3 (ref 3.4–10.8)

## 2020-06-12 PROCEDURE — 85025 COMPLETE CBC W/AUTO DIFF WBC: CPT

## 2020-06-12 PROCEDURE — 36415 COLL VENOUS BLD VENIPUNCTURE: CPT

## 2020-06-12 PROCEDURE — 80053 COMPREHEN METABOLIC PANEL: CPT

## 2020-06-15 DIAGNOSIS — I10 ESSENTIAL HYPERTENSION: Primary | ICD-10-CM

## 2020-06-24 ENCOUNTER — LAB (OUTPATIENT)
Dept: LAB | Facility: HOSPITAL | Age: 85
End: 2020-06-24

## 2020-06-24 DIAGNOSIS — I10 ESSENTIAL HYPERTENSION: ICD-10-CM

## 2020-06-24 LAB
ANION GAP SERPL CALCULATED.3IONS-SCNC: 10.2 MMOL/L (ref 5–15)
BUN BLD-MCNC: 19 MG/DL (ref 8–23)
BUN/CREAT SERPL: 15.6 (ref 7–25)
CALCIUM SPEC-SCNC: 9.2 MG/DL (ref 8.6–10.5)
CHLORIDE SERPL-SCNC: 102 MMOL/L (ref 98–107)
CO2 SERPL-SCNC: 25.8 MMOL/L (ref 22–29)
CREAT BLD-MCNC: 1.22 MG/DL (ref 0.57–1)
GFR SERPL CREATININE-BSD FRML MDRD: 42 ML/MIN/1.73
GLUCOSE BLD-MCNC: 109 MG/DL (ref 65–99)
POTASSIUM BLD-SCNC: 4.5 MMOL/L (ref 3.5–5.2)
SODIUM BLD-SCNC: 138 MMOL/L (ref 136–145)

## 2020-06-24 PROCEDURE — 36415 COLL VENOUS BLD VENIPUNCTURE: CPT

## 2020-06-24 PROCEDURE — 80048 BASIC METABOLIC PNL TOTAL CA: CPT

## 2020-06-25 DIAGNOSIS — I10 ESSENTIAL HYPERTENSION: Primary | ICD-10-CM

## 2020-07-13 ENCOUNTER — LAB (OUTPATIENT)
Dept: LAB | Facility: HOSPITAL | Age: 85
End: 2020-07-13

## 2020-07-13 DIAGNOSIS — I10 ESSENTIAL HYPERTENSION: ICD-10-CM

## 2020-07-13 LAB
ANION GAP SERPL CALCULATED.3IONS-SCNC: 9.8 MMOL/L (ref 5–15)
BUN SERPL-MCNC: 12 MG/DL (ref 8–23)
BUN/CREAT SERPL: 12.1 (ref 7–25)
CALCIUM SPEC-SCNC: 9.1 MG/DL (ref 8.6–10.5)
CHLORIDE SERPL-SCNC: 103 MMOL/L (ref 98–107)
CO2 SERPL-SCNC: 27.2 MMOL/L (ref 22–29)
CREAT SERPL-MCNC: 0.99 MG/DL (ref 0.57–1)
GFR SERPL CREATININE-BSD FRML MDRD: 53 ML/MIN/1.73
GLUCOSE SERPL-MCNC: 101 MG/DL (ref 65–99)
POTASSIUM SERPL-SCNC: 4.7 MMOL/L (ref 3.5–5.2)
SODIUM SERPL-SCNC: 140 MMOL/L (ref 136–145)

## 2020-07-13 PROCEDURE — 36415 COLL VENOUS BLD VENIPUNCTURE: CPT

## 2020-07-13 PROCEDURE — 80048 BASIC METABOLIC PNL TOTAL CA: CPT

## 2020-09-03 ENCOUNTER — OFFICE VISIT (OUTPATIENT)
Dept: CARDIOLOGY | Facility: CLINIC | Age: 85
End: 2020-09-03

## 2020-09-03 VITALS
DIASTOLIC BLOOD PRESSURE: 74 MMHG | WEIGHT: 167.2 LBS | HEART RATE: 68 BPM | SYSTOLIC BLOOD PRESSURE: 113 MMHG | HEIGHT: 64 IN | BODY MASS INDEX: 28.54 KG/M2

## 2020-09-03 DIAGNOSIS — I48.20 ATRIAL FIBRILLATION, CHRONIC (HCC): ICD-10-CM

## 2020-09-03 DIAGNOSIS — I25.10 ASCVD (ARTERIOSCLEROTIC CARDIOVASCULAR DISEASE): ICD-10-CM

## 2020-09-03 DIAGNOSIS — I50.32 DIASTOLIC CHF, CHRONIC (HCC): Primary | ICD-10-CM

## 2020-09-03 PROCEDURE — 99213 OFFICE O/P EST LOW 20 MIN: CPT | Performed by: INTERNAL MEDICINE

## 2020-09-03 RX ORDER — PANTOPRAZOLE SODIUM 40 MG/1
TABLET, DELAYED RELEASE ORAL
COMMUNITY
Start: 2020-02-03 | End: 2020-09-03 | Stop reason: SDUPTHER

## 2020-09-03 NOTE — PROGRESS NOTES
Duane oPllard MD  Desiree Matos  10/13/1933  09/03/2020    Patient Active Problem List   Diagnosis   • Atrial fibrillation, chronic   • Acute diverticulitis   • Chronic anticoagulation, Xarelto   • Essential hypertension   • Hypothyroidism   • History of colonic polyps   • ASCVD (arteriosclerotic cardiovascular disease)   • Diastolic CHF, chronic (CMS/HCC)   • GERD without esophagitis   • Peripheral neuropathy   • Asthma   • CKD (chronic kidney disease), stage III (CMS/HCC)   • Diverticulitis   • Abnormal nuclear stress test with medium size, mild degree of anterior wall myocardial ischemia.       Dear Duane Pollard MD:    Suman Matos is a 86 y.o. female with the problems as listed above, presents    Chief Complaint: Follow-up of chronic diastolic heart failure and coronary artery disease.     History of Present Illness: Ms. Matos is a pleasant 86-year-old  female with history of chronic diastolic heart failure, chronic atrial fibrillation and nonobstructive coronary artery disease, presents for regular cardiology follow-up.  On today's visit she says her breathing is slightly better than last visit with improvement in her leg edema as well.  She has lost 2 pounds since the last visit.  She is currently on Bumex 1 mg daily.  She denies any chest pains.  She denies any bleeding problems with Xarelto.    Allergies   Allergen Reactions   • Adhesive Tape Rash   :      Current Outpatient Medications:   •  acetaminophen (TYLENOL) 500 MG tablet, Take 500 mg by mouth Every 6 (Six) Hours As Needed for Mild Pain , Headache or Fever., Disp: , Rfl:   •  bumetanide (BUMEX) 1 MG tablet, Take 1 tablet by mouth Daily., Disp: 30 tablet, Rfl: 2  •  docusate sodium (COLACE) 250 MG capsule, Take 250 mg by mouth 2 (Two) Times a Day As Needed for Constipation., Disp: , Rfl:   •  gabapentin (NEURONTIN) 300 MG capsule, Take 300 mg by mouth 2 (Two) Times a Day., Disp: , Rfl:   •   "hydrochlorothiazide (HYDRODIURIL) 25 MG tablet, Take 25 mg by mouth Every Night., Disp: , Rfl:   •  levothyroxine (SYNTHROID, LEVOTHROID) 75 MCG tablet, Take 75 mcg by mouth Daily., Disp: , Rfl:   •  metoprolol succinate XL (TOPROL-XL) 25 MG 24 hr tablet, Take 1 tablet by mouth Daily., Disp: 90 tablet, Rfl: 3  •  oxyCODONE-acetaminophen (PERCOCET)  MG per tablet, Take 1 tablet by mouth Every 12 (Twelve) Hours As Needed for Moderate Pain ., Disp: , Rfl:   •  pantoprazole (PROTONIX) 40 MG EC tablet, Take 40 mg by mouth Daily., Disp: , Rfl:   •  rivaroxaban (Xarelto) 15 MG tablet, Take 1 tablet by mouth Daily., Disp: 30 tablet, Rfl: 5  •  isosorbide mononitrate (IMDUR) 30 MG 24 hr tablet, Take 1 tablet by mouth Every Morning., Disp: 30 tablet, Rfl: 3  •  quinapril (ACCUPRIL) 10 MG tablet, Take 10 mg by mouth Daily., Disp: , Rfl:       The following portions of the patient's history were reviewed and updated as appropriate: allergies, current medications, past family history, past medical history, past social history, past surgical history and problem list.    Social History     Tobacco Use   • Smoking status: Never Smoker   • Smokeless tobacco: Never Used   Substance Use Topics   • Alcohol use: No   • Drug use: No       Review of Systems   Constitution: Negative for chills and fever.   HENT: Negative for nosebleeds and sore throat.    Cardiovascular: Negative for chest pain.   Respiratory: Positive for shortness of breath. Negative for cough, hemoptysis and wheezing.    Gastrointestinal: Negative for abdominal pain, hematemesis, hematochezia, melena, nausea and vomiting.   Genitourinary: Negative for dysuria and hematuria.   Neurological: Negative for headaches.       Objective   Vitals:    09/03/20 1346   BP: 113/74   Pulse: 68   Weight: 75.8 kg (167 lb 3.2 oz)   Height: 162.6 cm (64\")     Body mass index is 28.7 kg/m².    Physical Exam   Constitutional: She is oriented to person, place, and time. She appears " well-developed and well-nourished.   HENT:   Head: Normocephalic.   Eyes: Conjunctivae and EOM are normal.   Neck: Normal range of motion. Neck supple. No JVD present. No tracheal deviation present. No thyromegaly present.   Cardiovascular: Normal rate. An irregularly irregular rhythm present. Exam reveals no gallop and no friction rub.   No murmur heard.  Pulmonary/Chest: Breath sounds normal. No respiratory distress. She has no wheezes. She has no rales.   Abdominal: Soft. Bowel sounds are normal. She exhibits no mass. There is no tenderness.   Musculoskeletal: She exhibits edema (1+ edema on the right leg with varicose veins.).   Neurological: She is alert and oriented to person, place, and time. No cranial nerve deficit.   Skin: Skin is warm and dry.   Psychiatric: She has a normal mood and affect.       Lab Results   Component Value Date     07/13/2020    K 4.7 07/13/2020     07/13/2020    CO2 27.2 07/13/2020    BUN 12 07/13/2020    CREATININE 0.99 07/13/2020    GLUCOSE 101 (H) 07/13/2020    CALCIUM 9.1 07/13/2020    AST 13 06/12/2020    ALT 10 06/12/2020    ALKPHOS 51 06/12/2020    LABIL2 1.2 (L) 06/24/2015     Lab Results   Component Value Date    CKTOTAL 42 01/13/2017     Lab Results   Component Value Date    WBC 6.21 06/12/2020    HGB 10.1 (L) 06/12/2020    HCT 31.1 (L) 06/12/2020     06/12/2020     Lab Results   Component Value Date    INR 1.25 (H) 08/09/2019     Lab Results   Component Value Date    MG 1.7 08/09/2019     Lab Results   Component Value Date    TSH 4.430 (H) 07/30/2019      Lab Results   Component Value Date    BNP 68.0 03/16/2018       Assessment/Plan :   Diagnosis Plan   1. Diastolic CHF, chronic appears fairly well compensated.     2. Atrial fibrillation, chronic, with controlled ventricular rate.     3. ASCVD (arteriosclerotic cardiovascular disease) (nonobstructive), clinically asymptomatic.       Recommendations:  Continue with Bumex 1 mg daily, Toprol-XL and Xarelto  at current doses.      Return in about 6 months (around 3/3/2021).    As always, Duane Pollard MD  I appreciate very much the opportunity to participate in the cardiovascular care of your patients. Please do not hesitate to call me with any questions with regards to Desiree Matos evaluation and management.       With Best Regards,        Eduardo Slaughter MD, [facility]    Please note that portions of this note were completed with a voice recognition program.

## 2020-11-08 NOTE — H&P
[facility] Medicine Services  History & Physical    Patient Identification:  Name:  Desiree Matos  Age:  85 y.o.  Sex:  female  :  10/13/1933  MRN:  [id number]   Visit Number:  [id number]  Primary Care Physician:  Duane Pollard MD    I have seen the patient in conjunction with Rosaura Au PA-C and I agree with the following statements:    Subjective     Chief complaint: Severe lower abdominal pain, vomiting, and diarrhea with history of diverticulitis    History of presenting illness:      Desiree Matos is a 85 y.o. female with past medical history significant for diverticulitis, atrial fibrillation, chronic anticoagulation with Xarelto, coronary artery disease, thyroidism, gastroesophageal reflux disease.  She was most recently admitted to this facility from 2019 through 2019 with sepsis present on admission secondary to acute sigmoid diverticulitis during which time she was treated with initially Zosyn that was escalated to Merrem and eventually transition to oral Augmentin at the time of discharge.  She was followed by general surgery during said admission.  She presented to the emergency department of [facility] on 2019 with reports of severe lower abdominal pain, vomiting, and diarrhea since 2019 with inability to tolerate oral intake.    She reports that she began having nausea, vomiting and diarrhea on 2019 with abdominal pain that was severe in nature in the bilateral lower quadrants following  within the 24 hours initial onset.  He reports the pain is severe but not as severe as her last bout of diverticulitis.  She reports that up until her initial bout in 2019 she did not have prior history of diverticulitis.  She tells me that she has had clear appearing watery stool that has also had blood present at times that she describes as bright red and inspects.  She denies chest pain, dyspnea, cough and  wheeze.  She denies fever and chills.  Reports she did take her home Xarelto last evening.    Upon arrival to the ED, vital signs were temperature 98.4 °F, pulse 90, respiration rate 20, and blood pressure 140/86 with room air oxygen saturation of 95%.  CT of her abdomen and pelvis revealed mild diverticulitis and a small amount of perihepatic and perisplenic fluid with follow-up CT recommended.    ---------------------------------------------------------------------------------------------------------------------   Review of Systems   Constitutional: Positive for fatigue. Negative for activity change, appetite change, diaphoresis and fever.   HENT: Negative for congestion and drooling.    Eyes: Negative for pain.   Respiratory: Negative for chest tightness, shortness of breath and wheezing.    Cardiovascular: Negative for chest pain and leg swelling.   Gastrointestinal: Positive for abdominal distention, abdominal pain, blood in stool, diarrhea, nausea and vomiting. Negative for rectal pain.   Endocrine: Negative for heat intolerance.   Genitourinary: Negative for difficulty urinating and dysuria.   Musculoskeletal: Negative for arthralgias and gait problem.   Skin: Negative for color change and pallor.   Allergic/Immunologic: Negative for environmental allergies and food allergies.   Neurological: Negative for dizziness and headaches.   Hematological: Negative for adenopathy. Does not bruise/bleed easily.   Psychiatric/Behavioral: Negative for agitation and confusion.      ---------------------------------------------------------------------------------------------------------------------   Past Medical History:   Diagnosis Date   • A-fib (CMS/HCC)    • Anemia 1/13/2017   • Arthritis    • Asthma    • Cancer (CMS/HCC)    • CHF (congestive heart failure) (CMS/HCC)    • Chronic anticoagulation, Xarelto 7/30/2019   • Coronary artery disease    • GERD without esophagitis 7/30/2019   • History of colonic polyps  7/30/2019   • History of transfusion    • Hypertension    • Hypothyroidism 7/30/2019   • Peripheral neuropathy 7/30/2019     Past Surgical History:   Procedure Laterality Date   • BREAST CYST EXCISION Left    • CERVICAL POLYPECTOMY     • COLONOSCOPY N/A 1/17/2017    Procedure: COLONOSCOPY;  Surgeon: Madi Zheng III, MD;  Location: [facility];  Service:    • ENDOSCOPY N/A 1/17/2017    Procedure: ESOPHAGOGASTRODUODENOSCOPY;  Surgeon: Madi Zheng III, MD;  Location: [facility];  Service:    • GALLBLADDER SURGERY       History reviewed. No pertinent family history.  Social History     Socioeconomic History   • Marital status:      Spouse name: Not on file   • Number of children: Not on file   • Years of education: Not on file   • Highest education level: Not on file   Tobacco Use   • Smoking status: Never Smoker   • Smokeless tobacco: Never Used   Substance and Sexual Activity   • Alcohol use: No   • Drug use: No   • Sexual activity: Defer     ---------------------------------------------------------------------------------------------------------------------   Allergies:  Adhesive tape  ---------------------------------------------------------------------------------------------------------------------   Home medications:    Medications below are reported home medications pulling from within the system; at this time, these medications have not been reconciled unless otherwise specified and are in the verification process for further verifcation as current home medications.  Medications Prior to Admission   Medication Sig Dispense Refill Last Dose   • digoxin (LANOXIN) 125 MCG tablet Take 125 mcg by mouth Every Morning.   7/29/2019 at Unknown time   • docusate sodium (COLACE) 250 MG capsule Take 250 mg by mouth 2 (Two) Times a Day As Needed for Constipation.   Past Week at Unknown time   • furosemide (LASIX) 20 MG tablet Take 20 mg by mouth Daily.   7/29/2019 at Unknown time   • gabapentin  (NEURONTIN) 300 MG capsule Take 300 mg by mouth 2 (Two) Times a Day.   7/29/2019 at Unknown time   • hydrochlorothiazide (HYDRODIURIL) 25 MG tablet Take 25 mg by mouth Daily.      • levothyroxine (SYNTHROID, LEVOTHROID) 75 MCG tablet Take 75 mcg by mouth Every Evening.   7/29/2019 at Unknown time   • pantoprazole (PROTONIX) 40 MG EC tablet Take 40 mg by mouth Daily.   7/29/2019 at Unknown time   • quinapril (ACCUPRIL) 10 MG tablet Take 10 mg by mouth Daily.   7/29/2019 at Unknown time   • rivaroxaban (XARELTO) 20 MG tablet Take 20 mg by mouth Every Evening.   7/29/2019 at Unknown time   • acetaminophen (TYLENOL) 500 MG tablet Take 500 mg by mouth Every 6 (Six) Hours As Needed for Mild Pain , Headache or Fever.   Past Week at Unknown time   • chlorzoxazone (PARAFON FORTE) 500 MG tablet Take 500 mg by mouth At Night As Needed for Muscle Spasms.   Past Week at Unknown time   • cholecalciferol (VITAMIN D3) 1000 units tablet Take 1,000 Units by mouth Daily.   7/29/2019 at Unknown time   • nitazoxanide (ALINIA) 500 MG tablet Take 1 tablet by mouth 2 (Two) Times a Day With Meals. Prior to [facility] Admission, Patient was on: Has 8 tabs left  0    • oxyCODONE-acetaminophen (PERCOCET)  MG per tablet Take 1 tablet by mouth Every 12 (Twelve) Hours As Needed for Moderate Pain .   7/29/2019 at Unknown time       Hospital Scheduled Meds:    [START ON 9/17/2019] ceftriaxone 1 g Intravenous Q24H   ceftriaxone 1 g Intravenous Q24H   heparin (porcine) 5,000 Units Subcutaneous Q12H   metroNIDAZOLE 500 mg Intravenous Once   metroNIDAZOLE 500 mg Intravenous Q8H   metroNIDAZOLE 500 mg Intravenous Q8H   sodium chloride 10 mL Intravenous Q12H          Current listed hospital scheduled medications may not yet reflect those currently placed in orders that are signed and held awaiting patient's arrival to floor.   ---------------------------------------------------------------------------------------------------------------------      Objective     Vital Signs:  Temp:  [98.4 °F (36.9 °C)] 98.4 °F (36.9 °C)  Heart Rate:  [64-90] 69  Resp:  [20] 20  BP: (133-143)/(58-90) 143/68      09/16/19  0901   Weight: 72.6 kg (160 lb)     Body mass index is 27.46 kg/m².  ---------------------------------------------------------------------------------------------------------------------       Physical Exam   Constitutional: She is oriented to person, place, and time and well-developed, well-nourished, and in no distress.   HENT:   Head: Atraumatic.   Eyes: EOM are normal. Pupils are equal, round, and reactive to light.   Neck: Normal range of motion. Neck supple.   Cardiovascular: Normal rate and regular rhythm.   Pulmonary/Chest: Effort normal and breath sounds normal. No respiratory distress. She has no wheezes. She has no rales.   Abdominal: Soft. There is tenderness in the left lower quadrant. There is no CVA tenderness.   Musculoskeletal:   Mild baseline edema of the lower extremities.  She reports this is not abnormal for her. Non-pitting.    Neurological: She is alert and oriented to person, place, and time.   Skin: Skin is warm and dry.   Psychiatric: Mood, memory, affect and judgment normal.         ---------------------------------------------------------------------------------------------------------------------  EKG:    EKG added x1      Telemetry:  Monitoring added x1  I have personally looked at both the EKG and the telemetry strips.  ---------------------------------------------------------------------------------------------------------------------   Results from last 7 days   Lab Units 09/16/19  1026 09/16/19  0930   CRP mg/dL  --  7.58*   LACTATE mmol/L 1.0  --    WBC 10*3/mm3  --  10.92*   HEMOGLOBIN g/dL  --  11.8*   HEMATOCRIT %  --  34.6   MCV fL  --  88.3   MCHC g/dL  --  34.1   PLATELETS 10*3/mm3  --  183         Results from last 7 days   Lab Units 09/16/19  0930   SODIUM mmol/L 138   POTASSIUM mmol/L 3.9   CHLORIDE mmol/L 102    CO2 mmol/L 24.5   BUN mg/dL 11   CREATININE mg/dL 0.92   EGFR IF NONAFRICN AM mL/min/1.73 58*   CALCIUM mg/dL 9.2   GLUCOSE mg/dL 121*   ALBUMIN g/dL 4.16   BILIRUBIN mg/dL 1.3*   ALK PHOS U/L 66   AST (SGOT) U/L 13   ALT (SGPT) U/L 9   Estimated Creatinine Clearance: 43.7 mL/min (by C-G formula based on SCr of 0.92 mg/dL).  No results found for: AMMONIA          Lab Results   Component Value Date    HGBA1C 5.30 07/30/2019     Lab Results   Component Value Date    TSH 4.430 (H) 07/30/2019    FREET4 1.20 07/30/2019     No results found for: PREGTESTUR, PREGSERUM, HCG, HCGQUANT  Pain Management Panel     There is no flowsheet data to display.                        ---------------------------------------------------------------------------------------------------------------------  Imaging Results (last 7 days)     Procedure Component Value Units Date/Time    CT Abdomen Pelvis With Contrast [[id number]] Collected:  09/16/19 1307     Updated:  09/16/19 1311    Narrative:       CT ABDOMEN PELVIS W CONTRAST-     CLINICAL INDICATION: abd pain        COMPARISON: 08/09/2019     TECHNIQUE: Axial images were acquired from the lung bases through the  pubic symphysis after IV and oral contrast.  Reformatted images were created in both the coronal and sagittal planes.     Radiation dose reduction techniques were utilized per ALARA protocol.  Automated exposure control was initiated through either or [employer] or  [employer] software packages by  protocol.           FINDINGS:   Moderate hiatal hernia is present     The liver is homogeneous. There is no evidence of focal hepatic mass     The spleen is homogeneous     There is no peripancreatic stranding or pancreatic head mass.     There is no adrenal enlargement.     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 The lower pole only does  appear to be dilated..      Trace free fluid around the liver and spleen.     Extensive sigmoid colon wall thickening and adjacent  stranding most  likely representing diverticulitis. Follow-up suggested             Impression:       :   1. Sigmoid diverticulitis  2. Small amount perihepatic and perisplenic fluid  3. 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 Dilatation of the lower  pole.                 This report was finalized on 9/16/2019 1:09 PM by Dr. Fly Gao MD.             Cultures:       Last echocardiogram:  Results for orders placed during the hospital encounter of 01/12/17   Adult Transthoracic Echo Complete    Narrative · Normal left ventricular cavity size and wall thickness noted. All left   ventricular wall segments contract normally.  · Estimated EF appears to be in the range of 61 - 65%.  · The aortic valve is abnormal in structure. The valve exhibits sclerosis.   There is calcification of the noncoronary cusp, left coronary cusp and   right coronary cusp present. No aortic valve regurgitation is present.   Mild aortic valve stenosis is present.  · The mitral valve is abnormal in structure. There are myxomatous changes   of the mitral valve apparatus present. Mild mitral valve regurgitation is   present. No significant mitral valve stenosis is present.  · The tricuspid valve is normal. No tricuspid valve stenosis is present.   No tricuspid valve regurgitation is present. Estimated right ventricular   systolic pressure from tricuspid regurgitation is mildly elevated (35-45   mmHg).  · The pulmonic valve is structurally normal. There is no significant   pulmonic valve stenosis present. There is no pulmonic valve regurgitation   present.  · There is no evidence of pericardial effusion.          CHADS-VASc Risk Assessment            5       Total Score        1 CHF    1 Hypertension    2 Age >/= 75    1 Sex: Female        Criteria that do not apply:    DM    PRIOR STROKE/TIA/THROMBO    Vascular Disease    Age 65-74            I have personally reviewed the radiology images and read the final radiology  report.  ---------------------------------------------------------------------------------------------------------------------  Assessment / Plan     Active Hospital Problems    Diagnosis POA   • Diverticulitis [K57.92] Yes       ASSESSMENT/PLAN:  · Acute sigmoid diverticulitis:   IV Rocephin and Flagyl have been ordered.   Lactate is within normal limits.  Blood cultures were performed in the ED and pending.  White blood cell count was slightly elevated but under 12, 000. C.diff negative.  Gastric PCR studies added to stool specimen. C-RP is elevated at 7.58.  HR is equal to but not greater than 90.      · Chronic anticoagulation with Xarelto:  Will hold home Xarelto in the event that Mrs. Matos requires further surgical intervention and she reports blood in stool.    · Chronic atrial fibrillation: Will obtain EKG x1 and added telemetry monitoring.  [id number] at least 5.  Will hold Xarelto initially in the event that she requires procedure.  We will review home antiarrhythmic regimen which has previously included digoxin.    · Diastolic CHF without acute exacerbation: Will monitor daily weight.   Echocardiogram reviewed with EF of 61-65% with mild MVR in 1/2017.     · Chronic kidney disease stage III: Creatinine WNL. Will monitor closely and avoid nephrotoxins when possible.     · Coronary artery disease: Stable without reports of chest pain. Will monitor on telemetry and review home medication regimen.       ----------  -DVT prophylaxis: SQ Heparin  -Activity: Ad susie  -Expected length of stay: INPATIENT status due to the need for care which can only be reasonably provided in an hospital setting such as aggressive/expedited ancillary services and/or consultation services, the necessity for IV medications, close physician monitoring and/or the possible need for procedures.  In such, I feel patient’s risk for adverse outcomes and need for care warrant INPATIENT evaluation and predict the patient’s care encounter  to likely last beyond 2 midnights.      Rosaura Au PA-C  09/16/19  3:07 PM  Pager # [phone]  ---------------------------------------------------------------------------------------------------------------------            Home

## 2021-02-11 DIAGNOSIS — Z23 IMMUNIZATION DUE: ICD-10-CM

## 2021-03-04 ENCOUNTER — OFFICE VISIT (OUTPATIENT)
Dept: CARDIOLOGY | Facility: CLINIC | Age: 86
End: 2021-03-04

## 2021-03-04 VITALS
RESPIRATION RATE: 16 BRPM | OXYGEN SATURATION: 98 % | WEIGHT: 181.2 LBS | HEART RATE: 76 BPM | HEIGHT: 63 IN | SYSTOLIC BLOOD PRESSURE: 131 MMHG | DIASTOLIC BLOOD PRESSURE: 62 MMHG | BODY MASS INDEX: 32.11 KG/M2 | TEMPERATURE: 96.5 F

## 2021-03-04 DIAGNOSIS — I48.20 ATRIAL FIBRILLATION, CHRONIC (HCC): ICD-10-CM

## 2021-03-04 DIAGNOSIS — I10 ESSENTIAL HYPERTENSION: ICD-10-CM

## 2021-03-04 DIAGNOSIS — I25.10 ASCVD (ARTERIOSCLEROTIC CARDIOVASCULAR DISEASE): ICD-10-CM

## 2021-03-04 DIAGNOSIS — R42 DIZZINESS: Primary | ICD-10-CM

## 2021-03-04 DIAGNOSIS — I50.32 DIASTOLIC CHF, CHRONIC (HCC): ICD-10-CM

## 2021-03-04 PROCEDURE — 99214 OFFICE O/P EST MOD 30 MIN: CPT | Performed by: INTERNAL MEDICINE

## 2021-03-04 PROCEDURE — 93000 ELECTROCARDIOGRAM COMPLETE: CPT | Performed by: INTERNAL MEDICINE

## 2021-03-04 NOTE — PROGRESS NOTES
Duane Pollard MD  Desiree Matos  10/13/1933  2021    Patient Active Problem List   Diagnosis   • Atrial fibrillation, chronic   • Acute diverticulitis   • Chronic anticoagulation, Xarelto   • Essential hypertension   • Hypothyroidism   • History of colonic polyps   • ASCVD (arteriosclerotic cardiovascular disease)   • Diastolic CHF, chronic (CMS/HCC)   • GERD without esophagitis   • Peripheral neuropathy   • Asthma   • CKD (chronic kidney disease), stage III (CMS/HCC)   • Diverticulitis   • Abnormal nuclear stress test with medium size, mild degree of anterior wall myocardial ischemia.       Dear Duane Pollard MD:    Subjective     Desiree Matos is a 87 y.o. female with the problems as listed above, presents    Chief Complaint   Patient presents with   • Atrial Fibrillation     6 mos follow   • Palpitations     skips, dizziness epis   • Shortness of Breath     increased   • Edema     LE       History of Present Illness: Ms. Matos is a pleasant 87-year-old  female with history of chronic atrial fibrillation, history of diastolic heart failure, is here for regular cardiology follow-up.  On today's visit she complains of mainly having some intermittent dizziness, .  She also has some shortness of breath with moderate exertion with no PND or orthopnea but mild intermittent bilateral leg edema.  She has gained about 14 pounds since 9/3/2020 but she attributes this to just eating better during the Covid pandemic.  She denies any chest pain or discomfort.  She denies any bleeding problems from Xarelto.    Desiree Matos  Echo Complete w/Doppler and Color Flow  Order# [id number]  Reading physician: Eduardo Slaughter MD Ordering physician: Eduardo Slaughter MD Study date: 20   Patient Information    Patient Name   Desiree Matos MRN   [id number] Sex   Female  (Age)   10/13/1933 (87 y.o.)   Interpretation Summary    · Normal left ventricular cavity size noted. All left ventricular  wall segments contract normally.  · Left ventricular wall thickness is consistent with mild concentric hypertrophy.  · Estimated EF appears to be in the range of 61 - 65%.  · The aortic valve is structurally normal. No aortic valve regurgitation is present. No aortic valve stenosis is present.  · The mitral valve is normal in structure. Moderate mitral valve regurgitation is present. No significant mitral valve stenosis is present.  · The tricuspid valve is normal. Mild tricuspid valve regurgitation is present. Estimated right ventricular systolic pressure from tricuspid regurgitation is normal (<35 mmHg).  · There is no evidence of pericardial effusion.       Desiree Matos  Cardiac Catheterization/Vascular Study  Order# [id number]  Reading physician: Jack Couch MD Ordering physician: Eduardo Slaughter MD Study date: 20    Procedures    Left Heart Cath      Patient Information    Patient Name   Desiree Matos MRN   [id number] Sex   Female  (Age)   10/13/1933 (87 y.o.)     Coronary anatomy findings:     LM: Is a large calibre vessel , normal take off from left cusp, divides into LAD and Lcx. No significant stenosis     LAD: Mild luminal irregularities , no focal stenosis, large D1, mild diffuse disease with no high grade stenosis      LCX: Moderate calibre vessel, proximal portion had tubular stenosis 60% stenosis, remaining Lcx continues as small OM branch with no significant stenosis      RCA: Large calibre, dominant artery, normal take off from right cusp.  Mild luminal irregularities with no significant stenosis. R PDA and PL had mild diffuse disease with no stenosis      Left Ventriculography:     LV systolic function was normal with visual estimated EF of 60%. No wall motion abnormalities.   No significant mitral regurgitation noted.      LVEDP: 28 mmHg  No gradient across the aortic valve on pull back.            Final Impression:  Moderate non obstructive  CAD      Recommendations:  Since pt has only moderate stenosis with no significant ischemia on stress and no significant angina recommend with medical rx.   Her dyspnea could be contributed from diastolic CHF, discussed with Dr Slaughter.       Jack Couch MD, [facility]  Interventional Cardiology    Allergies   Allergen Reactions   • Adhesive Tape Rash   :      Current Outpatient Medications:   •  acetaminophen (TYLENOL) 500 MG tablet, Take 500 mg by mouth Every 6 (Six) Hours As Needed for Mild Pain , Headache or Fever., Disp: , Rfl:   •  bumetanide (BUMEX) 1 MG tablet, Take 1 tablet by mouth Daily., Disp: 30 tablet, Rfl: 2  •  docusate sodium (COLACE) 250 MG capsule, Take 250 mg by mouth 2 (Two) Times a Day As Needed for Constipation., Disp: , Rfl:   •  gabapentin (NEURONTIN) 300 MG capsule, Take 300 mg by mouth 2 (Two) Times a Day., Disp: , Rfl:   •  hydrochlorothiazide (HYDRODIURIL) 25 MG tablet, Take 25 mg by mouth Every Night., Disp: , Rfl:   •  levothyroxine (SYNTHROID, LEVOTHROID) 75 MCG tablet, Take 75 mcg by mouth Daily., Disp: , Rfl:   •  metoprolol succinate XL (TOPROL-XL) 25 MG 24 hr tablet, Take 1 tablet by mouth Daily., Disp: 90 tablet, Rfl: 3  •  oxyCODONE-acetaminophen (PERCOCET)  MG per tablet, Take 1 tablet by mouth Every 12 (Twelve) Hours As Needed for Moderate Pain ., Disp: , Rfl:   •  pantoprazole (PROTONIX) 40 MG EC tablet, Take 40 mg by mouth Daily., Disp: , Rfl:   •  rivaroxaban (Xarelto) 15 MG tablet, Take 1 tablet by mouth Daily., Disp: 30 tablet, Rfl: 5      The following portions of the patient's history were reviewed and updated as appropriate: allergies, current medications, past family history, past medical history, past social history, past surgical history and problem list.    Social History     Tobacco Use   • Smoking status: Never Smoker   • Smokeless tobacco: Never Used   Substance Use Topics   • Alcohol use: No   • Drug use: No     Review of Systems   Cardiovascular: Positive  "for leg swelling and palpitations. Negative for chest pain.   Respiratory: Positive for shortness of breath.    Neurological: Positive for dizziness and loss of balance.     Objective   Vitals:    03/04/21 1149   BP: 131/62   Pulse: 76   Resp: 16   Temp: 96.5 °F (35.8 °C)   SpO2: 98%   Weight: 82.2 kg (181 lb 3.2 oz)   Height: 160 cm (63\")     Body mass index is 32.1 kg/m².        Constitutional:       Appearance: Well-developed.   Eyes:      Conjunctiva/sclera: Conjunctivae normal.   HENT:      Head: Normocephalic.   Neck:      Musculoskeletal: Normal range of motion and neck supple.      Thyroid: No thyromegaly.      Vascular: No JVD.      Trachea: No tracheal deviation.   Pulmonary:      Effort: No respiratory distress.      Breath sounds: Normal breath sounds. No wheezing. No rales.   Cardiovascular:      Normal rate. Irregularly irregular rhythm.      No gallop.   Edema:     Ankle: bilateral 1+ edema of the ankle.     Feet: bilateral 1+ edema of the feet.  Abdominal:      General: Bowel sounds are normal.      Palpations: Abdomen is soft. There is no abdominal mass.      Tenderness: There is no abdominal tenderness.   Skin:     General: Skin is warm and dry.   Neurological:      Mental Status: Alert and oriented to person, place, and time.      Cranial Nerves: No cranial nerve deficit.       Lab Results   Component Value Date     07/13/2020    K 4.7 07/13/2020     07/13/2020    CO2 27.2 07/13/2020    BUN 12 07/13/2020    CREATININE 0.99 07/13/2020    GLUCOSE 101 (H) 07/13/2020    CALCIUM 9.1 07/13/2020    AST 13 06/12/2020    ALT 10 06/12/2020    ALKPHOS 51 06/12/2020    LABIL2 1.2 (L) 06/24/2015     Lab Results   Component Value Date    CKTOTAL 42 01/13/2017     Lab Results   Component Value Date    WBC 6.21 06/12/2020    HGB 10.1 (L) 06/12/2020    HCT 31.1 (L) 06/12/2020     06/12/2020     Lab Results   Component Value Date    INR 1.25 (H) 08/09/2019     Lab Results   Component Value Date "    MG 1.7 08/09/2019     Lab Results   Component Value Date    TSH 4.430 (H) 07/30/2019      Lab Results   Component Value Date    BNP 68.0 03/16/2018       ECG 12 Lead    Date/Time: 3/4/2021 11:48 AM  Performed by: Eduardo Slaughter MD  Authorized by: Eduardo Slaughter MD   Comparison: compared with previous ECG from 5/15/2020  Similar to previous ECG  Rhythm: atrial fibrillation  Ectopy: unifocal PVCs  BPM: 75  Other findings: non-specific ST-T wave changes                Assessment/Plan :   Diagnosis Plan   1. Reccurent dizziness of unclear etiology    2. Atrial fibrillation, chronic, with controlled ventricular rate.     3. ASCVD (arteriosclerotic cardiovascular disease) (nonobstructive) with 60% tubular stenosis in the left circumflex coronary artery on 5/19/2020, clinically asymptomatic.     4. Diastolic CHF, chronic appears fairly well compensated.     5. Essential hypertension, controlled.          Recommendations:  1. We will evaluate for any significant tach or bradycardia arrhythmias causing her dizziness with a 14-day Holter monitor.  2. Her orthostatic BP check in office today was ok.    Return in about 2 months (around 5/4/2021).    As always, Duane Pollard MD  I appreciate very much the opportunity to participate in the cardiovascular care of your patients. Please do not hesitate to call me with any questions with regards to Desiree Matos evaluation and management.           With Best Regards,        Eduardo Slaughter MD, [facility]    Please note that portions of this note were completed with a voice recognition program.

## 2021-03-05 PROCEDURE — 93246 EXT ECG>7D<15D RECORDING: CPT | Performed by: INTERNAL MEDICINE

## 2021-03-25 ENCOUNTER — TELEPHONE (OUTPATIENT)
Dept: CARDIOLOGY | Facility: CLINIC | Age: 86
End: 2021-03-25

## 2021-03-29 ENCOUNTER — TREATMENT (OUTPATIENT)
Dept: CARDIOLOGY | Facility: CLINIC | Age: 86
End: 2021-03-29

## 2021-03-29 DIAGNOSIS — R42 DIZZINESS: ICD-10-CM

## 2021-03-29 PROCEDURE — 93248 EXT ECG>7D<15D REV&INTERPJ: CPT | Performed by: INTERNAL MEDICINE

## 2021-06-08 ENCOUNTER — OFFICE VISIT (OUTPATIENT)
Dept: CARDIOLOGY | Facility: CLINIC | Age: 86
End: 2021-06-08

## 2021-06-08 VITALS
RESPIRATION RATE: 16 BRPM | WEIGHT: 180.8 LBS | TEMPERATURE: 97.7 F | BODY MASS INDEX: 30.87 KG/M2 | HEART RATE: 68 BPM | HEIGHT: 64 IN | SYSTOLIC BLOOD PRESSURE: 133 MMHG | DIASTOLIC BLOOD PRESSURE: 67 MMHG

## 2021-06-08 DIAGNOSIS — I48.20 ATRIAL FIBRILLATION, CHRONIC (HCC): Primary | ICD-10-CM

## 2021-06-08 DIAGNOSIS — Z01.810 PREOPERATIVE CARDIOVASCULAR EXAMINATION: ICD-10-CM

## 2021-06-08 DIAGNOSIS — I10 ESSENTIAL HYPERTENSION: ICD-10-CM

## 2021-06-08 PROCEDURE — 93000 ELECTROCARDIOGRAM COMPLETE: CPT | Performed by: INTERNAL MEDICINE

## 2021-06-08 PROCEDURE — 99214 OFFICE O/P EST MOD 30 MIN: CPT | Performed by: INTERNAL MEDICINE

## 2021-06-08 NOTE — PROGRESS NOTES
Duane Pollard MD  Desiree Matos  10/13/1933  06/08/2021    Patient Active Problem List   Diagnosis   • Atrial fibrillation, chronic   • Acute diverticulitis   • Chronic anticoagulation, Xarelto   • Essential hypertension   • Hypothyroidism   • History of colonic polyps   • ASCVD (arteriosclerotic cardiovascular disease)   • Diastolic CHF, chronic (CMS/HCC)   • GERD without esophagitis   • Peripheral neuropathy   • Asthma   • CKD (chronic kidney disease), stage III (CMS/HCC)   • Diverticulitis   • Abnormal nuclear stress test with medium size, mild degree of anterior wall myocardial ischemia.       Dear Duane Pollard MD:    Suman Matos is a 87 y.o. female with the problems as listed above, presents    Chief Complaint   Patient presents with   • Results     14 day holter   • Surgical Clearance     skin lesion remove,        History of Present Illness: Ms. Matos is a pleasant 87-year-old  female with history of chronic atrial fibrillation with controlled ventricular response, nonobstructive coronary artery disease, presents for preoperative cardiac evaluation and cardiac clearance prior to undergoing surgery for skin lesions.  On further questioning she denies any significant palpitations, dizziness or syncope.  She says her dizziness is improved.  Her recent event monitor did not reveal any significant tacky or bradycardia arrhythmias.  Her coronary angiography about a year ago revealed nonobstructive coronary artery disease.  He has occasional chest pains.  She has chronic dyspnea with mild exertion.  This has not changed recently.      Cardiac catheterization on 5/19/2020 revealed:  Coronary anatomy findings:     LM: Is a large calibre vessel , normal take off from left cusp, divides into LAD and Lcx. No significant stenosis     LAD: Mild luminal irregularities , no focal stenosis, large D1, mild diffuse disease with no high grade stenosis      LCX: Moderate calibre  vessel, proximal portion had tubular stenosis 60% stenosis, remaining Lcx continues as small OM branch with no significant stenosis      RCA: Large calibre, dominant artery, normal take off from right cusp.  Mild luminal irregularities with no significant stenosis. R PDA and PL had mild diffuse disease with no stenosis      Left Ventriculography:     LV systolic function was normal with visual estimated EF of 60%. No wall motion abnormalities.   No significant mitral regurgitation noted.      LVEDP: 28 mmHg  No gradient across the aortic valve on pull back.          Final Impression:  Moderate non obstructive CAD      Recommendations:  Since pt has only moderate stenosis with no significant ischemia on stress and no significant angina recommend with medical rx.   Her dyspnea could be contributed from diastolic CHF, discussed with Dr Slaughter.            Jack Couch MD, [facility]  Interventional Cardiology    05/19/20    Allergies   Allergen Reactions   • Adhesive Tape Rash   :      Current Outpatient Medications:   •  acetaminophen (TYLENOL) 500 MG tablet, Take 500 mg by mouth Every 6 (Six) Hours As Needed for Mild Pain , Headache or Fever., Disp: , Rfl:   •  bumetanide (BUMEX) 1 MG tablet, Take 1 tablet by mouth Daily., Disp: 30 tablet, Rfl: 2  •  docusate sodium (COLACE) 250 MG capsule, Take 250 mg by mouth 2 (Two) Times a Day As Needed for Constipation., Disp: , Rfl:   •  gabapentin (NEURONTIN) 300 MG capsule, Take 300 mg by mouth 2 (Two) Times a Day., Disp: , Rfl:   •  hydrochlorothiazide (HYDRODIURIL) 25 MG tablet, Take 25 mg by mouth Every Night., Disp: , Rfl:   •  levothyroxine (SYNTHROID, LEVOTHROID) 75 MCG tablet, Take 75 mcg by mouth Daily., Disp: , Rfl:   •  metoprolol succinate XL (TOPROL-XL) 25 MG 24 hr tablet, Take 1 tablet by mouth Daily., Disp: 90 tablet, Rfl: 3  •  oxyCODONE-acetaminophen (PERCOCET)  MG per tablet, Take 1 tablet by mouth Every 12 (Twelve) Hours As Needed for Moderate Pain .,  "Disp: , Rfl:   •  pantoprazole (PROTONIX) 40 MG EC tablet, Take 40 mg by mouth Daily., Disp: , Rfl:   •  rivaroxaban (Xarelto) 15 MG tablet, Take 1 tablet by mouth Daily., Disp: 30 tablet, Rfl: 5      The following portions of the patient's history were reviewed and updated as appropriate: allergies, current medications, past family history, past medical history, past social history, past surgical history and problem list.    Social History     Tobacco Use   • Smoking status: Never Smoker   • Smokeless tobacco: Never Used   Substance Use Topics   • Alcohol use: No   • Drug use: No       Review of Systems   Constitutional: Negative for chills and fever.   HENT: Negative for nosebleeds and sore throat.    Cardiovascular: Positive for chest pain (Occasional mild chest pains) and leg swelling. Negative for palpitations.   Respiratory: Positive for shortness of breath (Chronic shortness of breath). Negative for cough, hemoptysis and wheezing.    Gastrointestinal: Negative for abdominal pain, hematemesis, hematochezia, melena, nausea and vomiting.   Genitourinary: Negative for dysuria and hematuria.   Neurological: Negative for headaches.     Objective   Vitals:    06/08/21 1239   BP: 133/67   Pulse: 68   Resp: 16   Temp: 97.7 °F (36.5 °C)   Weight: 82 kg (180 lb 12.8 oz)   Height: 162.6 cm (64\")     Body mass index is 31.03 kg/m².    Constitutional:       Appearance: Well-developed.   Eyes:      Conjunctiva/sclera: Conjunctivae normal.   HENT:      Head: Normocephalic.   Neck:      Thyroid: No thyromegaly.      Vascular: No JVD.      Trachea: No tracheal deviation.   Pulmonary:      Effort: No respiratory distress.      Breath sounds: Normal breath sounds. No wheezing. No rales.   Cardiovascular:      PMI at left midclavicular line. Normal rate. Regular rhythm. Normal S1. Normal S2.      Murmurs: There is no murmur.      No gallop. No click. No rub.   Pulses:     Intact distal pulses.   Edema:     Pretibial: bilateral 1+ " edema of the pretibial area.     Ankle: bilateral 1+ edema of the ankle.     Feet: bilateral 1+ edema of the feet.  Abdominal:      General: Bowel sounds are normal.      Palpations: Abdomen is soft. There is no abdominal mass.      Tenderness: There is no abdominal tenderness.   Musculoskeletal:      Cervical back: Normal range of motion and neck supple. Skin:     General: Skin is warm and dry.   Neurological:      Mental Status: Alert and oriented to person, place, and time.      Cranial Nerves: No cranial nerve deficit.       Lab Results   Component Value Date     07/13/2020    K 4.7 07/13/2020     07/13/2020    CO2 27.2 07/13/2020    BUN 12 07/13/2020    CREATININE 0.99 07/13/2020    GLUCOSE 101 (H) 07/13/2020    CALCIUM 9.1 07/13/2020    AST 13 06/12/2020    ALT 10 06/12/2020    ALKPHOS 51 06/12/2020    LABIL2 1.2 (L) 06/24/2015     Lab Results   Component Value Date    CKTOTAL 42 01/13/2017     Lab Results   Component Value Date    WBC 6.21 06/12/2020    HGB 10.1 (L) 06/12/2020    HCT 31.1 (L) 06/12/2020     06/12/2020     Lab Results   Component Value Date    INR 1.25 (H) 08/09/2019     Lab Results   Component Value Date    MG 1.7 08/09/2019     Lab Results   Component Value Date    TSH 4.430 (H) 07/30/2019      Lab Results   Component Value Date    BNP 68.0 03/16/2018       ECG 12 Lead    Date/Time: 6/8/2021 12:45 PM  Performed by: Eduardo Slaughter MD  Authorized by: Eduardo Slaughter MD   Comparison: compared with previous ECG from 3/4/2021  Similar to previous ECG  Rhythm: atrial fibrillation  BPM: 67  Other findings: non-specific ST-T wave changes            Assessment/Plan :   Diagnosis Plan   1. Atrial fibrillation, chronic, with controlled ventricular rate.     2. Preoperative cardiovascular examination     3. Essential hypertension          Recommendations:  1. I have reviewed the results of the Holter monitor with the patient.    2. With regards to cardiac risk for the the procedure  of removal of the skin lesion, she seems to be an acceptable cardiac risk at this time.  3. With regards to anticoagulation, ideally would like to continue the anticoagulation without interruption but if this has to be interrupted then rivaroxaban may be held for no more than 2 days prior to the procedure and restart this as soon as possible after the procedure to decrease the risk of stroke from atrial fibrillation.  I have discussed this with the patient and her daughter who accompanied her today to the clinic.    Return in about 4 months (around 10/8/2021) for or sooner if needed.    As always, Duane Pollard MD  I appreciate very much the opportunity to participate in the cardiovascular care of your patients. Please do not hesitate to call me with any questions with regards to Desiree Matos's evaluation and management.       With Best Regards,        Eduardo Slaughter MD, [facility]    Please note that portions of this note were completed with a voice recognition program.

## 2021-06-09 ENCOUNTER — TRANSCRIBE ORDERS (OUTPATIENT)
Dept: ADMINISTRATIVE | Facility: HOSPITAL | Age: 86
End: 2021-06-09

## 2021-06-09 ENCOUNTER — HOSPITAL ENCOUNTER (OUTPATIENT)
Dept: RESPIRATORY THERAPY | Facility: HOSPITAL | Age: 86
Discharge: HOME | End: 2021-06-09

## 2021-06-09 ENCOUNTER — LAB (OUTPATIENT)
Dept: LAB | Facility: HOSPITAL | Age: 86
End: 2021-06-09

## 2021-06-09 DIAGNOSIS — Z01.818 PRE-OPERATIVE CLEARANCE: ICD-10-CM

## 2021-06-09 DIAGNOSIS — Z01.818 PRE-OPERATIVE CLEARANCE: Primary | ICD-10-CM

## 2021-06-09 LAB
ANION GAP SERPL CALCULATED.3IONS-SCNC: 8.9 MMOL/L (ref 5–15)
BUN SERPL-MCNC: 13 MG/DL (ref 8–23)
BUN/CREAT SERPL: 11.3 (ref 7–25)
CALCIUM SPEC-SCNC: 8.9 MG/DL (ref 8.6–10.5)
CHLORIDE SERPL-SCNC: 100 MMOL/L (ref 98–107)
CO2 SERPL-SCNC: 29.1 MMOL/L (ref 22–29)
CREAT SERPL-MCNC: 1.15 MG/DL (ref 0.57–1)
GFR SERPL CREATININE-BSD FRML MDRD: 45 ML/MIN/1.73
GLUCOSE SERPL-MCNC: 93 MG/DL (ref 65–99)
POTASSIUM SERPL-SCNC: 3.9 MMOL/L (ref 3.5–5.2)
SODIUM SERPL-SCNC: 138 MMOL/L (ref 136–145)

## 2021-06-09 PROCEDURE — 80048 BASIC METABOLIC PNL TOTAL CA: CPT

## 2021-06-09 PROCEDURE — 36415 COLL VENOUS BLD VENIPUNCTURE: CPT

## 2021-09-09 ENCOUNTER — HOSPITAL ENCOUNTER (EMERGENCY)
Dept: HOSPITAL 79 - ER1 | Age: 86
Discharge: HOME | End: 2021-09-09
Payer: MEDICARE

## 2021-09-09 DIAGNOSIS — S61.412A: ICD-10-CM
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
## 2021-09-09 DIAGNOSIS — S61.411A: ICD-10-CM
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
## 2021-09-09 DIAGNOSIS — S52.122A: Primary | ICD-10-CM

## 2021-09-09 DIAGNOSIS — S01.112A: ICD-10-CM

## 2021-09-09 DIAGNOSIS — S50.02XA: ICD-10-CM

## 2021-09-19 ENCOUNTER — APPOINTMENT (OUTPATIENT)
Dept: CT IMAGING | Facility: HOSPITAL | Age: 86
End: 2021-09-19

## 2021-09-19 ENCOUNTER — HOSPITAL ENCOUNTER (EMERGENCY)
Facility: HOSPITAL | Age: 86
Discharge: HOME | End: 2021-09-20
Attending: EMERGENCY MEDICINE | Admitting: EMERGENCY MEDICINE

## 2021-09-19 ENCOUNTER — APPOINTMENT (OUTPATIENT)
Dept: GENERAL RADIOLOGY | Facility: HOSPITAL | Age: 86
End: 2021-09-19

## 2021-09-19 DIAGNOSIS — U07.1 COVID-19 VIRUS INFECTION: Primary | ICD-10-CM

## 2021-09-19 LAB
A-A DO2: 12.7 MMHG (ref 0–300)
ALBUMIN SERPL-MCNC: 3.84 G/DL (ref 3.5–5.2)
ALBUMIN/GLOB SERPL: 1.4 G/DL
ALP SERPL-CCNC: 76 U/L (ref 39–117)
ALT SERPL W P-5'-P-CCNC: 6 U/L (ref 1–33)
ANION GAP SERPL CALCULATED.3IONS-SCNC: 11.4 MMOL/L (ref 5–15)
APTT PPP: 34.3 SECONDS (ref 25.5–35.4)
ARTERIAL PATENCY WRIST A: ABNORMAL
AST SERPL-CCNC: 15 U/L (ref 1–32)
ATMOSPHERIC PRESS: 729 MMHG
BASE EXCESS BLDA CALC-SCNC: 2.1 MMOL/L (ref 0–2)
BASOPHILS # BLD AUTO: 0 10*3/MM3 (ref 0–0.2)
BASOPHILS # BLD AUTO: 0.01 10*3/MM3 (ref 0–0.2)
BASOPHILS NFR BLD AUTO: 0 % (ref 0–1.5)
BASOPHILS NFR BLD AUTO: 0.2 % (ref 0–1.5)
BDY SITE: ABNORMAL
BILIRUB SERPL-MCNC: 1.1 MG/DL (ref 0–1.2)
BILIRUB UR QL STRIP: NEGATIVE
BODY TEMPERATURE: 0 C
BUN SERPL-MCNC: 22 MG/DL (ref 8–23)
BUN/CREAT SERPL: 19.6 (ref 7–25)
CALCIUM SPEC-SCNC: 9.1 MG/DL (ref 8.6–10.5)
CHLORIDE SERPL-SCNC: 95 MMOL/L (ref 98–107)
CLARITY UR: CLEAR
CO2 BLDA-SCNC: 26.3 MMOL/L (ref 22–33)
CO2 SERPL-SCNC: 29.6 MMOL/L (ref 22–29)
COHGB MFR BLD: 1.8 % (ref 0–5)
COLOR UR: YELLOW
CREAT SERPL-MCNC: 1.12 MG/DL (ref 0.57–1)
D DIMER PPP FEU-MCNC: 1.22 MCGFEU/ML (ref 0–0.5)
D-LACTATE SERPL-SCNC: 1.2 MMOL/L (ref 0.5–2)
DEPRECATED RDW RBC AUTO: 49.6 FL (ref 37–54)
DEPRECATED RDW RBC AUTO: 51.2 FL (ref 37–54)
EOSINOPHIL # BLD AUTO: 0.08 10*3/MM3 (ref 0–0.4)
EOSINOPHIL # BLD AUTO: 0.08 10*3/MM3 (ref 0–0.4)
EOSINOPHIL NFR BLD AUTO: 1.6 % (ref 0.3–6.2)
EOSINOPHIL NFR BLD AUTO: 1.6 % (ref 0.3–6.2)
ERYTHROCYTE [DISTWIDTH] IN BLOOD BY AUTOMATED COUNT: 15.6 % (ref 12.3–15.4)
ERYTHROCYTE [DISTWIDTH] IN BLOOD BY AUTOMATED COUNT: 15.7 % (ref 12.3–15.4)
FLUAV RNA RESP QL NAA+PROBE: NOT DETECTED
FLUBV RNA RESP QL NAA+PROBE: NOT DETECTED
GFR SERPL CREATININE-BSD FRML MDRD: 46 ML/MIN/1.73
GLOBULIN UR ELPH-MCNC: 2.7 GM/DL
GLUCOSE SERPL-MCNC: 102 MG/DL (ref 65–99)
GLUCOSE UR STRIP-MCNC: NEGATIVE MG/DL
HCO3 BLDA-SCNC: 25.3 MMOL/L (ref 20–26)
HCT VFR BLD AUTO: 29.6 % (ref 34–46.6)
HCT VFR BLD AUTO: 31.7 % (ref 34–46.6)
HCT VFR BLD CALC: 31.5 % (ref 38–51)
HGB BLD-MCNC: 10 G/DL (ref 12–15.9)
HGB BLD-MCNC: 10.3 G/DL (ref 12–15.9)
HGB BLDA-MCNC: 10.3 G/DL (ref 13.5–17.5)
HGB UR QL STRIP.AUTO: NEGATIVE
IMM GRANULOCYTES # BLD AUTO: 0.04 10*3/MM3 (ref 0–0.05)
IMM GRANULOCYTES # BLD AUTO: 0.04 10*3/MM3 (ref 0–0.05)
IMM GRANULOCYTES NFR BLD AUTO: 0.8 % (ref 0–0.5)
IMM GRANULOCYTES NFR BLD AUTO: 0.8 % (ref 0–0.5)
INHALED O2 CONCENTRATION: 21 %
INR PPP: 1.14 (ref 0.9–1.1)
KETONES UR QL STRIP: ABNORMAL
LEUKOCYTE ESTERASE UR QL STRIP.AUTO: NEGATIVE
LYMPHOCYTES # BLD AUTO: 0.53 10*3/MM3 (ref 0.7–3.1)
LYMPHOCYTES # BLD AUTO: 0.66 10*3/MM3 (ref 0.7–3.1)
LYMPHOCYTES NFR BLD AUTO: 10.8 % (ref 19.6–45.3)
LYMPHOCYTES NFR BLD AUTO: 12.9 % (ref 19.6–45.3)
Lab: ABNORMAL
MCH RBC QN AUTO: 29.2 PG (ref 26.6–33)
MCH RBC QN AUTO: 29.6 PG (ref 26.6–33)
MCHC RBC AUTO-ENTMCNC: 32.5 G/DL (ref 31.5–35.7)
MCHC RBC AUTO-ENTMCNC: 33.8 G/DL (ref 31.5–35.7)
MCV RBC AUTO: 87.6 FL (ref 79–97)
MCV RBC AUTO: 89.8 FL (ref 79–97)
METHGB BLD QL: 0.1 % (ref 0–3)
MODALITY: ABNORMAL
MONOCYTES # BLD AUTO: 0.47 10*3/MM3 (ref 0.1–0.9)
MONOCYTES # BLD AUTO: 0.59 10*3/MM3 (ref 0.1–0.9)
MONOCYTES NFR BLD AUTO: 11.5 % (ref 5–12)
MONOCYTES NFR BLD AUTO: 9.6 % (ref 5–12)
NEUTROPHILS NFR BLD AUTO: 3.74 10*3/MM3 (ref 1.7–7)
NEUTROPHILS NFR BLD AUTO: 3.78 10*3/MM3 (ref 1.7–7)
NEUTROPHILS NFR BLD AUTO: 73 % (ref 42.7–76)
NEUTROPHILS NFR BLD AUTO: 77.2 % (ref 42.7–76)
NITRITE UR QL STRIP: NEGATIVE
NOTE: ABNORMAL
NRBC BLD AUTO-RTO: 0 /100 WBC (ref 0–0.2)
NRBC BLD AUTO-RTO: 0 /100 WBC (ref 0–0.2)
OXYHGB MFR BLDV: 96.2 % (ref 94–99)
PCO2 BLDA: 33.3 MM HG (ref 35–45)
PCO2 TEMP ADJ BLD: ABNORMAL MM[HG]
PH BLDA: 7.49 PH UNITS (ref 7.35–7.45)
PH UR STRIP.AUTO: 6.5 [PH] (ref 5–8)
PH, TEMP CORRECTED: ABNORMAL
PLATELET # BLD AUTO: 203 10*3/MM3 (ref 140–450)
PLATELET # BLD AUTO: 206 10*3/MM3 (ref 140–450)
PMV BLD AUTO: 10.9 FL (ref 6–12)
PMV BLD AUTO: 11.1 FL (ref 6–12)
PO2 BLDA: 93 MM HG (ref 83–108)
PO2 TEMP ADJ BLD: ABNORMAL MM[HG]
POTASSIUM SERPL-SCNC: 3.4 MMOL/L (ref 3.5–5.2)
PROT SERPL-MCNC: 6.5 G/DL (ref 6–8.5)
PROT UR QL STRIP: NEGATIVE
PROTHROMBIN TIME: 15 SECONDS (ref 12.8–14.5)
QT INTERVAL: 440 MS
QTC INTERVAL: 475 MS
RBC # BLD AUTO: 3.38 10*6/MM3 (ref 3.77–5.28)
RBC # BLD AUTO: 3.53 10*6/MM3 (ref 3.77–5.28)
SAO2 % BLDCOA: 98.1 % (ref 94–99)
SARS-COV-2 RNA RESP QL NAA+PROBE: DETECTED
SODIUM SERPL-SCNC: 136 MMOL/L (ref 136–145)
SP GR UR STRIP: 1.01 (ref 1–1.03)
TROPONIN T SERPL-MCNC: <0.01 NG/ML (ref 0–0.03)
UROBILINOGEN UR QL STRIP: ABNORMAL
VENTILATOR MODE: ABNORMAL
WBC # BLD AUTO: 4.9 10*3/MM3 (ref 3.4–10.8)
WBC # BLD AUTO: 5.12 10*3/MM3 (ref 3.4–10.8)

## 2021-09-19 PROCEDURE — 87040 BLOOD CULTURE FOR BACTERIA: CPT | Performed by: EMERGENCY MEDICINE

## 2021-09-19 PROCEDURE — 93010 ELECTROCARDIOGRAM REPORT: CPT | Performed by: SPECIALIST

## 2021-09-19 PROCEDURE — 36600 WITHDRAWAL OF ARTERIAL BLOOD: CPT

## 2021-09-19 PROCEDURE — 85730 THROMBOPLASTIN TIME PARTIAL: CPT | Performed by: EMERGENCY MEDICINE

## 2021-09-19 PROCEDURE — 85379 FIBRIN DEGRADATION QUANT: CPT | Performed by: EMERGENCY MEDICINE

## 2021-09-19 PROCEDURE — 82805 BLOOD GASES W/O2 SATURATION: CPT

## 2021-09-19 PROCEDURE — 83050 HGB METHEMOGLOBIN QUAN: CPT

## 2021-09-19 PROCEDURE — 25010000002 DEXAMETHASONE PER 1 MG: Performed by: EMERGENCY MEDICINE

## 2021-09-19 PROCEDURE — 85025 COMPLETE CBC W/AUTO DIFF WBC: CPT | Performed by: EMERGENCY MEDICINE

## 2021-09-19 PROCEDURE — 82375 ASSAY CARBOXYHB QUANT: CPT

## 2021-09-19 PROCEDURE — 87636 SARSCOV2 & INF A&B AMP PRB: CPT | Performed by: EMERGENCY MEDICINE

## 2021-09-19 PROCEDURE — 25010000002 ENOXAPARIN PER 10 MG: Performed by: EMERGENCY MEDICINE

## 2021-09-19 PROCEDURE — 71045 X-RAY EXAM CHEST 1 VIEW: CPT

## 2021-09-19 PROCEDURE — 0 IOPAMIDOL PER 1 ML: Performed by: EMERGENCY MEDICINE

## 2021-09-19 PROCEDURE — 96372 THER/PROPH/DIAG INJ SC/IM: CPT

## 2021-09-19 PROCEDURE — 70450 CT HEAD/BRAIN W/O DYE: CPT | Performed by: RADIOLOGY

## 2021-09-19 PROCEDURE — 70450 CT HEAD/BRAIN W/O DYE: CPT

## 2021-09-19 PROCEDURE — 93005 ELECTROCARDIOGRAM TRACING: CPT | Performed by: EMERGENCY MEDICINE

## 2021-09-19 PROCEDURE — 85610 PROTHROMBIN TIME: CPT | Performed by: EMERGENCY MEDICINE

## 2021-09-19 PROCEDURE — 84484 ASSAY OF TROPONIN QUANT: CPT | Performed by: EMERGENCY MEDICINE

## 2021-09-19 PROCEDURE — 81003 URINALYSIS AUTO W/O SCOPE: CPT | Performed by: EMERGENCY MEDICINE

## 2021-09-19 PROCEDURE — 71045 X-RAY EXAM CHEST 1 VIEW: CPT | Performed by: RADIOLOGY

## 2021-09-19 PROCEDURE — 83605 ASSAY OF LACTIC ACID: CPT | Performed by: EMERGENCY MEDICINE

## 2021-09-19 PROCEDURE — 99284 EMERGENCY DEPT VISIT MOD MDM: CPT

## 2021-09-19 PROCEDURE — 71275 CT ANGIOGRAPHY CHEST: CPT

## 2021-09-19 PROCEDURE — 80053 COMPREHEN METABOLIC PANEL: CPT | Performed by: EMERGENCY MEDICINE

## 2021-09-19 PROCEDURE — P9612 CATHETERIZE FOR URINE SPEC: HCPCS

## 2021-09-19 PROCEDURE — 96374 THER/PROPH/DIAG INJ IV PUSH: CPT

## 2021-09-19 PROCEDURE — 36415 COLL VENOUS BLD VENIPUNCTURE: CPT

## 2021-09-19 RX ORDER — LEVOTHYROXINE SODIUM 0.1 MG/1
100 TABLET ORAL NIGHTLY
Status: CANCELLED | OUTPATIENT
Start: 2021-09-19

## 2021-09-19 RX ORDER — PANTOPRAZOLE SODIUM 40 MG/1
40 TABLET, DELAYED RELEASE ORAL DAILY
Status: CANCELLED | OUTPATIENT
Start: 2021-09-20

## 2021-09-19 RX ORDER — SODIUM CHLORIDE 0.9 % (FLUSH) 0.9 %
10 SYRINGE (ML) INJECTION AS NEEDED
Status: DISCONTINUED | OUTPATIENT
Start: 2021-09-19 | End: 2021-09-20 | Stop reason: HOSPADM

## 2021-09-19 RX ORDER — LEVOTHYROXINE SODIUM 0.1 MG/1
100 TABLET ORAL NIGHTLY
COMMUNITY

## 2021-09-19 RX ORDER — GABAPENTIN 300 MG/1
300 CAPSULE ORAL 2 TIMES DAILY
Status: CANCELLED | OUTPATIENT
Start: 2021-09-19

## 2021-09-19 RX ORDER — DEXAMETHASONE SODIUM PHOSPHATE 4 MG/ML
6 INJECTION, SOLUTION INTRA-ARTICULAR; INTRALESIONAL; INTRAMUSCULAR; INTRAVENOUS; SOFT TISSUE ONCE
Status: COMPLETED | OUTPATIENT
Start: 2021-09-19 | End: 2021-09-19

## 2021-09-19 RX ORDER — POTASSIUM CHLORIDE 20 MEQ/1
40 TABLET, EXTENDED RELEASE ORAL DAILY
Status: DISCONTINUED | OUTPATIENT
Start: 2021-09-19 | End: 2021-09-20 | Stop reason: HOSPADM

## 2021-09-19 RX ORDER — HYDROCHLOROTHIAZIDE 25 MG/1
25 TABLET ORAL DAILY
Status: CANCELLED | OUTPATIENT
Start: 2021-09-20

## 2021-09-19 RX ORDER — OXYCODONE AND ACETAMINOPHEN 10; 325 MG/1; MG/1
1 TABLET ORAL 2 TIMES DAILY PRN
COMMUNITY

## 2021-09-19 RX ORDER — HEPARIN SODIUM 10000 [USP'U]/100ML
12 INJECTION, SOLUTION INTRAVENOUS
Status: DISCONTINUED | OUTPATIENT
Start: 2021-09-19 | End: 2021-09-19

## 2021-09-19 RX ORDER — METOPROLOL SUCCINATE 25 MG/1
50 TABLET, EXTENDED RELEASE ORAL DAILY
Status: CANCELLED | OUTPATIENT
Start: 2021-09-20

## 2021-09-19 RX ORDER — BUMETANIDE 1 MG/1
1 TABLET ORAL DAILY
Status: CANCELLED | OUTPATIENT
Start: 2021-09-20

## 2021-09-19 RX ORDER — HEPARIN SODIUM 5000 [USP'U]/ML
4000 INJECTION, SOLUTION INTRAVENOUS; SUBCUTANEOUS ONCE
Status: DISCONTINUED | OUTPATIENT
Start: 2021-09-19 | End: 2021-09-19

## 2021-09-19 RX ORDER — METOPROLOL SUCCINATE 50 MG/1
50 TABLET, EXTENDED RELEASE ORAL DAILY
COMMUNITY

## 2021-09-19 RX ORDER — DEXAMETHASONE SODIUM PHOSPHATE 10 MG/ML
6 INJECTION INTRAMUSCULAR; INTRAVENOUS ONCE
Status: DISCONTINUED | OUTPATIENT
Start: 2021-09-19 | End: 2021-09-19

## 2021-09-19 RX ORDER — OXYCODONE AND ACETAMINOPHEN 10; 325 MG/1; MG/1
1 TABLET ORAL 2 TIMES DAILY PRN
Status: CANCELLED | OUTPATIENT
Start: 2021-09-19

## 2021-09-19 RX ORDER — DOCUSATE SODIUM 100 MG/1
200 CAPSULE, LIQUID FILLED ORAL 2 TIMES DAILY PRN
Status: CANCELLED | OUTPATIENT
Start: 2021-09-19

## 2021-09-19 RX ADMIN — DEXAMETHASONE SODIUM PHOSPHATE 6 MG: 4 INJECTION, SOLUTION INTRA-ARTICULAR; INTRALESIONAL; INTRAMUSCULAR; INTRAVENOUS; SOFT TISSUE at 20:54

## 2021-09-19 RX ADMIN — SODIUM CHLORIDE 1000 ML: 9 INJECTION, SOLUTION INTRAVENOUS at 12:20

## 2021-09-19 RX ADMIN — ENOXAPARIN SODIUM 80 MG: 80 INJECTION SUBCUTANEOUS at 21:35

## 2021-09-19 RX ADMIN — POTASSIUM CHLORIDE 40 MEQ: 20 TABLET, EXTENDED RELEASE ORAL at 13:55

## 2021-09-19 RX ADMIN — IOPAMIDOL 60 ML: 755 INJECTION, SOLUTION INTRAVENOUS at 18:32

## 2021-09-19 NOTE — ED PROVIDER NOTES
"Subjective   87-year-old white female presents for generalized weakness.  History is per patient and daughter by phone.  Patient is unable to give specifics of her history saying that she just feels \"sick\".  She complains of feeling cold and hungry and says she has not had appetite she is not drinking well.  She feels weak, and confused.  The patient fell 10 days ago resulting in left arm fracture.  She reports being seen at [facility] for this.  She also had abrasion of her right hand and contusion/abrasion of her left forehead.  Additionally the pain she had complains of cough and shortness of breath.          Review of Systems   All other systems reviewed and are negative.      Past Medical History:   Diagnosis Date   • A-fib (CMS/HCC)    • Anemia 1/13/2017   • Arthritis    • Asthma    • Cancer (CMS/HCC)    • CHF (congestive heart failure) (CMS/HCC)    • Chronic anticoagulation, Xarelto 7/30/2019   • Coronary artery disease    • GERD without esophagitis 7/30/2019   • History of colonic polyps 7/30/2019   • History of transfusion    • Hypertension    • Hypothyroidism 7/30/2019   • Peripheral neuropathy 7/30/2019       Allergies   Allergen Reactions   • Adhesive Tape Rash       Past Surgical History:   Procedure Laterality Date   • BACK SURGERY     • BREAST CYST EXCISION Left    • CARDIAC CATHETERIZATION N/A 5/19/2020    Procedure: Left Heart Cath;  Surgeon: Jack Couch MD;  Location: [facility] CATH INVASIVE LOCATION;  Service: Cardiology;  Laterality: N/A;   • CERVICAL POLYPECTOMY     • COLONOSCOPY N/A 1/17/2017    Procedure: COLONOSCOPY;  Surgeon: Madi Zheng III, MD;  Location: [facility];  Service:    • D & C AND LAPAROSCOPY     • ENDOSCOPY N/A 1/17/2017    Procedure: ESOPHAGOGASTRODUODENOSCOPY;  Surgeon: Madi Zheng III, MD;  Location: [facility];  Service:    • GALLBLADDER SURGERY     • VEIN LIGATION AND STRIPPING         Family History   Problem Relation Age of Onset   • " Heart disease Mother    • Heart attack Sister    • Heart disease Sister    • Heart attack Brother    • Heart disease Brother    • Heart disease Brother    • Heart attack Paternal Grandfather        Social History     Socioeconomic History   • Marital status:      Spouse name: Not on file   • Number of children: Not on file   • Years of education: Not on file   • Highest education level: Not on file   Tobacco Use   • Smoking status: Never Smoker   • Smokeless tobacco: Never Used   Substance and Sexual Activity   • Alcohol use: No   • Drug use: No   • Sexual activity: Defer           Objective   Physical Exam  Vitals and nursing note reviewed. Exam conducted with a chaperone present (Davida Daly RN).   Constitutional:       Appearance: Normal appearance. She is normal weight.   HENT:      Head: Normocephalic.        Comments: Healing abrasion/contusion to left upper orbit/forehead area.  This is nontender with no bony deformities.     Mouth/Throat:      Mouth: Mucous membranes are dry.      Pharynx: Oropharynx is clear.   Cardiovascular:      Rate and Rhythm: Normal rate and regular rhythm.      Heart sounds: No murmur heard.   No friction rub. No gallop.    Pulmonary:      Effort: Pulmonary effort is normal. No respiratory distress.      Breath sounds: Normal breath sounds. No wheezing, rhonchi or rales.   Abdominal:      General: Abdomen is flat. Bowel sounds are normal. There is no distension.      Palpations: Abdomen is soft.      Tenderness: There is no abdominal tenderness. There is no guarding.      Hernia: No hernia is present.   Musculoskeletal:         General: Normal range of motion.      Cervical back: Normal range of motion.      Right lower leg: No edema.      Left lower leg: No edema.   Skin:     General: Skin is warm and dry.             Comments: Healing abrasion/skin tear of the right dorsal hand without signs of infection.   Neurological:      General: No focal deficit present.      Mental  Status: She is alert.   Psychiatric:         Mood and Affect: Mood normal.         Behavior: Behavior normal.         Procedures           ED Course  ED Course as of Sep 20 1854   Sun Sep 19, 2021   1255 Atrial fibrillation.  Rate 70.  Left axis deviation.  Normal QT interval.  Q waves in lead II, III, aVF, and V1.  No ST elevation or depression.  Note: Marked baseline artifact.  Abnormal EKG.  Interpreted by me.  No significant change from 5/20.   ECG 12 Lead [BC]   1921 Care endorsed to Dr. Palafox at shift change.    [BC]   2020 IMPRESSION:  1.  Senescent changes.  2.  No CT evidence of acute intracranial abnormality.   CT Head Without Contrast [ES]   2020 IMPRESSION:    Unremarkable exam. No acute cardiopulmonary findings identified.   XR Chest 1 View [ES]   2021 IMPRESSION:     1. Findings suspicious for subtle peripheral filling defects in the left lower lobe and developing small lower lobe pulmonary infarct. No large filling defect on either side.  2. Obstructive lung disease and evidence of COVID- 19 pneumonia as described.  3. Cardiomegaly.  4. Hiatal hernia.   CT Chest Pulmonary Embolism [ES]   Mon Sep 20, 2021   1258 Have reviewed results of chest CT with radiologist here.  Patient has very subtle small peripheral findings not felt to be significant for PE.  No further anticoagulation required at this time.  She will continue Xarelto.  We will plan to attempt discharge home later today.    [BC]   1423 Sutures removed. Some wound dehisence noted    [KALPESH]   1850 Patient doing well.  Hemodynamically stable.  Discussed with patient and son.  Patient wishes to go home.  She is oxygenating well.  Have discussed her CT.  We will continue Xarelto.  They understand to return for any further problems.  Will discharge home.    [BC]      ED Course User Index  [BC] Horacio Salazar MD  [ES] Eduardo Palafox MD  [KALPESH] Giles Chávez MD                                           MDM  Number of Diagnoses or  Management Options     Amount and/or Complexity of Data Reviewed  Clinical lab tests: reviewed  Tests in the radiology section of CPT®: reviewed  Tests in the medicine section of CPT®: reviewed    Risk of Complications, Morbidity, and/or Mortality  Presenting problems: high  Diagnostic procedures: high  Management options: high        Final diagnoses:   COVID-19 virus infection       ED Disposition  ED Disposition     ED Disposition Condition Comment    Discharge Stable           Duane Pollard MD  [address] [place] [id number] [phone]    In 1 week           Medication List      No changes were made to your prescriptions during this visit.          Horacio Salazar MD  09/20/21 0371

## 2021-09-20 VITALS
HEIGHT: 64 IN | BODY MASS INDEX: 29.19 KG/M2 | TEMPERATURE: 98 F | WEIGHT: 171 LBS | DIASTOLIC BLOOD PRESSURE: 74 MMHG | HEART RATE: 90 BPM | SYSTOLIC BLOOD PRESSURE: 128 MMHG | OXYGEN SATURATION: 98 % | RESPIRATION RATE: 18 BRPM

## 2021-09-20 LAB
ALBUMIN SERPL-MCNC: 3.49 G/DL (ref 3.5–5.2)
ALBUMIN/GLOB SERPL: 1 G/DL
ALP SERPL-CCNC: 78 U/L (ref 39–117)
ALT SERPL W P-5'-P-CCNC: 7 U/L (ref 1–33)
ANION GAP SERPL CALCULATED.3IONS-SCNC: 16.1 MMOL/L (ref 5–15)
AST SERPL-CCNC: 16 U/L (ref 1–32)
BASOPHILS # BLD AUTO: 0.01 10*3/MM3 (ref 0–0.2)
BASOPHILS NFR BLD AUTO: 0.2 % (ref 0–1.5)
BILIRUB SERPL-MCNC: 1.1 MG/DL (ref 0–1.2)
BUN SERPL-MCNC: 18 MG/DL (ref 8–23)
BUN/CREAT SERPL: 20.2 (ref 7–25)
CALCIUM SPEC-SCNC: 9.3 MG/DL (ref 8.6–10.5)
CHLORIDE SERPL-SCNC: 98 MMOL/L (ref 98–107)
CO2 SERPL-SCNC: 19.9 MMOL/L (ref 22–29)
CREAT SERPL-MCNC: 0.89 MG/DL (ref 0.57–1)
DEPRECATED RDW RBC AUTO: 50.2 FL (ref 37–54)
EOSINOPHIL # BLD AUTO: 0 10*3/MM3 (ref 0–0.4)
EOSINOPHIL NFR BLD AUTO: 0 % (ref 0.3–6.2)
ERYTHROCYTE [DISTWIDTH] IN BLOOD BY AUTOMATED COUNT: 15.5 % (ref 12.3–15.4)
GFR SERPL CREATININE-BSD FRML MDRD: 60 ML/MIN/1.73
GLOBULIN UR ELPH-MCNC: 3.4 GM/DL
GLUCOSE SERPL-MCNC: 144 MG/DL (ref 65–99)
HCT VFR BLD AUTO: 32.5 % (ref 34–46.6)
HGB BLD-MCNC: 10.7 G/DL (ref 12–15.9)
IMM GRANULOCYTES # BLD AUTO: 0.06 10*3/MM3 (ref 0–0.05)
IMM GRANULOCYTES NFR BLD AUTO: 1.1 % (ref 0–0.5)
LYMPHOCYTES # BLD AUTO: 0.29 10*3/MM3 (ref 0.7–3.1)
LYMPHOCYTES NFR BLD AUTO: 5.3 % (ref 19.6–45.3)
MCH RBC QN AUTO: 29 PG (ref 26.6–33)
MCHC RBC AUTO-ENTMCNC: 32.9 G/DL (ref 31.5–35.7)
MCV RBC AUTO: 88.1 FL (ref 79–97)
MONOCYTES # BLD AUTO: 0.21 10*3/MM3 (ref 0.1–0.9)
MONOCYTES NFR BLD AUTO: 3.8 % (ref 5–12)
NEUTROPHILS NFR BLD AUTO: 4.92 10*3/MM3 (ref 1.7–7)
NEUTROPHILS NFR BLD AUTO: 89.6 % (ref 42.7–76)
NRBC BLD AUTO-RTO: 0 /100 WBC (ref 0–0.2)
PLATELET # BLD AUTO: 227 10*3/MM3 (ref 140–450)
PMV BLD AUTO: 11.2 FL (ref 6–12)
POTASSIUM SERPL-SCNC: 4.1 MMOL/L (ref 3.5–5.2)
PROT SERPL-MCNC: 6.9 G/DL (ref 6–8.5)
RBC # BLD AUTO: 3.69 10*6/MM3 (ref 3.77–5.28)
SODIUM SERPL-SCNC: 134 MMOL/L (ref 136–145)
TROPONIN T SERPL-MCNC: <0.01 NG/ML (ref 0–0.03)
WBC # BLD AUTO: 5.49 10*3/MM3 (ref 3.4–10.8)

## 2021-09-20 PROCEDURE — 25010000002 ENOXAPARIN PER 10 MG: Performed by: EMERGENCY MEDICINE

## 2021-09-20 PROCEDURE — 80053 COMPREHEN METABOLIC PANEL: CPT | Performed by: EMERGENCY MEDICINE

## 2021-09-20 PROCEDURE — 85025 COMPLETE CBC W/AUTO DIFF WBC: CPT | Performed by: EMERGENCY MEDICINE

## 2021-09-20 PROCEDURE — 96372 THER/PROPH/DIAG INJ SC/IM: CPT

## 2021-09-20 PROCEDURE — 84484 ASSAY OF TROPONIN QUANT: CPT | Performed by: EMERGENCY MEDICINE

## 2021-09-20 RX ORDER — METOPROLOL SUCCINATE 25 MG/1
50 TABLET, EXTENDED RELEASE ORAL DAILY
Status: CANCELLED | OUTPATIENT
Start: 2021-09-20

## 2021-09-20 RX ORDER — BUMETANIDE 1 MG/1
1 TABLET ORAL DAILY
Status: CANCELLED | OUTPATIENT
Start: 2021-09-20

## 2021-09-20 RX ORDER — OXYCODONE HYDROCHLORIDE AND ACETAMINOPHEN 5; 325 MG/1; MG/1
1 TABLET ORAL EVERY 6 HOURS PRN
Status: DISCONTINUED | OUTPATIENT
Start: 2021-09-20 | End: 2021-09-20 | Stop reason: HOSPADM

## 2021-09-20 RX ORDER — LEVOTHYROXINE SODIUM 0.1 MG/1
100 TABLET ORAL NIGHTLY
Status: CANCELLED | OUTPATIENT
Start: 2021-09-20

## 2021-09-20 RX ADMIN — POTASSIUM CHLORIDE 40 MEQ: 20 TABLET, EXTENDED RELEASE ORAL at 08:35

## 2021-09-20 RX ADMIN — OXYCODONE HYDROCHLORIDE AND ACETAMINOPHEN 1 TABLET: 5; 325 TABLET ORAL at 09:24

## 2021-09-20 RX ADMIN — SODIUM CHLORIDE 1000 ML: 9 INJECTION, SOLUTION INTRAVENOUS at 13:31

## 2021-09-20 RX ADMIN — ENOXAPARIN SODIUM 80 MG: 80 INJECTION SUBCUTANEOUS at 08:35

## 2021-09-20 NOTE — ED NOTES
Spoke to both daughters and explained pts situation and diagnosis. Informed them of the needed treatments and hospital stay. Both daughters express that they want her to stay for treatment and will not be picking pt up until she is feeling better. Dr Javy gibbs.     Marcelina Corral, RN  09/19/21 1969

## 2021-09-20 NOTE — ED NOTES
"Pt states she wants to go home that she \"can be at home sick just as well as she can be laying here sick\". Explained to pt that she needed to stay due to her diagnosises and further treatment was necessary. Pt says to \"call my daughter and tell her to get her ass up here.\" Pt unable to ambulate on her own, so will need signed out by her family. Dr Palafox aware, will call and speak to family.      Marcelina Corral, RN  09/19/21 [id number]    "

## 2021-09-22 ENCOUNTER — APPOINTMENT (OUTPATIENT)
Dept: ULTRASOUND IMAGING | Facility: HOSPITAL | Age: 86
End: 2021-09-22

## 2021-09-22 ENCOUNTER — PATIENT OUTREACH (OUTPATIENT)
Dept: CASE MANAGEMENT | Facility: OTHER | Age: 86
End: 2021-09-22

## 2021-09-22 ENCOUNTER — APPOINTMENT (OUTPATIENT)
Dept: CT IMAGING | Facility: HOSPITAL | Age: 86
End: 2021-09-22

## 2021-09-22 ENCOUNTER — HOSPITAL ENCOUNTER (EMERGENCY)
Facility: HOSPITAL | Age: 86
Discharge: HOME | End: 2021-09-22
Attending: STUDENT IN AN ORGANIZED HEALTH CARE EDUCATION/TRAINING PROGRAM | Admitting: STUDENT IN AN ORGANIZED HEALTH CARE EDUCATION/TRAINING PROGRAM

## 2021-09-22 ENCOUNTER — NURSE TRIAGE (OUTPATIENT)
Dept: CALL CENTER | Facility: HOSPITAL | Age: 86
End: 2021-09-22

## 2021-09-22 VITALS
SYSTOLIC BLOOD PRESSURE: 118 MMHG | BODY MASS INDEX: 29.02 KG/M2 | TEMPERATURE: 97.8 F | WEIGHT: 170 LBS | HEIGHT: 64 IN | OXYGEN SATURATION: 100 % | RESPIRATION RATE: 18 BRPM | HEART RATE: 82 BPM | DIASTOLIC BLOOD PRESSURE: 77 MMHG

## 2021-09-22 DIAGNOSIS — R31.9 HEMATURIA, UNSPECIFIED TYPE: Primary | ICD-10-CM

## 2021-09-22 LAB
ALBUMIN SERPL-MCNC: 3.79 G/DL (ref 3.5–5.2)
ALBUMIN/GLOB SERPL: 1.5 G/DL
ALP SERPL-CCNC: 78 U/L (ref 39–117)
ALT SERPL W P-5'-P-CCNC: 7 U/L (ref 1–33)
ANION GAP SERPL CALCULATED.3IONS-SCNC: 10.3 MMOL/L (ref 5–15)
APTT PPP: 35.6 SECONDS (ref 25.5–35.4)
AST SERPL-CCNC: 19 U/L (ref 1–32)
BACTERIA UR QL AUTO: ABNORMAL /HPF
BASOPHILS # BLD AUTO: 0.01 10*3/MM3 (ref 0–0.2)
BASOPHILS NFR BLD AUTO: 0.2 % (ref 0–1.5)
BILIRUB SERPL-MCNC: 1.2 MG/DL (ref 0–1.2)
BILIRUB UR QL STRIP: ABNORMAL
BUN SERPL-MCNC: 25 MG/DL (ref 8–23)
BUN/CREAT SERPL: 21 (ref 7–25)
CALCIUM SPEC-SCNC: 9.4 MG/DL (ref 8.6–10.5)
CHLORIDE SERPL-SCNC: 99 MMOL/L (ref 98–107)
CLARITY UR: ABNORMAL
CO2 SERPL-SCNC: 25.7 MMOL/L (ref 22–29)
COLOR UR: ABNORMAL
CREAT SERPL-MCNC: 1.19 MG/DL (ref 0.57–1)
DEPRECATED RDW RBC AUTO: 51.5 FL (ref 37–54)
EOSINOPHIL # BLD AUTO: 0.09 10*3/MM3 (ref 0–0.4)
EOSINOPHIL NFR BLD AUTO: 1.4 % (ref 0.3–6.2)
ERYTHROCYTE [DISTWIDTH] IN BLOOD BY AUTOMATED COUNT: 15.8 % (ref 12.3–15.4)
GFR SERPL CREATININE-BSD FRML MDRD: 43 ML/MIN/1.73
GLOBULIN UR ELPH-MCNC: 2.5 GM/DL
GLUCOSE SERPL-MCNC: 105 MG/DL (ref 65–99)
GLUCOSE UR STRIP-MCNC: NEGATIVE MG/DL
HCT VFR BLD AUTO: 30 % (ref 34–46.6)
HGB BLD-MCNC: 9.8 G/DL (ref 12–15.9)
HGB UR QL STRIP.AUTO: ABNORMAL
HOLD SPECIMEN: NORMAL
HOLD SPECIMEN: NORMAL
HYALINE CASTS UR QL AUTO: ABNORMAL /LPF
IMM GRANULOCYTES # BLD AUTO: 0.1 10*3/MM3 (ref 0–0.05)
IMM GRANULOCYTES NFR BLD AUTO: 1.5 % (ref 0–0.5)
INR PPP: 1.59 (ref 0.9–1.1)
KETONES UR QL STRIP: NEGATIVE
LEUKOCYTE ESTERASE UR QL STRIP.AUTO: ABNORMAL
LYMPHOCYTES # BLD AUTO: 0.84 10*3/MM3 (ref 0.7–3.1)
LYMPHOCYTES NFR BLD AUTO: 12.8 % (ref 19.6–45.3)
MCH RBC QN AUTO: 29.4 PG (ref 26.6–33)
MCHC RBC AUTO-ENTMCNC: 32.7 G/DL (ref 31.5–35.7)
MCV RBC AUTO: 90.1 FL (ref 79–97)
MONOCYTES # BLD AUTO: 0.64 10*3/MM3 (ref 0.1–0.9)
MONOCYTES NFR BLD AUTO: 9.7 % (ref 5–12)
NEUTROPHILS NFR BLD AUTO: 4.89 10*3/MM3 (ref 1.7–7)
NEUTROPHILS NFR BLD AUTO: 74.4 % (ref 42.7–76)
NITRITE UR QL STRIP: POSITIVE
NRBC BLD AUTO-RTO: 0 /100 WBC (ref 0–0.2)
PH UR STRIP.AUTO: <=5 [PH] (ref 5–8)
PLATELET # BLD AUTO: 308 10*3/MM3 (ref 140–450)
PMV BLD AUTO: 11 FL (ref 6–12)
POTASSIUM SERPL-SCNC: 4.2 MMOL/L (ref 3.5–5.2)
PROT SERPL-MCNC: 6.3 G/DL (ref 6–8.5)
PROT UR QL STRIP: ABNORMAL
PROTHROMBIN TIME: 19.4 SECONDS (ref 12.8–14.5)
RBC # BLD AUTO: 3.33 10*6/MM3 (ref 3.77–5.28)
RBC # UR: ABNORMAL /HPF
REF LAB TEST METHOD: ABNORMAL
SODIUM SERPL-SCNC: 135 MMOL/L (ref 136–145)
SP GR UR STRIP: 1.02 (ref 1–1.03)
SQUAMOUS #/AREA URNS HPF: ABNORMAL /HPF
UROBILINOGEN UR QL STRIP: ABNORMAL
WBC # BLD AUTO: 6.57 10*3/MM3 (ref 3.4–10.8)
WBC UR QL AUTO: ABNORMAL /HPF
WHOLE BLOOD HOLD SPECIMEN: NORMAL
WHOLE BLOOD HOLD SPECIMEN: NORMAL

## 2021-09-22 PROCEDURE — P9612 CATHETERIZE FOR URINE SPEC: HCPCS

## 2021-09-22 PROCEDURE — 85730 THROMBOPLASTIN TIME PARTIAL: CPT | Performed by: PHYSICIAN ASSISTANT

## 2021-09-22 PROCEDURE — 74176 CT ABD & PELVIS W/O CONTRAST: CPT | Performed by: RADIOLOGY

## 2021-09-22 PROCEDURE — 85025 COMPLETE CBC W/AUTO DIFF WBC: CPT | Performed by: PHYSICIAN ASSISTANT

## 2021-09-22 PROCEDURE — 76856 US EXAM PELVIC COMPLETE: CPT | Performed by: RADIOLOGY

## 2021-09-22 PROCEDURE — 74176 CT ABD & PELVIS W/O CONTRAST: CPT

## 2021-09-22 PROCEDURE — 76856 US EXAM PELVIC COMPLETE: CPT

## 2021-09-22 PROCEDURE — 81001 URINALYSIS AUTO W/SCOPE: CPT | Performed by: PHYSICIAN ASSISTANT

## 2021-09-22 PROCEDURE — 99283 EMERGENCY DEPT VISIT LOW MDM: CPT

## 2021-09-22 PROCEDURE — 85610 PROTHROMBIN TIME: CPT | Performed by: PHYSICIAN ASSISTANT

## 2021-09-22 PROCEDURE — 80053 COMPREHEN METABOLIC PANEL: CPT | Performed by: PHYSICIAN ASSISTANT

## 2021-09-22 NOTE — OUTREACH NOTE
ED Potential Covid Discharge Follow-up    Patient was seen in the ED 9/19/21 and was diagnosed with Covid 19, noted to have received instruction from nurse triage this morning to return to ED for difficulty urinating and hematuria. RN-MIMI spoke with patient's granddaughter who confirmed patient has left to seek treatment in the ED, states patient was advised she will probably need a catheter placed in the ED. ACM left contact information for patient follow up as needed and scheduled an additional outreach to patient this week.    Naya Simons RN  Ambulatory Case Management    9/22/2021, 11:35 EDT

## 2021-09-22 NOTE — TELEPHONE ENCOUNTER
"  Was discharged Er after Antibody infusion few days ago, yesterday having blood in her urine, now bloody urine, and now having trouble urinating at all, feel full , cannot urinate.   Reason for Disposition  • Passing pure blood or large blood clots (i.e., size > a dime) (Exception: chad or small strands)    Additional Information  • Negative: Shock suspected (e.g., cold/pale/clammy skin, too weak to stand, low BP, rapid pulse)  • Negative: Sounds like a life-threatening emergency to the triager  • Negative: Urinary catheter, questions about  • Negative: Recent back or abdominal injury  • Negative: Recent genital injury  • Negative: [1] Unable to urinate (or only a few drops) > 4 hours AND [2] bladder feels very full (e.g., palpable bladder or strong urge to urinate)  • Negative: Fever > 100.4 F (38.0 C)  • Negative: Patient sounds very sick or weak to the triager  • Negative: [1] Pain or burning with passing urine AND [2] side (flank) or back pain present  • Negative: Known sickle cell disease  • Negative: Taking Coumadin (warfarin) or other strong blood thinner, or known bleeding disorder (e.g., thrombocytopenia)  • Negative: Pain or burning with passing urine    Answer Assessment - Initial Assessment Questions  1. COLOR of URINE: \"Describe the color of the urine.\"  (e.g., tea-colored, pink, red, blood clots, bloody)      Red, bloody  2. ONSET: \"When did the bleeding start?\"       yesterday  3. EPISODES: \"How many times has there been blood in the urine?\" or \"How many times today?\"      several  4. PAIN with URINATION: \"Is there any pain with passing your urine?\" If Yes, ask: \"How bad is the pain?\"  (Scale 1-10; or mild, moderate, severe)     - MILD - complains slightly about urination hurting     - MODERATE - interferes with normal activities       - SEVERE - excruciating, unwilling or unable to urinate because of the pain       moderate  5. FEVER: \"Do you have a fever?\" If Yes, ask: \"What is your temperature, " "how was it measured, and when did it start?\"      no  6. ASSOCIATED SYMPTOMS: \"Are you passing urine more frequently than usual?\"      Cannot urinate  7. OTHER SYMPTOMS: \"Do you have any other symptoms?\" (e.g., back/flank pain, abdominal pain, vomiting)      Feels full  8. PREGNANCY: \"Is there any chance you are pregnant?\" \"When was your last menstrual period?\"      no    Protocols used: URINE - BLOOD IN-ADULT-      "

## 2021-09-24 ENCOUNTER — APPOINTMENT (OUTPATIENT)
Dept: GENERAL RADIOLOGY | Facility: HOSPITAL | Age: 86
End: 2021-09-24

## 2021-09-24 ENCOUNTER — HOSPITAL ENCOUNTER (INPATIENT)
Facility: HOSPITAL | Age: 86
LOS: 11 days | Discharge: HOSPICE | End: 2021-10-05
Attending: STUDENT IN AN ORGANIZED HEALTH CARE EDUCATION/TRAINING PROGRAM | Admitting: INTERNAL MEDICINE

## 2021-09-24 ENCOUNTER — APPOINTMENT (OUTPATIENT)
Dept: CT IMAGING | Facility: HOSPITAL | Age: 86
End: 2021-09-24

## 2021-09-24 ENCOUNTER — PATIENT OUTREACH (OUTPATIENT)
Dept: CASE MANAGEMENT | Facility: OTHER | Age: 86
End: 2021-09-24

## 2021-09-24 DIAGNOSIS — U07.1 COVID-19: ICD-10-CM

## 2021-09-24 DIAGNOSIS — N17.9 AKI (ACUTE KIDNEY INJURY) (HCC): Primary | ICD-10-CM

## 2021-09-24 DIAGNOSIS — N12 PYELONEPHRITIS: ICD-10-CM

## 2021-09-24 DIAGNOSIS — Z53.20 FUNCTIONAL ASSESSMENT DECLINED: ICD-10-CM

## 2021-09-24 LAB
A-A DO2: 32 MMHG (ref 0–300)
ALBUMIN SERPL-MCNC: 3.55 G/DL (ref 3.5–5.2)
ALBUMIN/GLOB SERPL: 1.1 G/DL
ALP SERPL-CCNC: 78 U/L (ref 39–117)
ALT SERPL W P-5'-P-CCNC: 9 U/L (ref 1–33)
ANION GAP SERPL CALCULATED.3IONS-SCNC: 12.6 MMOL/L (ref 5–15)
ARTERIAL PATENCY WRIST A: ABNORMAL
AST SERPL-CCNC: 19 U/L (ref 1–32)
ATMOSPHERIC PRESS: 730 MMHG
BACTERIA SPEC AEROBE CULT: NORMAL
BACTERIA SPEC AEROBE CULT: NORMAL
BACTERIA UR QL AUTO: ABNORMAL /HPF
BASE EXCESS BLDA CALC-SCNC: 0.8 MMOL/L (ref 0–2)
BASOPHILS # BLD AUTO: 0.02 10*3/MM3 (ref 0–0.2)
BASOPHILS NFR BLD AUTO: 0.1 % (ref 0–1.5)
BDY SITE: ABNORMAL
BILIRUB SERPL-MCNC: 1.5 MG/DL (ref 0–1.2)
BILIRUB UR QL STRIP: ABNORMAL
BODY TEMPERATURE: 0 C
BUN SERPL-MCNC: 34 MG/DL (ref 8–23)
BUN/CREAT SERPL: 13.7 (ref 7–25)
CALCIUM SPEC-SCNC: 9 MG/DL (ref 8.6–10.5)
CHLORIDE SERPL-SCNC: 96 MMOL/L (ref 98–107)
CLARITY UR: ABNORMAL
CO2 BLDA-SCNC: 25.7 MMOL/L (ref 22–33)
CO2 SERPL-SCNC: 25.4 MMOL/L (ref 22–29)
COHGB MFR BLD: 2.1 % (ref 0–5)
COLOR UR: ABNORMAL
CREAT SERPL-MCNC: 2.48 MG/DL (ref 0.57–1)
CRP SERPL-MCNC: 18.88 MG/DL (ref 0–0.5)
D-LACTATE SERPL-SCNC: 1.4 MMOL/L (ref 0.5–2)
DEPRECATED RDW RBC AUTO: 51.5 FL (ref 37–54)
EOSINOPHIL # BLD AUTO: 0.01 10*3/MM3 (ref 0–0.4)
EOSINOPHIL NFR BLD AUTO: 0 % (ref 0.3–6.2)
ERYTHROCYTE [DISTWIDTH] IN BLOOD BY AUTOMATED COUNT: 16.2 % (ref 12.3–15.4)
GFR SERPL CREATININE-BSD FRML MDRD: 18 ML/MIN/1.73
GLOBULIN UR ELPH-MCNC: 3.2 GM/DL
GLUCOSE SERPL-MCNC: 140 MG/DL (ref 65–99)
GLUCOSE UR STRIP-MCNC: NEGATIVE MG/DL
HCO3 BLDA-SCNC: 24.6 MMOL/L (ref 20–26)
HCT VFR BLD AUTO: 28.8 % (ref 34–46.6)
HCT VFR BLD CALC: 26.2 % (ref 38–51)
HGB BLD-MCNC: 9.7 G/DL (ref 12–15.9)
HGB BLDA-MCNC: 8.6 G/DL (ref 13.5–17.5)
HGB UR QL STRIP.AUTO: ABNORMAL
HOLD SPECIMEN: NORMAL
HOLD SPECIMEN: NORMAL
HYALINE CASTS UR QL AUTO: ABNORMAL /LPF
IMM GRANULOCYTES # BLD AUTO: 0.3 10*3/MM3 (ref 0–0.05)
IMM GRANULOCYTES NFR BLD AUTO: 1.5 % (ref 0–0.5)
INHALED O2 CONCENTRATION: 21 %
KETONES UR QL STRIP: NEGATIVE
LEUKOCYTE ESTERASE UR QL STRIP.AUTO: ABNORMAL
LYMPHOCYTES # BLD AUTO: 0.64 10*3/MM3 (ref 0.7–3.1)
LYMPHOCYTES NFR BLD AUTO: 3.1 % (ref 19.6–45.3)
Lab: ABNORMAL
MCH RBC QN AUTO: 30.1 PG (ref 26.6–33)
MCHC RBC AUTO-ENTMCNC: 33.7 G/DL (ref 31.5–35.7)
MCV RBC AUTO: 89.4 FL (ref 79–97)
METHGB BLD QL: 0 % (ref 0–3)
MODALITY: ABNORMAL
MONOCYTES # BLD AUTO: 1.37 10*3/MM3 (ref 0.1–0.9)
MONOCYTES NFR BLD AUTO: 6.7 % (ref 5–12)
NEUTROPHILS NFR BLD AUTO: 18.15 10*3/MM3 (ref 1.7–7)
NEUTROPHILS NFR BLD AUTO: 88.6 % (ref 42.7–76)
NITRITE UR QL STRIP: NEGATIVE
NOTE: ABNORMAL
NRBC BLD AUTO-RTO: 0 /100 WBC (ref 0–0.2)
NT-PROBNP SERPL-MCNC: ABNORMAL PG/ML (ref 0–1800)
OXYHGB MFR BLDV: 93.8 % (ref 94–99)
PCO2 BLDA: 35.1 MM HG (ref 35–45)
PCO2 TEMP ADJ BLD: ABNORMAL MM[HG]
PH BLDA: 7.45 PH UNITS (ref 7.35–7.45)
PH UR STRIP.AUTO: 6 [PH] (ref 5–8)
PH, TEMP CORRECTED: ABNORMAL
PLATELET # BLD AUTO: 309 10*3/MM3 (ref 140–450)
PMV BLD AUTO: 11 FL (ref 6–12)
PO2 BLDA: 71.8 MM HG (ref 83–108)
PO2 TEMP ADJ BLD: ABNORMAL MM[HG]
POTASSIUM SERPL-SCNC: 3.7 MMOL/L (ref 3.5–5.2)
PROT SERPL-MCNC: 6.7 G/DL (ref 6–8.5)
PROT UR QL STRIP: ABNORMAL
RBC # BLD AUTO: 3.22 10*6/MM3 (ref 3.77–5.28)
RBC # UR: ABNORMAL /HPF
REF LAB TEST METHOD: ABNORMAL
SAO2 % BLDCOA: 95.8 % (ref 94–99)
SODIUM SERPL-SCNC: 134 MMOL/L (ref 136–145)
SP GR UR STRIP: 1.01 (ref 1–1.03)
SQUAMOUS #/AREA URNS HPF: ABNORMAL /HPF
TRANS CELLS #/AREA URNS HPF: ABNORMAL /HPF
TROPONIN T SERPL-MCNC: 0.04 NG/ML (ref 0–0.03)
TROPONIN T SERPL-MCNC: 0.05 NG/ML (ref 0–0.03)
UROBILINOGEN UR QL STRIP: ABNORMAL
VENTILATOR MODE: ABNORMAL
WBC # BLD AUTO: 20.49 10*3/MM3 (ref 3.4–10.8)
WBC UR QL AUTO: ABNORMAL /HPF
WHOLE BLOOD HOLD SPECIMEN: NORMAL
WHOLE BLOOD HOLD SPECIMEN: NORMAL

## 2021-09-24 PROCEDURE — 74176 CT ABD & PELVIS W/O CONTRAST: CPT | Performed by: RADIOLOGY

## 2021-09-24 PROCEDURE — 36600 WITHDRAWAL OF ARTERIAL BLOOD: CPT

## 2021-09-24 PROCEDURE — 71250 CT THORAX DX C-: CPT | Performed by: RADIOLOGY

## 2021-09-24 PROCEDURE — 82805 BLOOD GASES W/O2 SATURATION: CPT

## 2021-09-24 PROCEDURE — 73620 X-RAY EXAM OF FOOT: CPT | Performed by: RADIOLOGY

## 2021-09-24 PROCEDURE — 87186 SC STD MICRODIL/AGAR DIL: CPT | Performed by: PHYSICIAN ASSISTANT

## 2021-09-24 PROCEDURE — 83050 HGB METHEMOGLOBIN QUAN: CPT

## 2021-09-24 PROCEDURE — 99223 1ST HOSP IP/OBS HIGH 75: CPT | Performed by: INTERNAL MEDICINE

## 2021-09-24 PROCEDURE — 87040 BLOOD CULTURE FOR BACTERIA: CPT | Performed by: PHYSICIAN ASSISTANT

## 2021-09-24 PROCEDURE — P9612 CATHETERIZE FOR URINE SPEC: HCPCS

## 2021-09-24 PROCEDURE — 71045 X-RAY EXAM CHEST 1 VIEW: CPT

## 2021-09-24 PROCEDURE — 25010000002 CEFTRIAXONE PER 250 MG: Performed by: PHYSICIAN ASSISTANT

## 2021-09-24 PROCEDURE — 80053 COMPREHEN METABOLIC PANEL: CPT | Performed by: PHYSICIAN ASSISTANT

## 2021-09-24 PROCEDURE — 83880 ASSAY OF NATRIURETIC PEPTIDE: CPT | Performed by: PHYSICIAN ASSISTANT

## 2021-09-24 PROCEDURE — 25010000002 HEPARIN (PORCINE) PER 1000 UNITS: Performed by: INTERNAL MEDICINE

## 2021-09-24 PROCEDURE — 73620 X-RAY EXAM OF FOOT: CPT

## 2021-09-24 PROCEDURE — 93005 ELECTROCARDIOGRAM TRACING: CPT | Performed by: PHYSICIAN ASSISTANT

## 2021-09-24 PROCEDURE — 86140 C-REACTIVE PROTEIN: CPT | Performed by: PHYSICIAN ASSISTANT

## 2021-09-24 PROCEDURE — 82375 ASSAY CARBOXYHB QUANT: CPT

## 2021-09-24 PROCEDURE — 84484 ASSAY OF TROPONIN QUANT: CPT | Performed by: PHYSICIAN ASSISTANT

## 2021-09-24 PROCEDURE — XW033F5 INTRODUCTION OF OTHER NEW TECHNOLOGY THERAPEUTIC SUBSTANCE INTO PERIPHERAL VEIN, PERCUTANEOUS APPROACH, NEW TECHNOLOGY GROUP 5: ICD-10-PCS | Performed by: INTERNAL MEDICINE

## 2021-09-24 PROCEDURE — 74176 CT ABD & PELVIS W/O CONTRAST: CPT

## 2021-09-24 PROCEDURE — 71045 X-RAY EXAM CHEST 1 VIEW: CPT | Performed by: RADIOLOGY

## 2021-09-24 PROCEDURE — 99285 EMERGENCY DEPT VISIT HI MDM: CPT

## 2021-09-24 PROCEDURE — 25010000006 INJECTION, CASIRIVIMAB AND IMDEVIMAB, 1200 MG: Performed by: INTERNAL MEDICINE

## 2021-09-24 PROCEDURE — 85025 COMPLETE CBC W/AUTO DIFF WBC: CPT | Performed by: PHYSICIAN ASSISTANT

## 2021-09-24 PROCEDURE — 83605 ASSAY OF LACTIC ACID: CPT | Performed by: PHYSICIAN ASSISTANT

## 2021-09-24 PROCEDURE — 87150 DNA/RNA AMPLIFIED PROBE: CPT | Performed by: PHYSICIAN ASSISTANT

## 2021-09-24 PROCEDURE — 71250 CT THORAX DX C-: CPT

## 2021-09-24 PROCEDURE — 81001 URINALYSIS AUTO W/SCOPE: CPT | Performed by: PHYSICIAN ASSISTANT

## 2021-09-24 RX ORDER — DIPHENHYDRAMINE HYDROCHLORIDE 50 MG/ML
50 INJECTION INTRAMUSCULAR; INTRAVENOUS ONCE AS NEEDED
Status: DISCONTINUED | OUTPATIENT
Start: 2021-09-24 | End: 2021-10-05 | Stop reason: HOSPADM

## 2021-09-24 RX ORDER — SODIUM CHLORIDE 0.9 % (FLUSH) 0.9 %
10 SYRINGE (ML) INJECTION EVERY 12 HOURS SCHEDULED
Status: DISCONTINUED | OUTPATIENT
Start: 2021-09-24 | End: 2021-10-05 | Stop reason: HOSPADM

## 2021-09-24 RX ORDER — ASPIRIN 81 MG/1
81 TABLET ORAL DAILY
Status: DISCONTINUED | OUTPATIENT
Start: 2021-09-24 | End: 2021-10-05 | Stop reason: HOSPADM

## 2021-09-24 RX ORDER — DIPHENHYDRAMINE HCL 50 MG
50 CAPSULE ORAL ONCE AS NEEDED
Status: DISCONTINUED | OUTPATIENT
Start: 2021-09-24 | End: 2021-10-05 | Stop reason: HOSPADM

## 2021-09-24 RX ORDER — METOPROLOL SUCCINATE 50 MG/1
50 TABLET, EXTENDED RELEASE ORAL DAILY
Status: CANCELLED | OUTPATIENT
Start: 2021-09-25

## 2021-09-24 RX ORDER — SODIUM CHLORIDE 9 MG/ML
30 INJECTION, SOLUTION INTRAVENOUS ONCE
Status: COMPLETED | OUTPATIENT
Start: 2021-09-24 | End: 2021-09-24

## 2021-09-24 RX ORDER — HEPARIN SODIUM 5000 [USP'U]/ML
5000 INJECTION, SOLUTION INTRAVENOUS; SUBCUTANEOUS EVERY 8 HOURS SCHEDULED
Status: DISCONTINUED | OUTPATIENT
Start: 2021-09-24 | End: 2021-09-27

## 2021-09-24 RX ORDER — METHYLPREDNISOLONE SODIUM SUCCINATE 125 MG/2ML
125 INJECTION, POWDER, LYOPHILIZED, FOR SOLUTION INTRAMUSCULAR; INTRAVENOUS ONCE AS NEEDED
Status: DISCONTINUED | OUTPATIENT
Start: 2021-09-24 | End: 2021-10-05 | Stop reason: HOSPADM

## 2021-09-24 RX ORDER — HYDROCHLOROTHIAZIDE 25 MG/1
25 TABLET ORAL DAILY
Status: CANCELLED | OUTPATIENT
Start: 2021-09-25

## 2021-09-24 RX ORDER — DOCUSATE SODIUM 100 MG/1
200 CAPSULE, LIQUID FILLED ORAL 2 TIMES DAILY PRN
Status: DISCONTINUED | OUTPATIENT
Start: 2021-09-24 | End: 2021-10-05 | Stop reason: HOSPADM

## 2021-09-24 RX ORDER — ASPIRIN 81 MG/1
324 TABLET, CHEWABLE ORAL ONCE
Status: COMPLETED | OUTPATIENT
Start: 2021-09-24 | End: 2021-09-24

## 2021-09-24 RX ORDER — GABAPENTIN 300 MG/1
300 CAPSULE ORAL 2 TIMES DAILY
Status: CANCELLED | OUTPATIENT
Start: 2021-09-24

## 2021-09-24 RX ORDER — SODIUM CHLORIDE 0.9 % (FLUSH) 0.9 %
10 SYRINGE (ML) INJECTION AS NEEDED
Status: DISCONTINUED | OUTPATIENT
Start: 2021-09-24 | End: 2021-10-05 | Stop reason: HOSPADM

## 2021-09-24 RX ORDER — SODIUM CHLORIDE 0.9 % (FLUSH) 0.9 %
10 SYRINGE (ML) INJECTION AS NEEDED
Status: CANCELLED | OUTPATIENT
Start: 2021-09-24

## 2021-09-24 RX ORDER — OXYCODONE AND ACETAMINOPHEN 10; 325 MG/1; MG/1
0.5 TABLET ORAL 2 TIMES DAILY PRN
Status: DISCONTINUED | OUTPATIENT
Start: 2021-09-24 | End: 2021-10-05 | Stop reason: HOSPADM

## 2021-09-24 RX ORDER — ATORVASTATIN CALCIUM 40 MG/1
40 TABLET, FILM COATED ORAL NIGHTLY
Status: DISCONTINUED | OUTPATIENT
Start: 2021-09-24 | End: 2021-10-05 | Stop reason: HOSPADM

## 2021-09-24 RX ORDER — NITROGLYCERIN 0.4 MG/1
0.4 TABLET SUBLINGUAL
Status: DISCONTINUED | OUTPATIENT
Start: 2021-09-24 | End: 2021-10-05 | Stop reason: HOSPADM

## 2021-09-24 RX ORDER — BUMETANIDE 1 MG/1
1 TABLET ORAL DAILY
Status: CANCELLED | OUTPATIENT
Start: 2021-09-25

## 2021-09-24 RX ORDER — PANTOPRAZOLE SODIUM 40 MG/1
40 TABLET, DELAYED RELEASE ORAL DAILY
Status: DISCONTINUED | OUTPATIENT
Start: 2021-09-25 | End: 2021-10-05 | Stop reason: HOSPADM

## 2021-09-24 RX ORDER — LEVOTHYROXINE SODIUM 0.1 MG/1
100 TABLET ORAL NIGHTLY
Status: DISCONTINUED | OUTPATIENT
Start: 2021-09-24 | End: 2021-10-05 | Stop reason: HOSPADM

## 2021-09-24 RX ADMIN — LEVOTHYROXINE SODIUM 100 MCG: 100 TABLET ORAL at 23:37

## 2021-09-24 RX ADMIN — HEPARIN SODIUM 5000 UNITS: 5000 INJECTION INTRAVENOUS; SUBCUTANEOUS at 21:19

## 2021-09-24 RX ADMIN — ASPIRIN 324 MG: 81 TABLET, CHEWABLE ORAL at 17:12

## 2021-09-24 RX ADMIN — CASIRIVIMAB AND IMDEVIMAB: 600; 600 INJECTION, SOLUTION, CONCENTRATE INTRAVENOUS at 21:20

## 2021-09-24 RX ADMIN — ASPIRIN 81 MG: 81 TABLET, COATED ORAL at 21:19

## 2021-09-24 RX ADMIN — SODIUM CHLORIDE 750 ML: 9 INJECTION, SOLUTION INTRAVENOUS at 17:02

## 2021-09-24 RX ADMIN — SODIUM CHLORIDE 30 ML: 9 INJECTION, SOLUTION INTRAVENOUS at 22:53

## 2021-09-24 RX ADMIN — ATORVASTATIN CALCIUM 40 MG: 40 TABLET, FILM COATED ORAL at 21:19

## 2021-09-24 RX ADMIN — SODIUM CHLORIDE, PRESERVATIVE FREE 10 ML: 5 INJECTION INTRAVENOUS at 21:24

## 2021-09-24 RX ADMIN — SODIUM CHLORIDE 2 G: 9 INJECTION, SOLUTION INTRAVENOUS at 17:07

## 2021-09-24 NOTE — OUTREACH NOTE
Ambulatory Case Management Note    Patient Outreach    RN-MIMI spoke with patient's daughter/cg who reports patient is still not doing well at all. Cg reports patient is weak and shaky. ACM asked about urination, cg reports patient has not been able to pass urine since yesterday. Patient recently cath'd in the ED but was not anchored. SW in the ED worked on orders for Home Health, an order from Dr. Pollard's office on 9/22 notes the referral was placed to [facility]. Cg reports she was at Dr. Pollard's office this morning and also been on the phone with HH, states PCP office told her they are working on it and HH told her they have to get the order from PCP. Cg expresses frustration, states pt is miserable and she does not know what to do. ACM offered to assist in facilitating the order with a phone call to the provider's office, cg agreeable, states she appreciates any help.Cg denies any preference in HH agencies, is fine with [facility] if available. ACM discussed the need for patient to either pass urine on her own or be cath'd before end of day as she's had no output since yesterday, advised cg that if HH is unable to come today patient will need to return to the ED, cg v/u.    Care Coordination    ACM left a secure voicemail for Dr. Pollard's PCC, advising of pt's reported s/s and that HH has not received an order or been out to eval and treat. Requested a return communication.     RALPH also spoke with Jennifer Yun RN, leadership with [facility] team to advise of the same. ACM reported that per cg, they live on the [place] side of the [facility] line and advised that cg has no HH preference, and it may be worthwhile to send an order/referral to [facility] to establish care today. Jennifer noted all information provided by [facility] and agreed patient will need action today asa, states she will speak with Tonja Tafoya RN in case mgmt and have her work on this immediately. MIMI  encouraged Jennifer/Maxine team to communicate any need for assistance to ACM so that this patient's care may be expedited fully.    Patient Outreach    ACM made a second call to patient's cg to update her on the progress made toward getting HH out today for eval/treat and cath, cg v/u, and states she appreciates the assistance. ACM cautioned of the risks to patient with prolonged anuria including sepsis, instructed cg that if patient's condition worsens or if HH is unable to make a visit today, patient will require an ED visit, cg voiced understanding and will monitor patient closely.    Care Plan: Heart Failure   Updates made since 9/24/2021 12:00 AM      Problem: HEART FAILURE DIAGNOSIS KNOWLEDGE DEFICIT       Goal: Patient will verbalize understanding of how heart failure affects the body       Task: Educate patient on the mechanics of heart failure    Responsible User: Naya Simons RN      Task: Educate patient on ejection fraction and its importance as it pertains to the heart failure diagnosis (HFrEF or HFpEF)    Responsible User: Naya Simons RN      Task: Encourage patient to read the provided handouts on Heart Failure    Responsible User: Naya Simons RN      Task: Visit the American [employer] Association's website at https://www.heart.org/en/health-topics for more information about heart disease    Responsible User: Naya Simons RN      Goal: Patient will verbalize understanding of health maintenance       Task: Educate patient on importance of treating and maintaining other conditions Completed 9/24/2021   Responsible User: Naya Simons RN      Problem: FLUID RETENTION/OVERLOAD       Goal: Patient will be able to identify signs and symptoms of fluid retention       Task: Educate patient on daily weight tracking and signs of extremity edema    Responsible User: Naya Simons RN      Task: Educate patient on fluid management & how too little or too much affects the heart    Responsible User:  Naya Simons RN      Task: Instruct patient to take weight every day and call provider for weight gain of 2 lbs overnight or 5 lbs in a week    Responsible User: Naya Simons RN      Task: Instruct patient to do a daily self-check for symptoms of extra fluid (shortness of breath, weakness, trouble sleeping, body swelling, or dizziness)    Responsible User: Naya Simons RN      Task: Educate patient on use of stoplight tool and when to call Cardiology or Primary Care Physician    Responsible User: Naya Simons RN      Problem: SODIUM INTAKE       Goal: Patient will maintain management of sodium consumption       Task: Educate patient on daily sodium intake & how sodium affects the heart    Responsible User: Naya Simons RN      Task: Instruct patient to eat less salt and watch fluids. No added salt or no more than 2 grams (2000mgs) of sodium or 2 liters of fluid in a 24-hour period, or follow provider's specific recommendations.    Responsible User: Naya Simons RN      Task: Educate patient how to read a nutrition label pointing out servings and serving size    Responsible User: Naya Simons RN      Task: Educate patient on sodium alternatives    Responsible User: Naya Simons RN      Problem: HEART FAILURE MEDICATION ADHERENCE Resolved 9/24/2021      Goal: Consistently take heart failure medication as prescribed Completed 9/24/2021      Task: Instruct patient to take all medications as prescribed to help reduce and control symptoms and to call provider for any side effects that prevent taking of medications Completed 9/24/2021   Responsible User: Naya Simons RN      Task: Emphasize the importance of medication adherence Completed 9/24/2021   Responsible User: Naya Simons RN      Task: Discuss barriers to medication adherence with patient Completed 9/24/2021   Responsible User: Naya Simons RN      Task: Provide medication education on most common medications for heart failure  Completed 9/24/2021   Responsible User: Naya Simons RN      Task: Discuss ways to manage medications (organization & remembering). Assist patient in setting up daily reminders for meds. Completed 9/24/2021   Responsible User: Naya Simons RN      Task: Educate on possible common side effects and importance of reporting them to care provider Completed 9/24/2021   Responsible User: Naya Simons RN      Task: Provide education on frequency and refill details of meds and to call for refills 2 weeks before running out Completed 9/24/2021   Responsible User: Naya Simons RN      Problem: EXERCISE REGIMEN       Goal: Patient will engage in physical activity safely       Task: Educate patient on benefits of exercise for heart failure    Responsible User: Naya Simons RN      Task: Educate patient on maintaining safety during exercise activity    Responsible User: Naya Simons RN      Task: Educate patient on sexual activity precautions and danger signs    Responsible User: Naya Simons RN      Task: Educate regarding gradually increasing activity to 20 minutes a day    Responsible User: Naya Simons RN      Task: Educate patient on maintaining safety during exercise activity; stop if shortness of breath, dizziness, or chest pain develops    Responsible User: Naya Simons RN      Problem: HEART FAILURE MAINTENANCE       Goal: Patient will not experience any symptoms of shortness of breath or body swelling over the next 3 months       Task: Work with provider to create an action plan for monitoring and managing CHF Completed 9/24/2021   Responsible User: Naya Simons RN      Task: Schedule regular provider visits for close monitoring of CHF. If patient has not seen PCP within the past 6 months, schedule a follow up appointment Completed 9/24/2021   Responsible User: Naya Simons RN      Task: Educate patient to monitor symptoms and weight changes, restrict sodium intake, take medications as  prescribed, and stay physically active    Responsible User: Naya Simons RN      Task: Educate patient on smoking cessation Completed 9/24/2021   Responsible User: Naya Simons RN      Problem: HEART FAILURE PROGRESSION AND CARE NEEDS       Goal: Patient will verbalize understanding of heart failure progression       Task: Educate patient on stages of heart failure    Responsible User: Naya Simons RN      Task: Educate patient regarding symptoms with end stage heart failure    Responsible User: Naya Simons RN      Task: Educate patient on potential treatments for each stage    Responsible User: Naya Simons RN      Task: Educate patient on trajectory of chronic illness    Responsible User: Naya Simons RN      Task: Evaluate readiness for advanced care planning and Utilize ACP tools within Epic for discussion and documentation    Responsible User: Naya Simons RN      Task: Educate on palliative care and hospice    Responsible User: Naya Simons RN Debra Beverly, RN  Ambulatory Case Management    9/24/2021, 11:47 EDT

## 2021-09-25 ENCOUNTER — APPOINTMENT (OUTPATIENT)
Dept: GENERAL RADIOLOGY | Facility: HOSPITAL | Age: 86
End: 2021-09-25

## 2021-09-25 ENCOUNTER — APPOINTMENT (OUTPATIENT)
Dept: CARDIOLOGY | Facility: HOSPITAL | Age: 86
End: 2021-09-25

## 2021-09-25 LAB
ALBUMIN SERPL-MCNC: 2.79 G/DL (ref 3.5–5.2)
ALBUMIN/GLOB SERPL: 0.9 G/DL
ALP SERPL-CCNC: 80 U/L (ref 39–117)
ALT SERPL W P-5'-P-CCNC: 7 U/L (ref 1–33)
ANION GAP SERPL CALCULATED.3IONS-SCNC: 15.7 MMOL/L (ref 5–15)
ANISOCYTOSIS BLD QL: ABNORMAL
AST SERPL-CCNC: 16 U/L (ref 1–32)
BACTERIA BLD CULT: ABNORMAL
BH CV ECHO MEAS - % IVS THICK: 8.2 %
BH CV ECHO MEAS - % LVPW THICK: 25.9 %
BH CV ECHO MEAS - ACS: 1.8 CM
BH CV ECHO MEAS - AO MAX PG: 7.6 MMHG
BH CV ECHO MEAS - AO MEAN PG: 4 MMHG
BH CV ECHO MEAS - AO ROOT AREA (BSA CORRECTED): 1.7
BH CV ECHO MEAS - AO ROOT AREA: 7.5 CM^2
BH CV ECHO MEAS - AO ROOT DIAM: 3.1 CM
BH CV ECHO MEAS - AO V2 MAX: 138 CM/SEC
BH CV ECHO MEAS - AO V2 MEAN: 93.3 CM/SEC
BH CV ECHO MEAS - AO V2 VTI: 24 CM
BH CV ECHO MEAS - BSA(HAYCOCK): 1.9 M^2
BH CV ECHO MEAS - BSA: 1.8 M^2
BH CV ECHO MEAS - BZI_BMI: 29.2 KILOGRAMS/M^2
BH CV ECHO MEAS - BZI_METRIC_HEIGHT: 162.6 CM
BH CV ECHO MEAS - BZI_METRIC_WEIGHT: 77.1 KG
BH CV ECHO MEAS - EDV(CUBED): 67.9 ML
BH CV ECHO MEAS - EDV(MOD-SP4): 60.7 ML
BH CV ECHO MEAS - EDV(TEICH): 73.4 ML
BH CV ECHO MEAS - EF(CUBED): 77.9 %
BH CV ECHO MEAS - EF(MOD-SP4): 64.7 %
BH CV ECHO MEAS - EF(TEICH): 70.6 %
BH CV ECHO MEAS - ESV(CUBED): 15 ML
BH CV ECHO MEAS - ESV(MOD-SP4): 21.4 ML
BH CV ECHO MEAS - ESV(TEICH): 21.6 ML
BH CV ECHO MEAS - FS: 39.6 %
BH CV ECHO MEAS - IVS/LVPW: 0.79
BH CV ECHO MEAS - IVSD: 1.3 CM
BH CV ECHO MEAS - IVSS: 1.4 CM
BH CV ECHO MEAS - LA DIMENSION: 4.7 CM
BH CV ECHO MEAS - LA/AO: 1.5
BH CV ECHO MEAS - LV DIASTOLIC VOL/BSA (35-75): 33.3 ML/M^2
BH CV ECHO MEAS - LV MASS(C)D: 226.4 GRAMS
BH CV ECHO MEAS - LV MASS(C)DI: 124 GRAMS/M^2
BH CV ECHO MEAS - LV MASS(C)S: 157.7 GRAMS
BH CV ECHO MEAS - LV MASS(C)SI: 86.4 GRAMS/M^2
BH CV ECHO MEAS - LV SYSTOLIC VOL/BSA (12-30): 11.7 ML/M^2
BH CV ECHO MEAS - LVIDD: 4.1 CM
BH CV ECHO MEAS - LVIDS: 2.5 CM
BH CV ECHO MEAS - LVLD AP4: 6.5 CM
BH CV ECHO MEAS - LVLS AP4: 5.6 CM
BH CV ECHO MEAS - LVOT AREA (M): 3.1 CM^2
BH CV ECHO MEAS - LVOT AREA: 3.1 CM^2
BH CV ECHO MEAS - LVOT DIAM: 2 CM
BH CV ECHO MEAS - LVPWD: 1.6 CM
BH CV ECHO MEAS - LVPWS: 2 CM
BH CV ECHO MEAS - MV E MAX VEL: 113 CM/SEC
BH CV ECHO MEAS - PA ACC TIME: 0.11 SEC
BH CV ECHO MEAS - PA PR(ACCEL): 30 MMHG
BH CV ECHO MEAS - RAP SYSTOLE: 10 MMHG
BH CV ECHO MEAS - RVSP: 34.2 MMHG
BH CV ECHO MEAS - SI(AO): 99.2 ML/M^2
BH CV ECHO MEAS - SI(CUBED): 29 ML/M^2
BH CV ECHO MEAS - SI(MOD-SP4): 21.5 ML/M^2
BH CV ECHO MEAS - SI(TEICH): 28.4 ML/M^2
BH CV ECHO MEAS - SV(AO): 181.1 ML
BH CV ECHO MEAS - SV(CUBED): 52.9 ML
BH CV ECHO MEAS - SV(MOD-SP4): 39.3 ML
BH CV ECHO MEAS - SV(TEICH): 51.8 ML
BH CV ECHO MEAS - TR MAX VEL: 246 CM/SEC
BILIRUB SERPL-MCNC: 1.2 MG/DL (ref 0–1.2)
BOTTLE TYPE: ABNORMAL
BUN SERPL-MCNC: 39 MG/DL (ref 8–23)
BUN/CREAT SERPL: 16 (ref 7–25)
CALCIUM SPEC-SCNC: 8.4 MG/DL (ref 8.6–10.5)
CHLORIDE SERPL-SCNC: 100 MMOL/L (ref 98–107)
CO2 SERPL-SCNC: 19.3 MMOL/L (ref 22–29)
CREAT SERPL-MCNC: 2.44 MG/DL (ref 0.57–1)
D DIMER PPP FEU-MCNC: 1.36 MCGFEU/ML (ref 0–0.5)
DEPRECATED RDW RBC AUTO: 51.1 FL (ref 37–54)
ERYTHROCYTE [DISTWIDTH] IN BLOOD BY AUTOMATED COUNT: 16.1 % (ref 12.3–15.4)
GFR SERPL CREATININE-BSD FRML MDRD: 19 ML/MIN/1.73
GLOBULIN UR ELPH-MCNC: 3.1 GM/DL
GLUCOSE SERPL-MCNC: 139 MG/DL (ref 65–99)
HCT VFR BLD AUTO: 25.2 % (ref 34–46.6)
HGB BLD-MCNC: 8.4 G/DL (ref 12–15.9)
LYMPHOCYTES # BLD MANUAL: 0.35 10*3/MM3 (ref 0.7–3.1)
LYMPHOCYTES NFR BLD MANUAL: 2 % (ref 19.6–45.3)
LYMPHOCYTES NFR BLD MANUAL: 4 % (ref 5–12)
MAGNESIUM SERPL-MCNC: 1.6 MG/DL (ref 1.6–2.4)
MAXIMAL PREDICTED HEART RATE: 133 BPM
MCH RBC QN AUTO: 29.7 PG (ref 26.6–33)
MCHC RBC AUTO-ENTMCNC: 33.3 G/DL (ref 31.5–35.7)
MCV RBC AUTO: 89 FL (ref 79–97)
METAMYELOCYTES NFR BLD MANUAL: 1 % (ref 0–0)
MONOCYTES # BLD AUTO: 0.71 10*3/MM3 (ref 0.1–0.9)
MYELOCYTES NFR BLD MANUAL: 1 % (ref 0–0)
NEUTROPHILS # BLD AUTO: 16.25 10*3/MM3 (ref 1.7–7)
NEUTROPHILS NFR BLD MANUAL: 92 % (ref 42.7–76)
PLAT MORPH BLD: NORMAL
PLATELET # BLD AUTO: 262 10*3/MM3 (ref 140–450)
PMV BLD AUTO: 11.5 FL (ref 6–12)
POTASSIUM SERPL-SCNC: 3.3 MMOL/L (ref 3.5–5.2)
PROT SERPL-MCNC: 5.9 G/DL (ref 6–8.5)
QT INTERVAL: 326 MS
QT INTERVAL: 366 MS
QTC INTERVAL: 405 MS
QTC INTERVAL: 422 MS
RBC # BLD AUTO: 2.83 10*6/MM3 (ref 3.77–5.28)
SODIUM SERPL-SCNC: 135 MMOL/L (ref 136–145)
STRESS TARGET HR: 113 BPM
TSH SERPL DL<=0.05 MIU/L-ACNC: 0.49 UIU/ML (ref 0.27–4.2)
WBC # BLD AUTO: 17.66 10*3/MM3 (ref 3.4–10.8)

## 2021-09-25 PROCEDURE — 85007 BL SMEAR W/DIFF WBC COUNT: CPT | Performed by: INTERNAL MEDICINE

## 2021-09-25 PROCEDURE — 25010000002 HEPARIN (PORCINE) PER 1000 UNITS: Performed by: INTERNAL MEDICINE

## 2021-09-25 PROCEDURE — 85025 COMPLETE CBC W/AUTO DIFF WBC: CPT | Performed by: INTERNAL MEDICINE

## 2021-09-25 PROCEDURE — 80053 COMPREHEN METABOLIC PANEL: CPT | Performed by: INTERNAL MEDICINE

## 2021-09-25 PROCEDURE — 73060 X-RAY EXAM OF HUMERUS: CPT

## 2021-09-25 PROCEDURE — 85379 FIBRIN DEGRADATION QUANT: CPT | Performed by: INTERNAL MEDICINE

## 2021-09-25 PROCEDURE — 93306 TTE W/DOPPLER COMPLETE: CPT

## 2021-09-25 PROCEDURE — 73090 X-RAY EXAM OF FOREARM: CPT

## 2021-09-25 PROCEDURE — 99233 SBSQ HOSP IP/OBS HIGH 50: CPT | Performed by: INTERNAL MEDICINE

## 2021-09-25 PROCEDURE — 84443 ASSAY THYROID STIM HORMONE: CPT | Performed by: INTERNAL MEDICINE

## 2021-09-25 PROCEDURE — 25010000002 CEFTRIAXONE PER 250 MG: Performed by: INTERNAL MEDICINE

## 2021-09-25 PROCEDURE — 83735 ASSAY OF MAGNESIUM: CPT | Performed by: INTERNAL MEDICINE

## 2021-09-25 RX ORDER — SODIUM CHLORIDE, SODIUM LACTATE, POTASSIUM CHLORIDE, CALCIUM CHLORIDE 600; 310; 30; 20 MG/100ML; MG/100ML; MG/100ML; MG/100ML
75 INJECTION, SOLUTION INTRAVENOUS CONTINUOUS
Status: ACTIVE | OUTPATIENT
Start: 2021-09-25 | End: 2021-09-26

## 2021-09-25 RX ORDER — POTASSIUM CHLORIDE 20 MEQ/1
40 TABLET, EXTENDED RELEASE ORAL EVERY 4 HOURS
Status: COMPLETED | OUTPATIENT
Start: 2021-09-25 | End: 2021-09-25

## 2021-09-25 RX ORDER — MAGNESIUM SULFATE HEPTAHYDRATE 40 MG/ML
2 INJECTION, SOLUTION INTRAVENOUS AS NEEDED
Status: DISCONTINUED | OUTPATIENT
Start: 2021-09-25 | End: 2021-10-05 | Stop reason: HOSPADM

## 2021-09-25 RX ORDER — POTASSIUM CHLORIDE 20 MEQ/1
40 TABLET, EXTENDED RELEASE ORAL AS NEEDED
Status: DISCONTINUED | OUTPATIENT
Start: 2021-09-25 | End: 2021-10-05 | Stop reason: HOSPADM

## 2021-09-25 RX ORDER — POTASSIUM CHLORIDE 1.5 G/1.77G
40 POWDER, FOR SOLUTION ORAL AS NEEDED
Status: DISCONTINUED | OUTPATIENT
Start: 2021-09-25 | End: 2021-10-05 | Stop reason: HOSPADM

## 2021-09-25 RX ORDER — MAGNESIUM SULFATE HEPTAHYDRATE 40 MG/ML
4 INJECTION, SOLUTION INTRAVENOUS AS NEEDED
Status: DISCONTINUED | OUTPATIENT
Start: 2021-09-25 | End: 2021-10-05 | Stop reason: HOSPADM

## 2021-09-25 RX ADMIN — ASPIRIN 81 MG: 81 TABLET, COATED ORAL at 08:36

## 2021-09-25 RX ADMIN — POTASSIUM CHLORIDE 40 MEQ: 20 TABLET, EXTENDED RELEASE ORAL at 13:57

## 2021-09-25 RX ADMIN — SODIUM CHLORIDE, POTASSIUM CHLORIDE, SODIUM LACTATE AND CALCIUM CHLORIDE 75 ML/HR: 600; 310; 30; 20 INJECTION, SOLUTION INTRAVENOUS at 20:43

## 2021-09-25 RX ADMIN — POTASSIUM CHLORIDE 40 MEQ: 20 TABLET, EXTENDED RELEASE ORAL at 08:36

## 2021-09-25 RX ADMIN — PANTOPRAZOLE SODIUM 40 MG: 40 TABLET, DELAYED RELEASE ORAL at 08:36

## 2021-09-25 RX ADMIN — SODIUM CHLORIDE 2 G: 9 INJECTION, SOLUTION INTRAVENOUS at 08:37

## 2021-09-25 RX ADMIN — SODIUM CHLORIDE, PRESERVATIVE FREE 10 ML: 5 INJECTION INTRAVENOUS at 20:46

## 2021-09-25 RX ADMIN — HEPARIN SODIUM 5000 UNITS: 5000 INJECTION INTRAVENOUS; SUBCUTANEOUS at 06:22

## 2021-09-25 RX ADMIN — ATORVASTATIN CALCIUM 40 MG: 40 TABLET, FILM COATED ORAL at 20:45

## 2021-09-25 RX ADMIN — HEPARIN SODIUM 5000 UNITS: 5000 INJECTION INTRAVENOUS; SUBCUTANEOUS at 13:59

## 2021-09-25 RX ADMIN — HEPARIN SODIUM 5000 UNITS: 5000 INJECTION INTRAVENOUS; SUBCUTANEOUS at 20:45

## 2021-09-25 RX ADMIN — LEVOTHYROXINE SODIUM 100 MCG: 100 TABLET ORAL at 20:45

## 2021-09-25 RX ADMIN — OXYCODONE HYDROCHLORIDE AND ACETAMINOPHEN 0.5 TABLET: 10; 325 TABLET ORAL at 12:26

## 2021-09-25 RX ADMIN — SODIUM CHLORIDE, PRESERVATIVE FREE 10 ML: 5 INJECTION INTRAVENOUS at 08:37

## 2021-09-26 LAB
ALBUMIN SERPL-MCNC: 2.4 G/DL (ref 3.5–5.2)
ALBUMIN/GLOB SERPL: 0.7 G/DL
ALP SERPL-CCNC: 68 U/L (ref 39–117)
ALT SERPL W P-5'-P-CCNC: 6 U/L (ref 1–33)
ANION GAP SERPL CALCULATED.3IONS-SCNC: 12.8 MMOL/L (ref 5–15)
ANISOCYTOSIS BLD QL: ABNORMAL
AST SERPL-CCNC: 16 U/L (ref 1–32)
BACTERIA UR QL AUTO: ABNORMAL /HPF
BILIRUB SERPL-MCNC: 0.6 MG/DL (ref 0–1.2)
BILIRUB UR QL STRIP: NEGATIVE
BUN SERPL-MCNC: 40 MG/DL (ref 8–23)
BUN/CREAT SERPL: 23.4 (ref 7–25)
CALCIUM SPEC-SCNC: 8.5 MG/DL (ref 8.6–10.5)
CHLORIDE SERPL-SCNC: 103 MMOL/L (ref 98–107)
CLARITY UR: ABNORMAL
CO2 SERPL-SCNC: 18.2 MMOL/L (ref 22–29)
COLOR UR: ABNORMAL
CREAT SERPL-MCNC: 1.71 MG/DL (ref 0.57–1)
DACRYOCYTES BLD QL SMEAR: ABNORMAL
DEPRECATED RDW RBC AUTO: 54.6 FL (ref 37–54)
ERYTHROCYTE [DISTWIDTH] IN BLOOD BY AUTOMATED COUNT: 16.4 % (ref 12.3–15.4)
GFR SERPL CREATININE-BSD FRML MDRD: 28 ML/MIN/1.73
GLOBULIN UR ELPH-MCNC: 3.5 GM/DL
GLUCOSE SERPL-MCNC: 111 MG/DL (ref 65–99)
GLUCOSE UR STRIP-MCNC: NEGATIVE MG/DL
HCT VFR BLD AUTO: 24.1 % (ref 34–46.6)
HGB BLD-MCNC: 7.6 G/DL (ref 12–15.9)
HGB UR QL STRIP.AUTO: ABNORMAL
HYALINE CASTS UR QL AUTO: ABNORMAL /LPF
KETONES UR QL STRIP: NEGATIVE
LEUKOCYTE ESTERASE UR QL STRIP.AUTO: ABNORMAL
LYMPHOCYTES # BLD MANUAL: 0.43 10*3/MM3 (ref 0.7–3.1)
LYMPHOCYTES NFR BLD MANUAL: 3 % (ref 5–12)
LYMPHOCYTES NFR BLD MANUAL: 4 % (ref 19.6–45.3)
MAGNESIUM SERPL-MCNC: 1.7 MG/DL (ref 1.6–2.4)
MCH RBC QN AUTO: 29.1 PG (ref 26.6–33)
MCHC RBC AUTO-ENTMCNC: 31.5 G/DL (ref 31.5–35.7)
MCV RBC AUTO: 92.3 FL (ref 79–97)
MONOCYTES # BLD AUTO: 0.33 10*3/MM3 (ref 0.1–0.9)
NEUTROPHILS # BLD AUTO: 10.1 10*3/MM3 (ref 1.7–7)
NEUTROPHILS NFR BLD MANUAL: 93 % (ref 42.7–76)
NITRITE UR QL STRIP: POSITIVE
PH UR STRIP.AUTO: 5.5 [PH] (ref 5–8)
PLAT MORPH BLD: NORMAL
PLATELET # BLD AUTO: 194 10*3/MM3 (ref 140–450)
PMV BLD AUTO: 11.3 FL (ref 6–12)
POIKILOCYTOSIS BLD QL SMEAR: ABNORMAL
POTASSIUM SERPL-SCNC: 4.6 MMOL/L (ref 3.5–5.2)
PROT SERPL-MCNC: 5.9 G/DL (ref 6–8.5)
PROT UR QL STRIP: ABNORMAL
RBC # BLD AUTO: 2.61 10*6/MM3 (ref 3.77–5.28)
RBC # UR: ABNORMAL /HPF
REF LAB TEST METHOD: ABNORMAL
SCAN SLIDE: NORMAL
SODIUM SERPL-SCNC: 134 MMOL/L (ref 136–145)
SP GR UR STRIP: 1.01 (ref 1–1.03)
SQUAMOUS #/AREA URNS HPF: ABNORMAL /HPF
UROBILINOGEN UR QL STRIP: ABNORMAL
WBC # BLD AUTO: 10.86 10*3/MM3 (ref 3.4–10.8)
WBC UR QL AUTO: ABNORMAL /HPF

## 2021-09-26 PROCEDURE — 83735 ASSAY OF MAGNESIUM: CPT | Performed by: PHYSICIAN ASSISTANT

## 2021-09-26 PROCEDURE — 25010000002 CEFTRIAXONE PER 250 MG: Performed by: INTERNAL MEDICINE

## 2021-09-26 PROCEDURE — 81001 URINALYSIS AUTO W/SCOPE: CPT | Performed by: INTERNAL MEDICINE

## 2021-09-26 PROCEDURE — 99233 SBSQ HOSP IP/OBS HIGH 50: CPT | Performed by: INTERNAL MEDICINE

## 2021-09-26 PROCEDURE — 94799 UNLISTED PULMONARY SVC/PX: CPT

## 2021-09-26 PROCEDURE — 85007 BL SMEAR W/DIFF WBC COUNT: CPT | Performed by: INTERNAL MEDICINE

## 2021-09-26 PROCEDURE — 80053 COMPREHEN METABOLIC PANEL: CPT | Performed by: INTERNAL MEDICINE

## 2021-09-26 PROCEDURE — 25010000002 HEPARIN (PORCINE) PER 1000 UNITS: Performed by: INTERNAL MEDICINE

## 2021-09-26 PROCEDURE — 85025 COMPLETE CBC W/AUTO DIFF WBC: CPT | Performed by: INTERNAL MEDICINE

## 2021-09-26 PROCEDURE — 87086 URINE CULTURE/COLONY COUNT: CPT | Performed by: INTERNAL MEDICINE

## 2021-09-26 PROCEDURE — 25010000003 MAGNESIUM SULFATE 4 GM/100ML SOLUTION: Performed by: INTERNAL MEDICINE

## 2021-09-26 RX ORDER — MAGNESIUM SULFATE HEPTAHYDRATE 40 MG/ML
4 INJECTION, SOLUTION INTRAVENOUS ONCE
Status: COMPLETED | OUTPATIENT
Start: 2021-09-26 | End: 2021-09-26

## 2021-09-26 RX ADMIN — SODIUM CHLORIDE 2 G: 9 INJECTION, SOLUTION INTRAVENOUS at 05:30

## 2021-09-26 RX ADMIN — LEVOTHYROXINE SODIUM 100 MCG: 100 TABLET ORAL at 20:55

## 2021-09-26 RX ADMIN — MAGNESIUM SULFATE HEPTAHYDRATE 4 G: 40 INJECTION, SOLUTION INTRAVENOUS at 17:01

## 2021-09-26 RX ADMIN — ASPIRIN 81 MG: 81 TABLET, COATED ORAL at 08:21

## 2021-09-26 RX ADMIN — HEPARIN SODIUM 5000 UNITS: 5000 INJECTION INTRAVENOUS; SUBCUTANEOUS at 20:55

## 2021-09-26 RX ADMIN — METOPROLOL TARTRATE 12.5 MG: 25 TABLET, FILM COATED ORAL at 17:02

## 2021-09-26 RX ADMIN — ATORVASTATIN CALCIUM 40 MG: 40 TABLET, FILM COATED ORAL at 20:55

## 2021-09-26 RX ADMIN — SODIUM CHLORIDE, PRESERVATIVE FREE 10 ML: 5 INJECTION INTRAVENOUS at 08:22

## 2021-09-26 RX ADMIN — OXYCODONE HYDROCHLORIDE AND ACETAMINOPHEN 0.5 TABLET: 10; 325 TABLET ORAL at 01:10

## 2021-09-26 RX ADMIN — HEPARIN SODIUM 5000 UNITS: 5000 INJECTION INTRAVENOUS; SUBCUTANEOUS at 05:29

## 2021-09-26 RX ADMIN — HEPARIN SODIUM 5000 UNITS: 5000 INJECTION INTRAVENOUS; SUBCUTANEOUS at 14:16

## 2021-09-26 RX ADMIN — SODIUM CHLORIDE, POTASSIUM CHLORIDE, SODIUM LACTATE AND CALCIUM CHLORIDE 75 ML/HR: 600; 310; 30; 20 INJECTION, SOLUTION INTRAVENOUS at 14:18

## 2021-09-26 RX ADMIN — PANTOPRAZOLE SODIUM 40 MG: 40 TABLET, DELAYED RELEASE ORAL at 08:21

## 2021-09-26 RX ADMIN — SODIUM CHLORIDE, PRESERVATIVE FREE 10 ML: 5 INJECTION INTRAVENOUS at 20:54

## 2021-09-27 LAB
ALBUMIN SERPL-MCNC: 2.64 G/DL (ref 3.5–5.2)
ALBUMIN/GLOB SERPL: 0.9 G/DL
ALP SERPL-CCNC: 64 U/L (ref 39–117)
ALT SERPL W P-5'-P-CCNC: 5 U/L (ref 1–33)
ANION GAP SERPL CALCULATED.3IONS-SCNC: 12.3 MMOL/L (ref 5–15)
AST SERPL-CCNC: 9 U/L (ref 1–32)
BACTERIA SPEC AEROBE CULT: ABNORMAL
BACTERIA SPEC AEROBE CULT: ABNORMAL
BACTERIA SPEC AEROBE CULT: NORMAL
BASOPHILS # BLD AUTO: 0.01 10*3/MM3 (ref 0–0.2)
BASOPHILS NFR BLD AUTO: 0.1 % (ref 0–1.5)
BILIRUB SERPL-MCNC: 0.5 MG/DL (ref 0–1.2)
BUN SERPL-MCNC: 29 MG/DL (ref 8–23)
BUN/CREAT SERPL: 23 (ref 7–25)
CALCIUM SPEC-SCNC: 8.6 MG/DL (ref 8.6–10.5)
CHLORIDE SERPL-SCNC: 103 MMOL/L (ref 98–107)
CO2 SERPL-SCNC: 21.7 MMOL/L (ref 22–29)
CREAT SERPL-MCNC: 1.26 MG/DL (ref 0.57–1)
D DIMER PPP FEU-MCNC: 1.35 MCGFEU/ML (ref 0–0.5)
DEPRECATED RDW RBC AUTO: 53 FL (ref 37–54)
EOSINOPHIL # BLD AUTO: 0.07 10*3/MM3 (ref 0–0.4)
EOSINOPHIL NFR BLD AUTO: 1 % (ref 0.3–6.2)
ERYTHROCYTE [DISTWIDTH] IN BLOOD BY AUTOMATED COUNT: 15.9 % (ref 12.3–15.4)
FERRITIN SERPL-MCNC: 174 NG/ML (ref 13–150)
GFR SERPL CREATININE-BSD FRML MDRD: 40 ML/MIN/1.73
GLOBULIN UR ELPH-MCNC: 3.1 GM/DL
GLUCOSE SERPL-MCNC: 94 MG/DL (ref 65–99)
GRAM STN SPEC: ABNORMAL
HCT VFR BLD AUTO: 22.6 % (ref 34–46.6)
HGB BLD-MCNC: 7.3 G/DL (ref 12–15.9)
IMM GRANULOCYTES # BLD AUTO: 0.07 10*3/MM3 (ref 0–0.05)
IMM GRANULOCYTES NFR BLD AUTO: 1 % (ref 0–0.5)
IRON 24H UR-MRATE: 40 MCG/DL (ref 37–145)
IRON SATN MFR SERPL: 20 % (ref 20–50)
ISOLATED FROM: ABNORMAL
ISOLATED FROM: ABNORMAL
LYMPHOCYTES # BLD AUTO: 0.44 10*3/MM3 (ref 0.7–3.1)
LYMPHOCYTES NFR BLD AUTO: 6.1 % (ref 19.6–45.3)
MAGNESIUM SERPL-MCNC: 1.7 MG/DL (ref 1.6–2.4)
MCH RBC QN AUTO: 29.7 PG (ref 26.6–33)
MCHC RBC AUTO-ENTMCNC: 32.3 G/DL (ref 31.5–35.7)
MCV RBC AUTO: 91.9 FL (ref 79–97)
MONOCYTES # BLD AUTO: 0.75 10*3/MM3 (ref 0.1–0.9)
MONOCYTES NFR BLD AUTO: 10.4 % (ref 5–12)
NEUTROPHILS NFR BLD AUTO: 5.9 10*3/MM3 (ref 1.7–7)
NEUTROPHILS NFR BLD AUTO: 81.4 % (ref 42.7–76)
NRBC BLD AUTO-RTO: 0 /100 WBC (ref 0–0.2)
PLATELET # BLD AUTO: 185 10*3/MM3 (ref 140–450)
PMV BLD AUTO: 11.8 FL (ref 6–12)
POTASSIUM SERPL-SCNC: 4.1 MMOL/L (ref 3.5–5.2)
PROT SERPL-MCNC: 5.7 G/DL (ref 6–8.5)
RBC # BLD AUTO: 2.46 10*6/MM3 (ref 3.77–5.28)
SODIUM SERPL-SCNC: 137 MMOL/L (ref 136–145)
TIBC SERPL-MCNC: 195 MCG/DL (ref 298–536)
TRANSFERRIN SERPL-MCNC: 131 MG/DL (ref 200–360)
WBC # BLD AUTO: 7.24 10*3/MM3 (ref 3.4–10.8)

## 2021-09-27 PROCEDURE — 84466 ASSAY OF TRANSFERRIN: CPT | Performed by: INTERNAL MEDICINE

## 2021-09-27 PROCEDURE — 97166 OT EVAL MOD COMPLEX 45 MIN: CPT

## 2021-09-27 PROCEDURE — 85025 COMPLETE CBC W/AUTO DIFF WBC: CPT | Performed by: INTERNAL MEDICINE

## 2021-09-27 PROCEDURE — 97163 PT EVAL HIGH COMPLEX 45 MIN: CPT

## 2021-09-27 PROCEDURE — 25010000003 MAGNESIUM SULFATE 4 GM/100ML SOLUTION: Performed by: INTERNAL MEDICINE

## 2021-09-27 PROCEDURE — 25010000002 CEFTRIAXONE PER 250 MG: Performed by: INTERNAL MEDICINE

## 2021-09-27 PROCEDURE — 99232 SBSQ HOSP IP/OBS MODERATE 35: CPT | Performed by: INTERNAL MEDICINE

## 2021-09-27 PROCEDURE — 82746 ASSAY OF FOLIC ACID SERUM: CPT | Performed by: INTERNAL MEDICINE

## 2021-09-27 PROCEDURE — 83540 ASSAY OF IRON: CPT | Performed by: INTERNAL MEDICINE

## 2021-09-27 PROCEDURE — 85379 FIBRIN DEGRADATION QUANT: CPT | Performed by: INTERNAL MEDICINE

## 2021-09-27 PROCEDURE — 82607 VITAMIN B-12: CPT | Performed by: INTERNAL MEDICINE

## 2021-09-27 PROCEDURE — 97535 SELF CARE MNGMENT TRAINING: CPT

## 2021-09-27 PROCEDURE — 94799 UNLISTED PULMONARY SVC/PX: CPT

## 2021-09-27 PROCEDURE — 25010000002 HEPARIN (PORCINE) PER 1000 UNITS: Performed by: INTERNAL MEDICINE

## 2021-09-27 PROCEDURE — 83735 ASSAY OF MAGNESIUM: CPT | Performed by: INTERNAL MEDICINE

## 2021-09-27 PROCEDURE — 97530 THERAPEUTIC ACTIVITIES: CPT

## 2021-09-27 PROCEDURE — 80053 COMPREHEN METABOLIC PANEL: CPT | Performed by: INTERNAL MEDICINE

## 2021-09-27 PROCEDURE — 82728 ASSAY OF FERRITIN: CPT | Performed by: INTERNAL MEDICINE

## 2021-09-27 RX ORDER — MAGNESIUM SULFATE HEPTAHYDRATE 40 MG/ML
4 INJECTION, SOLUTION INTRAVENOUS ONCE
Status: COMPLETED | OUTPATIENT
Start: 2021-09-27 | End: 2021-09-28

## 2021-09-27 RX ADMIN — METOPROLOL TARTRATE 12.5 MG: 25 TABLET, FILM COATED ORAL at 08:42

## 2021-09-27 RX ADMIN — METOPROLOL TARTRATE 12.5 MG: 25 TABLET, FILM COATED ORAL at 20:15

## 2021-09-27 RX ADMIN — Medication 1 APPLICATION: at 08:44

## 2021-09-27 RX ADMIN — HEPARIN SODIUM 5000 UNITS: 5000 INJECTION INTRAVENOUS; SUBCUTANEOUS at 06:18

## 2021-09-27 RX ADMIN — RIVAROXABAN 2.5 MG: 2.5 TABLET, FILM COATED ORAL at 23:12

## 2021-09-27 RX ADMIN — OXYCODONE HYDROCHLORIDE AND ACETAMINOPHEN 0.5 TABLET: 10; 325 TABLET ORAL at 09:38

## 2021-09-27 RX ADMIN — HEPARIN SODIUM 5000 UNITS: 5000 INJECTION INTRAVENOUS; SUBCUTANEOUS at 13:00

## 2021-09-27 RX ADMIN — MAGNESIUM SULFATE HEPTAHYDRATE 4 G: 40 INJECTION, SOLUTION INTRAVENOUS at 23:13

## 2021-09-27 RX ADMIN — LEVOTHYROXINE SODIUM 100 MCG: 100 TABLET ORAL at 20:15

## 2021-09-27 RX ADMIN — HEPARIN SODIUM 5000 UNITS: 5000 INJECTION INTRAVENOUS; SUBCUTANEOUS at 20:15

## 2021-09-27 RX ADMIN — SODIUM CHLORIDE 2 G: 9 INJECTION, SOLUTION INTRAVENOUS at 06:18

## 2021-09-27 RX ADMIN — PANTOPRAZOLE SODIUM 40 MG: 40 TABLET, DELAYED RELEASE ORAL at 08:42

## 2021-09-27 RX ADMIN — ATORVASTATIN CALCIUM 40 MG: 40 TABLET, FILM COATED ORAL at 20:15

## 2021-09-27 RX ADMIN — SODIUM CHLORIDE, PRESERVATIVE FREE 10 ML: 5 INJECTION INTRAVENOUS at 08:42

## 2021-09-27 RX ADMIN — ASPIRIN 81 MG: 81 TABLET, COATED ORAL at 08:42

## 2021-09-27 RX ADMIN — OXYCODONE HYDROCHLORIDE AND ACETAMINOPHEN 0.5 TABLET: 10; 325 TABLET ORAL at 23:12

## 2021-09-28 LAB
ALBUMIN SERPL-MCNC: 2.54 G/DL (ref 3.5–5.2)
ALBUMIN/GLOB SERPL: 0.8 G/DL
ALP SERPL-CCNC: 63 U/L (ref 39–117)
ALT SERPL W P-5'-P-CCNC: 5 U/L (ref 1–33)
ANION GAP SERPL CALCULATED.3IONS-SCNC: 10.5 MMOL/L (ref 5–15)
AST SERPL-CCNC: 8 U/L (ref 1–32)
BASOPHILS # BLD AUTO: 0.01 10*3/MM3 (ref 0–0.2)
BASOPHILS NFR BLD AUTO: 0.1 % (ref 0–1.5)
BILIRUB SERPL-MCNC: 0.7 MG/DL (ref 0–1.2)
BUN SERPL-MCNC: 27 MG/DL (ref 8–23)
BUN/CREAT SERPL: 23.7 (ref 7–25)
CALCIUM SPEC-SCNC: 8.7 MG/DL (ref 8.6–10.5)
CHLORIDE SERPL-SCNC: 100 MMOL/L (ref 98–107)
CO2 SERPL-SCNC: 22.5 MMOL/L (ref 22–29)
CREAT SERPL-MCNC: 1.14 MG/DL (ref 0.57–1)
CRP SERPL-MCNC: 11.11 MG/DL (ref 0–0.5)
DEPRECATED RDW RBC AUTO: 53.8 FL (ref 37–54)
EOSINOPHIL # BLD AUTO: 0.07 10*3/MM3 (ref 0–0.4)
EOSINOPHIL NFR BLD AUTO: 0.9 % (ref 0.3–6.2)
ERYTHROCYTE [DISTWIDTH] IN BLOOD BY AUTOMATED COUNT: 16.1 % (ref 12.3–15.4)
FOLATE SERPL-MCNC: 3.19 NG/ML (ref 4.78–24.2)
GFR SERPL CREATININE-BSD FRML MDRD: 45 ML/MIN/1.73
GLOBULIN UR ELPH-MCNC: 3.3 GM/DL
GLUCOSE BLDC GLUCOMTR-MCNC: 105 MG/DL (ref 70–130)
GLUCOSE SERPL-MCNC: 90 MG/DL (ref 65–99)
HCT VFR BLD AUTO: 23.4 % (ref 34–46.6)
HGB BLD-MCNC: 7.5 G/DL (ref 12–15.9)
IMM GRANULOCYTES # BLD AUTO: 0.08 10*3/MM3 (ref 0–0.05)
IMM GRANULOCYTES NFR BLD AUTO: 1 % (ref 0–0.5)
LYMPHOCYTES # BLD AUTO: 0.5 10*3/MM3 (ref 0.7–3.1)
LYMPHOCYTES NFR BLD AUTO: 6.5 % (ref 19.6–45.3)
MAGNESIUM SERPL-MCNC: 2.3 MG/DL (ref 1.6–2.4)
MAGNESIUM SERPL-MCNC: 2.4 MG/DL (ref 1.6–2.4)
MCH RBC QN AUTO: 29.5 PG (ref 26.6–33)
MCHC RBC AUTO-ENTMCNC: 32.1 G/DL (ref 31.5–35.7)
MCV RBC AUTO: 92.1 FL (ref 79–97)
MONOCYTES # BLD AUTO: 0.92 10*3/MM3 (ref 0.1–0.9)
MONOCYTES NFR BLD AUTO: 11.9 % (ref 5–12)
NEUTROPHILS NFR BLD AUTO: 6.14 10*3/MM3 (ref 1.7–7)
NEUTROPHILS NFR BLD AUTO: 79.6 % (ref 42.7–76)
NRBC BLD AUTO-RTO: 0 /100 WBC (ref 0–0.2)
PLATELET # BLD AUTO: 186 10*3/MM3 (ref 140–450)
PMV BLD AUTO: 11.5 FL (ref 6–12)
POTASSIUM SERPL-SCNC: 4 MMOL/L (ref 3.5–5.2)
PROT SERPL-MCNC: 5.8 G/DL (ref 6–8.5)
RBC # BLD AUTO: 2.54 10*6/MM3 (ref 3.77–5.28)
SODIUM SERPL-SCNC: 133 MMOL/L (ref 136–145)
VIT B12 BLD-MCNC: 854 PG/ML (ref 211–946)
WBC # BLD AUTO: 7.72 10*3/MM3 (ref 3.4–10.8)

## 2021-09-28 PROCEDURE — 97530 THERAPEUTIC ACTIVITIES: CPT

## 2021-09-28 PROCEDURE — 80053 COMPREHEN METABOLIC PANEL: CPT | Performed by: INTERNAL MEDICINE

## 2021-09-28 PROCEDURE — 94799 UNLISTED PULMONARY SVC/PX: CPT

## 2021-09-28 PROCEDURE — 83735 ASSAY OF MAGNESIUM: CPT | Performed by: INTERNAL MEDICINE

## 2021-09-28 PROCEDURE — 82962 GLUCOSE BLOOD TEST: CPT

## 2021-09-28 PROCEDURE — 94760 N-INVAS EAR/PLS OXIMETRY 1: CPT

## 2021-09-28 PROCEDURE — 97110 THERAPEUTIC EXERCISES: CPT

## 2021-09-28 PROCEDURE — 85025 COMPLETE CBC W/AUTO DIFF WBC: CPT | Performed by: INTERNAL MEDICINE

## 2021-09-28 PROCEDURE — 99232 SBSQ HOSP IP/OBS MODERATE 35: CPT | Performed by: INTERNAL MEDICINE

## 2021-09-28 PROCEDURE — 86140 C-REACTIVE PROTEIN: CPT | Performed by: INTERNAL MEDICINE

## 2021-09-28 PROCEDURE — 25010000002 CEFTRIAXONE PER 250 MG: Performed by: INTERNAL MEDICINE

## 2021-09-28 RX ADMIN — SODIUM CHLORIDE 2 G: 9 INJECTION, SOLUTION INTRAVENOUS at 05:46

## 2021-09-28 RX ADMIN — SODIUM CHLORIDE, PRESERVATIVE FREE 10 ML: 5 INJECTION INTRAVENOUS at 09:13

## 2021-09-28 RX ADMIN — OXYCODONE HYDROCHLORIDE AND ACETAMINOPHEN 0.5 TABLET: 10; 325 TABLET ORAL at 20:44

## 2021-09-28 RX ADMIN — METOPROLOL TARTRATE 12.5 MG: 25 TABLET, FILM COATED ORAL at 20:44

## 2021-09-28 RX ADMIN — RIVAROXABAN 15 MG: 15 TABLET, FILM COATED ORAL at 18:39

## 2021-09-28 RX ADMIN — ASPIRIN 81 MG: 81 TABLET, COATED ORAL at 09:11

## 2021-09-28 RX ADMIN — Medication 1 APPLICATION: at 09:25

## 2021-09-28 RX ADMIN — LEVOTHYROXINE SODIUM 100 MCG: 100 TABLET ORAL at 20:44

## 2021-09-28 RX ADMIN — METOPROLOL TARTRATE 12.5 MG: 25 TABLET, FILM COATED ORAL at 09:10

## 2021-09-28 RX ADMIN — PANTOPRAZOLE SODIUM 40 MG: 40 TABLET, DELAYED RELEASE ORAL at 09:10

## 2021-09-28 RX ADMIN — ATORVASTATIN CALCIUM 40 MG: 40 TABLET, FILM COATED ORAL at 20:44

## 2021-09-28 RX ADMIN — OXYCODONE HYDROCHLORIDE AND ACETAMINOPHEN 0.5 TABLET: 10; 325 TABLET ORAL at 09:13

## 2021-09-29 ENCOUNTER — APPOINTMENT (OUTPATIENT)
Dept: ULTRASOUND IMAGING | Facility: HOSPITAL | Age: 86
End: 2021-09-29

## 2021-09-29 LAB
ALBUMIN SERPL-MCNC: 2.97 G/DL (ref 3.5–5.2)
ALBUMIN/GLOB SERPL: 1 G/DL
ALP SERPL-CCNC: 66 U/L (ref 39–117)
ALT SERPL W P-5'-P-CCNC: <5 U/L (ref 1–33)
ANION GAP SERPL CALCULATED.3IONS-SCNC: 10.9 MMOL/L (ref 5–15)
AST SERPL-CCNC: 9 U/L (ref 1–32)
BASOPHILS # BLD AUTO: 0.01 10*3/MM3 (ref 0–0.2)
BASOPHILS NFR BLD AUTO: 0.1 % (ref 0–1.5)
BILIRUB SERPL-MCNC: 0.7 MG/DL (ref 0–1.2)
BUN SERPL-MCNC: 18 MG/DL (ref 8–23)
BUN/CREAT SERPL: 18.9 (ref 7–25)
CALCIUM SPEC-SCNC: 8.6 MG/DL (ref 8.6–10.5)
CHLORIDE SERPL-SCNC: 95 MMOL/L (ref 98–107)
CO2 SERPL-SCNC: 24.1 MMOL/L (ref 22–29)
CREAT SERPL-MCNC: 0.95 MG/DL (ref 0.57–1)
CRP SERPL-MCNC: 11.65 MG/DL (ref 0–0.5)
D DIMER PPP FEU-MCNC: 1.42 MCGFEU/ML (ref 0–0.5)
DEPRECATED RDW RBC AUTO: 51.5 FL (ref 37–54)
EOSINOPHIL # BLD AUTO: 0.06 10*3/MM3 (ref 0–0.4)
EOSINOPHIL NFR BLD AUTO: 0.8 % (ref 0.3–6.2)
ERYTHROCYTE [DISTWIDTH] IN BLOOD BY AUTOMATED COUNT: 15.8 % (ref 12.3–15.4)
GFR SERPL CREATININE-BSD FRML MDRD: 56 ML/MIN/1.73
GLOBULIN UR ELPH-MCNC: 3 GM/DL
GLUCOSE SERPL-MCNC: 89 MG/DL (ref 65–99)
HCT VFR BLD AUTO: 24 % (ref 34–46.6)
HGB BLD-MCNC: 7.8 G/DL (ref 12–15.9)
IMM GRANULOCYTES # BLD AUTO: 0.15 10*3/MM3 (ref 0–0.05)
IMM GRANULOCYTES NFR BLD AUTO: 1.9 % (ref 0–0.5)
LYMPHOCYTES # BLD AUTO: 0.45 10*3/MM3 (ref 0.7–3.1)
LYMPHOCYTES NFR BLD AUTO: 5.8 % (ref 19.6–45.3)
MCH RBC QN AUTO: 29.3 PG (ref 26.6–33)
MCHC RBC AUTO-ENTMCNC: 32.5 G/DL (ref 31.5–35.7)
MCV RBC AUTO: 90.2 FL (ref 79–97)
MONOCYTES # BLD AUTO: 1.09 10*3/MM3 (ref 0.1–0.9)
MONOCYTES NFR BLD AUTO: 14.1 % (ref 5–12)
NEUTROPHILS NFR BLD AUTO: 5.96 10*3/MM3 (ref 1.7–7)
NEUTROPHILS NFR BLD AUTO: 77.3 % (ref 42.7–76)
NRBC BLD AUTO-RTO: 0 /100 WBC (ref 0–0.2)
PLATELET # BLD AUTO: 183 10*3/MM3 (ref 140–450)
PMV BLD AUTO: 11.5 FL (ref 6–12)
POTASSIUM SERPL-SCNC: 3.7 MMOL/L (ref 3.5–5.2)
PROT SERPL-MCNC: 6 G/DL (ref 6–8.5)
RBC # BLD AUTO: 2.66 10*6/MM3 (ref 3.77–5.28)
SODIUM SERPL-SCNC: 130 MMOL/L (ref 136–145)
WBC # BLD AUTO: 7.72 10*3/MM3 (ref 3.4–10.8)

## 2021-09-29 PROCEDURE — 85379 FIBRIN DEGRADATION QUANT: CPT | Performed by: INTERNAL MEDICINE

## 2021-09-29 PROCEDURE — 97110 THERAPEUTIC EXERCISES: CPT

## 2021-09-29 PROCEDURE — 25010000002 CEFTRIAXONE PER 250 MG: Performed by: INTERNAL MEDICINE

## 2021-09-29 PROCEDURE — 80053 COMPREHEN METABOLIC PANEL: CPT | Performed by: INTERNAL MEDICINE

## 2021-09-29 PROCEDURE — 86140 C-REACTIVE PROTEIN: CPT | Performed by: INTERNAL MEDICINE

## 2021-09-29 PROCEDURE — 97530 THERAPEUTIC ACTIVITIES: CPT

## 2021-09-29 PROCEDURE — 99232 SBSQ HOSP IP/OBS MODERATE 35: CPT | Performed by: INTERNAL MEDICINE

## 2021-09-29 PROCEDURE — 85025 COMPLETE CBC W/AUTO DIFF WBC: CPT | Performed by: INTERNAL MEDICINE

## 2021-09-29 RX ADMIN — SODIUM CHLORIDE, PRESERVATIVE FREE 10 ML: 5 INJECTION INTRAVENOUS at 08:31

## 2021-09-29 RX ADMIN — ASPIRIN 81 MG: 81 TABLET, COATED ORAL at 08:31

## 2021-09-29 RX ADMIN — RIVAROXABAN 15 MG: 15 TABLET, FILM COATED ORAL at 17:20

## 2021-09-29 RX ADMIN — SODIUM CHLORIDE 2 G: 9 INJECTION, SOLUTION INTRAVENOUS at 05:13

## 2021-09-29 RX ADMIN — OXYCODONE HYDROCHLORIDE AND ACETAMINOPHEN 0.5 TABLET: 10; 325 TABLET ORAL at 19:55

## 2021-09-29 RX ADMIN — ATORVASTATIN CALCIUM 40 MG: 40 TABLET, FILM COATED ORAL at 19:55

## 2021-09-29 RX ADMIN — PANTOPRAZOLE SODIUM 40 MG: 40 TABLET, DELAYED RELEASE ORAL at 10:57

## 2021-09-29 RX ADMIN — METOPROLOL TARTRATE 12.5 MG: 25 TABLET, FILM COATED ORAL at 19:55

## 2021-09-29 RX ADMIN — METOPROLOL TARTRATE 12.5 MG: 25 TABLET, FILM COATED ORAL at 08:31

## 2021-09-29 RX ADMIN — LEVOTHYROXINE SODIUM 100 MCG: 100 TABLET ORAL at 19:55

## 2021-09-30 ENCOUNTER — APPOINTMENT (OUTPATIENT)
Dept: ULTRASOUND IMAGING | Facility: HOSPITAL | Age: 86
End: 2021-09-30

## 2021-09-30 PROBLEM — D89.831 CYTOKINE RELEASE SYNDROME, GRADE 1: Status: ACTIVE | Noted: 2021-09-30

## 2021-09-30 LAB
ALBUMIN SERPL-MCNC: 2.51 G/DL (ref 3.5–5.2)
ALBUMIN/GLOB SERPL: 0.7 G/DL
ALP SERPL-CCNC: 69 U/L (ref 39–117)
ALT SERPL W P-5'-P-CCNC: 5 U/L (ref 1–33)
ANION GAP SERPL CALCULATED.3IONS-SCNC: 10.7 MMOL/L (ref 5–15)
AST SERPL-CCNC: 13 U/L (ref 1–32)
BASOPHILS # BLD AUTO: 0.02 10*3/MM3 (ref 0–0.2)
BASOPHILS NFR BLD AUTO: 0.2 % (ref 0–1.5)
BILIRUB SERPL-MCNC: 0.6 MG/DL (ref 0–1.2)
BUN SERPL-MCNC: 14 MG/DL (ref 8–23)
BUN/CREAT SERPL: 14.9 (ref 7–25)
CALCIUM SPEC-SCNC: 8.3 MG/DL (ref 8.6–10.5)
CHLORIDE SERPL-SCNC: 102 MMOL/L (ref 98–107)
CO2 SERPL-SCNC: 22.3 MMOL/L (ref 22–29)
CREAT SERPL-MCNC: 0.94 MG/DL (ref 0.57–1)
CRP SERPL-MCNC: 19.57 MG/DL (ref 0–0.5)
DEPRECATED RDW RBC AUTO: 50.7 FL (ref 37–54)
EOSINOPHIL # BLD AUTO: 0.04 10*3/MM3 (ref 0–0.4)
EOSINOPHIL NFR BLD AUTO: 0.4 % (ref 0.3–6.2)
ERYTHROCYTE [DISTWIDTH] IN BLOOD BY AUTOMATED COUNT: 15.7 % (ref 12.3–15.4)
GFR SERPL CREATININE-BSD FRML MDRD: 56 ML/MIN/1.73
GLOBULIN UR ELPH-MCNC: 3.4 GM/DL
GLUCOSE SERPL-MCNC: 82 MG/DL (ref 65–99)
HCT VFR BLD AUTO: 24.3 % (ref 34–46.6)
HGB BLD-MCNC: 7.8 G/DL (ref 12–15.9)
IMM GRANULOCYTES # BLD AUTO: 0.35 10*3/MM3 (ref 0–0.05)
IMM GRANULOCYTES NFR BLD AUTO: 3.7 % (ref 0–0.5)
LYMPHOCYTES # BLD AUTO: 0.61 10*3/MM3 (ref 0.7–3.1)
LYMPHOCYTES NFR BLD AUTO: 6.4 % (ref 19.6–45.3)
MCH RBC QN AUTO: 29 PG (ref 26.6–33)
MCHC RBC AUTO-ENTMCNC: 32.1 G/DL (ref 31.5–35.7)
MCV RBC AUTO: 90.3 FL (ref 79–97)
MONOCYTES # BLD AUTO: 1.18 10*3/MM3 (ref 0.1–0.9)
MONOCYTES NFR BLD AUTO: 12.4 % (ref 5–12)
NEUTROPHILS NFR BLD AUTO: 7.32 10*3/MM3 (ref 1.7–7)
NEUTROPHILS NFR BLD AUTO: 76.9 % (ref 42.7–76)
NRBC BLD AUTO-RTO: 0.2 /100 WBC (ref 0–0.2)
PLATELET # BLD AUTO: 181 10*3/MM3 (ref 140–450)
PMV BLD AUTO: 12.1 FL (ref 6–12)
POTASSIUM SERPL-SCNC: 3.6 MMOL/L (ref 3.5–5.2)
PROT SERPL-MCNC: 5.9 G/DL (ref 6–8.5)
RBC # BLD AUTO: 2.69 10*6/MM3 (ref 3.77–5.28)
SODIUM SERPL-SCNC: 135 MMOL/L (ref 136–145)
TROPONIN T SERPL-MCNC: 0.03 NG/ML (ref 0–0.03)
WBC # BLD AUTO: 9.52 10*3/MM3 (ref 3.4–10.8)

## 2021-09-30 PROCEDURE — 85025 COMPLETE CBC W/AUTO DIFF WBC: CPT | Performed by: INTERNAL MEDICINE

## 2021-09-30 PROCEDURE — 93010 ELECTROCARDIOGRAM REPORT: CPT | Performed by: INTERNAL MEDICINE

## 2021-09-30 PROCEDURE — 97530 THERAPEUTIC ACTIVITIES: CPT

## 2021-09-30 PROCEDURE — 80053 COMPREHEN METABOLIC PANEL: CPT | Performed by: INTERNAL MEDICINE

## 2021-09-30 PROCEDURE — 86140 C-REACTIVE PROTEIN: CPT | Performed by: INTERNAL MEDICINE

## 2021-09-30 PROCEDURE — 93970 EXTREMITY STUDY: CPT | Performed by: RADIOLOGY

## 2021-09-30 PROCEDURE — 93005 ELECTROCARDIOGRAM TRACING: CPT | Performed by: INTERNAL MEDICINE

## 2021-09-30 PROCEDURE — 97535 SELF CARE MNGMENT TRAINING: CPT

## 2021-09-30 PROCEDURE — 93970 EXTREMITY STUDY: CPT

## 2021-09-30 PROCEDURE — 97110 THERAPEUTIC EXERCISES: CPT

## 2021-09-30 PROCEDURE — 25010000002 CEFTRIAXONE PER 250 MG: Performed by: INTERNAL MEDICINE

## 2021-09-30 PROCEDURE — 99232 SBSQ HOSP IP/OBS MODERATE 35: CPT | Performed by: INTERNAL MEDICINE

## 2021-09-30 PROCEDURE — 84484 ASSAY OF TROPONIN QUANT: CPT | Performed by: INTERNAL MEDICINE

## 2021-09-30 RX ORDER — POTASSIUM CHLORIDE 20 MEQ/1
40 TABLET, EXTENDED RELEASE ORAL EVERY 4 HOURS
Status: COMPLETED | OUTPATIENT
Start: 2021-09-30 | End: 2021-09-30

## 2021-09-30 RX ORDER — BUMETANIDE 1 MG/1
1 TABLET ORAL
Qty: 30 TABLET | Refills: 2
Start: 2021-09-30

## 2021-09-30 RX ORDER — ATORVASTATIN CALCIUM 40 MG/1
40 TABLET, FILM COATED ORAL NIGHTLY
Qty: 30 TABLET | Refills: 0 | Status: SHIPPED | OUTPATIENT
Start: 2021-09-30

## 2021-09-30 RX ORDER — ASPIRIN 81 MG/1
81 TABLET ORAL DAILY
Qty: 30 TABLET | Refills: 0 | Status: SHIPPED | OUTPATIENT
Start: 2021-10-01

## 2021-09-30 RX ORDER — CEFDINIR 300 MG/1
300 CAPSULE ORAL 2 TIMES DAILY
Qty: 14 CAPSULE | Refills: 0 | Status: SHIPPED | OUTPATIENT
Start: 2021-09-30 | End: 2021-10-05 | Stop reason: HOSPADM

## 2021-09-30 RX ADMIN — ATORVASTATIN CALCIUM 40 MG: 40 TABLET, FILM COATED ORAL at 20:42

## 2021-09-30 RX ADMIN — SODIUM CHLORIDE 2 G: 9 INJECTION, SOLUTION INTRAVENOUS at 05:50

## 2021-09-30 RX ADMIN — METOPROLOL TARTRATE 12.5 MG: 25 TABLET, FILM COATED ORAL at 09:19

## 2021-09-30 RX ADMIN — PANTOPRAZOLE SODIUM 40 MG: 40 TABLET, DELAYED RELEASE ORAL at 09:19

## 2021-09-30 RX ADMIN — RIVAROXABAN 15 MG: 15 TABLET, FILM COATED ORAL at 17:42

## 2021-09-30 RX ADMIN — POTASSIUM CHLORIDE 40 MEQ: 20 TABLET, EXTENDED RELEASE ORAL at 09:19

## 2021-09-30 RX ADMIN — METOPROLOL TARTRATE 25 MG: 25 TABLET, FILM COATED ORAL at 20:42

## 2021-09-30 RX ADMIN — LEVOTHYROXINE SODIUM 100 MCG: 100 TABLET ORAL at 20:43

## 2021-09-30 RX ADMIN — SODIUM CHLORIDE, PRESERVATIVE FREE 10 ML: 5 INJECTION INTRAVENOUS at 20:43

## 2021-09-30 RX ADMIN — POTASSIUM CHLORIDE 40 MEQ: 20 TABLET, EXTENDED RELEASE ORAL at 11:30

## 2021-09-30 RX ADMIN — SODIUM CHLORIDE, PRESERVATIVE FREE 10 ML: 5 INJECTION INTRAVENOUS at 09:20

## 2021-09-30 RX ADMIN — ASPIRIN 81 MG: 81 TABLET, COATED ORAL at 09:19

## 2021-09-30 RX ADMIN — OXYCODONE HYDROCHLORIDE AND ACETAMINOPHEN 0.5 TABLET: 10; 325 TABLET ORAL at 20:43

## 2021-10-01 ENCOUNTER — APPOINTMENT (OUTPATIENT)
Dept: GENERAL RADIOLOGY | Facility: HOSPITAL | Age: 86
End: 2021-10-01

## 2021-10-01 ENCOUNTER — APPOINTMENT (OUTPATIENT)
Dept: CT IMAGING | Facility: HOSPITAL | Age: 86
End: 2021-10-01

## 2021-10-01 LAB
ALBUMIN SERPL-MCNC: 2.76 G/DL (ref 3.5–5.2)
ALBUMIN/GLOB SERPL: 0.8 G/DL
ALP SERPL-CCNC: 69 U/L (ref 39–117)
ALT SERPL W P-5'-P-CCNC: 5 U/L (ref 1–33)
ANION GAP SERPL CALCULATED.3IONS-SCNC: 11.3 MMOL/L (ref 5–15)
AST SERPL-CCNC: 12 U/L (ref 1–32)
BACTERIA UR QL AUTO: ABNORMAL /HPF
BASOPHILS # BLD AUTO: 0.02 10*3/MM3 (ref 0–0.2)
BASOPHILS NFR BLD AUTO: 0.2 % (ref 0–1.5)
BILIRUB SERPL-MCNC: 0.7 MG/DL (ref 0–1.2)
BILIRUB UR QL STRIP: NEGATIVE
BUN SERPL-MCNC: 16 MG/DL (ref 8–23)
BUN/CREAT SERPL: 16.2 (ref 7–25)
CALCIUM SPEC-SCNC: 8.6 MG/DL (ref 8.6–10.5)
CHLORIDE SERPL-SCNC: 105 MMOL/L (ref 98–107)
CLARITY UR: ABNORMAL
CO2 SERPL-SCNC: 21.7 MMOL/L (ref 22–29)
COLOR UR: ABNORMAL
CREAT SERPL-MCNC: 0.99 MG/DL (ref 0.57–1)
CRP SERPL-MCNC: 25.18 MG/DL (ref 0–0.5)
D DIMER PPP FEU-MCNC: 1.79 MCGFEU/ML (ref 0–0.5)
D-LACTATE SERPL-SCNC: 1 MMOL/L (ref 0.5–2)
DEPRECATED RDW RBC AUTO: 52.5 FL (ref 37–54)
EOSINOPHIL # BLD AUTO: 0.02 10*3/MM3 (ref 0–0.4)
EOSINOPHIL NFR BLD AUTO: 0.2 % (ref 0.3–6.2)
ERYTHROCYTE [DISTWIDTH] IN BLOOD BY AUTOMATED COUNT: 16 % (ref 12.3–15.4)
GFR SERPL CREATININE-BSD FRML MDRD: 53 ML/MIN/1.73
GLOBULIN UR ELPH-MCNC: 3.3 GM/DL
GLUCOSE SERPL-MCNC: 97 MG/DL (ref 65–99)
GLUCOSE UR STRIP-MCNC: NEGATIVE MG/DL
HCT VFR BLD AUTO: 24.6 % (ref 34–46.6)
HGB BLD-MCNC: 7.6 G/DL (ref 12–15.9)
HGB UR QL STRIP.AUTO: ABNORMAL
HYALINE CASTS UR QL AUTO: ABNORMAL /LPF
IMM GRANULOCYTES # BLD AUTO: 0.5 10*3/MM3 (ref 0–0.05)
IMM GRANULOCYTES NFR BLD AUTO: 3.8 % (ref 0–0.5)
KETONES UR QL STRIP: ABNORMAL
LEUKOCYTE ESTERASE UR QL STRIP.AUTO: ABNORMAL
LYMPHOCYTES # BLD AUTO: 0.92 10*3/MM3 (ref 0.7–3.1)
LYMPHOCYTES NFR BLD AUTO: 7 % (ref 19.6–45.3)
MAGNESIUM SERPL-MCNC: 1.9 MG/DL (ref 1.6–2.4)
MCH RBC QN AUTO: 28.6 PG (ref 26.6–33)
MCHC RBC AUTO-ENTMCNC: 30.9 G/DL (ref 31.5–35.7)
MCV RBC AUTO: 92.5 FL (ref 79–97)
MONOCYTES # BLD AUTO: 1.48 10*3/MM3 (ref 0.1–0.9)
MONOCYTES NFR BLD AUTO: 11.2 % (ref 5–12)
MRSA DNA SPEC QL NAA+PROBE: POSITIVE
NEUTROPHILS NFR BLD AUTO: 10.27 10*3/MM3 (ref 1.7–7)
NEUTROPHILS NFR BLD AUTO: 77.6 % (ref 42.7–76)
NITRITE UR QL STRIP: NEGATIVE
NRBC BLD AUTO-RTO: 0 /100 WBC (ref 0–0.2)
PH UR STRIP.AUTO: 5.5 [PH] (ref 5–8)
PLATELET # BLD AUTO: 211 10*3/MM3 (ref 140–450)
PMV BLD AUTO: 11.7 FL (ref 6–12)
POTASSIUM SERPL-SCNC: 5.2 MMOL/L (ref 3.5–5.2)
PROCALCITONIN SERPL-MCNC: 0.46 NG/ML (ref 0–0.25)
PROT SERPL-MCNC: 6.1 G/DL (ref 6–8.5)
PROT UR QL STRIP: ABNORMAL
QT INTERVAL: 322 MS
QTC INTERVAL: 462 MS
RBC # BLD AUTO: 2.66 10*6/MM3 (ref 3.77–5.28)
RBC # UR: ABNORMAL /HPF
REF LAB TEST METHOD: ABNORMAL
S AUREUS DNA SPEC QL NAA+PROBE: POSITIVE
SODIUM SERPL-SCNC: 138 MMOL/L (ref 136–145)
SP GR UR STRIP: 1.02 (ref 1–1.03)
SQUAMOUS #/AREA URNS HPF: ABNORMAL /HPF
UROBILINOGEN UR QL STRIP: ABNORMAL
WBC # BLD AUTO: 13.21 10*3/MM3 (ref 3.4–10.8)
WBC UR QL AUTO: ABNORMAL /HPF

## 2021-10-01 PROCEDURE — 83605 ASSAY OF LACTIC ACID: CPT | Performed by: INTERNAL MEDICINE

## 2021-10-01 PROCEDURE — 86140 C-REACTIVE PROTEIN: CPT | Performed by: INTERNAL MEDICINE

## 2021-10-01 PROCEDURE — 74018 RADEX ABDOMEN 1 VIEW: CPT

## 2021-10-01 PROCEDURE — 85379 FIBRIN DEGRADATION QUANT: CPT | Performed by: INTERNAL MEDICINE

## 2021-10-01 PROCEDURE — 25010000002 CEFTRIAXONE PER 250 MG: Performed by: INTERNAL MEDICINE

## 2021-10-01 PROCEDURE — 99233 SBSQ HOSP IP/OBS HIGH 50: CPT | Performed by: INTERNAL MEDICINE

## 2021-10-01 PROCEDURE — 97110 THERAPEUTIC EXERCISES: CPT

## 2021-10-01 PROCEDURE — 80053 COMPREHEN METABOLIC PANEL: CPT | Performed by: INTERNAL MEDICINE

## 2021-10-01 PROCEDURE — 97530 THERAPEUTIC ACTIVITIES: CPT

## 2021-10-01 PROCEDURE — 74176 CT ABD & PELVIS W/O CONTRAST: CPT

## 2021-10-01 PROCEDURE — 87086 URINE CULTURE/COLONY COUNT: CPT | Performed by: INTERNAL MEDICINE

## 2021-10-01 PROCEDURE — 85025 COMPLETE CBC W/AUTO DIFF WBC: CPT | Performed by: INTERNAL MEDICINE

## 2021-10-01 PROCEDURE — 25010000003 MAGNESIUM SULFATE 4 GM/100ML SOLUTION: Performed by: INTERNAL MEDICINE

## 2021-10-01 PROCEDURE — 87641 MR-STAPH DNA AMP PROBE: CPT | Performed by: INTERNAL MEDICINE

## 2021-10-01 PROCEDURE — 71045 X-RAY EXAM CHEST 1 VIEW: CPT

## 2021-10-01 PROCEDURE — 87640 STAPH A DNA AMP PROBE: CPT | Performed by: INTERNAL MEDICINE

## 2021-10-01 PROCEDURE — 81001 URINALYSIS AUTO W/SCOPE: CPT | Performed by: INTERNAL MEDICINE

## 2021-10-01 PROCEDURE — 25010000002 CEFEPIME PER 500 MG: Performed by: INTERNAL MEDICINE

## 2021-10-01 PROCEDURE — 74018 RADEX ABDOMEN 1 VIEW: CPT | Performed by: RADIOLOGY

## 2021-10-01 PROCEDURE — 71045 X-RAY EXAM CHEST 1 VIEW: CPT | Performed by: RADIOLOGY

## 2021-10-01 PROCEDURE — 71250 CT THORAX DX C-: CPT

## 2021-10-01 PROCEDURE — 84145 PROCALCITONIN (PCT): CPT | Performed by: INTERNAL MEDICINE

## 2021-10-01 PROCEDURE — 71250 CT THORAX DX C-: CPT | Performed by: RADIOLOGY

## 2021-10-01 PROCEDURE — 74176 CT ABD & PELVIS W/O CONTRAST: CPT | Performed by: RADIOLOGY

## 2021-10-01 PROCEDURE — 83735 ASSAY OF MAGNESIUM: CPT | Performed by: INTERNAL MEDICINE

## 2021-10-01 PROCEDURE — 87040 BLOOD CULTURE FOR BACTERIA: CPT | Performed by: INTERNAL MEDICINE

## 2021-10-01 RX ORDER — POLYETHYLENE GLYCOL 3350 17 G/17G
17 POWDER, FOR SOLUTION ORAL DAILY
Status: DISCONTINUED | OUTPATIENT
Start: 2021-10-01 | End: 2021-10-05 | Stop reason: HOSPADM

## 2021-10-01 RX ORDER — MAGNESIUM SULFATE HEPTAHYDRATE 40 MG/ML
4 INJECTION, SOLUTION INTRAVENOUS ONCE
Status: COMPLETED | OUTPATIENT
Start: 2021-10-01 | End: 2021-10-01

## 2021-10-01 RX ORDER — DOCUSATE SODIUM 100 MG/1
100 CAPSULE, LIQUID FILLED ORAL 2 TIMES DAILY
Status: DISCONTINUED | OUTPATIENT
Start: 2021-10-01 | End: 2021-10-05 | Stop reason: HOSPADM

## 2021-10-01 RX ADMIN — METOPROLOL TARTRATE 25 MG: 25 TABLET, FILM COATED ORAL at 08:38

## 2021-10-01 RX ADMIN — SODIUM CHLORIDE, PRESERVATIVE FREE 10 ML: 5 INJECTION INTRAVENOUS at 20:39

## 2021-10-01 RX ADMIN — LEVOTHYROXINE SODIUM 100 MCG: 100 TABLET ORAL at 20:38

## 2021-10-01 RX ADMIN — ASPIRIN 81 MG: 81 TABLET, COATED ORAL at 08:38

## 2021-10-01 RX ADMIN — DOXYCYCLINE 100 MG: 100 INJECTION, POWDER, LYOPHILIZED, FOR SOLUTION INTRAVENOUS at 21:32

## 2021-10-01 RX ADMIN — CEFEPIME HYDROCHLORIDE 2 G: 2 INJECTION, POWDER, FOR SOLUTION INTRAVENOUS at 20:38

## 2021-10-01 RX ADMIN — SODIUM CHLORIDE 2 G: 9 INJECTION, SOLUTION INTRAVENOUS at 05:28

## 2021-10-01 RX ADMIN — DOCUSATE SODIUM 100 MG: 100 CAPSULE ORAL at 21:31

## 2021-10-01 RX ADMIN — RIVAROXABAN 15 MG: 15 TABLET, FILM COATED ORAL at 18:20

## 2021-10-01 RX ADMIN — MAGNESIUM SULFATE HEPTAHYDRATE 4 G: 40 INJECTION, SOLUTION INTRAVENOUS at 06:23

## 2021-10-01 RX ADMIN — POLYETHYLENE GLYCOL (3350) 17 G: 17 POWDER, FOR SOLUTION ORAL at 21:31

## 2021-10-01 RX ADMIN — OXYCODONE HYDROCHLORIDE AND ACETAMINOPHEN 0.5 TABLET: 10; 325 TABLET ORAL at 08:49

## 2021-10-01 RX ADMIN — SODIUM CHLORIDE, PRESERVATIVE FREE 10 ML: 5 INJECTION INTRAVENOUS at 08:38

## 2021-10-01 RX ADMIN — ATORVASTATIN CALCIUM 40 MG: 40 TABLET, FILM COATED ORAL at 20:38

## 2021-10-01 RX ADMIN — METOPROLOL TARTRATE 25 MG: 25 TABLET, FILM COATED ORAL at 20:38

## 2021-10-01 RX ADMIN — PANTOPRAZOLE SODIUM 40 MG: 40 TABLET, DELAYED RELEASE ORAL at 08:38

## 2021-10-01 NOTE — THERAPY TREATMENT NOTE
Acute Care - Physical Therapy Treatment Note  [facility]     Patient Name: Desiree Matos  : 10/13/1933  MRN: [id number]  Today's Date: 10/1/2021   Onset of Illness/Injury or Date of Surgery: 21  Visit Dx:     ICD-10-CM ICD-9-CM   1. ALEKSANDRA (acute kidney injury) (CMS/[facility])  N17.9 584.9   2. Pyelonephritis  N12 590.80   3. COVID-19  U07.1 079.89   4. Functional assessment declined  Z53.20 V64.2     Patient Active Problem List   Diagnosis   • Atrial fibrillation, chronic   • Acute diverticulitis   • Chronic anticoagulation, Xarelto   • Essential hypertension   • Hypothyroidism   • History of colonic polyps   • ASCVD (arteriosclerotic cardiovascular disease)   • Diastolic CHF, chronic ([facility])   • GERD without esophagitis   • Peripheral neuropathy   • Asthma   • CKD (chronic kidney disease), stage III ([facility])   • Diverticulitis   • Abnormal nuclear stress test with medium size, mild degree of anterior wall myocardial ischemia.   • ALEKSANDRA (acute kidney injury) ([facility])   • Cytokine release syndrome, grade 1     Past Medical History:   Diagnosis Date   • A-fib (CMS/[facility])    • Anemia 2017   • Arthritis    • Asthma    • Cancer (CMS/[facility])    • CHF (congestive heart failure) (CMS/[facility])    • Chronic anticoagulation, Xarelto 2019   • Coronary artery disease    • GERD without esophagitis 2019   • History of colonic polyps 2019   • History of transfusion    • Hypertension    • Hypothyroidism 2019   • Peripheral neuropathy 2019     Past Surgical History:   Procedure Laterality Date   • BACK SURGERY     • BREAST CYST EXCISION Left    • CARDIAC CATHETERIZATION N/A 2020    Procedure: Left Heart Cath;  Surgeon: Jack Couch MD;  Location: [facility] INVASIVE LOCATION;  Service: Cardiology;  Laterality: N/A;   • CERVICAL POLYPECTOMY     • COLONOSCOPY N/A 2017    Procedure: COLONOSCOPY;  Surgeon: Madi Zheng III, MD;  Location: [facility] OR;  Service:    • D & C AND LAPAROSCOPY      • ENDOSCOPY N/A 1/17/2017    Procedure: ESOPHAGOGASTRODUODENOSCOPY;  Surgeon: Madi Zheng III, MD;  Location: [facility];  Service:    • GALLBLADDER SURGERY     • VEIN LIGATION AND STRIPPING          PT Assessment (last 12 hours)      PT Evaluation and Treatment     Row Name 10/01/21 1622          Physical Therapy Time and Intention    Document Type  therapy note (daily note)  -     Mode of Treatment  physical therapy  -     Patient Effort  adequate  -     Comment  Pt recently developed chest pain, therefore tx low intensity performing supine LE TE.   -     Row Name 10/01/21 1622          Motor Skills    Therapeutic Exercise  hip;knee hip flex/abd, knee flex/ext; ankle pump HEP   -     Row Name             Wound 09/24/21 2003 Bilateral coccyx Other (comment)    Wound - Properties Group Placement Date: 09/24/21  -EA Placement Time: 2003  -EA Present on Hospital Admission: Y  -EA Side: Bilateral  -EA Location: coccyx  -EA Primary Wound Type: Other  -EA Stage, Pressure Injury : other (see comments)  -EA    Retired Wound - Properties Group Date first assessed: 09/24/21  -EA Time first assessed: 2003  -EA Present on Hospital Admission: Y  -EA Side: Bilateral  -EA Location: coccyx  -EA Primary Wound Type: Other  -EA    Row Name 10/01/21 1622          Coping    Observed Emotional State  calm;cooperative;pleasant  -     Row Name 10/01/21 1622          Positioning and Restraints    Pre-Treatment Position  in bed  -     Post Treatment Position  bed  -     In Bed  with other staff  -       User Key  (r) = Recorded By, (t) = Taken By, (c) = Cosigned By    Initials Name Provider Type    Kathie Hendrickson RN Registered Nurse    Kaylyn Scruggs, PT Physical Therapist          PT Recommendation and Plan     Outcome Summary: Pt has decreased participation in PT tx this day, pt desiring to go home. Pt participated minimally in bed mobility with overall minimum assist. Pt actively performed  some LE exercises with active assist from this PT. Current D/C recommendation for nursing home placement for therapy.        Time Calculation:   PT Charges     Row Name 10/01/21 1624             Time Calculation    PT Received On  10/01/21  -      PT Goal Re-Cert Due Date  10/11/21  -         Time Calculation- PT    Total Timed Code Minutes- PT  24 minute(s)  -        User Key  (r) = Recorded By, (t) = Taken By, (c) = Cosigned By    Initials Name Provider Type     Kayyln Butt, PT Physical Therapist        Therapy Charges for Today     Code Description Service Date Service Provider Modifiers Qty    56520074558 HC PT THER PROC EA 15 MIN 9/30/2021 Kaylyn Butt, PT GP 1    59614833825 HC PT THERAPEUTIC ACT EA 15 MIN 9/30/2021 Kaylyn Butt, PT GP 1    00737717197 HC PT THER PROC EA 15 MIN 10/1/2021 Kaylyn Butt, PT GP 1    50978887345 HC PT THERAPEUTIC ACT EA 15 MIN 10/1/2021 Kaylyn Butt, PT GP 1               Kaylyn Butt, PT  10/1/2021

## 2021-10-01 NOTE — CASE MANAGEMENT/SOCIAL WORK
Discharge Planning Assessment  [facility]     Patient Name: Desiree Matos  MRN: [id number]  Today's Date: 10/1/2021    Admit Date: 9/24/2021    Discharge Needs Assessment    No documentation.       Discharge Plan     Row Name 10/01/21 1012       Plan    Plan Pt was admitted on 09/24/21. SS spoke with Pt and Pt's daughter, Brie about NHP for short term rehab. Pt and daugther are agreeable, prefers NH location in [facility]. SS faxed referral to [facility], [facility], [facility], and [facility] to [facility]. SS notified Dr. Bryant. SS to follow.    1457pm: SS spoke with [facility] per [facility] who states Pt can come to facility when stable. SS notified Dr. Bryant who states Pt is not stable for discharge at this time. SS will follow up on Monday, 10/04/21. SS to follow.     Row Name 10/01/21 0900       Cheryl Villalta

## 2021-10-01 NOTE — DISCHARGE PLACEMENT REQUEST
"Desiree Monsalve (87 y.o. Female)     Date of Birth Social Security Number Address Home Phone MRN    10/13/1933  [address] [address] [phone] [id number]    Protestant Marital Status          None        Admission Date Admission Type Admitting Provider Attending Provider Department, Room/Bed    9/24/21 Emergency Nathaniel Wright MD Troxell, Christopher A, DO [address], 3313/2S    Discharge Date Discharge Disposition Discharge Destination                       Attending Provider: Hong Bryant DO    Allergies: Adhesive Tape    Isolation: Enh Drop/Con   Infection: COVID (confirmed) (09/19/21)   Code Status: No CPR    Ht: 162.6 cm (64\")   Wt: 77.1 kg (170 lb)    Admission Cmt: None   Principal Problem: None                Active Insurance as of 9/24/2021     Primary Coverage     Payor Plan Insurance Group Employer/Plan Group    MEDICARE MEDICARE A & B      Payor Plan Address Payor Plan Phone Number Payor Plan Fax Number Effective Dates    [address] [phone]  10/1/1998 - None Entered    [facility] [id number]       Subscriber Name Subscriber Birth Date Member ID       DESIREE MONSALVE 10/13/1933 [id number]           Secondary Coverage     Payor Plan Insurance Group Employer/Plan Group    [place]      Pay[place] Plan Address Payor Plan Phone Number Payor Plan Fax Number Effective Dates    [address] [address]   1/1/2014 - None Entered    [facility] [place] [address]       Subscriber Name Subscriber Birth Date Member ID       DESIREE MONSALVE 10/13/1933 [id number]                 Emergency Contacts      (Rel.) Home Phone Work Phone Mobile Phone    COMFORT SANTILLAN (Son) [phone] -- --    Talita Moreno (Daughter) [phone] -- [phone]    Brie Santillan (Daughter) [phone] -- [phone]            Emergency Contact Information     Name Relation Home Work Mobile    COMFORT SANTILLAN Son [phone]      Talita Moreno Daughter " [phone]    Brie Santillan Daughter [phone]  [phone]          Insurance Information                MEDICARE/MEDICARE A & B Phone: [phone]    Subscriber: Desiree Matos Subscriber#: [id number]    Group#:  Precert#:         [place] Phone:     Subscriber: Desiree Matos Subscriber#: [id number]    Group#:  Precert#:              History & Physical      Nathaniel Wright MD at 21 1909              [facility]    Patient Identification:  Name:  Desiree Matos  Age:  87 y.o.  Sex:  female  :  10/13/1933  MRN:  [id number]   Visit Number:  [id number]  Admit Date: 2021   Room number:  3313/2S  Primary Care Physician:  Duane Pollard MD     Subjective     Chief complaint:    Chief Complaint   Patient presents with   • Exposure To Known Illness   • Failure To Thrive       History of presenting illness:  87 y.o. female with hx afib, HTN, hypothyroidism, CAD, CKD, hypothyroidism who presented with general malaise and mild dyspnea. Patient has had poor PO intake since a fall on . She was seen at [facility]. She had a left arm fracture with unclear ortho plan per patient. No cast or support device in place. Patient has went home and essentially reports failure to thrive. Patient reports some lower quadrant abdominal pain an dysuria. Patient notes urination have also decreased. She denies any chest pain or swelling.     Patient reports getting two part COVID vaccine in January or february of this year. She denies any sick or COViD positive contacts.     Patient lives with her daughter who she notes help care for her. She notes since her fall she has not really had any mobility.   ---------------------------------------------------------------------------------------------------------------------   Review of Systems   Constitutional: Positive for fatigue.   HENT: Positive for congestion.     Eyes: Negative.    Respiratory: Positive for shortness of breath.    Cardiovascular: Negative.    Gastrointestinal: Negative.    Endocrine: Negative.    Genitourinary: Negative.    Musculoskeletal: Positive for arthralgias.   Skin: Negative.    Allergic/Immunologic: Negative.    Neurological: Negative.    Hematological: Negative.    Psychiatric/Behavioral: Negative.      ---------------------------------------------------------------------------------------------------------------------   Past Medical History:   Diagnosis Date   • A-fib (CMS/[facility])    • Anemia 1/13/2017   • Arthritis    • Asthma    • Cancer (CMS/[facility])    • CHF (congestive heart failure) (CMS/[facility])    • Chronic anticoagulation, Xarelto 7/30/2019   • Coronary artery disease    • GERD without esophagitis 7/30/2019   • History of colonic polyps 7/30/2019   • History of transfusion    • Hypertension    • Hypothyroidism 7/30/2019   • Peripheral neuropathy 7/30/2019     Past Surgical History:   Procedure Laterality Date   • BACK SURGERY     • BREAST CYST EXCISION Left    • CARDIAC CATHETERIZATION N/A 5/19/2020    Procedure: Left Heart Cath;  Surgeon: Jack Couch MD;  [facility];  Service: Cardiology;  Laterality: N/A;   • CERVICAL POLYPECTOMY     • COLONOSCOPY N/A 1/17/2017    Procedure: COLONOSCOPY;  Surgeon: Madi Zheng III, MD;  Location: [facility];  Service:    • D & C AND LAPAROSCOPY     • ENDOSCOPY N/A 1/17/2017    Procedure: ESOPHAGOGASTRODUODENOSCOPY;  Surgeon: Madi Zheng III, MD;  Location: [facility];  Service:    • GALLBLADDER SURGERY     • VEIN LIGATION AND STRIPPING       Family History   Problem Relation Age of Onset   • Heart disease Mother    • Heart attack Sister    • Heart disease Sister    • Heart attack Brother    • Heart disease Brother    • Heart disease Brother    • Heart attack Paternal Grandfather      Social History     Socioeconomic History   • Marital status:       Spouse name: Not on file   • Number of children: Not on file   • Years of education: Not on file   • Highest education level: Not on file   Tobacco Use   • Smoking status: Never Smoker   • Smokeless tobacco: Never Used   Substance and Sexual Activity   • Alcohol use: No   • Drug use: No   • Sexual activity: Defer     ---------------------------------------------------------------------------------------------------------------------   Allergies:  Adhesive tape  ---------------------------------------------------------------------------------------------------------------------   Medications below are reported home medications pulling from within the system; at this time, these medications have not been reconciled unless otherwise specified and are in the verification process for further verifcation as current home medications.    Prior to Admission Medications     Prescriptions Last Dose Informant Patient Reported? Taking?    bumetanide (BUMEX) 1 MG tablet  Family Member No No    Take 1 tablet by mouth Daily.    docusate sodium (COLACE) 250 MG capsule  Family Member Yes No    Take 250 mg by mouth 2 (Two) Times a Day As Needed for Constipation.    gabapentin (NEURONTIN) 300 MG capsule  Family Member Yes No    Take 300 mg by mouth 2 (Two) Times a Day.    hydrochlorothiazide (HYDRODIURIL) 25 MG tablet  Family Member Yes No    Take 25 mg by mouth Daily.    levothyroxine (SYNTHROID, LEVOTHROID) 100 MCG tablet  Family Member Yes No    Take 100 mcg by mouth Every Night.    metoprolol succinate XL (TOPROL-XL) 50 MG 24 hr tablet  Family Member Yes No    Take 50 mg by mouth Daily.    oxyCODONE-acetaminophen (PERCOCET)  MG per tablet   Yes No    Take 1 tablet by mouth 2 (Two) Times a Day As Needed for Moderate Pain .    pantoprazole (PROTONIX) 40 MG EC tablet  Family Member Yes No    Take 40 mg by mouth Daily.    rivaroxaban (XARELTO) 15 MG tablet  Family Member Yes No    Take 15 mg by mouth Every Night.         Objective     Vital Signs:  Temp:  [97.6 °F (36.4 °C)-98.3 °F (36.8 °C)] 98.3 °F (36.8 °C)  Heart Rate:  [] 86  Resp:  [16] 16  BP: ()/(40-87) 104/50    Mean Arterial Pressure (Non-Invasive) for the past 24 hrs (Last 3 readings):   Noninvasive MAP (mmHg)   09/24/21 1842 64   09/24/21 1836 72   09/24/21 1828 57     SpO2:  [97 %-100 %] 100 %  on   ;   Device (Oxygen Therapy): room air  Body mass index is 29.18 kg/m².    Wt Readings from Last 3 Encounters:   09/24/21 77.1 kg (170 lb)   09/22/21 77.1 kg (170 lb)   09/19/21 77.6 kg (171 lb)      ---------------------------------------------------------------------------------------------------------------------   Physical Exam:  Constitutional:  Well-developed and well-nourished.  No respiratory distress.      HENT:  Head: Normocephalic and atraumatic.  Mouth:  Moist mucous membranes.    Eyes:  Conjunctivae and EOM are normal.  Pupils are equal, round, and reactive to light.  No scleral icterus.  Cardiovascular:  Normal rate, regular rhythm and normal heart sounds with no murmur.  Pulmonary/Chest:  No respiratory distress, no wheezes, no crackles, with normal breath sounds and good air movement.  Abdominal:  Soft.  Bowel sounds are normal.  No distension and no tenderness.   Musculoskeletal:  L arm swelling and limited ROM at elbow.   Neurological:  Alert and oriented to person, place, and time.  No cranial nerve deficit.  No focal neurological deficit.   Skin:  Skin is warm and dry.  No rash noted.  No pallor.   Peripheral vascular:  No edema and strong pulses on all 4 extremities.    ---------------------------------------------------------------------------------------------------------------------  EKG:  No acute ischemic changes.   ECG 12 Lead         ECG 12 Lead             Telemetry:      I have personally looked at both the EKG and the telemetry strips.    Last echocardiogram:  Results for orders placed during the hospital encounter of  04/28/20    Adult Transthoracic Echo Complete W/ Cont if Necessary Per Protocol    Interpretation Summary  · Normal left ventricular cavity size noted. All left ventricular wall segments contract normally.  · Left ventricular wall thickness is consistent with mild concentric hypertrophy.  · Estimated EF appears to be in the range of 61 - 65%.  · The aortic valve is structurally normal. No aortic valve regurgitation is present. No aortic valve stenosis is present.  · The mitral valve is normal in structure. Moderate mitral valve regurgitation is present. No significant mitral valve stenosis is present.  · The tricuspid valve is normal. Mild tricuspid valve regurgitation is present. Estimated right ventricular systolic pressure from tricuspid regurgitation is normal (<35 mmHg).  · There is no evidence of pericardial effusion.    --------------------------------------------------------------------------------------------------------------------  Labs:  Results from last 7 days   Lab Units 09/24/21  1530 09/24/21  1407 09/22/21  1203 09/20/21  0618 09/19/21  2100 09/19/21  2100 09/19/21  1107 09/19/21  1107   LACTATE mmol/L 1.4  --   --   --   --   --   --  1.2   CRP mg/dL  --  18.88*  --   --   --   --   --   --    WBC 10*3/mm3  --  20.49* 6.57 5.49   < > 5.12   < > 4.90   HEMOGLOBIN g/dL  --  9.7* 9.8* 10.7*   < > 10.3*   < > 10.0*   HEMATOCRIT %  --  28.8* 30.0* 32.5*   < > 31.7*   < > 29.6*   MCV fL  --  89.4 90.1 88.1   < > 89.8   < > 87.6   MCHC g/dL  --  33.7 32.7 32.9   < > 32.5   < > 33.8   PLATELETS 10*3/mm3  --  309 308 227   < > 206   < > 203   INR   --   --  1.59*  --   --  1.14*  --   --     < > = values in this interval not displayed.     Results from last 7 days   Lab Units 09/24/21  1408   PH, ARTERIAL pH units 7.454*   PO2 ART mm Hg 71.8*   PCO2, ARTERIAL mm Hg 35.1   HCO3 ART mmol/L 24.6     Results from last 7 days   Lab Units 09/24/21  1407 09/22/21  1203 09/20/21  0618   SODIUM mmol/L 134* 135*  134*   POTASSIUM mmol/L 3.7 4.2 4.1   CHLORIDE mmol/L 96* 99 98   CO2 mmol/L 25.4 25.7 19.9*   BUN mg/dL 34* 25* 18   CREATININE mg/dL 2.48* 1.19* 0.89   EGFR IF NONAFRICN AM mL/min/1.73 18* 43* 60*   CALCIUM mg/dL 9.0 9.4 9.3   GLUCOSE mg/dL 140* 105* 144*   ALBUMIN g/dL 3.55 3.79 3.49*   BILIRUBIN mg/dL 1.5* 1.2 1.1   ALK PHOS U/L 78 78 78   AST (SGOT) U/L 19 19 16   ALT (SGPT) U/L 9 7 7   Estimated Creatinine Clearance: 16.1 mL/min (A) (by C-G formula based on SCr of 2.48 mg/dL (H)).    No results found for: AMMONIA  Results from last 7 days   Lab Units 09/24/21  1535 09/24/21  1407 09/20/21  0618   TROPONIN T ng/mL 0.046* 0.045* <0.010   PROBNP pg/mL  --  14,027.0*  --          No results found for: HGBA1C, POCGLU  Lab Results   Component Value Date    TSH 4.430 (H) 07/30/2019    FREET4 1.20 07/30/2019     No results found for: PREGTESTUR, PREGSERUM, HCG, HCGQUANT  Pain Management Panel    There is no flowsheet data to display.       Brief Urine Lab Results  (Last result in the past 365 days)      Color   Clarity   Blood   Leuk Est   Nitrite   Protein   CREAT   Urine HCG        09/24/21 1432 Dark Yellow Turbid Large (3+) Large (3+) Negative >=300 mg/dL (3+)             Blood Culture   Date Value Ref Range Status   09/19/2021 No growth at 5 days  Final   09/19/2021 No growth at 5 days  Final     No results found for: URINECX  No results found for: WOUNDCX  No results found for: STOOLCX    I have personally looked at the labs and they are summarized above.  ----------------------------------------------------------------------------------------------------------------------  Detailed radiology reports for the last 24 hours:    Imaging Results (Last 24 Hours)     Procedure Component Value Units Date/Time    CT Abdomen Pelvis Without Contrast [[id number]] Collected: 09/24/21 1742     Updated: 09/24/21 1745    Narrative:      EXAM:    CT Abdomen and Pelvis Without Intravenous Contrast     EXAM DATE:    9/24/2021 4:46  PM     CLINICAL HISTORY:    renal failure     TECHNIQUE:    Axial computed tomography images of the abdomen and pelvis without  intravenous contrast.  Sagittal and coronal reformatted images were  created and reviewed.  This CT exam was performed using one or more of  the following dose reduction techniques:  automated exposure control,  adjustment of the mA and/or kV according to patient size, and/or use of  iterative reconstruction technique.     COMPARISON:    No relevant prior studies available.     FINDINGS:    LUNG BASES:  Unremarkable.  No mass.  No consolidation.      ABDOMEN:    LIVER:  Unremarkable.    GALLBLADDER AND BILE DUCTS:  Cholecystectomy clips.  No ductal  dilation.    PANCREAS:  Unremarkable.  No ductal dilation.    SPLEEN:  Unremarkable.  No splenomegaly.    ADRENALS:  Unremarkable.  No mass.    KIDNEYS AND URETERS:  Stranding around the left kidney that is  nonspecific with no definite obstructive urolithiasis demonstrated.   Chronic atrophy of the right kidney.    STOMACH AND BOWEL:  Unremarkable.  No obstruction.  No mucosal  thickening.      PELVIS:    APPENDIX:  No findings to suggest acute appendicitis.    BLADDER:  Unremarkable.  No stones.    REPRODUCTIVE:  Unremarkable as visualized.      ABDOMEN and PELVIS:    INTRAPERITONEAL SPACE:  Unremarkable.  No free air.  No significant  fluid collection.    BONES/JOINTS:  No acute fracture.  No dislocation.    SOFT TISSUES:  Unremarkable.    VASCULATURE:  Unremarkable.  No abdominal aortic aneurysm.    LYMPH NODES:  Unremarkable.  No enlarged lymph nodes.       Impression:        Stranding around the left kidney that is nonspecific with no definite  obstructive urolithiasis demonstrated.     This report was finalized on 9/24/2021 5:43 PM by Dr. Mario Duffy MD.       CT Chest Without Contrast Diagnostic [[id number]] Collected: 09/24/21 1742     Updated: 09/24/21 1744    Narrative:      EXAM:    CT Chest Without Intravenous Contrast     EXAM  DATE:    9/24/2021 4:47 PM     CLINICAL HISTORY:    SOB     TECHNIQUE:    Axial computed tomography images of the chest without intravenous  contrast.  Sagittal and coronal reformatted images were created and  reviewed.  This CT exam was performed using one or more of the following  dose reduction techniques:  automated exposure control, adjustment of  the mA and/or kV according to patient size, and/or use of iterative  reconstruction technique.     COMPARISON:    No relevant prior studies available.     FINDINGS:    LUNGS:  Unremarkable.  No mass.  No consolidation.    PLEURAL SPACE:  Unremarkable.  No pneumothorax.  No significant  effusion.    HEART:  Cardiomegaly.  No significant pericardial effusion.    MEDIASTINUM:  Small hiatal hernia.    BONES/JOINTS:  Unremarkable.  No acute fracture.  No dislocation.    SOFT TISSUES:  Unremarkable.    VASCULATURE:  Unremarkable.  No thoracic aortic aneurysm.    LYMPH NODES:  Unremarkable.  No enlarged lymph nodes.       Impression:        Cardiomegaly.     This report was finalized on 9/24/2021 5:42 PM by Dr. Mario Duffy MD.       XR Foot 2 View Left [[id number]] Collected: 09/24/21 1741     Updated: 09/24/21 1744    Narrative:      EXAM:    XR Left Foot Complete, 3 or More Views     EXAM DATE:    9/24/2021 5:21 PM     CLINICAL HISTORY:    foot wound     TECHNIQUE:    Frontal, lateral and oblique views of the left foot.     COMPARISON:    No relevant prior studies available.     FINDINGS:    BONES/JOINTS:  Unremarkable.  No acute fracture.  No dislocation.    SOFT TISSUES:  Unremarkable.  No radiopaque foreign body.       Impression:        No acute findings in the left foot.     This report was finalized on 9/24/2021 5:42 PM by Dr. Mario Duffy MD.       XR Chest 1 View [[id number]] Collected: 09/24/21 1542     Updated: 09/24/21 1544    Narrative:      EXAM:    XR Chest, 1 View     EXAM DATE:    9/24/2021 2:16 PM     CLINICAL HISTORY:    SOB     TECHNIQUE:    Frontal  view of the chest.     COMPARISON:    No relevant prior studies available.     FINDINGS:    LUNGS:  Unremarkable.  No consolidation.    PLEURAL SPACE:  Unremarkable.  No pneumothorax.    HEART:  Unremarkable.  No cardiomegaly.    MEDIASTINUM:  Unremarkable.    BONES/JOINTS:  Unremarkable.       Impression:        Unremarkable exam. No acute cardiopulmonary findings identified.     This report was finalized on 9/24/2021 3:42 PM by Dr. Mario Duffy MD.           Final impressions for the last 30 days of radiology reports:    CT Abdomen Pelvis Without Contrast    Result Date: 9/24/2021    Stranding around the left kidney that is nonspecific with no definite obstructive urolithiasis demonstrated.  This report was finalized on 9/24/2021 5:43 PM by Dr. Mario Duffy MD.      XR Foot 2 View Left    Result Date: 9/24/2021    No acute findings in the left foot.  This report was finalized on 9/24/2021 5:42 PM by Dr. Mario Duffy MD.      CT Head Without Contrast    Result Date: 9/19/2021  1.  Senescent changes. 2.  No CT evidence of acute intracranial abnormality.  This report was finalized on 9/19/2021 11:54 AM by Dr. Walker Torres MD.      CT Chest Without Contrast Diagnostic    Result Date: 9/24/2021    Cardiomegaly.  This report was finalized on 9/24/2021 5:42 PM by Dr. Mario Duffy MD.      US Pelvis Complete    Result Date: 9/22/2021  1.  Neither ovary is visualized. 2.  Again noted is nonspecific for centimeter posterior bladder region mass or debris.  This report was finalized on 9/22/2021 1:08 PM by Dr. Mario Duffy MD.      XR Chest 1 View    Result Date: 9/24/2021    Unremarkable exam. No acute cardiopulmonary findings identified.  This report was finalized on 9/24/2021 3:42 PM by Dr. Mario Duffy MD.      XR Chest 1 View    Result Date: 9/19/2021    Unremarkable exam. No acute cardiopulmonary findings identified.  This report was finalized on 9/19/2021 12:10 PM by Dr. Walker Torres MD.      CT Chest  Pulmonary Embolism    Result Date: 9/19/2021  1. Findings suspicious for subtle peripheral filling defects in the left lower lobe and developing small lower lobe pulmonary infarct. No large filling defect on either side. 2. Obstructive lung disease and evidence of COVID- 19 pneumonia as described. 3. Cardiomegaly. 4. Hiatal hernia. NOTIFICATION: Critical Value/emergent results were called by telephone at the time of interpretation on 9/19/2021 7:38 PM to Dr. Consuelo MD who verbally acknowledged these results. Signer Name: Jose Raul Almaraz MD  Signed: 9/19/2021 7:39 PM  Workstation Name: [facility]  [facility]    CT Abdomen Pelvis Stone Protocol    Result Date: 9/22/2021  1.  4 cm right posterior bladder wall hyperdense lesion that could represent blood clot or mass. 2.  Bilateral renal collecting system prominence is again noted bilaterally. 3.  Left double collecting system again noted with some dilatation of the entire left ureter that appear stable.  This report was finalized on 9/22/2021 1:06 PM by Dr. Mario Duffy MD.      I have personally looked at the radiology images and read the final radiology report.    Assessment & Plan    #Sepsis 2/2 pyelonephritis   - Patient with tachycardia, leukocytosis and likely urinary source as evidenced by stranding around left kidney.   - Will start ceftriaxone and monitor response.   - Will monitor urine and blood cultures.     #ALEKSANDRA on CKD  - Baseline Cr appears to be around 1.1-1.2  - Cr on presentation 2.48. Suspect prerenal injury given history. S/p bolus in ED. Encourage PO intake.   - No obstruction seen on CT abdomen  - Will repeat in AM.     #COVID-19 infection   - On room air but high risk. Patient agreeable to REGEN-COV    #Elevated proBNP  #Troponin leak  - Suspect elevated heart enzymes from renal dysfunction. Will get echo  - Troponin with no significant delta. No new ischemic findings on EKG. No chest pain.     #Afib  - Rate  controlled. Will continue metoprolol   - Will restart full dose anticoagulation prior to discharge based on renal improvement. No need to bridge.     #CAD  #HTN  - Will start ASA, statin.   - Will restart home antihypertensive regimen as needed.     #Hypothyroidism   -TSH pending. Continue home regimen.     #Failue to thrive  - Likely from COVID infection. Will encourage PO intake.     F:PO  E:Replace as needed  N:Regular     Code status: DNR/DNI    Dispo: Admit to tele     VTE Prophylaxis:   Mechanical Order History:     None      Pharmalogical Order History:      Ordered     Dose Route Frequency Stop    Signed and Held  heparin (porcine) 5000 UNIT/ML injection 5,000 Units      5,000 Units SC Every 8 Hours Scheduled --                Nathaniel Wright MD  [facility]  09/24/21  19:09 EDT    Electronically signed by Nathaniel Wright MD at 09/24/21 1936       Vital Signs (last day)     Date/Time   Temp   Temp src   Pulse   Resp   BP   Patient Position   SpO2    10/01/21 0710   98 (36.7)   Oral   120   18   129/74   Lying   99    10/01/21 0300   97.6 (36.4)   Oral   85   18   130/78   Lying   99    09/30/21 1900   99 (37.2)   Oral   110   18   112/68   Lying   99    09/30/21 1755   99.3 (37.4)   Oral   100   18   132/72   Lying   --    09/30/21 1433   99.1 (37.3)   Axillary   101   18   142/82   Lying   99    09/30/21 0758   97.9 (36.6)   Axillary   107   18   138/76   Lying   99    09/30/21 0254   98.4 (36.9)   Axillary   94   18   140/92   Lying   99    09/30/21 0108   --   --   97   --   --   --   --              Intake & Output (last day)       09/30 0701 - 10/01 0700 10/01 0701 - 10/02 0700    P.O. 180     Total Intake(mL/kg) 180 (2.3)     Urine (mL/kg/hr)      Total Output      Net +180           Urine Unmeasured Occurrence 2 x     Stool Unmeasured Occurrence 1 x         Lines, Drains & Airways    Active LDAs     Name:   Placement date:   Placement time:   Site:   Days:    Peripheral IV  10/01/21 0421 Anterior;Distal;Right Forearm   10/01/21    0421    Forearm   less than 1                  Current Facility-Administered Medications   Medication Dose Route Frequency Provider Last Rate Last Admin   • aspirin EC tablet 81 mg  81 mg Oral Daily Nathaniel Wright MD   81 mg at 10/01/21 0838   • atorvastatin (LIPITOR) tablet 40 mg  40 mg Oral Nightly Nathaniel Wright MD   40 mg at 09/30/21 2042   • cefTRIAXone (ROCEPHIN) 2 g in sodium chloride 0.9 % 100 mL IVPB-VTB  2 g Intravenous Q24H Hong Bryant  mL/hr at 10/01/21 0528 2 g at 10/01/21 0528   • diphenhydrAMINE (BENADRYL) capsule 50 mg  50 mg Oral Once PRN Nathaniel Wright MD        Or   • diphenhydrAMINE (BENADRYL) injection 50 mg  50 mg Intravenous Once PRN Nathaniel Wright MD       • docusate sodium (COLACE) capsule 200 mg  200 mg Oral BID PRN Patricia Goodman DO       • EPINEPHrine (ADRENALIN) injection 0.3 mg  0.3 mg Intramuscular Once PRN Nathaniel Wright MD       • levothyroxine (SYNTHROID, LEVOTHROID) tablet 100 mcg  100 mcg Oral Nightly Patricia Goodman DO   100 mcg at 09/30/21 2043   • magic barrier cream 1 application  1 application Topical PRN Nathaniel Wright MD   1 application at 09/28/21 0925   • Magnesium Sulfate 2 gram Bolus, followed by 8 gram infusion (total Mg dose 10 grams)- Mg less than or equal to 1mg/dL  2 g Intravenous PRN Nathaniel Wright MD        Or   • Magnesium Sulfate 2 gram / 50mL Infusion (GIVE X 3 BAGS TO EQUAL 6GM TOTAL DOSE) - Mg 1.1 - 1.5 mg/dl  2 g Intravenous PRN Nathaniel Wright MD        Or   • Magnesium Sulfate 4 gram infusion- Mg 1.6-1.9 mg/dL  4 g Intravenous PRN Nathaniel Wright MD       • Magnesium Sulfate 4 gram infusion- Mg 1.6-1.9 mg/dL  4 g Intravenous Once Hong Bryant DO 25 mL/hr at 10/01/21 0623 4 g at 10/01/21 0623   • methylPREDNISolone sodium succinate (SOLU-Medrol) injection 125 mg  125 mg Intravenous Once PRN Nathaniel Wright MD       • metoprolol tartrate (LOPRESSOR)  tablet 25 mg  25 mg Oral Q12H Hong Bryant DO   25 mg at 10/01/21 0838   • nitroglycerin (NITROSTAT) SL tablet 0.4 mg  0.4 mg Sublingual Q5 Min PRN Nathaniel Wright MD       • oxyCODONE-acetaminophen (PERCOCET)  MG per tablet 0.5 tablet  0.5 tablet Oral BID PRN Patricia Goodman DO   0.5 tablet at 10/01/21 0849   • pantoprazole (PROTONIX) EC tablet 40 mg  40 mg Oral Daily Patricia Goodman DO   40 mg at 10/01/21 0838   • potassium chloride (K-DUR,KLOR-CON) CR tablet 40 mEq  40 mEq Oral PRN Nathaniel Wright MD       • potassium chloride (KLOR-CON) packet 40 mEq  40 mEq Oral PRN Nathaniel Wright MD       • rivaroxaban (XARELTO) tablet 15 mg  15 mg Oral Daily With Dinner Hong Bryant DO   15 mg at 09/30/21 1742   • sodium chloride 0.9 % flush 10 mL  10 mL Intravenous PRN Davida Hammer PA       • sodium chloride 0.9 % flush 10 mL  10 mL Intravenous Q12H Nathaniel Wright MD   10 mL at 10/01/21 0838   • sodium chloride 0.9 % flush 10 mL  10 mL Intravenous PRN Nathaniel Wright MD         Operative/Procedure Notes (most recent note)    No notes of this type exist for this encounter.            Physician Progress Notes (most recent note)      Hong Bryant DO at 09/30/21 1959          Subjective     History:   Desiree Matos is a 87 y.o. female admitted on 9/24/2021 secondary to <principal problem not specified>     Procedures: None    CC: Follow up bacteremia, ALEKSANDRA    Patient seen and examined at bedside. Awake and alert. Continues to report poor appetite with poor PO intake. Denies CP or dyspnea. Denies nausea, vomiting or diarrhea. Remains eager to go home. No acute events overnight per RN.     History taken from: patient, chart, and RN.      Objective     Vital Signs  Temp:  [97.9 °F (36.6 °C)-99.3 °F (37.4 °C)] 99.3 °F (37.4 °C)  Heart Rate:  [] 100  Resp:  [18] 18  BP: (132-142)/(72-92) 132/72  No intake or output data in the 24 hours ending 09/30/21  1959      Physical Exam:  General:    Awake, alert, in no acute distress, chronically ill appearing    Heart:      Normal S1 and S2. Irregularly irregular.    Lungs:     Respirations regular, even and unlabored. Lungs clear to auscultation B/L. No wheezes, rales or rhonchi.   Abdomen:   Soft. Mild generalized TTP. No guarding, rebound tenderness or  organomegaly noted. Bowel sounds present x 4.   Extremities:  No clubbing, cyanosis or edema noted. Moves UE and LE equally B/L.     Results Review:    Results from last 7 days   Lab Units 09/30/21 0537 09/29/21 0133 09/28/21 0057 09/27/21  1142 09/26/21  1006 09/25/21  0357 09/24/21  1407   WBC 10*3/mm3 9.52 7.72 7.72 7.24 10.86* 17.66* 20.49*   HEMOGLOBIN g/dL 7.8* 7.8* 7.5* 7.3* 7.6* 8.4* 9.7*   PLATELETS 10*3/mm3 181 183 186 185 194 262 309     Results from last 7 days   Lab Units 09/30/21 0537 09/29/21 0133 09/28/21 0057 09/27/21  1142 09/26/21  1006 09/25/21  0357 09/24/21  1407   SODIUM mmol/L 135* 130* 133* 137 134* 135* 134*   POTASSIUM mmol/L 3.6 3.7 4.0 4.1 4.6 3.3* 3.7   CHLORIDE mmol/L 102 95* 100 103 103 100 96*   CO2 mmol/L 22.3 24.1 22.5 21.7* 18.2* 19.3* 25.4   BUN mg/dL 14 18 27* 29* 40* 39* 34*   CREATININE mg/dL 0.94 0.95 1.14* 1.26* 1.71* 2.44* 2.48*   CALCIUM mg/dL 8.3* 8.6 8.7 8.6 8.5* 8.4* 9.0   GLUCOSE mg/dL 82 89 90 94 111* 139* 140*     Results from last 7 days   Lab Units 09/30/21 0537 09/29/21 0133 09/28/21 0057 09/27/21  1142 09/26/21  1006 09/25/21  0357 09/24/21  1407   BILIRUBIN mg/dL 0.6 0.7 0.7 0.5 0.6 1.2 1.5*   ALK PHOS U/L 69 66 63 64 68 80 78   AST (SGOT) U/L 13 9 8 9 16 16 19   ALT (SGPT) U/L 5 <5 5 5 6 7 9     Results from last 7 days   Lab Units 09/28/21  1430 09/28/21  0057 09/27/21  1927 09/26/21  1006 09/25/21  0936   MAGNESIUM mg/dL 2.3 2.4 1.7 1.7 1.6         Results from last 7 days   Lab Units 09/30/21  1800 09/24/21  1535 09/24/21  1407   TROPONIN T ng/mL 0.027 0.046* 0.045*       Imaging Results (Last 24 Hours)      Procedure Component Value Units Date/Time    US Venous Doppler Lower Extremity Bilateral (duplex) [[id number]] Collected: 09/30/21 1405     Updated: 09/30/21 1408    Narrative:      EXAM:    US Duplex Bilateral Lower Extremities Veins     EXAM DATE:    9/30/2021 12:36 PM     CLINICAL HISTORY:    (+) D-Dimer; N17.9-Acute kidney failure, unspecified;  D85-Crnezp-inhoplekvsuw nephritis, not specified as acute or chronic;  U07.1-COVID-19     TECHNIQUE:    Real-time duplex ultrasound scan of the bilateral lower extremity  veins integrating B-mode two-dimensional vascular structure, Doppler  spectral analysis, color flow Doppler imaging and compression.     COMPARISON:    No relevant prior studies available.     FINDINGS:    RIGHT DEEP VEINS:  Unremarkable.  No DVT in the right common femoral,  femoral, proximal deep femoral or popliteal veins.  The veins  demonstrate normal color flow, are normally compressible, with normal  phasic flow and/or augmentation response.       LEFT DEEP VEINS:  Unremarkable.  No DVT in the left common femoral,  femoral, proximal deep femoral or popliteal veins.  The veins  demonstrate normal color flow, are normally compressible, with normal  phasic flow and/or augmentation response.       SOFT TISSUES:  No acute findings.  No popliteal cyst.       Impression:        Normal bilateral lower extremity duplex venous ultrasound.     This report was finalized on 9/30/2021 2:05 PM by Dr. Mario Duffy MD.               Medications:  aspirin, 81 mg, Oral, Daily  atorvastatin, 40 mg, Oral, Nightly  cefTRIAXone, 2 g, Intravenous, Q24H  levothyroxine, 100 mcg, Oral, Nightly  metoprolol tartrate, 25 mg, Oral, Q12H  pantoprazole, 40 mg, Oral, Daily  rivaroxaban, 15 mg, Oral, Daily With Dinner  sodium chloride, 10 mL, Intravenous, Q12H               Assessment/Plan   Sepsis: Likely 2/2 pyelonephritis and bacteremia. Afebrile and hemodynamically stable. Leukocytosis has resolved with stable WBC today.   CRP has risen today. Cont high dose Rocephin. Repeat labs in the AM.     E coli bacteremia: Likely 2/2 left-sided pyelonephritis. Urine culture not initially collected with add on culture revealing mixed carlee. 2/2 initial blood cultures reveal pan sensitive E coli. Cont Rocephin.     ALEKSANDRA on CKD IIIb: Likely prerenal. S/P bolus in the ED. Creatine improving. Cont to encourage PO intake. Holding home Bumex and HCTZ. Repeat labs in the AM.     COVID-19 infection: Respiratory status stable. O2 stable on RA. S/P REGEN-COV. Cont supportive treatment.     Afib: Episode of RVR this evening. Increase metoprolol. Cont Xarelto for stroke prevention. Monitor on telemetry.     Essential HTN: BP stable. Cont metoprolol.     CAD: Appears clinically stable. Cont ASA, statin and metoprolol. Monitor on telemetry.     Normocytic anemia: Iron and iron saturation normal. Hgb low but remains >7 with no signs of bleeding. Possible dilutional component. Stable today. Cont to monitor.     Hypothyroidism: TSH is normal. Cont levothyroxine.     Generalized weakness: PT/OT    DVT PPX: Xarelto.     Disposition: Pt would likely benefit from short term rehab placement based on recent falls and review of therapy notes/discussion with PT. However, pt not currently agreeable and family agreeable for her to return home with 24/7 care. Discussed with CM today with plans for discharge home with . However, discharge canceled as pt c/o CP with EKG revealing Afib with mild RVR. Will reevaluate tomorrow.       Hong Bryant DO  09/30/21  19:59 EDT    Electronically signed by Hong Bryant DO at 09/30/21 2003       Consult Notes (most recent note)    No notes of this type exist for this encounter.            Physical Therapy Notes (most recent note)      Kaylyn Butt, PT at 09/30/21 1138  Version 1 of 1       Goal Outcome Evaluation:              Outcome Summary: Pt has decreased participation in PT tx this day, pt desiring to go  "home. Pt participated minimally in bed mobility with overall minimum assist. Pt actively performed some LE exercises with active assist from this PT. Current D/C recommendation for nursing home placement for therapy.     Electronically signed by Kaylyn Butt, PT at 21 1138          Occupational Therapy Notes (most recent note)      Walker Vallecillo, OT at 21 1457          Acute Care - Occupational Therapy Treatment Note  GILBERTO Shaikh     Patient Name: Desiree Matos  : 10/13/1933  MRN: [id number]  Today's Date: 2021  Onset of Illness/Injury or Date of Surgery: 21     Referring Physician: Kyle    Admit Date: 2021   Pt presents with improvement noted from initial admission status. She is able to perform/assist with self care from bed with min/mod assist needed at this time. Pt has declined further participation in therapy, stating \"I am going home\". Pt encouraged to continue for improved fxl status in home environment. She states that she will have family available to assist as needed. At this time,  pt would need 24/7 care and assist to ensure safe performance of self care/mobility in home environment. Also recommend home health therapy to eval and tx as pt/family is agreeable.     ICD-10-CM ICD-9-CM   1. ALEKSANDRA (acute kidney injury) (HCC)  N17.9 584.9   2. Pyelonephritis  N12 590.80   3. COVID-19  U07.1 079.89     Patient Active Problem List   Diagnosis   • Atrial fibrillation, chronic   • Acute diverticulitis   • Chronic anticoagulation, Xarelto   • Essential hypertension   • Hypothyroidism   • History of colonic polyps   • ASCVD (arteriosclerotic cardiovascular disease)   • Diastolic CHF, chronic (CMS/HCC)   • GERD without esophagitis   • Peripheral neuropathy   • Asthma   • CKD (chronic kidney disease), stage III (CMS/HCC)   • Diverticulitis   • Abnormal nuclear stress test with medium size, mild degree of anterior wall myocardial ischemia.   • ALEKSANDRA (acute kidney injury) (HCC) "     Past Medical History:   Diagnosis Date   • A-fib (CMS/HCC)    • Anemia 1/13/2017   • Arthritis    • Asthma    • Cancer (CMS/[facility])    • CHF (congestive heart failure) (CMS/[facility])    • Chronic anticoagulation, Xarelto 7/30/2019   • Coronary artery disease    • GERD without esophagitis 7/30/2019   • History of colonic polyps 7/30/2019   • History of transfusion    • Hypertension    • Hypothyroidism 7/30/2019   • Peripheral neuropathy 7/30/2019     Past Surgical History:   Procedure Laterality Date   • BACK SURGERY     • BREAST CYST EXCISION Left    • CARDIAC CATHETERIZATION N/A 5/19/2020    Procedure: Left Heart Cath;  Surgeon: Jack Couch MD;  Location: [facility] CATH INVASIVE LOCATION;  Service: Cardiology;  Laterality: N/A;   • CERVICAL POLYPECTOMY     • COLONOSCOPY N/A 1/17/2017    Procedure: COLONOSCOPY;  Surgeon: Madi Zheng III, MD;  Location: [facility] OR;  Service:    • D & C AND LAPAROSCOPY     • ENDOSCOPY N/A 1/17/2017    Procedure: ESOPHAGOGASTRODUODENOSCOPY;  Surgeon: Madi Zheng III, MD;  Location: [facility] OR;  Service:    • GALLBLADDER SURGERY     • VEIN LIGATION AND STRIPPING              OT ASSESSMENT FLOWSHEET (last 12 hours)      OT Evaluation and Treatment     Row Name 09/30/21 1451                   OT Time and Intention    Document Type  therapy note (daily note)  -KR        Mode of Treatment  occupational therapy  -KR        Patient Effort  poor  -KR        Comment  Pt. declined to participate in strength/mobility training at this time. Pt. encouraged to participate and accepted verbal safety instruction for future home environment.   -KR           General Information    General Observations of Patient  alert/ frustrated  -KR           Living Environment    Current Living Arrangements  home/apartment/condo  -KR        Lives With  -- pt. reports multiple relatives available to assist  -KR           Bed Mobility    Bed Mobility  bed mobility (all) activities  -KR         All Activities, Edgar (Bed Mobility)  minimum assist (75% patient effort)  -KR           Toileting Assessment/Training    Edgar Level (Toileting)  toileting skills;maximum assist (25% patient effort) bed pan/supine  -KR           Wound 09/24/21 2003 Bilateral coccyx Other (comment)    Wound - Properties Group Placement Date: 09/24/21  -EA Placement Time: 2003  -EA Present on Hospital Admission: Y  -EA Side: Bilateral  -EA Location: coccyx  -EA Primary Wound Type: Other  -EA Stage, Pressure Injury : other (see comments)  -EA    Retired Wound - Properties Group Date first assessed: 09/24/21  -EA Time first assessed: 2003  -EA Present on Hospital Admission: Y  -EA Side: Bilateral  -EA Location: coccyx  -EA Primary Wound Type: Other  -EA       Plan of Care Review    Plan of Care Reviewed With  patient  -KR        Outcome Summary  Pt. encouraged to participate with therapy to enhance fxl performance and safety in home environment. Pt. declined further activity at this time.   -KR           Therapy Plan Review/Discharge Plan (OT)    Anticipated Discharge Disposition (OT)  home with 24/7 care  -KR          User Key  (r) = Recorded By, (t) = Taken By, (c) = Cosigned By    Initials Name Effective Dates    Kathie Hendrickson RN 06/16/21 -     Walker Ryder OT 06/16/21 -                  OT Recommendation and Plan     Plan of Care Review  Plan of Care Reviewed With: patient  Outcome Summary: Pt. encouraged to participate with therapy to enhance fxl performance and safety in home environment. Pt. declined further activity at this time.   Plan of Care Reviewed With: patient  Outcome Summary: Pt. encouraged to participate with therapy to enhance fxl performance and safety in home environment. Pt. declined further activity at this time.         Time Calculation:     Therapy Charges for Today     Code Description Service Date Service Provider Modifiers Qty    [id number]  OT THERAPEUTIC ACT EA 15 MIN  9/30/2021 Walker Vallecillo OT GO 1    [id number] HC OT SELF CARE/MGMT/TRAIN EA 15 MIN 9/30/2021 Walker Vallecillo OT GO 1               Walker Vallecillo OT  9/30/2021    Electronically signed by Walker Vallecillo OT at 09/30/21 3342

## 2021-10-01 NOTE — PLAN OF CARE
Goal Outcome Evaluation:  Pt resting in bed. No complaints at this time. Will continue to follow plan of care.

## 2021-10-01 NOTE — PROGRESS NOTES
Antimicrobial Length of Therapy:    Day 8 of 10 ceftriaxone    Thank you.  Love Reynolds, Pharm.D.  10/1/2021  16:35 EDT

## 2021-10-01 NOTE — PROGRESS NOTES
Subjective     History:   Desiree Matos is a 87 y.o. female admitted on 9/24/2021 secondary to <principal problem not specified>     Procedures: None    CC: Follow up bacteremia, ALEKSANDRA    Patient seen and examined at bedside. Awake and alert. States she does not feel as well today. Denies significant cough or dyspnea. Reports right-sided abdominal pain. (+) BM. HR overall improved.  No acute events overnight per RN.     History taken from: patient, chart, and RN.      Objective     Vital Signs  Temp:  [97.6 °F (36.4 °C)-98.6 °F (37 °C)] 98.6 °F (37 °C)  Heart Rate:  [] 68  Resp:  [16-18] 16  BP: (116-147)/(55-78) 147/69    Intake/Output Summary (Last 24 hours) at 10/1/2021 1933  Last data filed at 10/1/2021 1335  Gross per 24 hour   Intake 540 ml   Output --   Net 540 ml         Physical Exam:  General:    Awake, alert, in no acute distress, chronically ill appearing    Heart:      Normal S1 and S2. Irregularly irregular.    Lungs:     Respirations regular, even and unlabored. Diminished breath sounds at bases. No wheezes, rales or rhonchi.   Abdomen:   Soft. Mild generalized TTP, worse on right. No guarding, rebound tenderness or organomegaly noted. Bowel sounds present x 4.   Extremities:  No clubbing, cyanosis or edema noted. Moves UE and LE equally B/L.     Results Review:    Results from last 7 days   Lab Units 10/01/21  0058 09/30/21  0537 09/29/21  0133 09/28/21  0057 09/27/21  1142 09/26/21  1006 09/25/21  0357   WBC 10*3/mm3 13.21* 9.52 7.72 7.72 7.24 10.86* 17.66*   HEMOGLOBIN g/dL 7.6* 7.8* 7.8* 7.5* 7.3* 7.6* 8.4*   PLATELETS 10*3/mm3 211 181 183 186 185 194 262     Results from last 7 days   Lab Units 10/01/21  0058 09/30/21  0537 09/29/21  0133 09/28/21  0057 09/27/21  1142 09/26/21  1006 09/25/21  0357   SODIUM mmol/L 138 135* 130* 133* 137 134* 135*   POTASSIUM mmol/L 5.2 3.6 3.7 4.0 4.1 4.6 3.3*   CHLORIDE mmol/L 105 102 95* 100 103 103 100   CO2 mmol/L 21.7* 22.3 24.1 22.5 21.7* 18.2* 19.3*    BUN mg/dL 16 14 18 27* 29* 40* 39*   CREATININE mg/dL 0.99 0.94 0.95 1.14* 1.26* 1.71* 2.44*   CALCIUM mg/dL 8.6 8.3* 8.6 8.7 8.6 8.5* 8.4*   GLUCOSE mg/dL 97 82 89 90 94 111* 139*     Results from last 7 days   Lab Units 10/01/21  0058 09/30/21  0537 09/29/21  0133 09/28/21  0057 09/27/21  1142 09/26/21  1006 09/25/21  0357   BILIRUBIN mg/dL 0.7 0.6 0.7 0.7 0.5 0.6 1.2   ALK PHOS U/L 69 69 66 63 64 68 80   AST (SGOT) U/L 12 13 9 8 9 16 16   ALT (SGPT) U/L 5 5 <5 5 5 6 7     Results from last 7 days   Lab Units 10/01/21  0058 09/28/21  1430 09/28/21  0057 09/27/21  1927 09/26/21  1006 09/25/21  0936   MAGNESIUM mg/dL 1.9 2.3 2.4 1.7 1.7 1.6         Results from last 7 days   Lab Units 09/30/21  1800   TROPONIN T ng/mL 0.027       Imaging Results (Last 24 Hours)     Procedure Component Value Units Date/Time    CT Abdomen Pelvis Without Contrast [[id number]] Collected: 10/01/21 1554     Updated: 10/01/21 1602    Narrative:      EXAM: CT ABDOMEN PELVIS WO CONTRAST-         TECHNIQUE: Multiple axial CT images were obtained from lung bases  through pubic symphysis WITHOUT administration of IV contrast.  Reformatted images in the coronal and/or sagittal plane(s) were  generated from the axial data set to facilitate diagnostic accuracy  and/or surgical planning.  Oral Contrast:NONE.     Radiation dose reduction techniques were utilized per ALARA protocol.  Automated exposure control was initiated through either or [email or web address] or  D[employer]Right software packages by  protocol.    DOSE:     Clinical information Abdominal pain, worsening sepsis; N17.9-Acute  kidney failure, unspecified; Y76-Whsubr-vrjplekqdabk nephritis, not  specified as acute or chronic; U07.1-COVID-19; Z53.20-Procedure and  treatment not carried out because of patient's decision for unspecified  reasons      Comparison 09/24/2021     FINDINGS:     Lower thorax: Trace left effusion and left basilar consolidation     Abdomen:     Liver: Homogeneous. No  focal hepatic mass or ductal dilatation.     Gallbladder: Surgically absent     Pancreas: Unremarkable. No mass or ductal dilatation.     Spleen: Homogeneous. No splenomegaly.     Adrenals: No mass.     Kidneys/ureters: Small right kidney.  Left kidney shows mild dilatation of the renal pelvis and left ureter  but no distal ureteral stones are seen.     GI tract: Hiatal hernia is present. Moderate stool burden. Sigmoid  diverticuli without evidence of diverticulitis     MESENTERY: No free fluid, walled off fluid collections, mesenteric  stranding, or enlarged lymph nodes         Vasculature: Evidence of atherosclerotic vascular disease     Abdominal wall: Fat-containing left inguinal hernia     Bladder: No focal mass or significant wall thickening     Reproductive: Unremarkable as visualized     Bones: No acute bony abnormality.       Impression:         1. Mild dilatation of the left collecting system is present but no  distal ureteral stones.     2.Trace left effusion and left basilar consolidation     3. Other findings as above              This report was finalized on 10/1/2021 3:56 PM by Dr. Fly Gao MD.       CT Chest Without Contrast Diagnostic [[id number]] Collected: 10/01/21 1556     Updated: 10/01/21 1601    Narrative:      EXAM: CT CHEST WO CONTRAST DIAGNOSTIC-      CLINICAL INDICATION:Worsening sepsis; N17.9-Acute kidney failure,  unspecified; N11-Pirmcz-snejhjsukktw nephritis, not specified as acute  or chronic; U07.1-COVID-19; Z53.20-Procedure and treatment not carried  out because of patient's decision for unspecified reasons      COMPARISON: 09/24/2021     TECHNIQUE: Multiple axial CT images were obtained from lung apex through  upper abdomen without the administration of IV contrast. Reformatted  images in the coronal and/or sagittal plane(s) were generated from the  axial data set to facilitate diagnostic accuracy and/or surgical  planning.     Radiation dose reduction techniques were utilized  per ALARA protocol.  Automated exposure control was initiated through either or [employer] or  [employer] software packages by  protocol.    DOSE (DLP mGy-cm):        FINDINGS:     LUNGS: Left basilar consolidation     HEART: Extensive coronary artery calcifications and mild cardiomegaly     MEDIASTINUM: Large hiatal hernia. No adenopathy     PLEURA: Trace left pleural effusion     VASCULATURE: No evidence of aneurysm.     BONES: Arthritic change     VISUALIZED UPPER ABDOMEN:Please see the CT report for the abdomen and  pelvis     Other: None.       Impression:         1. Interval development of trace left effusion and minimal left basilar  consolidation  2. Coronary artery calcifications and cardiomegaly  3. Other findings as above           This report was finalized on 10/1/2021 3:58 PM by Dr. Fly Gao MD.       XR Chest 1 View [[id number]] Collected: 10/01/21 1120     Updated: 10/01/21 1159    Narrative:      XR CHEST 1 VW-     CLINICAL INDICATION: Worsening inflammatory markers, COVID; N17.9-Acute  kidney failure, unspecified; W80-Xuemwc-blpivgawoqif nephritis, not  specified as acute or chronic; U07.1-COVID-19; Z53.20-Procedure and  treatment not carried out because of patient's decision for unspecified  reasons        COMPARISON: 09/24/2021      TECHNIQUE: Single frontal view of the chest.     FINDINGS:     Left basilar consolidation  The cardiac silhouette is normal. The pulmonary vasculature is  unremarkable.  There is no evidence of an acute osseous abnormality.   There are no suspicious-appearing parenchymal soft tissue nodules.          Impression:      Left-sided airspace disease     This report was finalized on 10/1/2021 11:20 AM by Dr. Fly Gao MD.       XR Abdomen KUB [[id number]] Collected: 10/01/21 1125     Updated: 10/01/21 1158    Narrative:      EXAMINATION: XR ABDOMEN KUB-      CLINICAL INDICATION: Abdominal pain; N17.9-Acute kidney failure,  unspecified; Z09-Xhhzll-ekaomyxgdgqn  nephritis, not specified as acute  or chronic; U07.1-COVID-19; Z53.20-Procedure and treatment not carried  out because of patient's decision for unspecified reasons        COMPARISON: None available     FINDINGS: One view of the abdomen     Postoperative hardware in the lumbar spine     No evidence of bowel obstruction     No pneumatosis      This report was finalized on 10/1/2021 11:25 AM by Dr. Fly Gao MD.               Medications:  aspirin, 81 mg, Oral, Daily  atorvastatin, 40 mg, Oral, Nightly  cefepime, 2 g, Intravenous, Once  [START ON 10/2/2021] cefepime, 2 g, Intravenous, Q12H  doxycycline, 100 mg, Intravenous, Q12H  levothyroxine, 100 mcg, Oral, Nightly  metoprolol tartrate, 25 mg, Oral, Q12H  pantoprazole, 40 mg, Oral, Daily  rivaroxaban, 15 mg, Oral, Daily With Dinner  sodium chloride, 10 mL, Intravenous, Q12H               Assessment/Plan   Sepsis: Likely 2/2 pyelonephritis and bacteremia. Afebrile and hemodynamically stable. Leukocytosis initially resolved but WBC has risen today.  CRP has risen again today. Lactate is normal. Procal is pending. Obained repeat blood cultures. Repeat UA remains abnormal. CT chest and abd/pelvis obtained revealing left basilar consolidation. Will broaden antibiotic coverage to Doxycycline and Cefepime. Repeat labs in the AM.     E coli bacteremia: Likely 2/2 left-sided pyelonephritis. Urine culture not initially collected with add on culture revealing mixed carlee. 2/2 initial blood cultures reveal pan sensitive E coli. Cont antibiotics as above.      ALEKSANDRA on CKD IIIb: Likely prerenal. S/P bolus in the ED. Creatine improving. Cont to encourage PO intake. Holding home Bumex and HCTZ. Repeat labs in the AM.     COVID-19 infection: Respiratory status stable. O2 stable on RA. S/P REGEN-COV. Cont supportive treatment.     Afib: Episode of RVR last evening but HR overall improved today with increasing metoprolol. Cont Xarelto for stroke prevention. Monitor on telemetry.      Abdominal pain: CT abd/pelvis reveals mild dilatation of the left collecting system but no distal stones, trace left effusion and left basilar consolidation. Add bowel regimen. Cont supportive treatment.     Essential HTN: BP stable. Cont metoprolol.     CAD: Appears clinically stable. Cont ASA, statin and metoprolol. Monitor on telemetry.     Normocytic anemia: Iron and iron saturation normal. Hgb low but remains >7 with no signs of bleeding. Possible dilutional component. Stable today. Cont to monitor.     Hypothyroidism: TSH is normal. Cont levothyroxine.     Generalized weakness: PT/OT    DVT PPX: Xarelto.     Pt is at high risk 2/2 worsening sepsis, UTI/pyelonephritis, bacteremia, Afib with RVR, COVID-19 and functional decline.     Disposition: Pt and family now agreeable to short term SNF placement. However, pt not medically stable for discharge today in the setting of rising inflammatory markers.       Hong Bryant DO  10/01/21  19:33 EDT

## 2021-10-01 NOTE — PLAN OF CARE
Goal Outcome Evaluation:              Outcome Summary: Pt tolerated session fair performing supine LE exercises this day. Current D/C recommendation for nursing home placement for therapy.

## 2021-10-01 NOTE — PLAN OF CARE
Goal Outcome Evaluation:         Pt rested in bed today with complaints of should and arm pain. Pt stated it had been broke. Treated pain and Pt rested well. Will continue to monitor and follow plan of care.

## 2021-10-02 LAB
ALBUMIN SERPL-MCNC: 2.27 G/DL (ref 3.5–5.2)
ALBUMIN/GLOB SERPL: 0.6 G/DL
ALP SERPL-CCNC: 71 U/L (ref 39–117)
ALT SERPL W P-5'-P-CCNC: <5 U/L (ref 1–33)
ANION GAP SERPL CALCULATED.3IONS-SCNC: 8.6 MMOL/L (ref 5–15)
AST SERPL-CCNC: 11 U/L (ref 1–32)
BACTERIA SPEC AEROBE CULT: NORMAL
BASOPHILS # BLD AUTO: 0.01 10*3/MM3 (ref 0–0.2)
BASOPHILS NFR BLD AUTO: 0.1 % (ref 0–1.5)
BILIRUB SERPL-MCNC: 0.5 MG/DL (ref 0–1.2)
BUN SERPL-MCNC: 18 MG/DL (ref 8–23)
BUN/CREAT SERPL: 17.5 (ref 7–25)
CALCIUM SPEC-SCNC: 8.5 MG/DL (ref 8.6–10.5)
CHLORIDE SERPL-SCNC: 100 MMOL/L (ref 98–107)
CO2 SERPL-SCNC: 23.4 MMOL/L (ref 22–29)
CREAT SERPL-MCNC: 1.03 MG/DL (ref 0.57–1)
CRP SERPL-MCNC: 23.37 MG/DL (ref 0–0.5)
DEPRECATED RDW RBC AUTO: 53.5 FL (ref 37–54)
EOSINOPHIL # BLD AUTO: 0.04 10*3/MM3 (ref 0–0.4)
EOSINOPHIL NFR BLD AUTO: 0.4 % (ref 0.3–6.2)
ERYTHROCYTE [DISTWIDTH] IN BLOOD BY AUTOMATED COUNT: 16.2 % (ref 12.3–15.4)
GFR SERPL CREATININE-BSD FRML MDRD: 51 ML/MIN/1.73
GLOBULIN UR ELPH-MCNC: 3.8 GM/DL
GLUCOSE SERPL-MCNC: 109 MG/DL (ref 65–99)
HCT VFR BLD AUTO: 23.8 % (ref 34–46.6)
HGB BLD-MCNC: 7.4 G/DL (ref 12–15.9)
IMM GRANULOCYTES # BLD AUTO: 0.26 10*3/MM3 (ref 0–0.05)
IMM GRANULOCYTES NFR BLD AUTO: 2.4 % (ref 0–0.5)
LYMPHOCYTES # BLD AUTO: 0.78 10*3/MM3 (ref 0.7–3.1)
LYMPHOCYTES NFR BLD AUTO: 7.1 % (ref 19.6–45.3)
MAGNESIUM SERPL-MCNC: 2.3 MG/DL (ref 1.6–2.4)
MCH RBC QN AUTO: 28.6 PG (ref 26.6–33)
MCHC RBC AUTO-ENTMCNC: 31.1 G/DL (ref 31.5–35.7)
MCV RBC AUTO: 91.9 FL (ref 79–97)
MONOCYTES # BLD AUTO: 1.25 10*3/MM3 (ref 0.1–0.9)
MONOCYTES NFR BLD AUTO: 11.5 % (ref 5–12)
NEUTROPHILS NFR BLD AUTO: 78.5 % (ref 42.7–76)
NEUTROPHILS NFR BLD AUTO: 8.57 10*3/MM3 (ref 1.7–7)
NRBC BLD AUTO-RTO: 0.2 /100 WBC (ref 0–0.2)
PLATELET # BLD AUTO: 206 10*3/MM3 (ref 140–450)
PMV BLD AUTO: 11.9 FL (ref 6–12)
POTASSIUM SERPL-SCNC: 4.9 MMOL/L (ref 3.5–5.2)
PROT SERPL-MCNC: 6.1 G/DL (ref 6–8.5)
RBC # BLD AUTO: 2.59 10*6/MM3 (ref 3.77–5.28)
SODIUM SERPL-SCNC: 132 MMOL/L (ref 136–145)
TROPONIN T SERPL-MCNC: 0.03 NG/ML (ref 0–0.03)
WBC # BLD AUTO: 10.91 10*3/MM3 (ref 3.4–10.8)

## 2021-10-02 PROCEDURE — 86140 C-REACTIVE PROTEIN: CPT | Performed by: INTERNAL MEDICINE

## 2021-10-02 PROCEDURE — 93010 ELECTROCARDIOGRAM REPORT: CPT | Performed by: INTERNAL MEDICINE

## 2021-10-02 PROCEDURE — 80053 COMPREHEN METABOLIC PANEL: CPT | Performed by: INTERNAL MEDICINE

## 2021-10-02 PROCEDURE — 99232 SBSQ HOSP IP/OBS MODERATE 35: CPT | Performed by: INTERNAL MEDICINE

## 2021-10-02 PROCEDURE — 25010000002 CEFEPIME PER 500 MG: Performed by: INTERNAL MEDICINE

## 2021-10-02 PROCEDURE — 93005 ELECTROCARDIOGRAM TRACING: CPT | Performed by: INTERNAL MEDICINE

## 2021-10-02 PROCEDURE — 84484 ASSAY OF TROPONIN QUANT: CPT | Performed by: INTERNAL MEDICINE

## 2021-10-02 PROCEDURE — 83735 ASSAY OF MAGNESIUM: CPT | Performed by: INTERNAL MEDICINE

## 2021-10-02 PROCEDURE — 85025 COMPLETE CBC W/AUTO DIFF WBC: CPT | Performed by: INTERNAL MEDICINE

## 2021-10-02 RX ADMIN — RIVAROXABAN 15 MG: 15 TABLET, FILM COATED ORAL at 17:27

## 2021-10-02 RX ADMIN — METOPROLOL TARTRATE 25 MG: 25 TABLET, FILM COATED ORAL at 20:42

## 2021-10-02 RX ADMIN — DOXYCYCLINE 100 MG: 100 INJECTION, POWDER, LYOPHILIZED, FOR SOLUTION INTRAVENOUS at 08:50

## 2021-10-02 RX ADMIN — SODIUM CHLORIDE, PRESERVATIVE FREE 10 ML: 5 INJECTION INTRAVENOUS at 20:43

## 2021-10-02 RX ADMIN — DOCUSATE SODIUM 100 MG: 100 CAPSULE ORAL at 08:47

## 2021-10-02 RX ADMIN — DOXYCYCLINE 100 MG: 100 INJECTION, POWDER, LYOPHILIZED, FOR SOLUTION INTRAVENOUS at 22:31

## 2021-10-02 RX ADMIN — ASPIRIN 81 MG: 81 TABLET, COATED ORAL at 08:47

## 2021-10-02 RX ADMIN — ATORVASTATIN CALCIUM 40 MG: 40 TABLET, FILM COATED ORAL at 20:42

## 2021-10-02 RX ADMIN — DOCUSATE SODIUM 100 MG: 100 CAPSULE ORAL at 20:42

## 2021-10-02 RX ADMIN — NITROGLYCERIN 0.4 MG: 0.4 TABLET, ORALLY DISINTEGRATING SUBLINGUAL at 08:20

## 2021-10-02 RX ADMIN — OXYCODONE HYDROCHLORIDE AND ACETAMINOPHEN 0.5 TABLET: 10; 325 TABLET ORAL at 17:25

## 2021-10-02 RX ADMIN — PANTOPRAZOLE SODIUM 40 MG: 40 TABLET, DELAYED RELEASE ORAL at 08:47

## 2021-10-02 RX ADMIN — LEVOTHYROXINE SODIUM 100 MCG: 100 TABLET ORAL at 20:42

## 2021-10-02 RX ADMIN — SODIUM CHLORIDE, PRESERVATIVE FREE 10 ML: 5 INJECTION INTRAVENOUS at 09:32

## 2021-10-02 RX ADMIN — POLYETHYLENE GLYCOL (3350) 17 G: 17 POWDER, FOR SOLUTION ORAL at 08:48

## 2021-10-02 RX ADMIN — CEFEPIME HYDROCHLORIDE 2 G: 2 INJECTION, POWDER, FOR SOLUTION INTRAVENOUS at 08:47

## 2021-10-02 RX ADMIN — CEFEPIME HYDROCHLORIDE 2 G: 2 INJECTION, POWDER, FOR SOLUTION INTRAVENOUS at 20:43

## 2021-10-02 RX ADMIN — METOPROLOL TARTRATE 25 MG: 25 TABLET, FILM COATED ORAL at 08:47

## 2021-10-02 RX ADMIN — OXYCODONE HYDROCHLORIDE AND ACETAMINOPHEN 0.5 TABLET: 10; 325 TABLET ORAL at 04:02

## 2021-10-02 NOTE — PROGRESS NOTES
Subjective     History:   Desiree Matos is a 87 y.o. female admitted on 9/24/2021 secondary to <principal problem not specified>     Procedures: None    CC: Follow up bacteremia, ALEKSANDRA    Patient seen and examined at bedside. Awake and alert. Reports pleuritic pain today. Denies significant cough or dyspnea. Reports improvement in her abdominal pain today.  No acute events overnight per RN.     History taken from: patient, chart, and RN.      Objective     Vital Signs  Temp:  [98.1 °F (36.7 °C)-98.6 °F (37 °C)] 98.1 °F (36.7 °C)  Heart Rate:  [] 92  Resp:  [16-18] 18  BP: (103-147)/(49-86) 120/60    Intake/Output Summary (Last 24 hours) at 10/2/2021 1635  Last data filed at 10/2/2021 1353  Gross per 24 hour   Intake 480 ml   Output 300 ml   Net 180 ml         Physical Exam:  General:    Awake, alert, in no acute distress, chronically ill appearing    Heart:      Normal S1 and S2. Irregularly irregular. Anterior chest wall tender to palpation.    Lungs:     Respirations regular, even and unlabored. Diminished breath sounds at bases. No wheezes, rales or rhonchi.   Abdomen:   Soft. TTP RUQ. No guarding, rebound tenderness or organomegaly noted. Bowel sounds present x 4.   Extremities:  No clubbing, cyanosis or edema noted. Moves UE and LE equally B/L.     Results Review:    Results from last 7 days   Lab Units 10/02/21  0430 10/01/21  0058 09/30/21  0537 09/29/21  0133 09/28/21  0057 09/27/21  1142 09/26/21  1006   WBC 10*3/mm3 10.91* 13.21* 9.52 7.72 7.72 7.24 10.86*   HEMOGLOBIN g/dL 7.4* 7.6* 7.8* 7.8* 7.5* 7.3* 7.6*   PLATELETS 10*3/mm3 206 211 181 183 186 185 194     Results from last 7 days   Lab Units 10/02/21  0430 10/01/21  0058 09/30/21  0537 09/29/21  0133 09/28/21 0057 09/27/21  1142 09/26/21  1006   SODIUM mmol/L 132* 138 135* 130* 133* 137 134*   POTASSIUM mmol/L 4.9 5.2 3.6 3.7 4.0 4.1 4.6   CHLORIDE mmol/L 100 105 102 95* 100 103 103   CO2 mmol/L 23.4 21.7* 22.3 24.1 22.5 21.7* 18.2*   BUN  mg/dL 18 16 14 18 27* 29* 40*   CREATININE mg/dL 1.03* 0.99 0.94 0.95 1.14* 1.26* 1.71*   CALCIUM mg/dL 8.5* 8.6 8.3* 8.6 8.7 8.6 8.5*   GLUCOSE mg/dL 109* 97 82 89 90 94 111*     Results from last 7 days   Lab Units 10/02/21  0430 10/01/21  0058 09/30/21  0537 09/29/21  0133 09/28/21  0057 09/27/21  1142 09/26/21  1006   BILIRUBIN mg/dL 0.5 0.7 0.6 0.7 0.7 0.5 0.6   ALK PHOS U/L 71 69 69 66 63 64 68   AST (SGOT) U/L 11 12 13 9 8 9 16   ALT (SGPT) U/L <5 5 5 <5 5 5 6     Results from last 7 days   Lab Units 10/02/21  0430 10/01/21  0058 09/28/21  1430 09/28/21  0057 09/27/21  1927 09/26/21  1006   MAGNESIUM mg/dL 2.3 1.9 2.3 2.4 1.7 1.7         Results from last 7 days   Lab Units 10/02/21  0849 09/30/21  1800   TROPONIN T ng/mL 0.025 0.027       Imaging Results (Last 24 Hours)     ** No results found for the last 24 hours. **            Medications:  aspirin, 81 mg, Oral, Daily  atorvastatin, 40 mg, Oral, Nightly  cefepime, 2 g, Intravenous, Q12H  docusate sodium, 100 mg, Oral, BID  doxycycline, 100 mg, Intravenous, Q12H  levothyroxine, 100 mcg, Oral, Nightly  metoprolol tartrate, 25 mg, Oral, Q12H  pantoprazole, 40 mg, Oral, Daily  polyethylene glycol, 17 g, Oral, Daily  rivaroxaban, 15 mg, Oral, Daily With Dinner  sodium chloride, 10 mL, Intravenous, Q12H               Assessment/Plan   Sepsis: Likely 2/2 pyelonephritis and bacteremia upon admission with concern for development of LLL pneumonia. Afebrile and hemodynamically stable. Leukocytosis initially resolved but WBC began rising. WBC improved today.  CRP elevated but improved today. Lactate is normal. Procal is elevated. Repeat blood cultures with NGTD. Repeat UA remains abnormal with no growth. MRSA PCR positive. CT chest and abd/pelvis obtained revealing left basilar consolidation. Broadened antibiotic coverage to Doxycycline and Cefepime. Cont current regimen today. Repeat labs in the AM.     E coli bacteremia: Likely 2/2 left-sided pyelonephritis. Urine  culture not initially collected with add on culture revealing mixed carlee. 2/2 initial blood cultures reveal pan sensitive E coli. Cont antibiotics as above.      ALEKSANDRA on CKD IIIb: Likely prerenal. S/P bolus in the ED. Creatine improved and stable today. Cont to encourage PO intake. Holding home Bumex and HCTZ. Repeat labs in the AM.     COVID-19 infection: Respiratory status stable. O2 stable on RA. S/P REGEN-COV. Cont supportive treatment.     Afib: RVR improved with increasing metoprolol. Cont Xarelto for stroke prevention. Monitor on telemetry.     Abdominal pain: CT abd/pelvis reveals mild dilatation of the left collecting system but no distal stones, trace left effusion and left basilar consolidation. Added bowel regimen. Reports some improvement in her pain today. Cont supportive treatment.     Essential HTN: BP stable. Cont metoprolol.     CAD: Appears clinically stable. Cont ASA, statin and metoprolol. Monitor on telemetry.     Normocytic anemia: Iron and iron saturation normal. Hgb low but remains >7 with no signs of bleeding. Possible dilutional component. Stable today. Cont to monitor.     Hypothyroidism: TSH is normal. Cont levothyroxine.     Generalized weakness: PT/OT    DVT PPX: Xarelto    Pt is at high risk 2/2 worsening sepsis, UTI/pyelonephritis, bacteremia, Afib with RVR, COVID-19 and functional decline.     Disposition: Pt and family now agreeable to short term SNF placement. Discharge held yesterday 2/2 rising inflammatory markers. Possible D/C ton SNF early next week if clinically improved.        Hong Bryant,   10/02/21  16:35 EDT

## 2021-10-02 NOTE — PLAN OF CARE
Goal Outcome Evaluation:           Progress: improving   Pt resting in bed at this time. She has refused turns and positioning today until the end of the shift when she complained her bottom was hurting her. I informed her this was most likely from lying on her back/bottom continuously, so then she was agreeable to be turned. At this time I was able to assess her bottom and apply magic barrier cream. Pt stated this provided relief as well as her pain medication. See MAR. She has no complaints/concerns at this time. VSS. Will continue to monitor

## 2021-10-03 LAB
ALBUMIN SERPL-MCNC: 2.51 G/DL (ref 3.5–5.2)
ALBUMIN/GLOB SERPL: 0.7 G/DL
ALP SERPL-CCNC: 68 U/L (ref 39–117)
ALT SERPL W P-5'-P-CCNC: <5 U/L (ref 1–33)
ANION GAP SERPL CALCULATED.3IONS-SCNC: 6.7 MMOL/L (ref 5–15)
AST SERPL-CCNC: 11 U/L (ref 1–32)
BASOPHILS # BLD AUTO: 0.01 10*3/MM3 (ref 0–0.2)
BASOPHILS NFR BLD AUTO: 0.1 % (ref 0–1.5)
BILIRUB SERPL-MCNC: 0.5 MG/DL (ref 0–1.2)
BUN SERPL-MCNC: 18 MG/DL (ref 8–23)
BUN/CREAT SERPL: 17.8 (ref 7–25)
CALCIUM SPEC-SCNC: 8.7 MG/DL (ref 8.6–10.5)
CHLORIDE SERPL-SCNC: 97 MMOL/L (ref 98–107)
CO2 SERPL-SCNC: 25.3 MMOL/L (ref 22–29)
CREAT SERPL-MCNC: 1.01 MG/DL (ref 0.57–1)
CRP SERPL-MCNC: 19.54 MG/DL (ref 0–0.5)
D DIMER PPP FEU-MCNC: 2 MCGFEU/ML (ref 0–0.5)
DEPRECATED RDW RBC AUTO: 54.8 FL (ref 37–54)
EOSINOPHIL # BLD AUTO: 0.06 10*3/MM3 (ref 0–0.4)
EOSINOPHIL NFR BLD AUTO: 0.7 % (ref 0.3–6.2)
ERYTHROCYTE [DISTWIDTH] IN BLOOD BY AUTOMATED COUNT: 16.3 % (ref 12.3–15.4)
GFR SERPL CREATININE-BSD FRML MDRD: 52 ML/MIN/1.73
GLOBULIN UR ELPH-MCNC: 3.7 GM/DL
GLUCOSE SERPL-MCNC: 99 MG/DL (ref 65–99)
HCT VFR BLD AUTO: 23.9 % (ref 34–46.6)
HGB BLD-MCNC: 7.3 G/DL (ref 12–15.9)
IMM GRANULOCYTES # BLD AUTO: 0.25 10*3/MM3 (ref 0–0.05)
IMM GRANULOCYTES NFR BLD AUTO: 3 % (ref 0–0.5)
LYMPHOCYTES # BLD AUTO: 0.73 10*3/MM3 (ref 0.7–3.1)
LYMPHOCYTES NFR BLD AUTO: 8.9 % (ref 19.6–45.3)
MAGNESIUM SERPL-MCNC: 2.1 MG/DL (ref 1.6–2.4)
MCH RBC QN AUTO: 28.4 PG (ref 26.6–33)
MCHC RBC AUTO-ENTMCNC: 30.5 G/DL (ref 31.5–35.7)
MCV RBC AUTO: 93 FL (ref 79–97)
MONOCYTES # BLD AUTO: 0.67 10*3/MM3 (ref 0.1–0.9)
MONOCYTES NFR BLD AUTO: 8.2 % (ref 5–12)
NEUTROPHILS NFR BLD AUTO: 6.5 10*3/MM3 (ref 1.7–7)
NEUTROPHILS NFR BLD AUTO: 79.1 % (ref 42.7–76)
NRBC BLD AUTO-RTO: 0 /100 WBC (ref 0–0.2)
PLATELET # BLD AUTO: 219 10*3/MM3 (ref 140–450)
PMV BLD AUTO: 12 FL (ref 6–12)
POTASSIUM SERPL-SCNC: 4.9 MMOL/L (ref 3.5–5.2)
PROT SERPL-MCNC: 6.2 G/DL (ref 6–8.5)
QT INTERVAL: 336 MS
QTC INTERVAL: 452 MS
RBC # BLD AUTO: 2.57 10*6/MM3 (ref 3.77–5.28)
SODIUM SERPL-SCNC: 129 MMOL/L (ref 136–145)
WBC # BLD AUTO: 8.22 10*3/MM3 (ref 3.4–10.8)

## 2021-10-03 PROCEDURE — 86140 C-REACTIVE PROTEIN: CPT | Performed by: INTERNAL MEDICINE

## 2021-10-03 PROCEDURE — 83735 ASSAY OF MAGNESIUM: CPT | Performed by: INTERNAL MEDICINE

## 2021-10-03 PROCEDURE — 99232 SBSQ HOSP IP/OBS MODERATE 35: CPT | Performed by: INTERNAL MEDICINE

## 2021-10-03 PROCEDURE — 85025 COMPLETE CBC W/AUTO DIFF WBC: CPT | Performed by: INTERNAL MEDICINE

## 2021-10-03 PROCEDURE — 25010000002 CEFEPIME PER 500 MG: Performed by: INTERNAL MEDICINE

## 2021-10-03 PROCEDURE — 80053 COMPREHEN METABOLIC PANEL: CPT | Performed by: INTERNAL MEDICINE

## 2021-10-03 PROCEDURE — 85379 FIBRIN DEGRADATION QUANT: CPT | Performed by: INTERNAL MEDICINE

## 2021-10-03 RX ADMIN — ASPIRIN 81 MG: 81 TABLET, COATED ORAL at 08:37

## 2021-10-03 RX ADMIN — METOPROLOL TARTRATE 25 MG: 25 TABLET, FILM COATED ORAL at 08:37

## 2021-10-03 RX ADMIN — OXYCODONE HYDROCHLORIDE AND ACETAMINOPHEN 0.5 TABLET: 10; 325 TABLET ORAL at 11:04

## 2021-10-03 RX ADMIN — CEFEPIME HYDROCHLORIDE 2 G: 2 INJECTION, POWDER, FOR SOLUTION INTRAVENOUS at 08:37

## 2021-10-03 RX ADMIN — DOCUSATE SODIUM 100 MG: 100 CAPSULE ORAL at 08:37

## 2021-10-03 RX ADMIN — LEVOTHYROXINE SODIUM 100 MCG: 100 TABLET ORAL at 20:38

## 2021-10-03 RX ADMIN — SODIUM CHLORIDE, PRESERVATIVE FREE 10 ML: 5 INJECTION INTRAVENOUS at 21:57

## 2021-10-03 RX ADMIN — METOPROLOL TARTRATE 25 MG: 25 TABLET, FILM COATED ORAL at 20:38

## 2021-10-03 RX ADMIN — PANTOPRAZOLE SODIUM 40 MG: 40 TABLET, DELAYED RELEASE ORAL at 08:37

## 2021-10-03 RX ADMIN — ATORVASTATIN CALCIUM 40 MG: 40 TABLET, FILM COATED ORAL at 20:38

## 2021-10-03 RX ADMIN — RIVAROXABAN 15 MG: 15 TABLET, FILM COATED ORAL at 17:26

## 2021-10-03 RX ADMIN — DOXYCYCLINE 100 MG: 100 INJECTION, POWDER, LYOPHILIZED, FOR SOLUTION INTRAVENOUS at 09:02

## 2021-10-03 RX ADMIN — DOXYCYCLINE 100 MG: 100 INJECTION, POWDER, LYOPHILIZED, FOR SOLUTION INTRAVENOUS at 21:56

## 2021-10-03 RX ADMIN — DOCUSATE SODIUM 100 MG: 100 CAPSULE ORAL at 20:38

## 2021-10-03 RX ADMIN — CEFEPIME HYDROCHLORIDE 2 G: 2 INJECTION, POWDER, FOR SOLUTION INTRAVENOUS at 21:56

## 2021-10-03 RX ADMIN — POLYETHYLENE GLYCOL (3350) 17 G: 17 POWDER, FOR SOLUTION ORAL at 08:37

## 2021-10-03 RX ADMIN — SODIUM CHLORIDE, PRESERVATIVE FREE 10 ML: 5 INJECTION INTRAVENOUS at 09:02

## 2021-10-03 NOTE — PROGRESS NOTES
Subjective     History:   Desiree Matos is a 87 y.o. female admitted on 9/24/2021 secondary to <principal problem not specified>     Procedures: None    CC: Follow up bacteremia, ALEKSANDRA    Patient seen and examined at bedside. Awake and alert. States she feels slightly improved today. Continues to report some pleuritic pain. Denies significant cough or dyspnea. Reports improvement in her abdominal pain.  No acute events overnight per RN.     History taken from: patient, chart, and RN.      Objective     Vital Signs  Temp:  [97 °F (36.1 °C)-98 °F (36.7 °C)] 97.8 °F (36.6 °C)  Heart Rate:  [] 94  Resp:  [18-20] 18  BP: (104-129)/(51-80) 104/51    Intake/Output Summary (Last 24 hours) at 10/3/2021 1740  Last data filed at 10/3/2021 1700  Gross per 24 hour   Intake 751.5 ml   Output 400 ml   Net 351.5 ml         Physical Exam:  General:    Awake, alert, in no acute distress, chronically ill appearing    Heart:      Normal S1 and S2. Irregularly irregular.    Lungs:     Respirations regular, even and unlabored. Diminished breath sounds at bases. No wheezes, rales or rhonchi.   Abdomen:   Soft. Mild generalized TTP. No guarding, rebound tenderness or organomegaly noted. Bowel sounds present x 4.   Extremities:  No clubbing, cyanosis or edema noted. Moves UE and LE equally B/L.     Results Review:    Results from last 7 days   Lab Units 10/03/21  0755 10/02/21  0430 10/01/21  0058 09/30/21  0537 09/29/21  0133 09/28/21  0057 09/27/21  1142   WBC 10*3/mm3 8.22 10.91* 13.21* 9.52 7.72 7.72 7.24   HEMOGLOBIN g/dL 7.3* 7.4* 7.6* 7.8* 7.8* 7.5* 7.3*   PLATELETS 10*3/mm3 219 206 211 181 183 186 185     Results from last 7 days   Lab Units 10/03/21  0755 10/02/21  0430 10/01/21  0058 09/30/21  0537 09/29/21  0133 09/28/21 0057 09/27/21  1142   SODIUM mmol/L 129* 132* 138 135* 130* 133* 137   POTASSIUM mmol/L 4.9 4.9 5.2 3.6 3.7 4.0 4.1   CHLORIDE mmol/L 97* 100 105 102 95* 100 103   CO2 mmol/L 25.3 23.4 21.7* 22.3 24.1  22.5 21.7*   BUN mg/dL 18 18 16 14 18 27* 29*   CREATININE mg/dL 1.01* 1.03* 0.99 0.94 0.95 1.14* 1.26*   CALCIUM mg/dL 8.7 8.5* 8.6 8.3* 8.6 8.7 8.6   GLUCOSE mg/dL 99 109* 97 82 89 90 94     Results from last 7 days   Lab Units 10/03/21  0755 10/02/21  0430 10/01/21  0058 09/30/21  0537 09/29/21  0133 09/28/21  0057 09/27/21  1142   BILIRUBIN mg/dL 0.5 0.5 0.7 0.6 0.7 0.7 0.5   ALK PHOS U/L 68 71 69 69 66 63 64   AST (SGOT) U/L 11 11 12 13 9 8 9   ALT (SGPT) U/L <5 <5 5 5 <5 5 5     Results from last 7 days   Lab Units 10/03/21  0755 10/02/21  0430 10/01/21  0058 09/28/21  1430 09/28/21  0057 09/27/21  1927   MAGNESIUM mg/dL 2.1 2.3 1.9 2.3 2.4 1.7         Results from last 7 days   Lab Units 10/02/21  0849 09/30/21  1800   TROPONIN T ng/mL 0.025 0.027       Imaging Results (Last 24 Hours)     ** No results found for the last 24 hours. **            Medications:  aspirin, 81 mg, Oral, Daily  atorvastatin, 40 mg, Oral, Nightly  cefepime, 2 g, Intravenous, Q12H  docusate sodium, 100 mg, Oral, BID  doxycycline, 100 mg, Intravenous, Q12H  levothyroxine, 100 mcg, Oral, Nightly  metoprolol tartrate, 25 mg, Oral, Q12H  pantoprazole, 40 mg, Oral, Daily  polyethylene glycol, 17 g, Oral, Daily  rivaroxaban, 15 mg, Oral, Daily With Dinner  sodium chloride, 10 mL, Intravenous, Q12H               Assessment/Plan   Sepsis: Likely 2/2 pyelonephritis and bacteremia upon admission with concern for development of LLL pneumonia. Afebrile and hemodynamically stable. Leukocytosis initially resolved but WBC began rising. WBC improved again today.  CRP elevated but improving. Lactate is normal. Procal is elevated. Repeat blood cultures with NGTD. Repeat UA remains abnormal with no growth. MRSA PCR positive. CT chest and abd/pelvis obtained revealing left basilar consolidation. Broadened antibiotic coverage to Doxycycline and Cefepime. Cont current regimen today. Repeat labs in the AM.     E coli bacteremia: Likely 2/2 left-sided  pyelonephritis. Urine culture not initially collected with add on culture revealing mixed carlee. 2/2 initial blood cultures reveal pan sensitive E coli. Cont antibiotics as above.      ALEKSANDRA on CKD IIIb: Likely prerenal. S/P bolus in the ED. Creatine improved and stable today. Cont to encourage PO intake. Holding home Bumex and HCTZ. Repeat labs in the AM.     COVID-19 infection: Respiratory status stable. O2 stable on RA. S/P REGEN-COV. Cont supportive treatment.     Afib: RVR improved with increasing metoprolol. Cont Xarelto for stroke prevention. Monitor on telemetry.     Abdominal pain: CT abd/pelvis reveals mild dilatation of the left collecting system but no distal stones, trace left effusion and left basilar consolidation. Added bowel regimen. Reports some improvement in her pain again today. Cont supportive treatment.     Essential HTN: BP stable. Cont metoprolol.     CAD: Appears clinically stable. Cont ASA, statin and metoprolol. Monitor on telemetry.     Normocytic anemia: Iron and iron saturation normal. Hgb low but remains >7 with no signs of bleeding. Possible dilutional component. Stable today. Cont to monitor.     Hypothyroidism: TSH is normal. Cont levothyroxine.     Generalized weakness: PT/OT    DVT PPX: Xarelto    Pt is at high risk 2/2 worsening sepsis, UTI/pyelonephritis, bacteremia, Afib with RVR, COVID-19 and functional decline.     Disposition: Pt and family now agreeable to short term SNF placement. Discharge held on Friday 2/2 rising inflammatory markers. Possible D/C to SNF early next week if clinically improved.        Hong Bryant DO  10/03/21  17:40 EDT

## 2021-10-03 NOTE — PLAN OF CARE
Goal Outcome Evaluation:           Progress: improving   Pt has been much more pleasant and cooperative today. She has also had improved oral intake and states she feels better today. VSS. Will continue to monitor and follow plan of care

## 2021-10-03 NOTE — PLAN OF CARE
Goal Outcome Evaluation:  Pt resting in bed. No complaints. Will continue to follow plan of care.

## 2021-10-03 NOTE — NURSING NOTE
Wound consult for re-evaluation of wounds to bilateral buttocks. These were present upon admission but have progressively been getting worse. Area is pink, non blanchable, with shearing noted. PT has been refusing Q 2 hour turns and and has been cantankerous with staff. PT has a Abundio score of 14, an Albumin of 2.51 and has not been eating enough to promotes wound healing. Educated her on the importance of her protein intake and for the need to turn every two hours at a minimum. Continue with Magic barrier cream

## 2021-10-04 LAB
ALBUMIN SERPL-MCNC: 2.46 G/DL (ref 3.5–5.2)
ALBUMIN/GLOB SERPL: 0.6 G/DL
ALP SERPL-CCNC: 73 U/L (ref 39–117)
ALT SERPL W P-5'-P-CCNC: 5 U/L (ref 1–33)
ANION GAP SERPL CALCULATED.3IONS-SCNC: 10.2 MMOL/L (ref 5–15)
ANION GAP SERPL CALCULATED.3IONS-SCNC: 8.4 MMOL/L (ref 5–15)
AST SERPL-CCNC: 13 U/L (ref 1–32)
BASOPHILS # BLD AUTO: 0.01 10*3/MM3 (ref 0–0.2)
BASOPHILS NFR BLD AUTO: 0.1 % (ref 0–1.5)
BILIRUB SERPL-MCNC: 0.7 MG/DL (ref 0–1.2)
BUN SERPL-MCNC: 17 MG/DL (ref 8–23)
BUN SERPL-MCNC: 17 MG/DL (ref 8–23)
BUN/CREAT SERPL: 18.9 (ref 7–25)
BUN/CREAT SERPL: 18.9 (ref 7–25)
CALCIUM SPEC-SCNC: 8.8 MG/DL (ref 8.6–10.5)
CALCIUM SPEC-SCNC: 8.9 MG/DL (ref 8.6–10.5)
CHLORIDE SERPL-SCNC: 100 MMOL/L (ref 98–107)
CHLORIDE SERPL-SCNC: 95 MMOL/L (ref 98–107)
CO2 SERPL-SCNC: 21.8 MMOL/L (ref 22–29)
CO2 SERPL-SCNC: 22.6 MMOL/L (ref 22–29)
CREAT SERPL-MCNC: 0.9 MG/DL (ref 0.57–1)
CREAT SERPL-MCNC: 0.9 MG/DL (ref 0.57–1)
CRP SERPL-MCNC: 15.62 MG/DL (ref 0–0.5)
DEPRECATED RDW RBC AUTO: 54.2 FL (ref 37–54)
EOSINOPHIL # BLD AUTO: 0.06 10*3/MM3 (ref 0–0.4)
EOSINOPHIL NFR BLD AUTO: 0.7 % (ref 0.3–6.2)
ERYTHROCYTE [DISTWIDTH] IN BLOOD BY AUTOMATED COUNT: 16.2 % (ref 12.3–15.4)
GFR SERPL CREATININE-BSD FRML MDRD: 59 ML/MIN/1.73
GFR SERPL CREATININE-BSD FRML MDRD: 59 ML/MIN/1.73
GLOBULIN UR ELPH-MCNC: 4.1 GM/DL
GLUCOSE SERPL-MCNC: 100 MG/DL (ref 65–99)
GLUCOSE SERPL-MCNC: 115 MG/DL (ref 65–99)
HCT VFR BLD AUTO: 25.5 % (ref 34–46.6)
HGB BLD-MCNC: 7.8 G/DL (ref 12–15.9)
IMM GRANULOCYTES # BLD AUTO: 0.25 10*3/MM3 (ref 0–0.05)
IMM GRANULOCYTES NFR BLD AUTO: 2.8 % (ref 0–0.5)
LYMPHOCYTES # BLD AUTO: 0.74 10*3/MM3 (ref 0.7–3.1)
LYMPHOCYTES NFR BLD AUTO: 8.2 % (ref 19.6–45.3)
MCH RBC QN AUTO: 28.4 PG (ref 26.6–33)
MCHC RBC AUTO-ENTMCNC: 30.6 G/DL (ref 31.5–35.7)
MCV RBC AUTO: 92.7 FL (ref 79–97)
MONOCYTES # BLD AUTO: 0.75 10*3/MM3 (ref 0.1–0.9)
MONOCYTES NFR BLD AUTO: 8.3 % (ref 5–12)
NEUTROPHILS NFR BLD AUTO: 7.19 10*3/MM3 (ref 1.7–7)
NEUTROPHILS NFR BLD AUTO: 79.9 % (ref 42.7–76)
NRBC BLD AUTO-RTO: 0 /100 WBC (ref 0–0.2)
PLATELET # BLD AUTO: 213 10*3/MM3 (ref 140–450)
PMV BLD AUTO: 11.8 FL (ref 6–12)
POTASSIUM SERPL-SCNC: 4.3 MMOL/L (ref 3.5–5.2)
POTASSIUM SERPL-SCNC: 4.8 MMOL/L (ref 3.5–5.2)
PROT SERPL-MCNC: 6.6 G/DL (ref 6–8.5)
RBC # BLD AUTO: 2.75 10*6/MM3 (ref 3.77–5.28)
SODIUM SERPL-SCNC: 127 MMOL/L (ref 136–145)
SODIUM SERPL-SCNC: 131 MMOL/L (ref 136–145)
WBC # BLD AUTO: 9 10*3/MM3 (ref 3.4–10.8)

## 2021-10-04 PROCEDURE — 25010000002 CEFEPIME PER 500 MG: Performed by: INTERNAL MEDICINE

## 2021-10-04 PROCEDURE — 86140 C-REACTIVE PROTEIN: CPT | Performed by: INTERNAL MEDICINE

## 2021-10-04 PROCEDURE — 80053 COMPREHEN METABOLIC PANEL: CPT | Performed by: INTERNAL MEDICINE

## 2021-10-04 PROCEDURE — 99232 SBSQ HOSP IP/OBS MODERATE 35: CPT | Performed by: INTERNAL MEDICINE

## 2021-10-04 PROCEDURE — 85025 COMPLETE CBC W/AUTO DIFF WBC: CPT | Performed by: INTERNAL MEDICINE

## 2021-10-04 RX ORDER — SODIUM CHLORIDE 9 MG/ML
60 INJECTION, SOLUTION INTRAVENOUS CONTINUOUS
Status: DISCONTINUED | OUTPATIENT
Start: 2021-10-04 | End: 2021-10-05

## 2021-10-04 RX ADMIN — DOCUSATE SODIUM 100 MG: 100 CAPSULE ORAL at 08:37

## 2021-10-04 RX ADMIN — CEFEPIME HYDROCHLORIDE 2 G: 2 INJECTION, POWDER, FOR SOLUTION INTRAVENOUS at 08:37

## 2021-10-04 RX ADMIN — DOXYCYCLINE 100 MG: 100 INJECTION, POWDER, LYOPHILIZED, FOR SOLUTION INTRAVENOUS at 11:33

## 2021-10-04 RX ADMIN — PANTOPRAZOLE SODIUM 40 MG: 40 TABLET, DELAYED RELEASE ORAL at 08:37

## 2021-10-04 RX ADMIN — OXYCODONE HYDROCHLORIDE AND ACETAMINOPHEN 0.5 TABLET: 10; 325 TABLET ORAL at 12:10

## 2021-10-04 RX ADMIN — ASPIRIN 81 MG: 81 TABLET, COATED ORAL at 08:37

## 2021-10-04 RX ADMIN — ATORVASTATIN CALCIUM 40 MG: 40 TABLET, FILM COATED ORAL at 21:02

## 2021-10-04 RX ADMIN — SODIUM CHLORIDE, PRESERVATIVE FREE 10 ML: 5 INJECTION INTRAVENOUS at 21:03

## 2021-10-04 RX ADMIN — SODIUM CHLORIDE 60 ML/HR: 9 INJECTION, SOLUTION INTRAVENOUS at 12:15

## 2021-10-04 RX ADMIN — RIVAROXABAN 15 MG: 15 TABLET, FILM COATED ORAL at 17:45

## 2021-10-04 RX ADMIN — SODIUM CHLORIDE, PRESERVATIVE FREE 10 ML: 5 INJECTION INTRAVENOUS at 09:00

## 2021-10-04 RX ADMIN — DOXYCYCLINE 100 MG: 100 INJECTION, POWDER, LYOPHILIZED, FOR SOLUTION INTRAVENOUS at 21:02

## 2021-10-04 RX ADMIN — LEVOTHYROXINE SODIUM 100 MCG: 100 TABLET ORAL at 21:02

## 2021-10-04 RX ADMIN — POLYETHYLENE GLYCOL (3350) 17 G: 17 POWDER, FOR SOLUTION ORAL at 08:37

## 2021-10-04 RX ADMIN — METOPROLOL TARTRATE 25 MG: 25 TABLET, FILM COATED ORAL at 08:37

## 2021-10-04 RX ADMIN — CEFEPIME HYDROCHLORIDE 2 G: 2 INJECTION, POWDER, FOR SOLUTION INTRAVENOUS at 21:02

## 2021-10-04 RX ADMIN — DOCUSATE SODIUM 100 MG: 100 CAPSULE ORAL at 21:02

## 2021-10-04 RX ADMIN — METOPROLOL TARTRATE 25 MG: 25 TABLET, FILM COATED ORAL at 21:02

## 2021-10-04 NOTE — CASE MANAGEMENT/SOCIAL WORK
Discharge Planning Assessment   Neel     Patient Name: Desiree Matos  MRN: [id number]  Today's Date: 10/4/2021    Admit Date: 9/24/2021    Discharge Needs Assessment    No documentation.       Discharge Plan     Row Name 10/04/21 0851       Plan    Plan SS contacted [facility] who confirms Pt has a bed at facility today if stable. SS notified Dr. Bryant. SS to follow.    1307pm: SS spoke with Dr Bryant who states Pt is not stable for discharge at this time. SS to follow.             Cheryl Villalta

## 2021-10-04 NOTE — PLAN OF CARE
Goal Outcome Evaluation:           Progress: improving   Pt resting in bed. VSS. Pt very ready to be discharged at this time. Sodium 127 so she cannot be d/c at this time. NS infusing 60mL/hr. See MAR. Will continue to follow plan of care

## 2021-10-04 NOTE — PLAN OF CARE
Goal Outcome Evaluation:  Pt has been resting in bed. No complaints. Will continue to follow plan of care.

## 2021-10-04 NOTE — PROGRESS NOTES
Subjective     History:   Desiree Matos is a 87 y.o. female admitted on 9/24/2021 secondary to <principal problem not specified>     Procedures: None    CC: Follow up bacteremia, ALEKSANDRA    Patient seen and examined at bedside. Awake and alert. Reports generalized weakness and fatigue but feels improved from upon admission. Reports pleuritic pain. No reported nausea or vomiting. Appetite and PO intake remain poor. No acute events overnight per RN.     History taken from: patient, chart, and RN.      Objective     Vital Signs  Temp:  [97.1 °F (36.2 °C)-98.4 °F (36.9 °C)] 97.1 °F (36.2 °C)  Heart Rate:  [] 97  Resp:  [18] 18  BP: (128-152)/(63-88) 128/67    Intake/Output Summary (Last 24 hours) at 10/4/2021 1923  Last data filed at 10/4/2021 1745  Gross per 24 hour   Intake 871 ml   Output 200 ml   Net 671 ml         Physical Exam: Unchanged from previous.   General:    Awake, alert, in no acute distress, chronically ill appearing    Heart:      Normal S1 and S2. Irregularly irregular.    Lungs:     Respirations regular, even and unlabored. Diminished breath sounds at bases. No wheezes, rales or rhonchi.   Abdomen:   Soft. Mild generalized TTP. No guarding, rebound tenderness or organomegaly noted. Bowel sounds present x 4.   Extremities:  No clubbing, cyanosis or edema noted. Moves UE and LE equally B/L.     Results Review:    Results from last 7 days   Lab Units 10/04/21  0929 10/03/21  0755 10/02/21  0430 10/01/21  0058 09/30/21  0537 09/29/21  0133 09/28/21  0057   WBC 10*3/mm3 9.00 8.22 10.91* 13.21* 9.52 7.72 7.72   HEMOGLOBIN g/dL 7.8* 7.3* 7.4* 7.6* 7.8* 7.8* 7.5*   PLATELETS 10*3/mm3 213 219 206 211 181 183 186     Results from last 7 days   Lab Units 10/04/21  1325 10/04/21  0929 10/03/21  0755 10/02/21  0430 10/01/21  0058 09/30/21  0537 09/29/21  0133   SODIUM mmol/L 131* 127* 129* 132* 138 135* 130*   POTASSIUM mmol/L 4.8 4.3 4.9 4.9 5.2 3.6 3.7   CHLORIDE mmol/L 100 95* 97* 100 105 102 95*   CO2  mmol/L 22.6 21.8* 25.3 23.4 21.7* 22.3 24.1   BUN mg/dL 17 17 18 18 16 14 18   CREATININE mg/dL 0.90 0.90 1.01* 1.03* 0.99 0.94 0.95   CALCIUM mg/dL 8.8 8.9 8.7 8.5* 8.6 8.3* 8.6   GLUCOSE mg/dL 100* 115* 99 109* 97 82 89     Results from last 7 days   Lab Units 10/04/21  0929 10/03/21  0755 10/02/21  0430 10/01/21  0058 09/30/21  0537 09/29/21  0133 09/28/21  0057   BILIRUBIN mg/dL 0.7 0.5 0.5 0.7 0.6 0.7 0.7   ALK PHOS U/L 73 68 71 69 69 66 63   AST (SGOT) U/L 13 11 11 12 13 9 8   ALT (SGPT) U/L 5 <5 <5 5 5 <5 5     Results from last 7 days   Lab Units 10/03/21  0755 10/02/21  0430 10/01/21  0058 09/28/21  1430 09/28/21  0057 09/27/21  1927   MAGNESIUM mg/dL 2.1 2.3 1.9 2.3 2.4 1.7         Results from last 7 days   Lab Units 10/02/21  0849 09/30/21  1800   TROPONIN T ng/mL 0.025 0.027       Imaging Results (Last 24 Hours)     ** No results found for the last 24 hours. **            Medications:  aspirin, 81 mg, Oral, Daily  atorvastatin, 40 mg, Oral, Nightly  cefepime, 2 g, Intravenous, Q12H  docusate sodium, 100 mg, Oral, BID  doxycycline, 100 mg, Intravenous, Q12H  levothyroxine, 100 mcg, Oral, Nightly  metoprolol tartrate, 25 mg, Oral, Q12H  pantoprazole, 40 mg, Oral, Daily  polyethylene glycol, 17 g, Oral, Daily  rivaroxaban, 15 mg, Oral, Daily With Dinner  sodium chloride, 10 mL, Intravenous, Q12H      sodium chloride, 60 mL/hr, Last Rate: 60 mL/hr (10/04/21 1215)            Assessment/Plan   Sepsis: Likely 2/2 pyelonephritis and bacteremia upon admission with concern for development of LLL pneumonia. Afebrile and hemodynamically stable. Leukocytosis initially resolved but WBC began rising. WBC has now normalized again.  CRP elevated but improving. Lactate is normal. Procal is elevated. Repeat blood cultures with NGTD. Repeat UA remains abnormal with no growth. MRSA PCR positive. CT chest and abd/pelvis obtained revealing left basilar consolidation. Broadened antibiotic coverage to Doxycycline and Cefepime.  Cont current regimen today. Repeat labs in the AM.     E coli bacteremia: Likely 2/2 left-sided pyelonephritis. Urine culture not initially collected with add on culture revealing mixed carlee. 2/2 initial blood cultures reveal pan sensitive E coli. Cont antibiotics as above.      ALEKSANDRA on CKD IIIb: Likely prerenal. S/P bolus in the ED. Creatine improved and stable today. Cont to encourage PO intake. Holding home Bumex and HCTZ. Repeat labs in the AM.     Hyponatremia: Likely hypovolemic. Started IV fluids today as Na+ trended downward. Check urine Na+. Encourage PO intake.     COVID-19 infection: Respiratory status stable. O2 stable on RA. S/P REGEN-COV. Cont supportive treatment.     Afib: RVR improved with increasing metoprolol. Cont Xarelto for stroke prevention. Monitor on telemetry.     Abdominal pain: CT abd/pelvis reveals mild dilatation of the left collecting system but no distal stones, trace left effusion and left basilar consolidation. Added bowel regimen. Reports some improvement in her pain. Cont supportive treatment.     Essential HTN: BP stable. Cont metoprolol.     CAD: Appears clinically stable. Cont ASA, statin and metoprolol. Monitor on telemetry.     Normocytic anemia: Iron and iron saturation normal. Hgb low but remains >7 with no signs of bleeding. Possible dilutional component. Stable today. Cont to monitor.     Hypothyroidism: TSH is normal. Cont levothyroxine.     Generalized weakness: PT/OT    DVT PPX: Xarelto    Pt is at high risk 2/2 worsening sepsis, UTI/pyelonephritis, bacteremia, Afib with RVR, COVID-19 and functional decline.     Disposition: Hold D/C to rehab today 2/2 worsening hyponatremia. Reevaluate in the AM.        Hong Bryant DO  10/04/21  19:23 EDT

## 2021-10-05 VITALS
OXYGEN SATURATION: 99 % | WEIGHT: 170 LBS | BODY MASS INDEX: 29.02 KG/M2 | SYSTOLIC BLOOD PRESSURE: 161 MMHG | HEIGHT: 64 IN | TEMPERATURE: 97.4 F | RESPIRATION RATE: 18 BRPM | HEART RATE: 110 BPM | DIASTOLIC BLOOD PRESSURE: 83 MMHG

## 2021-10-05 LAB
ALBUMIN SERPL-MCNC: 2.36 G/DL (ref 3.5–5.2)
ALBUMIN/GLOB SERPL: 0.6 G/DL
ALP SERPL-CCNC: 71 U/L (ref 39–117)
ALT SERPL W P-5'-P-CCNC: <5 U/L (ref 1–33)
ANION GAP SERPL CALCULATED.3IONS-SCNC: 9 MMOL/L (ref 5–15)
AST SERPL-CCNC: 14 U/L (ref 1–32)
BASOPHILS # BLD AUTO: 0.01 10*3/MM3 (ref 0–0.2)
BASOPHILS NFR BLD AUTO: 0.1 % (ref 0–1.5)
BILIRUB SERPL-MCNC: 0.7 MG/DL (ref 0–1.2)
BUN SERPL-MCNC: 17 MG/DL (ref 8–23)
BUN/CREAT SERPL: 18.1 (ref 7–25)
CALCIUM SPEC-SCNC: 8.8 MG/DL (ref 8.6–10.5)
CHLORIDE SERPL-SCNC: 101 MMOL/L (ref 98–107)
CO2 SERPL-SCNC: 24 MMOL/L (ref 22–29)
CREAT SERPL-MCNC: 0.94 MG/DL (ref 0.57–1)
CRP SERPL-MCNC: 12.46 MG/DL (ref 0–0.5)
D DIMER PPP FEU-MCNC: 1.85 MCGFEU/ML (ref 0–0.5)
DEPRECATED RDW RBC AUTO: 54.5 FL (ref 37–54)
EOSINOPHIL # BLD AUTO: 0.07 10*3/MM3 (ref 0–0.4)
EOSINOPHIL NFR BLD AUTO: 0.7 % (ref 0.3–6.2)
ERYTHROCYTE [DISTWIDTH] IN BLOOD BY AUTOMATED COUNT: 16.2 % (ref 12.3–15.4)
GFR SERPL CREATININE-BSD FRML MDRD: 56 ML/MIN/1.73
GLOBULIN UR ELPH-MCNC: 3.9 GM/DL
GLUCOSE SERPL-MCNC: 88 MG/DL (ref 65–99)
HCT VFR BLD AUTO: 23.6 % (ref 34–46.6)
HGB BLD-MCNC: 7.3 G/DL (ref 12–15.9)
IMM GRANULOCYTES # BLD AUTO: 0.2 10*3/MM3 (ref 0–0.05)
IMM GRANULOCYTES NFR BLD AUTO: 2 % (ref 0–0.5)
LYMPHOCYTES # BLD AUTO: 0.91 10*3/MM3 (ref 0.7–3.1)
LYMPHOCYTES NFR BLD AUTO: 9.3 % (ref 19.6–45.3)
MCH RBC QN AUTO: 28.5 PG (ref 26.6–33)
MCHC RBC AUTO-ENTMCNC: 30.9 G/DL (ref 31.5–35.7)
MCV RBC AUTO: 92.2 FL (ref 79–97)
MONOCYTES # BLD AUTO: 0.83 10*3/MM3 (ref 0.1–0.9)
MONOCYTES NFR BLD AUTO: 8.4 % (ref 5–12)
NEUTROPHILS NFR BLD AUTO: 7.81 10*3/MM3 (ref 1.7–7)
NEUTROPHILS NFR BLD AUTO: 79.5 % (ref 42.7–76)
NRBC BLD AUTO-RTO: 0 /100 WBC (ref 0–0.2)
PLATELET # BLD AUTO: 207 10*3/MM3 (ref 140–450)
PMV BLD AUTO: 12 FL (ref 6–12)
POTASSIUM SERPL-SCNC: 4.3 MMOL/L (ref 3.5–5.2)
PROT SERPL-MCNC: 6.3 G/DL (ref 6–8.5)
RBC # BLD AUTO: 2.56 10*6/MM3 (ref 3.77–5.28)
SODIUM SERPL-SCNC: 134 MMOL/L (ref 136–145)
SODIUM UR-SCNC: 93 MMOL/L
WBC # BLD AUTO: 9.83 10*3/MM3 (ref 3.4–10.8)

## 2021-10-05 PROCEDURE — 84300 ASSAY OF URINE SODIUM: CPT | Performed by: INTERNAL MEDICINE

## 2021-10-05 PROCEDURE — 99239 HOSP IP/OBS DSCHRG MGMT >30: CPT | Performed by: INTERNAL MEDICINE

## 2021-10-05 PROCEDURE — 85379 FIBRIN DEGRADATION QUANT: CPT | Performed by: INTERNAL MEDICINE

## 2021-10-05 PROCEDURE — 85025 COMPLETE CBC W/AUTO DIFF WBC: CPT | Performed by: INTERNAL MEDICINE

## 2021-10-05 PROCEDURE — 86140 C-REACTIVE PROTEIN: CPT | Performed by: INTERNAL MEDICINE

## 2021-10-05 PROCEDURE — 80053 COMPREHEN METABOLIC PANEL: CPT | Performed by: INTERNAL MEDICINE

## 2021-10-05 RX ORDER — CEFDINIR 300 MG/1
300 CAPSULE ORAL 2 TIMES DAILY
Qty: 10 CAPSULE | Refills: 0 | Status: SHIPPED | OUTPATIENT
Start: 2021-10-05 | End: 2021-10-05 | Stop reason: SDUPTHER

## 2021-10-05 RX ORDER — DOXYCYCLINE HYCLATE 100 MG/1
100 CAPSULE ORAL 2 TIMES DAILY
Qty: 10 CAPSULE | Refills: 0 | Status: SHIPPED | OUTPATIENT
Start: 2021-10-05 | End: 2021-10-05 | Stop reason: SDUPTHER

## 2021-10-05 RX ORDER — POLYETHYLENE GLYCOL 3350 17 G/17G
17 POWDER, FOR SOLUTION ORAL DAILY
Qty: 30 PACKET | Refills: 0 | Status: SHIPPED | OUTPATIENT
Start: 2021-10-06

## 2021-10-05 RX ORDER — POLYETHYLENE GLYCOL 3350 17 G/17G
17 POWDER, FOR SOLUTION ORAL DAILY
Qty: 30 PACKET | Refills: 0 | Status: SHIPPED | OUTPATIENT
Start: 2021-10-06 | End: 2021-10-05

## 2021-10-05 RX ORDER — CEFDINIR 300 MG/1
300 CAPSULE ORAL 2 TIMES DAILY
Qty: 10 CAPSULE | Refills: 0 | Status: SHIPPED | OUTPATIENT
Start: 2021-10-05 | End: 2021-10-10

## 2021-10-05 RX ORDER — DOXYCYCLINE HYCLATE 100 MG/1
100 CAPSULE ORAL 2 TIMES DAILY
Qty: 10 CAPSULE | Refills: 0 | Status: SHIPPED | OUTPATIENT
Start: 2021-10-05 | End: 2021-10-10

## 2021-10-05 RX ADMIN — ASPIRIN 81 MG: 81 TABLET, COATED ORAL at 09:22

## 2021-10-05 RX ADMIN — DOCUSATE SODIUM 100 MG: 100 CAPSULE ORAL at 09:21

## 2021-10-05 RX ADMIN — POLYETHYLENE GLYCOL (3350) 17 G: 17 POWDER, FOR SOLUTION ORAL at 09:21

## 2021-10-05 RX ADMIN — PANTOPRAZOLE SODIUM 40 MG: 40 TABLET, DELAYED RELEASE ORAL at 09:22

## 2021-10-05 RX ADMIN — METOPROLOL TARTRATE 25 MG: 25 TABLET, FILM COATED ORAL at 09:22

## 2021-10-05 NOTE — CASE MANAGEMENT/SOCIAL WORK
Discharge Planning Assessment   Neel     Patient Name: Desiree Matos  MRN: [id number]  Today's Date: 10/5/2021    Admit Date: 9/24/2021        Discharge Plan     Row Name 10/05/21 1201       Plan    Plan  [facility] [facility] per Brianne notified SS of possibility of accepting pt but wanted to know updated information after Palliative Care consult is completed.  SS will follow.        CANELO HawthorneW

## 2021-10-05 NOTE — DISCHARGE PLACEMENT REQUEST
"Desiree Monsalve (87 y.o. Female)     Date of Birth Social Security Number Address Home Phone MRN    10/13/1933  [address] [address] [phone] [id number]    [facility]vent Marital Status          None        Admission Date Admission Type Admitting Provider Attending Provider Department, Room/Bed    9/24/21 Emergency Nathaniel Wright MD Troxell, Christopher A, DO [address], 3313/2S    Discharge Date Discharge Disposition Discharge Destination         Hospice/Home              Attending Provider: Hong Bryant DO    Allergies: Adhesive Tape    Isolation: Enh Drop/Con   Infection: COVID (confirmed) (09/19/21), MRSA No Isolation this Admit (10/04/21)   Code Status: No CPR    Ht: 162.6 cm (64\")   Wt: 77.1 kg (170 lb)    Admission Cmt: None   Principal Problem: None                Active Insurance as of 9/24/2021     Primary Coverage     Payor Plan Insurance Group Employer/Plan Group    MEDICARE MEDICARE A & B      Payor Plan Address Payor Plan Phone Number Payor Plan Fax Number Effective Dates    [address] [phone]  10/1/1998 - None Entered    [facility] [id number]       Subscriber Name Subscriber Birth Date Member ID       DESIREE MONSALVE 10/13/1933 [id number]           Secondary Coverage     Payor Plan Insurance Group Employer/Plan Group    [place]      Payor Plan Address Payor Plan Phone Number Payor Plan Fax Number Effective Dates    [address] [address]   1/1/2014 - None Entered    [facility] [place] [address]       Subscriber Name Subscriber Birth Date Member ID       DESIREE MONSALVE 10/13/1933 [id number]                 Emergency Contacts      (Rel.) Home Phone Work Phone Mobile Phone    COMFORT SANTILLAN (Son) [phone] -- --    Talita Moreno (Daughter) [phone] -- [phone]    SantillanBrie irving (Daughter) [phone] -- [phone]            Emergency Contact Information     Name Relation Home Work Mobile    " COMFORT BUSTOS Son [phone]      Talita Moreno Daughter [phone]    Brie Butsos Daughter [phone]          Insurance Information                MEDICARE/MEDICARE A & B Phone: [phone]    Subscriber: Desiree Matos Subscriber#: [id number]    Group#:  Precert#:         [place] Phone:     Subscriber: Desiree Matos Subscriber#: [id number]    Group#:  Precert#:           Treatment Team  Chat With All Active Members    Provider Relationship Specialty Contact    Hong Bryant DO  Attending, Physician of Record Hospitalist  [phone]    Wanda Benjamin, RN  Registered Nurse --  [phone]    Kaylyn Butt, PT  Physical Therapist Physical Therapy     Carissa Arredondo APRN  Consulting Physician, Nurse Practitioner Palliative Care  [phone]    Brianne Rico, RRT  Respiratory Therapist --  [phone]    Fanny Lilly  Technician --     Raisa Crabtree RN  Registered Nurse --           Problem List         Codes Noted - Resolved       Hospital    Cytokine release syndrome, grade 1 ICD-10-CM: D89.831  ICD-9-CM: 279.8 9/30/2021 - Present    ALEKSANDRA (acute kidney injury) (HCC) ICD-10-CM: N17.9  ICD-9-CM: 584.9 9/24/2021 - Present       Non-Hospital    Abnormal nuclear stress test with medium size, mild degree of anterior wall myocardial ischemia. ICD-10-CM: R94.39  ICD-9-CM: 794.39 5/7/2020 - Present    Diverticulitis ICD-10-CM: K57.92  ICD-9-CM: 562.11 9/16/2019 - Present    Acute diverticulitis ICD-10-CM: K57.92  ICD-9-CM: 562.11 7/30/2019 - Present    Chronic anticoagulation, Xarelto (Chronic) ICD-10-CM: Z79.01  ICD-9-CM: V58.61 7/30/2019 - Present    Essential hypertension (Chronic) ICD-10-CM: I10  ICD-9-CM: 401.9 7/30/2019 - Present    Hypothyroidism (Chronic) ICD-10-CM: E03.9  ICD-9-CM: 244.9 7/30/2019 - Present    History of colonic polyps (Chronic) ICD-10-CM: Z86.010  ICD-9-CM: V12.72 7/30/2019 - Present     ASCVD (arteriosclerotic cardiovascular disease) (Chronic) ICD-10-CM: I25.10  ICD-9-CM: 429.2, 440.9 2019 - Present    Diastolic CHF, chronic (HCC) (Chronic) ICD-10-CM: I50.32  ICD-9-CM: 428.32, 428.0 2019 - Present    GERD without esophagitis (Chronic) ICD-10-CM: K21.9  ICD-9-CM: 530.81 2019 - Present    Peripheral neuropathy (Chronic) ICD-10-CM: G62.9  ICD-9-CM: 356.9 2019 - Present    Asthma (Chronic) ICD-10-CM: J45.909  ICD-9-CM: 493.90 2019 - Present    CKD (chronic kidney disease), stage III (HCC) (Chronic) ICD-10-CM: N18.30  ICD-9-CM: 585.3 2019 - Present    Atrial fibrillation, chronic (Chronic) ICD-10-CM: I48.20  ICD-9-CM: 427.31 2017 - Present             History & Physical      Nathaniel Wright MD at [address] [place], [place] [address]IST HISTORY AND PHYSICAL    Patient Identification:  Name:  Desiree Matos  Age:  87 y.o.  Sex:  female  :  10/13/1933  MRN:  [id number]   Visit Number:  [id number]  Admit Date: 2021   Room number:  3313/2S  Primary Care Physician:  Duane Pollard MD     Subjective     Chief complaint:    Chief Complaint   Patient presents with   • Exposure To Known Illness   • Failure To Thrive       History of presenting illness:  87 y.o. female with hx afib, HTN, hypothyroidism, CAD, CKD, hypothyroidism who presented with general malaise and mild dyspnea. Patient has had poor PO intake since a fall on . She was seen at [facility]. She had a left arm fracture with unclear ortho plan per patient. No cast or support device in place. Patient has went home and essentially reports failure to thrive. Patient reports some lower quadrant abdominal pain an dysuria. Patient notes urination have also decreased. She denies any chest pain or swelling.     Patient reports getting two part COVID vaccine in January or february of this year. She denies any sick or COViD positive contacts.     Patient lives with her daughter who  she notes help care for her. She notes since her fall she has not really had any mobility.   ---------------------------------------------------------------------------------------------------------------------   Review of Systems   Constitutional: Positive for fatigue.   HENT: Positive for congestion.    Eyes: Negative.    Respiratory: Positive for shortness of breath.    Cardiovascular: Negative.    Gastrointestinal: Negative.    Endocrine: Negative.    Genitourinary: Negative.    Musculoskeletal: Positive for arthralgias.   Skin: Negative.    Allergic/Immunologic: Negative.    Neurological: Negative.    Hematological: Negative.    Psychiatric/Behavioral: Negative.      ---------------------------------------------------------------------------------------------------------------------   Past Medical History:   Diagnosis Date   • A-fib (CMS/[facility])    • Anemia 1/13/2017   • Arthritis    • Asthma    • Cancer (CMS/[facility])    • CHF (congestive heart failure) (CMS/[facility])    • Chronic anticoagulation, Xarelto 7/30/2019   • Coronary artery disease    • GERD without esophagitis 7/30/2019   • History of colonic polyps 7/30/2019   • History of transfusion    • Hypertension    • Hypothyroidism 7/30/2019   • Peripheral neuropathy 7/30/2019     Past Surgical History:   Procedure Laterality Date   • BACK SURGERY     • BREAST CYST EXCISION Left    • CARDIAC CATHETERIZATION N/A 5/19/2020    Procedure: Left Heart Cath;  Surgeon: Jack Couch MD;  Location: [facility] CATH INVASIVE LOCATION;  Service: Cardiology;  Laterality: N/A;   • CERVICAL POLYPECTOMY     • COLONOSCOPY N/A 1/17/2017    Procedure: COLONOSCOPY;  Surgeon: Madi Zheng III, MD;  Location: [facility] OR;  Service:    • D & C AND LAPAROSCOPY     • ENDOSCOPY N/A 1/17/2017    Procedure: ESOPHAGOGASTRODUODENOSCOPY;  Surgeon: Madi Zheng III, MD;  Location: [facility] OR;  Service:    • GALLBLADDER SURGERY     • VEIN LIGATION AND STRIPPING       Family  History   Problem Relation Age of Onset   • Heart disease Mother    • Heart attack Sister    • Heart disease Sister    • Heart attack Brother    • Heart disease Brother    • Heart disease Brother    • Heart attack Paternal Grandfather      Social History     Socioeconomic History   • Marital status:      Spouse name: Not on file   • Number of children: Not on file   • Years of education: Not on file   • Highest education level: Not on file   Tobacco Use   • Smoking status: Never Smoker   • Smokeless tobacco: Never Used   Substance and Sexual Activity   • Alcohol use: No   • Drug use: No   • Sexual activity: Defer     ---------------------------------------------------------------------------------------------------------------------   Allergies:  Adhesive tape  ---------------------------------------------------------------------------------------------------------------------   Medications below are reported home medications pulling from within the system; at this time, these medications have not been reconciled unless otherwise specified and are in the verification process for further verifcation as current home medications.    Prior to Admission Medications     Prescriptions Last Dose Informant Patient Reported? Taking?    bumetanide (BUMEX) 1 MG tablet  Family Member No No    Take 1 tablet by mouth Daily.    docusate sodium (COLACE) 250 MG capsule  Family Member Yes No    Take 250 mg by mouth 2 (Two) Times a Day As Needed for Constipation.    gabapentin (NEURONTIN) 300 MG capsule  Family Member Yes No    Take 300 mg by mouth 2 (Two) Times a Day.    hydrochlorothiazide (HYDRODIURIL) 25 MG tablet  Family Member Yes No    Take 25 mg by mouth Daily.    levothyroxine (SYNTHROID, LEVOTHROID) 100 MCG tablet  Family Member Yes No    Take 100 mcg by mouth Every Night.    metoprolol succinate XL (TOPROL-XL) 50 MG 24 hr tablet  Family Member Yes No    Take 50 mg by mouth Daily.    oxyCODONE-acetaminophen (PERCOCET)   MG per tablet   Yes No    Take 1 tablet by mouth 2 (Two) Times a Day As Needed for Moderate Pain .    pantoprazole (PROTONIX) 40 MG EC tablet  Family Member Yes No    Take 40 mg by mouth Daily.    rivaroxaban (XARELTO) 15 MG tablet  Family Member Yes No    Take 15 mg by mouth Every Night.        Objective     Vital Signs:  Temp:  [97.6 °F (36.4 °C)-98.3 °F (36.8 °C)] 98.3 °F (36.8 °C)  Heart Rate:  [] 86  Resp:  [16] 16  BP: ()/(40-87) 104/50    Mean Arterial Pressure (Non-Invasive) for the past 24 hrs (Last 3 readings):   Noninvasive MAP (mmHg)   09/24/21 1842 64   09/24/21 1836 72   09/24/21 1828 57     SpO2:  [97 %-100 %] 100 %  on   ;   Device (Oxygen Therapy): room air  Body mass index is 29.18 kg/m².    Wt Readings from Last 3 Encounters:   09/24/21 77.1 kg (170 lb)   09/22/21 77.1 kg (170 lb)   09/19/21 77.6 kg (171 lb)      ---------------------------------------------------------------------------------------------------------------------   Physical Exam:  Constitutional:  Well-developed and well-nourished.  No respiratory distress.      HENT:  Head: Normocephalic and atraumatic.  Mouth:  Moist mucous membranes.    Eyes:  Conjunctivae and EOM are normal.  Pupils are equal, round, and reactive to light.  No scleral icterus.  Cardiovascular:  Normal rate, regular rhythm and normal heart sounds with no murmur.  Pulmonary/Chest:  No respiratory distress, no wheezes, no crackles, with normal breath sounds and good air movement.  Abdominal:  Soft.  Bowel sounds are normal.  No distension and no tenderness.   Musculoskeletal:  L arm swelling and limited ROM at elbow.   Neurological:  Alert and oriented to person, place, and time.  No cranial nerve deficit.  No focal neurological deficit.   Skin:  Skin is warm and dry.  No rash noted.  No pallor.   Peripheral vascular:  No edema and strong pulses on all 4  extremities.    ---------------------------------------------------------------------------------------------------------------------  EKG:  No acute ischemic changes.   ECG 12 Lead         ECG 12 Lead             Telemetry:      I have personally looked at both the EKG and the telemetry strips.    Last echocardiogram:  Results for orders placed during the hospital encounter of 04/28/20    Adult Transthoracic Echo Complete W/ Cont if Necessary Per Protocol    Interpretation Summary  · Normal left ventricular cavity size noted. All left ventricular wall segments contract normally.  · Left ventricular wall thickness is consistent with mild concentric hypertrophy.  · Estimated EF appears to be in the range of 61 - 65%.  · The aortic valve is structurally normal. No aortic valve regurgitation is present. No aortic valve stenosis is present.  · The mitral valve is normal in structure. Moderate mitral valve regurgitation is present. No significant mitral valve stenosis is present.  · The tricuspid valve is normal. Mild tricuspid valve regurgitation is present. Estimated right ventricular systolic pressure from tricuspid regurgitation is normal (<35 mmHg).  · There is no evidence of pericardial effusion.    --------------------------------------------------------------------------------------------------------------------  Labs:  Results from last 7 days   Lab Units 09/24/21  1530 09/24/21  1407 09/22/21  1203 09/20/21  0618 09/19/21  2100 09/19/21  2100 09/19/21  1107 09/19/21  1107   LACTATE mmol/L 1.4  --   --   --   --   --   --  1.2   CRP mg/dL  --  18.88*  --   --   --   --   --   --    WBC 10*3/mm3  --  20.49* 6.57 5.49   < > 5.12   < > 4.90   HEMOGLOBIN g/dL  --  9.7* 9.8* 10.7*   < > 10.3*   < > 10.0*   HEMATOCRIT %  --  28.8* 30.0* 32.5*   < > 31.7*   < > 29.6*   MCV fL  --  89.4 90.1 88.1   < > 89.8   < > 87.6   MCHC g/dL  --  33.7 32.7 32.9   < > 32.5   < > 33.8   PLATELETS 10*3/mm3  --  309 308 227   < > 206    < > 203   INR   --   --  1.59*  --   --  1.14*  --   --     < > = values in this interval not displayed.     Results from last 7 days   Lab Units 09/24/21  1408   PH, ARTERIAL pH units 7.454*   PO2 ART mm Hg 71.8*   PCO2, ARTERIAL mm Hg 35.1   HCO3 ART mmol/L 24.6     Results from last 7 days   Lab Units 09/24/21  1407 09/22/21  1203 09/20/21  0618   SODIUM mmol/L 134* 135* 134*   POTASSIUM mmol/L 3.7 4.2 4.1   CHLORIDE mmol/L 96* 99 98   CO2 mmol/L 25.4 25.7 19.9*   BUN mg/dL 34* 25* 18   CREATININE mg/dL 2.48* 1.19* 0.89   EGFR IF NONAFRICN AM mL/min/1.73 18* 43* 60*   CALCIUM mg/dL 9.0 9.4 9.3   GLUCOSE mg/dL 140* 105* 144*   ALBUMIN g/dL 3.55 3.79 3.49*   BILIRUBIN mg/dL 1.5* 1.2 1.1   ALK PHOS U/L 78 78 78   AST (SGOT) U/L 19 19 16   ALT (SGPT) U/L 9 7 7   Estimated Creatinine Clearance: 16.1 mL/min (A) (by C-G formula based on SCr of 2.48 mg/dL (H)).    No results found for: AMMONIA  Results from last 7 days   Lab Units 09/24/21  1535 09/24/21  1407 09/20/21  0618   TROPONIN T ng/mL 0.046* 0.045* <0.010   PROBNP pg/mL  --  14,027.0*  --          No results found for: HGBA1C, POCGLU  Lab Results   Component Value Date    TSH 4.430 (H) 07/30/2019    FREET4 1.20 07/30/2019     No results found for: PREGTESTUR, PREGSERUM, HCG, HCGQUANT  Pain Management Panel    There is no flowsheet data to display.       Brief Urine Lab Results  (Last result in the past 365 days)      Color   Clarity   Blood   Leuk Est   Nitrite   Protein   CREAT   Urine HCG        09/24/21 1432 Dark Yellow Turbid Large (3+) Large (3+) Negative >=300 mg/dL (3+)             Blood Culture   Date Value Ref Range Status   09/19/2021 No growth at 5 days  Final   09/19/2021 No growth at 5 days  Final     No results found for: URINECX  No results found for: WOUNDCX  No results found for: STOOLCX    I have personally looked at the labs and they are summarized  above.  ----------------------------------------------------------------------------------------------------------------------  Detailed radiology reports for the last 24 hours:    Imaging Results (Last 24 Hours)     Procedure Component Value Units Date/Time    CT Abdomen Pelvis Without Contrast [[id number]] Collected: 09/24/21 1742     Updated: 09/24/21 1745    Narrative:      EXAM:    CT Abdomen and Pelvis Without Intravenous Contrast     EXAM DATE:    9/24/2021 4:46 PM     CLINICAL HISTORY:    renal failure     TECHNIQUE:    Axial computed tomography images of the abdomen and pelvis without  intravenous contrast.  Sagittal and coronal reformatted images were  created and reviewed.  This CT exam was performed using one or more of  the following dose reduction techniques:  automated exposure control,  adjustment of the mA and/or kV according to patient size, and/or use of  iterative reconstruction technique.     COMPARISON:    No relevant prior studies available.     FINDINGS:    LUNG BASES:  Unremarkable.  No mass.  No consolidation.      ABDOMEN:    LIVER:  Unremarkable.    GALLBLADDER AND BILE DUCTS:  Cholecystectomy clips.  No ductal  dilation.    PANCREAS:  Unremarkable.  No ductal dilation.    SPLEEN:  Unremarkable.  No splenomegaly.    ADRENALS:  Unremarkable.  No mass.    KIDNEYS AND URETERS:  Stranding around the left kidney that is  nonspecific with no definite obstructive urolithiasis demonstrated.   Chronic atrophy of the right kidney.    STOMACH AND BOWEL:  Unremarkable.  No obstruction.  No mucosal  thickening.      PELVIS:    APPENDIX:  No findings to suggest acute appendicitis.    BLADDER:  Unremarkable.  No stones.    REPRODUCTIVE:  Unremarkable as visualized.      ABDOMEN and PELVIS:    INTRAPERITONEAL SPACE:  Unremarkable.  No free air.  No significant  fluid collection.    BONES/JOINTS:  No acute fracture.  No dislocation.    SOFT TISSUES:  Unremarkable.    VASCULATURE:  Unremarkable.  No  abdominal aortic aneurysm.    LYMPH NODES:  Unremarkable.  No enlarged lymph nodes.       Impression:        Stranding around the left kidney that is nonspecific with no definite  obstructive urolithiasis demonstrated.     This report was finalized on 9/24/2021 5:43 PM by Dr. Mario Duffy MD.       CT Chest Without Contrast Diagnostic [[id number]] Collected: 09/24/21 1742     Updated: 09/24/21 1744    Narrative:      EXAM:    CT Chest Without Intravenous Contrast     EXAM DATE:    9/24/2021 4:47 PM     CLINICAL HISTORY:    SOB     TECHNIQUE:    Axial computed tomography images of the chest without intravenous  contrast.  Sagittal and coronal reformatted images were created and  reviewed.  This CT exam was performed using one or more of the following  dose reduction techniques:  automated exposure control, adjustment of  the mA and/or kV according to patient size, and/or use of iterative  reconstruction technique.     COMPARISON:    No relevant prior studies available.     FINDINGS:    LUNGS:  Unremarkable.  No mass.  No consolidation.    PLEURAL SPACE:  Unremarkable.  No pneumothorax.  No significant  effusion.    HEART:  Cardiomegaly.  No significant pericardial effusion.    MEDIASTINUM:  Small hiatal hernia.    BONES/JOINTS:  Unremarkable.  No acute fracture.  No dislocation.    SOFT TISSUES:  Unremarkable.    VASCULATURE:  Unremarkable.  No thoracic aortic aneurysm.    LYMPH NODES:  Unremarkable.  No enlarged lymph nodes.       Impression:        Cardiomegaly.     This report was finalized on 9/24/2021 5:42 PM by Dr. Mario Duffy MD.       XR Foot 2 View Left [[id number]] Collected: 09/24/21 1741     Updated: 09/24/21 1744    Narrative:      EXAM:    XR Left Foot Complete, 3 or More Views     EXAM DATE:    9/24/2021 5:21 PM     CLINICAL HISTORY:    foot wound     TECHNIQUE:    Frontal, lateral and oblique views of the left foot.     COMPARISON:    No relevant prior studies available.     FINDINGS:     BONES/JOINTS:  Unremarkable.  No acute fracture.  No dislocation.    SOFT TISSUES:  Unremarkable.  No radiopaque foreign body.       Impression:        No acute findings in the left foot.     This report was finalized on 9/24/2021 5:42 PM by Dr. Mario Duffy MD.       XR Chest 1 View [[id number]] Collected: 09/24/21 1542     Updated: 09/24/21 1544    Narrative:      EXAM:    XR Chest, 1 View     EXAM DATE:    9/24/2021 2:16 PM     CLINICAL HISTORY:    SOB     TECHNIQUE:    Frontal view of the chest.     COMPARISON:    No relevant prior studies available.     FINDINGS:    LUNGS:  Unremarkable.  No consolidation.    PLEURAL SPACE:  Unremarkable.  No pneumothorax.    HEART:  Unremarkable.  No cardiomegaly.    MEDIASTINUM:  Unremarkable.    BONES/JOINTS:  Unremarkable.       Impression:        Unremarkable exam. No acute cardiopulmonary findings identified.     This report was finalized on 9/24/2021 3:42 PM by Dr. Mario Duffy MD.           Final impressions for the last 30 days of radiology reports:    CT Abdomen Pelvis Without Contrast    Result Date: 9/24/2021    Stranding around the left kidney that is nonspecific with no definite obstructive urolithiasis demonstrated.  This report was finalized on 9/24/2021 5:43 PM by Dr. Mario Duffy MD.      XR Foot 2 View Left    Result Date: 9/24/2021    No acute findings in the left foot.  This report was finalized on 9/24/2021 5:42 PM by Dr. Mario Duffy MD.      CT Head Without Contrast    Result Date: 9/19/2021  1.  Senescent changes. 2.  No CT evidence of acute intracranial abnormality.  This report was finalized on 9/19/2021 11:54 AM by Dr. Walker Torres MD.      CT Chest Without Contrast Diagnostic    Result Date: 9/24/2021    Cardiomegaly.  This report was finalized on 9/24/2021 5:42 PM by Dr. Mario Duffy MD.      US Pelvis Complete    Result Date: 9/22/2021  1.  Neither ovary is visualized. 2.  Again noted is nonspecific for centimeter posterior bladder region  mass or debris.  This report was finalized on 9/22/2021 1:08 PM by Dr. Mario Duffy MD.      XR Chest 1 View    Result Date: 9/24/2021    Unremarkable exam. No acute cardiopulmonary findings identified.  This report was finalized on 9/24/2021 3:42 PM by Dr. Mario Duffy MD.      XR Chest 1 View    Result Date: 9/19/2021    Unremarkable exam. No acute cardiopulmonary findings identified.  This report was finalized on 9/19/2021 12:10 PM by Dr. Walker Torres MD.      CT Chest Pulmonary Embolism    Result Date: 9/19/2021  1. Findings suspicious for subtle peripheral filling defects in the left lower lobe and developing small lower lobe pulmonary infarct. No large filling defect on either side. 2. Obstructive lung disease and evidence of COVID- 19 pneumonia as described. 3. Cardiomegaly. 4. Hiatal hernia. NOTIFICATION: Critical Value/emergent results were called by telephone at the time of interpretation on 9/19/2021 7:38 PM to Dr. Consuelo MD who verbally acknowledged these results. Signer Name: Jose Raul Almaraz MD  Signed: 9/19/2021 7:39 PM  Workstation Name: [facility]  [facility]    CT Abdomen Pelvis Stone Protocol    Result Date: 9/22/2021  1.  4 cm right posterior bladder wall hyperdense lesion that could represent blood clot or mass. 2.  Bilateral renal collecting system prominence is again noted bilaterally. 3.  Left double collecting system again noted with some dilatation of the entire left ureter that appear stable.  This report was finalized on 9/22/2021 1:06 PM by Dr. Mario Duffy MD.      I have personally looked at the radiology images and read the final radiology report.    Assessment & Plan    #Sepsis 2/2 pyelonephritis   - Patient with tachycardia, leukocytosis and likely urinary source as evidenced by stranding around left kidney.   - Will start ceftriaxone and monitor response.   - Will monitor urine and blood cultures.     #ALEKSANDRA on CKD  - Baseline Cr appears to be around  1.1-1.2  - Cr on presentation 2.48. Suspect prerenal injury given history. S/p bolus in ED. Encourage PO intake.   - No obstruction seen on CT abdomen  - Will repeat in AM.     #COVID-19 infection   - On room air but high risk. Patient agreeable to REGEN-COV    #Elevated proBNP  #Troponin leak  - Suspect elevated heart enzymes from renal dysfunction. Will get echo  - Troponin with no significant delta. No new ischemic findings on EKG. No chest pain.     #Afib  - Rate controlled. Will continue metoprolol   - Will restart full dose anticoagulation prior to discharge based on renal improvement. No need to bridge.     #CAD  #HTN  - Will start ASA, statin.   - Will restart home antihypertensive regimen as needed.     #Hypothyroidism   -TSH pending. Continue home regimen.     #Failue to thrive  - Likely from COVID infection. Will encourage PO intake.     F:PO  E:Replace as needed  N:Regular     Code status: DNR/DNI    Dispo: Admit to tele     VTE Prophylaxis:   Mechanical Order History:     None      Pharmalogical Order History:      Ordered     Dose Route Frequency Stop    Signed and Held  heparin (porcine) 5000 UNIT/ML injection 5,000 Units      5,000 Units SC Every 8 Hours Scheduled --                Nathaniel Wright MD  [facility]  09/24/21  19:09 EDT    Electronically signed by Nathaniel Wright MD at 09/24/21 1936          Physician Progress Notes (last 24 hours) (Notes from 10/04/21 1426 through 10/05/21 1426)      Hong Bryant DO at 10/04/21 1923          Subjective     History:   Desiree Matos is a 87 y.o. female admitted on 9/24/2021 secondary to <principal problem not specified>     Procedures: None    CC: Follow up bacteremia, ALEKSANDRA    Patient seen and examined at bedside. Awake and alert. Reports generalized weakness and fatigue but feels improved from upon admission. Reports pleuritic pain. No reported nausea or vomiting. Appetite and PO intake remain poor. No acute events  overnight per RN.     History taken from: patient, chart, and RN.      Objective     Vital Signs  Temp:  [97.1 °F (36.2 °C)-98.4 °F (36.9 °C)] 97.1 °F (36.2 °C)  Heart Rate:  [] 97  Resp:  [18] 18  BP: (128-152)/(63-88) 128/67    Intake/Output Summary (Last 24 hours) at 10/4/2021 1923  Last data filed at 10/4/2021 1745  Gross per 24 hour   Intake 871 ml   Output 200 ml   Net 671 ml         Physical Exam: Unchanged from previous.   General:    Awake, alert, in no acute distress, chronically ill appearing    Heart:      Normal S1 and S2. Irregularly irregular.    Lungs:     Respirations regular, even and unlabored. Diminished breath sounds at bases. No wheezes, rales or rhonchi.   Abdomen:   Soft. Mild generalized TTP. No guarding, rebound tenderness or organomegaly noted. Bowel sounds present x 4.   Extremities:  No clubbing, cyanosis or edema noted. Moves UE and LE equally B/L.     Results Review:    Results from last 7 days   Lab Units 10/04/21  0929 10/03/21  0755 10/02/21  0430 10/01/21  0058 09/30/21  0537 09/29/21  0133 09/28/21  0057   WBC 10*3/mm3 9.00 8.22 10.91* 13.21* 9.52 7.72 7.72   HEMOGLOBIN g/dL 7.8* 7.3* 7.4* 7.6* 7.8* 7.8* 7.5*   PLATELETS 10*3/mm3 213 219 206 211 181 183 186     Results from last 7 days   Lab Units 10/04/21  1325 10/04/21  0929 10/03/21  0755 10/02/21  0430 10/01/21  0058 09/30/21  0537 09/29/21  0133   SODIUM mmol/L 131* 127* 129* 132* 138 135* 130*   POTASSIUM mmol/L 4.8 4.3 4.9 4.9 5.2 3.6 3.7   CHLORIDE mmol/L 100 95* 97* 100 105 102 95*   CO2 mmol/L 22.6 21.8* 25.3 23.4 21.7* 22.3 24.1   BUN mg/dL 17 17 18 18 16 14 18   CREATININE mg/dL 0.90 0.90 1.01* 1.03* 0.99 0.94 0.95   CALCIUM mg/dL 8.8 8.9 8.7 8.5* 8.6 8.3* 8.6   GLUCOSE mg/dL 100* 115* 99 109* 97 82 89     Results from last 7 days   Lab Units 10/04/21  0929 10/03/21  0755 10/02/21  0430 10/01/21  0058 09/30/21  0537 09/29/21  0133 09/28/21  0057   BILIRUBIN mg/dL 0.7 0.5 0.5 0.7 0.6 0.7 0.7   ALK PHOS U/L 73 68  71 69 69 66 63   AST (SGOT) U/L 13 11 11 12 13 9 8   ALT (SGPT) U/L 5 <5 <5 5 5 <5 5     Results from last 7 days   Lab Units 10/03/21  0755 10/02/21  0430 10/01/21  0058 09/28/21  1430 09/28/21  0057 09/27/21  1927   MAGNESIUM mg/dL 2.1 2.3 1.9 2.3 2.4 1.7         Results from last 7 days   Lab Units 10/02/21  0849 09/30/21  1800   TROPONIN T ng/mL 0.025 0.027       Imaging Results (Last 24 Hours)     ** No results found for the last 24 hours. **            Medications:  aspirin, 81 mg, Oral, Daily  atorvastatin, 40 mg, Oral, Nightly  cefepime, 2 g, Intravenous, Q12H  docusate sodium, 100 mg, Oral, BID  doxycycline, 100 mg, Intravenous, Q12H  levothyroxine, 100 mcg, Oral, Nightly  metoprolol tartrate, 25 mg, Oral, Q12H  pantoprazole, 40 mg, Oral, Daily  polyethylene glycol, 17 g, Oral, Daily  rivaroxaban, 15 mg, Oral, Daily With Dinner  sodium chloride, 10 mL, Intravenous, Q12H      sodium chloride, 60 mL/hr, Last Rate: 60 mL/hr (10/04/21 1215)            Assessment/Plan   Sepsis: Likely 2/2 pyelonephritis and bacteremia upon admission with concern for development of LLL pneumonia. Afebrile and hemodynamically stable. Leukocytosis initially resolved but WBC began rising. WBC has now normalized again.  CRP elevated but improving. Lactate is normal. Procal is elevated. Repeat blood cultures with NGTD. Repeat UA remains abnormal with no growth. MRSA PCR positive. CT chest and abd/pelvis obtained revealing left basilar consolidation. Broadened antibiotic coverage to Doxycycline and Cefepime. Cont current regimen today. Repeat labs in the AM.     E coli bacteremia: Likely 2/2 left-sided pyelonephritis. Urine culture not initially collected with add on culture revealing mixed carlee. 2/2 initial blood cultures reveal pan sensitive E coli. Cont antibiotics as above.      ALEKSANDRA on CKD IIIb: Likely prerenal. S/P bolus in the ED. Creatine improved and stable today. Cont to encourage PO intake. Holding home Bumex and HCTZ.  Repeat labs in the AM.     Hyponatremia: Likely hypovolemic. Started IV fluids today as Na+ trended downward. Check urine Na+. Encourage PO intake.     COVID-19 infection: Respiratory status stable. O2 stable on RA. S/P REGEN-COV. Cont supportive treatment.     Afib: RVR improved with increasing metoprolol. Cont Xarelto for stroke prevention. Monitor on telemetry.     Abdominal pain: CT abd/pelvis reveals mild dilatation of the left collecting system but no distal stones, trace left effusion and left basilar consolidation. Added bowel regimen. Reports some improvement in her pain. Cont supportive treatment.     Essential HTN: BP stable. Cont metoprolol.     CAD: Appears clinically stable. Cont ASA, statin and metoprolol. Monitor on telemetry.     Normocytic anemia: Iron and iron saturation normal. Hgb low but remains >7 with no signs of bleeding. Possible dilutional component. Stable today. Cont to monitor.     Hypothyroidism: TSH is normal. Cont levothyroxine.     Generalized weakness: PT/OT    DVT PPX: Xarelto    Pt is at high risk 2/2 worsening sepsis, UTI/pyelonephritis, bacteremia, Afib with RVR, COVID-19 and functional decline.     Disposition: Hold D/C to rehab today 2/2 worsening hyponatremia. Reevaluate in the AM.        Hong Bryant DO  10/04/21  19:23 EDT    Electronically signed by Hong Bryant DO at 10/04/21 1930          Consult Notes (last 24 hours) (Notes from 10/04/21 1426 through 10/05/21 1426)      Carissa Arredondo, APRN at 10/05/21 1220      Consult Orders    1. Inpatient Palliative Care MD Consult [[id number]] ordered by Hong Bryant DO at 10/05/21 8774               Palliative Care Initial Consult     Attending Physician: Hong Bryant, *  Referring Provider: Hong Bryant     Palliative care reason for consult: GOC/ACP pain management  Code Status:   Code Status and Medical Interventions:   Ordered at: 09/24/21 1919     Limited Support to NOT  Include:    Intubation     Code Status:    No CPR     Medical Interventions (Level of Support Prior to Arrest):    Limited      Advanced Directives: Advance Directive Status: Patient does not have advance directive   Healthcare surrogate: Adult Children Talita Winslow, and Boy Baird Walter and Jim  Goals of Care: Pt states that she is tired and would prefer to just go home and doesn't want to do rehab.    HPI:  Desiree Matos is a 87 y.o. female admitted on 9/24/2021 with exposure to known illness and failure to thrive.She has a history of afib, HTN, hypothyroidism, CAD, CKD, hypothyroidism who presented with general malaise and mild dyspnea. Patient has had poor PO intake since a fall on 9/9. She was seen at [facility]. She had a left arm fracture with unclear ortho plan per patient. No cast or support device in place. Patient has went home and essentially reports failure to thrive. Patient reports some lower quadrant abdominal pain an dysuria. Patient notes urination have also decreased. She denies any chest pain or swelling.         ROS: Negative except as above in HPI.     Past Medical History:   Diagnosis Date   • A-fib (CMS/HCC)    • Anemia 1/13/2017   • Arthritis    • Asthma    • Cancer (CMS/HCC)    • CHF (congestive heart failure) (CMS/HCC)    • Chronic anticoagulation, Xarelto 7/30/2019   • Coronary artery disease    • GERD without esophagitis 7/30/2019   • History of colonic polyps 7/30/2019   • History of transfusion    • Hypertension    • Hypothyroidism 7/30/2019   • Peripheral neuropathy 7/30/2019     Past Surgical History:   Procedure Laterality Date   • BACK SURGERY     • BREAST CYST EXCISION Left    • CARDIAC CATHETERIZATION N/A 5/19/2020    Procedure: Left Heart Cath;  Surgeon: Jack Couch MD;  Location:  [facility];  Service: Cardiology;  Laterality: N/A;   • CERVICAL POLYPECTOMY     • COLONOSCOPY N/A 1/17/2017    Procedure: COLONOSCOPY;  Surgeon: Madi  Vaughn Zheng III, MD;  Location: [facility];  Service:    • D & C AND LAPAROSCOPY     • ENDOSCOPY N/A 1/17/2017    Procedure: ESOPHAGOGASTRODUODENOSCOPY;  Surgeon: Maid Zheng III, MD;  Location: [facility];  Service:    • GALLBLADDER SURGERY     • VEIN LIGATION AND STRIPPING       Social History     Socioeconomic History   • Marital status:      Spouse name: Not on file   • Number of children: Not on file   • Years of education: Not on file   • Highest education level: Not on file   Tobacco Use   • Smoking status: Never Smoker   • Smokeless tobacco: Never Used   Substance and Sexual Activity   • Alcohol use: No   • Drug use: No   • Sexual activity: Defer     Family History   Problem Relation Age of Onset   • Heart disease Mother    • Heart attack Sister    • Heart disease Sister    • Heart attack Brother    • Heart disease Brother    • Heart disease Brother    • Heart attack Paternal Grandfather        Allergies   Allergen Reactions   • Adhesive Tape Rash       Current Facility-Administered Medications   Medication Dose Route Frequency Provider Last Rate Last Admin   • aspirin EC tablet 81 mg  81 mg Oral Daily Nathaniel Wright MD   81 mg at 10/05/21 0922   • atorvastatin (LIPITOR) tablet 40 mg  40 mg Oral Nightly Nathaniel Wright MD   40 mg at 10/04/21 2102   • cefepime 2 gm IVPB in 100 ml NS (VTB)  2 g Intravenous Q12H Hong Bryant DO   2 g at 10/04/21 2102   • diphenhydrAMINE (BENADRYL) capsule 50 mg  50 mg Oral Once PRN Nathaniel Wright MD        Or   • diphenhydrAMINE (BENADRYL) injection 50 mg  50 mg Intravenous Once PRN Nathaniel Wright MD       • docusate sodium (COLACE) capsule 100 mg  100 mg Oral BID Hong Bryant DO   100 mg at 10/05/21 0921   • docusate sodium (COLACE) capsule 200 mg  200 mg Oral BID PRN Patricia Goodman DO       • doxycycline (VIBRAMYCIN) 100 mg in sodium chloride 0.9 % 100 mL IVPB-VTB  100 mg Intravenous Q12H Hong Bryant DO   100 mg at  10/04/21 2102   • EPINEPHrine (ADRENALIN) injection 0.3 mg  0.3 mg Intramuscular Once PRN Nathaniel Wright MD       • levothyroxine (SYNTHROID, LEVOTHROID) tablet 100 mcg  100 mcg Oral Nightly Patricia Goodman, DO   100 mcg at 10/04/21 2102   • magic barrier cream 1 application  1 application Topical PRN Nathaniel Wright MD   1 application at 09/28/21 0925   • Magnesium Sulfate 2 gram Bolus, followed by 8 gram infusion (total Mg dose 10 grams)- Mg less than or equal to 1mg/dL  2 g Intravenous PRN Nathaniel Wright MD        Or   • Magnesium Sulfate 2 gram / 50mL Infusion (GIVE X 3 BAGS TO EQUAL 6GM TOTAL DOSE) - Mg 1.1 - 1.5 mg/dl  2 g Intravenous PRN Nathaniel Wright MD        Or   • Magnesium Sulfate 4 gram infusion- Mg 1.6-1.9 mg/dL  4 g Intravenous PRNathaniel Diaz MD       • methylPREDNISolone sodium succinate (SOLU-Medrol) injection 125 mg  125 mg Intravenous Once PRN Nathaniel Wright MD       • metoprolol tartrate (LOPRESSOR) tablet 25 mg  25 mg Oral Q12H Hong Bryant DO   25 mg at 10/05/21 0922   • nitroglycerin (NITROSTAT) SL tablet 0.4 mg  0.4 mg Sublingual Q5 Min PRN Nathaniel Wright MD   0.4 mg at 10/02/21 0820   • oxyCODONE-acetaminophen (PERCOCET)  MG per tablet 0.5 tablet  0.5 tablet Oral BID PRN Patricia Goodman DO   0.5 tablet at 10/04/21 1210   • pantoprazole (PROTONIX) EC tablet 40 mg  40 mg Oral Daily Patricia Goodman DO   40 mg at 10/05/21 0922   • polyethylene glycol (MIRALAX) packet 17 g  17 g Oral Daily Hong Bryant DO   17 g at 10/05/21 0921   • potassium chloride (K-DUR,KLOR-CON) CR tablet 40 mEq  40 mEq Oral PRN Nathaniel Wright MD       • potassium chloride (KLOR-CON) packet 40 mEq  40 mEq Oral PRN Nathaniel Wright MD       • rivaroxaban (XARELTO) tablet 15 mg  15 mg Oral Daily With Dinner Hong Bryant DO   15 mg at 10/04/21 1745   • sodium chloride 0.9 % flush 10 mL  10 mL Intravenous PRN Davida Hammer PA       • sodium chloride 0.9  "% flush 10 mL  10 mL Intravenous Q12H Nathaniel Wright MD   10 mL at 10/04/21 2103   • sodium chloride 0.9 % flush 10 mL  10 mL Intravenous PRN Nathaniel Wright MD            diphenhydrAMINE **OR** diphenhydrAMINE  •  docusate sodium  •  EPINEPHrine  •  magic barrier cream  •  magnesium sulfate **OR** magnesium sulfate **OR** magnesium sulfate  •  methylPREDNISolone sodium succinate  •  nitroglycerin  •  oxyCODONE-acetaminophen  •  potassium chloride  •  potassium chloride  •  [COMPLETED] Insert peripheral IV **AND** sodium chloride  •  sodium chloride    Current medication reviewed for route, type, dose and frequency and are current per MAR.    Palliative Performance Scale Score:     /83 (BP Location: Right arm, Patient Position: Lying)   Pulse 110   Temp 97.4 °F (36.3 °C) (Axillary)   Resp 18   Ht 162.6 cm (64\")   Wt 77.1 kg (170 lb)   LMP  (LMP Unknown)   SpO2 99%   BMI 29.18 kg/m²     Intake/Output Summary (Last 24 hours) at 10/5/2021 1220  Last data filed at 10/5/2021 0420  Gross per 24 hour   Intake 971 ml   Output 600 ml   Net 371 ml       PE:  COVID RESTRICTIONS    Labs:   Results from last 7 days   Lab Units 10/05/21  0152   WBC 10*3/mm3 9.83   HEMOGLOBIN g/dL 7.3*   HEMATOCRIT % 23.6*   PLATELETS 10*3/mm3 207     Results from last 7 days   Lab Units 10/05/21  0152   SODIUM mmol/L 134*   POTASSIUM mmol/L 4.3   CHLORIDE mmol/L 101   CO2 mmol/L 24.0   BUN mg/dL 17   CREATININE mg/dL 0.94   GLUCOSE mg/dL 88   CALCIUM mg/dL 8.8     Results from last 7 days   Lab Units 10/05/21  0152   SODIUM mmol/L 134*   POTASSIUM mmol/L 4.3   CHLORIDE mmol/L 101   CO2 mmol/L 24.0   BUN mg/dL 17   CREATININE mg/dL 0.94   CALCIUM mg/dL 8.8   BILIRUBIN mg/dL 0.7   ALK PHOS U/L 71   ALT (SGPT) U/L <5   AST (SGOT) U/L 14   GLUCOSE mg/dL 88     Imaging Results (Last 72 Hours)     ** No results found for the last 72 hours. **          Diagnostics: Reviewed    A: Desiree EMERY Shawna is a 87 y.o. female with  exposure to " known illness and failure to thrive.She has a history of afib, HTN, hypothyroidism, CAD, CKD, hypothyroidism who presented with general malaise and mild dyspnea. Patient has had poor PO intake since a fall on 9/9. She was seen at [facility]. She had a left arm fracture with unclear ortho plan per patient. No cast or support device in place. Patient has went home and essentially reports failure to thrive. Patient reports some lower quadrant abdominal pain an dysuria. Patient notes urination have also decreased. She denies any chest pain or swelling. Pt was positive for COVID upon arrival to hospital positive on 9/19/2021. P t was admitted to [facility]    Today 10/5/2021   Pt is afebrile, , and BP of 161/83 RR 18 and was saturating 99% on room air. Pt appeared weak and tired and stated I just want to go home and be with my family.       P:   Palliative was consulted to discuss GOC./ACP with patient and family. I was able to speak with Desiree at bedside as well as daughter Trey on the phone. Desiree states that she just wants to go home and is agreeable to hospice to provide services as she says my family needs help.Trey says that if coming home with hospice is what her mother wants she supports that and are ready for her to come home. I let her know that hospice would manage her care and that an RN would come out weekly as well as CNA's could come out 3-5 times a weeki for bathing. I let her know if she had any symptoms in between visit they could call 24/7 day or night and they would address her symptoms. I put hospice referral in and spoke with Lor in  as well as Dr. Bryant and RN Garima. Plan is for patient to return home with hospice, Dr. Bryant plans on discharging this afternoon.    We appreciate the consult and the opportunity to participate in Desiree Matos's care. We will continue to follow along. Please do not hesitate to contact us regarding further symptom management or goals of care needs,  including after hours or on weekends via our on call provider at [phone].     Time: 45 minutes spent reviewing medical and medication records, assessing and examining patient, discussing with family, answering questions, providing some guidance about a plan and documentation of care, and coordinating care with other healthcare members, with > 50% time spent face to face.     RUSTY Banerjee    10/5/2021      Electronically signed by Carissa Arredondo APRN at 10/05/21 1322       Physical Therapy Notes (last 24 hours) (Notes from 10/04/21 1426 through 10/05/21 1426)    No notes exist for this encounter.         Occupational Therapy Notes (last 24 hours) (Notes from 10/04/21 1426 through 10/05/21 1426)    No notes exist for this encounter.         Speech Language Pathology Notes (last 24 hours) (Notes from 10/04/21 1426 through 10/05/21 1426)    No notes exist for this encounter.       After Visit Summary  Desiree Matos  MRN: [id number]    Icon Date   9/24/2021 - 10/5/2021   Icon Location   [address]   Icon Checklist header    Your Next Steps    Icon destination   [facility]   [address]   [place] [address]   [phone]    Icon ask where to get these medications   Ask   Icon Checkbox    Ask how to get these medications  1 bumetanide  Icon do   Do   Icon Checkbox     these medications from [facility] [employer] - [place], [place] - [address] [place] [address] [phone] [facility] [phone] FX  1 cefdinir  2 doxycycline  3 polyethylene glycol   Icon Checkbox     these medications from [facility] - [place], [place] - [address] [phone] [facility] [phone] FX  1 aspirin  2 atorvastatin  Icon read   Read   Icon Checkbox    Read 5 attachments  [employer]    View your After Visit Summary and more online at [email or web address]   After Visit Summary  Instructions     Icon  medication changes this visit           Your medications have changed    Icon medications to start taking   START taking:   aspirin    atorvastatin (LIPITOR)    cefdinir (OMNICEF)    doxycycline (VIBRAMYCIN)    polyethylene glycol (MIRALAX)   Start taking on: October 6, 2021  Icon medications to change how you take   CHANGE how you take:   bumetanide (BUMEX) -- when to take this, reasons to take this  Icon medications to stop taking   STOP taking:   hydroCHLOROthiazide 25 MG tablet (HYDRODIURIL)   Review your updated medication list below.  Your Next Steps  Icon destination   Destination  Icon ask where to get these medications   Ask  Icon do   Do  Icon read   Read  COVID-19 Vaccination Information  Why Get Vaccinated?  Building defenses against COVID-19 is a team effort, and you are a banks part of that team. Getting the COVID-19 vaccine adds one more layer of protection for you, your coworkers, and family. Here are ways you can build people’s confidence in the COVID-19 vaccines in your community and at home.     · Get vaccinated and enroll in the v-safe text messaging program to help CDC monitor vaccine safety.   · Tell others why you are getting vaccinated and encourage them to get vaccinated. Share your success story.    · Learn how to have conversations about COVID-19 vaccine with coworkers, family, and friends.  · https://www.cdc.gov/coronavirus/2019-ncov/vaccines/index.html     How do I schedule an appointment for a vaccine?  https://www.vaccines.gov/ helps you find locations that carry COVID-19 vaccines and their contact information. Because every location handles appointments differently, you will need to schedule your appointment directly with the location you choose.        If you have any questions about your recovery, please call the [facility] Nurse Call Center at [phone]. A registered nurse is available 24 hours a day 7 days a week to assist you.   If you have any COVID-19 related questions,  please call [phone].   Icon activity    Activity instructions    Activity as Tolerated    Icon diet    Diet instructions    Diet as Tolerated    Icon Orders placed today                  Other instructions    Discharge Follow-up with PCP   Currently Documented PCP:   Duane Pollard MD   PCP Phone Number:   [phone]   What's Next    What's Next          Follow up with Duane Pollard MD  Follow up with Dr. Pollard as needed. [address] [id number]  [phone]          Follow up with Duane Pollard MD in 2 week(s)  OCT 19 @10:15AM [address] [address]  [phone]   Continuing Care     Icon destination    [facility]  Services: Skilled [facility]   Address: [facility] [address] [id number]   Phone: [phone]    Icon Home Medical Care    Home Medical Care    [facility]  Services: Home Health Services   Address: [facility] [address] [facility] [address]   Phone: [phone]   Your Allergies  Date Reviewed: 9/24/2021  Your Allergies   Allergen Reactions   Adhesive Tape Rash   Patient Belongings Returned    Document Return of Belongings Flowsheet    Were the patient bedside belongings sent home? Yes   Medications Retrieved from Pharmacy & Sent Home No (Comment)   Belongings Sent to Safe None   Belongings sent with: --   Belongings Retrieved from Security & Sent Home N/A       [employer] Signup    Our records indicate that you have an active [employer] account.     You can view your After Visit Summary by going to [employer] and logging in with your [employer] username and password.  If you don't have a [employer] username and password but a parent or guardian has access to your record, the parent or guardian should login with their own [employer] username and password and access your record to view the After Visit Summary.     If you have questions, you can email  [email or web address] or call [phone] or toll free number [phone] to talk to our MyChart staff.  Remember, treadalongt is NOT to be used for urgent needs.  For medical emergencies, dial 911.     Medication List  Medication List     Morning Afternoon Evening Bedtime As Needed   Icon medications to start taking   aspirin 81 MG EC tablet  Take 1 tablet by mouth Daily.  Last time this was given: 81 mg on October 5, 2021  9:22 AM        Icon medications to start taking   atorvastatin 40 MG tablet  Commonly known as: LIPITOR  Take 1 tablet by mouth Every Night.  Last time this was given: 40 mg on October 4, 2021  9:02 PM        Icon medications to change how you take   bumetanide 1 MG tablet  Commonly known as: BUMEX  Take 1 tablet by mouth Every Other Day As Needed (Increased swelling or shortness of breath).  What changed:   0 when to take this  0 reasons to take this     Every other day as needed for increased swelling   Icon medications to start taking   cefdinir 300 MG capsule  Commonly known as: OMNICEF  Take 1 capsule by mouth 2 (Two) Times a Day for 5 days.         docusate sodium 250 MG capsule  Commonly known as: COLACE  Take 250 mg by mouth 2 (Two) Times a Day As Needed for Constipation.  Last time this was given: 100 mg on October 5, 2021  9:21 AM     2 times a day as needed for constipation   Icon medications to start taking   doxycycline 100 MG capsule  Commonly known as: VIBRAMYCIN  Take 1 capsule by mouth 2 (Two) Times a Day for 5 days.  Last time this was given: Ask your nurse or doctor         gabapentin 300 MG capsule  Commonly known as: NEURONTIN  Take 300 mg by mouth 2 (Two) Times a Day.         levothyroxine 100 MCG tablet  Commonly known as: SYNTHROID, LEVOTHROID  Take 100 mcg by mouth Every Night.  Last time this was given: 100 mcg on October 4, 2021  9:02 PM         metoprolol succinate XL 50 MG 24 hr tablet  Commonly known as: TOPROL-XL  Take 50 mg by mouth Daily.  Last time this was  given: Ask your nurse or doctor         oxyCODONE-acetaminophen  MG per tablet  Commonly known as: PERCOCET  Take 1 tablet by mouth 2 (Two) Times a Day As Needed for Moderate Pain .  Last time this was given: 0.5 tablets on October 4, 2021 12:10 PM     2 times a day as needed for moderate pain    pantoprazole 40 MG EC tablet  Commonly known as: PROTONIX  Take 40 mg by mouth Daily.  Last time this was given: 40 mg on October 5, 2021  9:22 AM        Icon medications to start taking   polyethylene glycol 17 g packet  Commonly known as: MIRALAX  Start taking on: October 6, 2021  Take 17 g by mouth Daily.  Last time this was given: 17 g on October 5, 2021  9:21 AM         rivaroxaban 15 MG tablet  Commonly known as: XARELTO  Take 15 mg by mouth Every Night.  Last time this was given: 15 mg on October 4, 2021  5:45 PM        Where to  your medications     Icon medication  information                   these medications at [facility] - [place], [place] - [address] [place] [address] [phone] [facility] [phone] FX     cefdinir • doxycycline • polyethylene glycol  Address: [address] [place] [address]   Phone: [phone]    Icon medication  information                   these medications at [facility] - [place] - [address] [place] [address] - [phone] [facility] [phone] FX     aspirin • atorvastatin  Address: [address] [place] [address]   Phone: [phone]    Icon ask where to get these medications    Ask your doctor where to  these medications     • bumetanide 1 MG tablet  Always carry an updated list of your medications with you. If there is an emergency, a responder can quickly see what medications you are taking. Take this paperwork with you the next time you see your health care provider.        Lesvia  Icon List of Attachments     Attached Information  10 Things You Can Do to Manage Your COVID-19 Symptoms at Home - [facility] (07/16/2021)  (English)  10 Things You Can Do to Manage Your COVID-19 Symptoms at Home  If you have possible or confirmed COVID-19:  1. Stay home except to get medical care.  2. Monitor your symptoms carefully. If your symptoms get worse, call your healthcare provider immediately.  3. Get rest and stay hydrated.  4. If you have a medical appointment, call the healthcare provider ahead of time and tell them that you have or may have COVID-19.  5. For medical emergencies, call 911 and notify the dispatch personnel that you have or may have COVID-19.  6. Cover your cough and sneezes with a tissue or use the inside of your elbow.  7. Wash your hands often with soap and water for at least 20 seconds or clean your hands with an alcohol-based hand  that contains at least 60% alcohol.  8. As much as possible, stay in a specific room and away from other people in your home. Also, you should use a separate bathroom, if available. If you need to be around other people in or outside of the home, wear a mask.  9. Avoid sharing personal items with other people in your household, like dishes, towels, and bedding.  10. Clean all surfaces that are touched often, like counters, tabletops, and doorknobs. Use household cleaning sprays or wipes according to the label instructions.  cdc.gov/coronavirus  07/16/2021  This information is not intended to replace advice given to you by your health care provider. Make sure you discuss any questions you have with your health care provider.  Document Revised: 08/04/2021 Document Reviewed: 08/04/2021  [employer] Patient Education © 2021 [employer]     Icon List of Attachments     Attached Information  COVID-19: Quarantine vs. Isolation - CDC (12/17/2020) (English)  COVID-19: Quarantine vs. Isolation  QUARANTINE keeps someone who was in close contact with someone who has COVID-19 away from others.  If you had close contact with a person who has COVID-19  · The best way to protect yourself and others  "is to stay home for 14 days after your last contact. Check your local health department's website for information about options in your area to possibly shorten this quarantine period.  · Check your temperature twice a day and watch for symptoms of COVID-19.  · If possible, stay away from people who are at higher-risk for getting very sick from COVID-19.  ISOLATION keeps someone who is sick or tested positive for COVID-19 without symptoms away from others, even in their own home.  If you are sick and think or know you have COVID-19  · Stay home until after  ? At least 10 days since symptoms first appeared and  ? At least 24 hours with no fever without fever-reducing medication and  ? Symptoms have improved  If you tested positive for COVID-19 but do not have symptoms  · Stay home until after  ? 10 days have passed since your positive test  If you live with others, stay in a specific \"sick room\" or area and away from other people or animals, including pets. Use a separate bathroom, if available.  cdc.gov/coronavirus  12/17/2020  This information is not intended to replace advice given to you by your health care provider. Make sure you discuss any questions you have with your health care provider.  Document Revised: 07/13/2021 Document Reviewed: 07/13/2021  [employer] Patient Education © 2021 Else[employer] Inc.     Icon List of Attachments     Attached Information  How to Wear and Take Off Your Mask - [facility] (04/01/2021) (English)  How to Wear and Take Off Your Mask  How to put on and wear your mask correctly  · Wash your hands or use hand  before putting on your mask  · Put it over your face and mouth  · Be sure your mask fits snugly against the sides of your face and under your chin  · Make sure you can breathe easily  Wear a mask to protect yourself and others  · Wear a mask over your nose and mouth to help prevent getting and spreading COVID-19  · Wear a mask in public settings, especially when you cannot stay six " feet apart from people who don't live with you  How to take off your mask  · Untie the strings behind your head or stretch the ear loops  · Handle only by the ear loops or ties  · Fold outside corners together  · Wash hands immediately after removing  Other ways to protect yourself  · Stay at least 6 feet away from others  · Avoid crowds and places with poor ventilation  · Wash your hands often  · Get a vaccine when it is offered  cdc.gov/coronavirus  04/01/2021  This information is not intended to replace advice given to you by your health care provider. Make sure you discuss any questions you have with your health care provider.  Document Revised: 07/13/2021 Document Reviewed: 07/13/2021  [employer] Patient Education © 2021 Elsevier Inc.     Icon List of Attachments     Attached Information  Symptoms of COVID-19 - [facility] (02/22/2021) (English)  Symptoms of COVID-19  Watch for symptoms  People with COVID-19 have had a wide range of symptoms reported - ranging from mild symptoms to severe illness. Symptoms may appear 2-14 days after exposure to the virus. Anyone can have mild to severe symptoms. People with these symptoms may have COVID-19:  · Fever or chills  · Cough  · Shortness of breath or difficulty breathing  · Fatigue  · Muscle or body aches  · Headache  · New loss of taste or smell  · Sore throat  · Congestion or runny nose  · Nausea or vomiting  · Diarrhea  This list does not include all possible symptoms. CDC will continue to update this list as we learn more about COVID-19. Older adults and people who have severe underlying medical conditions like heart or lung disease or diabetes seem to be at higher risk for developing more serious complications from COVID-19 illness.  When to seek emergency medical attention  Look for emergency warning signs* for COVID-19. If someone is showing any of these signs, seek emergency medical care immediately:  · Trouble breathing  · Persistent pain or pressure in the  chest  · New confusion  · Inability to wake or stay awake  · Pale, gray, or blue-colored skin, lips, or nail beds, depending on skin tone  *This list is not all possible symptoms. Please call your medical provider for any other symptoms that are severe or concerning to you.   Call 911 or call ahead to your local emergency facility: Notify the  that you are seeking care for someone who has or may have COVID-19.  If you are sick  · Check symptoms with a Coronavirus Self-  · Get tested  · What to do if you are sick  · Isolate if you are sick  · When to quarantine  · How to care for someone who is sick  Difference between COVID-19 & flu  Influenza (Flu) and COVID-19 are both contagious respiratory illnesses, but they are caused by different viruses. COVID-19 is caused by infection with a new coronavirus (called SARS-CoV-2), and flu is caused by infection with influenza viruses.   COVID-19 seems to spread more easily than flu and causes more serious illnesses in some people. It can also take longer before people show symptoms and people can be contagious for longer. More information about differences between flu and COVID-19 is available in the different sections below.   Because some of the symptoms of flu and COVID-19 are similar, it may be hard to tell the difference between them based on symptoms alone, and testing may be needed to help confirm a diagnosis.  While more is learned every day about COVID-19 and the virus that causes it, there is still a lot that is unknown . This page compares COVID-19 and flu, given the best available information to date.  Centers for Disease Control and Prevention  Content source: National Center for Immunization and Respiratory Diseases (NCIRD), Division of Viral Diseases  02/22/2021  This information is not intended to replace advice given to you by your health care provider. Make sure you discuss any questions you have with your health care provider.  Document  Revised: 07/14/2021 Document Reviewed: 07/14/2021  [employer] Patient Education © 2021 Elsevier Inc.     Icon List of Attachments     Attached Information  Acute Kidney Injury  Adult (English)  Acute Kidney Injury, Adult  ?     Acute kidney injury is a sudden worsening of kidney function. The kidneys are organs that have several jobs. They filter the blood to remove waste products and extra fluid. They also maintain a healthy balance of minerals and hormones in the body, which helps control blood pressure and keep bones strong. With this condition, your kidneys do not do their jobs as well as they should.  This condition ranges from mild to severe. Over time, it may develop into long-lasting (chronic) kidney disease. Early detection and treatment may prevent acute kidney injury from developing into a chronic condition.  What are the causes?  Common causes of this condition include:  · A problem with blood flow to the kidneys. This may be caused by:  ? Low blood pressure (hypotension) or shock.  ? Blood loss.  ? Heart and blood vessel (cardiovascular) disease.  ? Severe burns.  ? Liver disease.  · Direct damage to the kidneys. This may be caused by:  ? Certain medicines.  ? A kidney infection.  ? Poisoning.  ? Being around or in contact with toxic substances.  ? A surgical wound.  ? A hard, direct hit to the kidney area.  · A sudden blockage of urine flow. This may be caused by:  ? Cancer.  ? Kidney stones.  ? An enlarged prostate in males.  What increases the risk?  You are more likely to develop this condition if you:  · Are older than age 65.  · Are female.  · Are hospitalized, especially if you are in critical condition.  · Have certain conditions, such as:  ? Chronic kidney disease.  ? Diabetes.  ? Coronary artery disease and heart failure.  ? Pulmonary disease.  ? Chronic liver disease.  What are the signs or symptoms?  Symptoms of this condition may not be obvious until the condition becomes severe. Symptoms of  this condition can include:  · Tiredness (lethargy) or difficulty staying awake.  · Nausea or vomiting.  · Swelling (edema) of the face, legs, ankles, or feet.  · Problems with urination, such as:  ? Pain in the abdomen, or pain along the side of your stomach (flank).  ? Producing little or no urine.  ? Passing urine with a weak flow.  · Muscle twitches and cramps, especially in the legs.  · Confusion or trouble concentrating.  · Loss of appetite.  · Fever.  How is this diagnosed?  Your health care provider can diagnose this condition based on your symptoms, medical history, and a physical exam.   You may also have other tests, such as:  · Blood tests.  · Urine tests.  · Imaging tests.  · A test in which a sample of tissue is removed from the kidneys to be examined under a microscope (kidney biopsy).  How is this treated?  Treatment for this condition depends on the cause and how severe the condition is. In mild cases, treatment may not be needed. The kidneys may heal on their own. In more severe cases, treatment will involve:  · Treating the cause of the kidney injury. This may involve changing any medicines you are taking or adjusting your dosage.  · Fluids. You may need specialized IV fluids to balance your body's needs.  · Having a catheter placed to drain urine and prevent blockages.  · Preventing problems from occurring. This may mean avoiding certain medicines or procedures that can cause further injury to the kidneys.  In some cases, treatment may also require:  · A procedure to remove toxic wastes from the body (dialysis or continuous renal replacement therapy, CRRT).  · Surgery. This may be done to repair a torn kidney or to remove the blockage from the urinary system.  Follow these instructions at home:  Medicines  · Take over-the-counter and prescription medicines only as told by your health care provider.  · Do not take any new medicines without your health care provider's approval. Many medicines can  worsen your kidney damage.  · Do not take any vitamin and mineral supplements without your health care provider's approval. Many nutritional supplements can worsen your kidney damage.  Lifestyle  ?     · If your health care provider prescribed changes to your diet, follow them. You may need to decrease the amount of protein you eat.  · Achieve and maintain a healthy weight. If you need help with this, ask your health care provider.  · Start or continue an exercise plan. Try to exercise at least 30 minutes a day, 5 days a week.  · Do not use any products that contain nicotine or tobacco, such as cigarettes, e-cigarettes, and chewing tobacco. If you need help quitting, ask your health care provider.     General instructions  ?     · Keep track of your blood pressure. Report changes in your blood pressure as told by your health care provider.  · Stay up to date with your vaccines. Ask your health care provider which vaccines you need.  · Keep all follow-up visits as told by your health care provider. This is important.     Where to find more information  · American Association of Kidney Patients: www.aakp.org  · National Kidney Foundation: www.kidney.org  · American Kidney Fund: www.akfinc.org  · Life Options Rehabilitation Program:  ? www.lifeoptions.org  ? www.kidneyschool.org  Contact a health care provider if:  · Your symptoms get worse.  · You develop new symptoms.  Get help right away if:  · You develop symptoms of worsening kidney disease, which include:  ? Headaches.  ? Abnormally dark or light skin.  ? Easy bruising.  ? Frequent hiccups.  ? Chest pain.  ? Shortness of breath.  ? End of menstruation in women.  ? Seizures.  ? Confusion or altered mental status.  ? Abdominal or back pain.  ? Itchiness.  · You have a fever.  · Your body is producing less urine.  · You have pain or bleeding when you urinate.  Summary  · Acute kidney injury is a sudden worsening of kidney function.  · Acute kidney injury can be  caused by problems with blood flow to the kidneys, direct damage to the kidneys, and sudden blockage of urine flow.  · Symptoms of this condition may not be obvious until it becomes severe. Symptoms may include edema, lethargy, confusion, nausea or vomiting, and problems passing urine.  · This condition can be diagnosed with blood tests, urine tests, and imaging tests. Sometimes a kidney biopsy is done to diagnose this condition.  · Treatment for this condition often involves treating the underlying cause. It is treated with fluids, medicines, diet changes, dialysis, or surgery.  This information is not intended to replace advice given to you by your health care provider. Make sure you discuss any questions you have with your health care provider.  Document Revised: 10/27/2020 Document Reviewed: 10/27/2020  [employer] Patient Education © 2021 [employer]           Opioid Resource    If you or someone you know needs information on substance abuse, please visit   https://www.findhelpnowky.org/ for listings of facilities and resources across [place].  Stroke Symptoms    · Call 911 or have someone take you to the Emergency Department if you have any of the following:  · Sudden numbness or weakness of your face, arm or leg especially on one side of the body  · Sudden confusion, difficulty speaking or trouble understanding   · Changes in your vision or loss of sight in one eye  · Sudden severe headache with no known cause  · Sudden dizziness, trouble walking, loss of balance or coordination     It is important to seek emergency care right away if you have further stroke symptoms. If you get emergency help quickly, the powerful clot-dissolving medicines can reduce the disabilities caused by a stroke.      For more information:  American Stroke Association  2-776-2-STROKE  www.strokeassociation.org  Smoking Cessation    IF YOU SMOKE OR USE TOBACCO PLEASE READ THE FOLLOWING:  Why is smoking bad for me?  Smoking increases  the risk of heart disease, lung disease, vascular disease, stroke, and cancer. If you smoke, STOP!     For more information:  Quit Now [facility]  1-800-QUIT-NOW  https://[facility] [facility] Hospitaly.quitlogix.org/en-US/  Suicidal Feelings    If you feel like life is too tough and are thinking of suicide or injuring yourself, get help right away!  · Call 911  · Call a suicide hotline to speak to a counselor. 2-886-273-TALK or 1-966-KYTZNPP   Patient Experience    Thank you for choosing [facility]. You may receive a survey following your visit. Please take a moment to share what went well, where we need improvement, and which staff members deserve recognition. We value your input.           ? ?              YOU ARE THE MOST IMPORTANT FACTOR IN YOUR RECOVERY.      Follow all instructions carefully.      I have reviewed my discharge instructions with my nurse, including the following information, if applicable:                 Information about my illness and diagnosis              Follow up appointments (including lab draws)              Wound Care              Equipment Needs              Medications (new and continuing) along with side effects              Preventative information such as vaccines and smoking cessations              Diet              Pain              I know when to contact my Doctor's office or seek emergency care        I want my nurse to describe the side effects of my medications: YES NO   If the answer is no, I understand the side effects of my medications: YES NO   My nurse described the side effects of my medications in a way that I could understand: YES NO   I have taken my personal belongings and my own medications with me at discharge: YES NO       I have received this information and my questions have been answered. I have discussed any concerns I see with this plan with the nurse or physician. I understand these instructions.     Signature of Patient or Responsible Person:  _____________________________________     Date: _________________  Time: __________________     Signature of Healthcare Provider: _______________________________________  Date: _________________  Time: __________________      [address] [address] [place] [address]  Phone:  [phone]  Fax:  [phone]        Patient: ROOM: [address] [place] [address]   Desiree LAND Shawna MRN:  [id number]   [address] [place] [address] :  10/13/1933  SSN:    Phone: [phone] Sex:  F   PCP: Duane Pollard                 Emergency Contact Information      Name Relation Home Work Mobile     COMFORT BUSTOS Son [phone]         Talita Moreno Daughter [phone]     Brie Bustos Daughter [phone]          INSURANCE PA[employer]AN [employer] # SUBSCRIBER ID   Primary:  Secondary:    [employer] [place] [id number]  [id number]      [id number]  [id number]   Admitting Diagnosis: ALEKSANDRA (acute kidney injury) (HCC) [N17.9]  Order Date:  Oct 5, 2021         Case Management  Consult       (Order ID: [id number])     Diagnosis:         Priority:  Routine Expected Date:   Expiration Date:        Interval:   Count:    Reason for Consult? home with hospice [facility], dx protein calorie malnutrition Daughter Brie is contact stated does not need any equipment         Authorizing Provider:Carissa Arredondo APRN  Authorizing Provider's NPI: [id number]  Order Entered By: Carissa Arredondo APRN 10/5/2021 12:43 PM     Electronically signed by: Carissa Arredondo APRN 10/5/2021 12:43 PM              Discharge Summary    No notes of this type exist for this encounter.         Discharge Order (From admission, onward)     Start     Ordered    10/05/21 1341  Discharge patient  Once     Expected Discharge Date: 10/05/21    Discharge Disposition: Hospice/Home    Physician of Record for Attribution - Please select from Treatment Team: BERNARDA MALIK [[id number]]    Review needed by CMO to determine  Physician of Record: No       Question Answer Comment   Physician of Record for Attribution - Please select from Treatment Team BERNARDA MALIK    Review needed by CMO to determine Physician of Record No        10/05/21 1349    09/30/21 1606  Discharge patient  Once,   Status:  Canceled     Expected Discharge Date: 09/30/21    Discharge Disposition: Home or Self Care    Physician of Record for Attribution - Please select from Treatment Team: BERNARDA MALIK [[id number]]    Review needed by CMO to determine Physician of Record: No       Question Answer Comment   Physician of Record for Attribution - Please select from Treatment Team BERNARDA MALIK    Review needed by CMO to determine Physician of Record No        09/30/21 8911

## 2021-10-05 NOTE — DISCHARGE SUMMARY
Date of Admission: 9/24/2021    Date of Discharge: 10/5/2021     PCP: Duane Pollard MD    Admission Diagnosis:   Please see admission H&P    Discharge Diagnosis:   Sepsis  UTI/left-sided pyelonephritis  E coli bacteremia  LLL pneumonia  ALEKSANDRA on CKD IIIb  Hyponatremia, likely hypovolemic  COVID-19 infection  Afib with intermittent RVR  Essential HTN  CAD  Normocytic anemia  Hypothyroidism  Generalized weakness with functional decline  Failure to thrive  Advanced age    Procedures Performed: None     Consults:   Consults     Date and Time Order Name Status Description    10/5/2021  8:48 AM Inpatient Palliative Care MD Consult Completed             History of Present Illness:  Desiree Matos is a 87 y.o. female who presented to [facility] ED with CC of generalized malaise and mild dyspnea. Please see admission H&P for complete details.     In the ED, workup revealed sepsis, left-sided pyelonephritis, ALEKSANDRA on CKD and (+) COVID-19 infection. Cultures were obtained and IV antibiotics initiated. She was given IV fluids while in the ED.        Hospital Course  Desiree Matos was admitted to the telemetry floor for further evaluation and treatment. She was continued on IV antibiotics with IV Rocephin.     Her respiratory status remained stable with O2 in the 90's on RA. However, she was considered high risk and after discussion she was given monoclonal antibody infusion. Her renal function improved with IV fluids and additional fluids were initially held to avoid volume overload. However, fluids were later restarted as she developed worsening hyponatremia in the setting of poor PO intake.     Cultures were followed with blood cultures revealing E coli. She was continued on Rocephin based on culture results and her inflammatory markers initially improved. However, they began trending upward again with additional workup revealing a LLL pneumonia. Her antibiotic regimen was escalated to Doxycycline and Cefepime with subsequent  improvement in her inflammatory markers on follow up labs.     She remained very weak and debilitated despite PT/OT. Recommendations were given for SNF placement but she was initially very reluctant. Plans were initially made for discharge home with home health but she experienced some chest pain, Afib with RVR and was very weak when she attempted to get up at the time of discharge. She then agreed to short term SNF placement and SS worked to arrange placement. She remained agreeable to placement for a few days with plans for discharge once her sepsis and hyponatremia improved. However, she was not progressing with therapy with continued poor PO intake. Goals of care were discussed with she and her family. Palliative care was consulted and after further discussion she and her family decided to go home with hospice services. Arrangements were made for hospice and she was discharged on 10/5 once all arrangements were made.       Condition on Discharge:  Stable    Vital Signs  Vitals:    10/05/21 0700   BP: 161/83   Pulse: 110   Resp: 18   Temp: 97.4 °F (36.3 °C)   SpO2: 99%       Physical Exam:  General:    Awake, alert, in no acute distress, chronically ill appearing    Heart:      Normal S1 and S2. Irregularly irregular.    Lungs:     Respirations regular, even and unlabored. Lungs clear to auscultation B/L. No wheezes, rales or rhonchi.   Abdomen:   Soft and nontender. No guarding, rebound tenderness or  organomegaly noted. Bowel sounds present x 4.   Extremities:  No clubbing, cyanosis or edema noted. Moves UE and LE equally B/L.     Discharge Disposition:   Home with hospice       Discharge Medications:     Discharge Medications      New Medications      Instructions Start Date   aspirin 81 MG EC tablet   81 mg, Oral, Daily      atorvastatin 40 MG tablet  Commonly known as: LIPITOR   40 mg, Oral, Nightly      cefdinir 300 MG capsule  Commonly known as: OMNICEF   300 mg, Oral, 2 Times Daily      doxycycline 100 MG  capsule  Commonly known as: VIBRAMYCIN   100 mg, Oral, 2 Times Daily      polyethylene glycol 17 g packet  Commonly known as: MIRALAX   17 g, Oral, Daily   Start Date: October 6, 2021        Changes to Medications      Instructions Start Date   bumetanide 1 MG tablet  Commonly known as: BUMEX  What changed:   · when to take this  · reasons to take this   1 mg, Oral, Every 48 Hours PRN         Continue These Medications      Instructions Start Date   docusate sodium 250 MG capsule  Commonly known as: COLACE   250 mg, Oral, 2 Times Daily PRN      gabapentin 300 MG capsule  Commonly known as: NEURONTIN   300 mg, Oral, 2 Times Daily      levothyroxine 100 MCG tablet  Commonly known as: SYNTHROID, LEVOTHROID   100 mcg, Oral, Nightly      metoprolol succinate XL 50 MG 24 hr tablet  Commonly known as: TOPROL-XL   50 mg, Oral, Daily      oxyCODONE-acetaminophen  MG per tablet  Commonly known as: PERCOCET   1 tablet, Oral, 2 Times Daily PRN      pantoprazole 40 MG EC tablet  Commonly known as: PROTONIX   40 mg, Oral, Daily      rivaroxaban 15 MG tablet  Commonly known as: XARELTO   15 mg, Oral, Nightly         Stop These Medications    hydroCHLOROthiazide 25 MG tablet  Commonly known as: HYDRODIURIL              Discharge Diet:   Dietary Orders (From admission, onward)     Start     Ordered    10/02/21 1800  Dietary Nutrition Supplements Magic Cup  Daily With Lunch & Dinner     Question:  Select Supplement  Answer:  Magic Cup    10/02/21 1250    10/02/21 1200  Dietary Nutrition Supplements Boost Plus (Ensure Enlive, Ensure Plus)  Daily With Breakfast, Lunch & Dinner     Question:  Select Supplement  Answer:  Boost Plus (Ensure Enlive, Ensure Plus)    10/02/21 0824    09/24/21 1915  Diet Regular  Diet Effective Now     Question:  Diet Texture / Consistency  Answer:  Regular    09/24/21 1914                Activity at Discharge:  activity as tolerated    Follow-up Appointments:  Additional Instructions for the Follow-ups  that You Need to Schedule     Discharge Follow-up with PCP   As directed       Currently Documented PCP:    Duane Pollard MD    PCP Phone Number:    [phone]     Follow Up Details: Follow up with Dr. Pollard as needed.            Contact information for follow-up providers     Duane Pollard MD Follow up in 2 week(s).    Specialty: Internal Medicine  Why: OCT 19 @10:15AM  Contact information:  [address] [facility] [address]  [phone]             Duane Pollard MD .    Specialty: Internal Medicine  Why: Follow up with Dr. Pollard as needed.  Contact information:  [address] [facility] [address]  [phone]                   Contact information for after-discharge care     Destination     [facility] .    Service: Skilled Nursing  Contact information:  [address] [facility] [address]  [phone]                 Home Medical Care     [facility] .    Service: Home Health Services  Contact information:  [address] [facility] [address] [phone]                                 Test Results Pending at Discharge:  None     Hong Bryant DO  10/05/21  13:49 EDT      Time: Greater than 30 minutes spent on this discharge.

## 2021-10-05 NOTE — SIGNIFICANT NOTE
10/05/21 1630   OTHER   Discipline physical therapist   Rehab Time/Intention   Session Not Performed other (see comments)   Recommendation   PT - Next Appointment   (Pt being D/C this date)

## 2021-10-05 NOTE — CASE MANAGEMENT/SOCIAL WORK
Discharge Planning Assessment  [facility]     Patient Name: Desiree Matos  MRN: [id number]  Today's Date: 10/5/2021    Admit Date: 9/24/2021      Discharge Plan     Row Name 10/05/21 1521       Plan    Final Discharge Disposition Code  50 - home with hospice    Final Note  Pt to be discharged home with [facility].  RN to call report to Hospice at [phone]. Pt's daughter Brie aware and agreeable.  Pt to be transported via [facility].  SS completed PCS form and placed it with face sheet. EMS DNR form completed per Palliative care and in pt's room.    Row Name 10/05/21 1328       Plan    Plan  SS received consult per Palliative Care to arrange hospice.  SS spoke with pt's daughter Brie who is agreeable and stated no preference for hospice provider.  SS made referral to [facility] per Rosaura at [phone] and faxed pt information to [phone].  SS will follow.        CANELO HawthorneW

## 2021-10-05 NOTE — CASE MANAGEMENT/SOCIAL WORK
Discharge Planning Assessment  [facility]     Patient Name: Desiree Matos  MRN: [id number]  Today's Date: 10/5/2021    Admit Date: 9/24/2021      Discharge Plan     Row Name 10/05/21 1635       Plan    Plan  Notified Hospice per Mohit of number for pt's RN for Hospice to call to receive report.  SS scheduled [facility] at [phone] per [place] and notified EMS of EMS DNR form in pt's room on window sill as per Palliative Care notification.    Row Name 10/05/21 1529       Plan    Final Discharge Disposition Code  50 - home with hospice    Final Note  Pt to be discharged home with [facility].  RN to call report to Hospice at [phone]. Pt's daughter Brie aware and agreeable.  Pt to be transported via [facility].  SS completed PCS form and placed it with face sheet. EMS DNR form completed per Palliative care and in pt's room.            Lor Euceda, BSW

## 2021-10-05 NOTE — DISCHARGE PLACEMENT REQUEST
"Desiree Monsalve (87 y.o. Female)     Date of Birth Social Security Number Address Home Phone MRN    10/13/1933  [address] [place] [address] [phone] [id number]    Zoroastrian Marital Status          None        Admission Date Admission Type Admitting Provider Attending Provider Department, Room/Bed    9/24/21 Emergency Nathaniel Wrgiht MD Troxell, Christopher A, DO [address], 3313/2S    Discharge Date Discharge Disposition Discharge Destination                       Attending Provider: Hong Bryant DO    Allergies: Adhesive Tape    Isolation: Enh Drop/Con   Infection: COVID (confirmed) (09/19/21), MRSA No Isolation this Admit (10/04/21)   Code Status: No CPR    Ht: 162.6 cm (64\")   Wt: 77.1 kg (170 lb)    Admission Cmt: None   Principal Problem: None                Active Insurance as of 9/24/2021     Primary Coverage     Payor Plan Insurance Group Employer/Plan Group    MEDICARE MEDICARE A & B      Payor Plan Address Payor Plan Phone Number Payor Plan Fax Number Effective Dates    [address] [phone]  10/1/1998 - None Entered    [facility] [id number]       Subscriber Name Subscriber Birth Date Member ID       DESIREE MONSALVE 10/13/1933 [id number]           Secondary Coverage     Payor Plan Insurance Group Employer/Plan Group    [place]      Payor Plan Address Payor Plan Phone Number Payor Plan Fax Number Effective Dates    [address] [address]   1/1/2014 - None Entered    [place] [address]       Subscriber Name Subscriber Birth Date Member ID       DESIREE MONSALVE 10/13/1933 [id number]                 Emergency Contacts      (Rel.) Home Phone Work Phone [place] Phone    COMFORT SANTILLAN (Son) [phone] -- --    Talita Moreno (Daughter) [phone] -- [phone]    Brie Santillan (Daughter) [phone] -- [phone]            Emergency Contact Information     Name Relation Home Work [place]    COMFORT SANTILLAN Son " [phone]      Talita Moreno Daughter [phone]    Brie Santillan Daughter [phone]          Insurance Information                MEDICARE/MEDICARE A & B Phone: [phone]    Subscriber: Desiree Matos Subscriber#: [id number]    Group#:  Precert#:         [place] Phone:     Subscriber: Desiree Matos Subscriber#: [id number]    Group#:  Precert#:           Treatment Team  Chat With All Active Members    Provider Relationship Specialty Contact    Hong Bryant DO  Attending --  [phone]    Wanda Benjamin, RN  Registered Nurse --  [phone]    Kaylny Butt, CHLOE  Physical Therapist Physical Therapy     Carissa Arredondo APRN  Consulting Physician, Nurse Practitioner Palliative Care  [phone]    Brianne Rico, RRT  Respiratory Therapist --  [phone]    Fanny Lilly  Technician --     Raisa Crabtree RN  Registered Nurse --           Problem List         Codes Noted - Resolved       Hospital    Cytokine release syndrome, grade 1 ICD-10-CM: D89.831  ICD-9-CM: 279.8 9/30/2021 - Present    ALEKSANDRA (acute kidney injury) (HCC) ICD-10-CM: N17.9  ICD-9-CM: 584.9 9/24/2021 - Present       Non-Hospital    Abnormal nuclear stress test with medium size, mild degree of anterior wall myocardial ischemia. ICD-10-CM: R94.39  ICD-9-CM: 794.39 5/7/2020 - Present    Diverticulitis ICD-10-CM: K57.92  ICD-9-CM: 562.11 9/16/2019 - Present    Acute diverticulitis ICD-10-CM: K57.92  ICD-9-CM: 562.11 7/30/2019 - Present    Chronic anticoagulation, Xarelto (Chronic) ICD-10-CM: Z79.01  ICD-9-CM: V58.61 7/30/2019 - Present    Essential hypertension (Chronic) ICD-10-CM: I10  ICD-9-CM: 401.9 7/30/2019 - Present    Hypothyroidism (Chronic) ICD-10-CM: E03.9  ICD-9-CM: 244.9 7/30/2019 - Present    History of colonic polyps (Chronic) ICD-10-CM: Z86.010  ICD-9-CM: V12.72 7/30/2019 - Present    ASCVD (arteriosclerotic cardiovascular  disease) (Chronic) ICD-10-CM: I25.10  ICD-9-CM: 429.2, 440.9 2019 - Present    Diastolic CHF, chronic (HCC) (Chronic) ICD-10-CM: I50.32  ICD-9-CM: 428.32, 428.0 2019 - Present    GERD without esophagitis (Chronic) ICD-10-CM: K21.9  ICD-9-CM: 530.81 2019 - Present    Peripheral neuropathy (Chronic) ICD-10-CM: G62.9  ICD-9-CM: 356.9 2019 - Present    Asthma (Chronic) ICD-10-CM: J45.909  ICD-9-CM: 493.90 2019 - Present    CKD (chronic kidney disease), stage III (HCC) (Chronic) ICD-10-CM: N18.30  ICD-9-CM: 585.3 2019 - Present    Atrial fibrillation, chronic (Chronic) ICD-10-CM: I48.20  ICD-9-CM: 427.31 2017 - Present             History & Physical      Nathaniel Wright MD at [address] [place], [place] [address]IST HISTORY AND PHYSICAL    Patient Identification:  Name:  Desiree Matos  Age:  87 y.o.  Sex:  female  :  10/13/1933  MRN:  [id number]   Visit Number:  [id number]  Admit Date: 2021   Room number:  3313/2S  Primary Care Physician:  Duane Pollard MD     Subjective     Chief complaint:    Chief Complaint   Patient presents with   • Exposure To Known Illness   • Failure To Thrive       History of presenting illness:  87 y.o. female with hx afib, HTN, hypothyroidism, CAD, CKD, hypothyroidism who presented with general malaise and mild dyspnea. Patient has had poor PO intake since a fall on . She was seen at [facility]. She had a left arm fracture with unclear ortho plan per patient. No cast or support device in place. Patient has went home and essentially reports failure to thrive. Patient reports some lower quadrant abdominal pain an dysuria. Patient notes urination have also decreased. She denies any chest pain or swelling.     Patient reports getting two part COVID vaccine in January or february of this year. She denies any sick or COViD positive contacts.     Patient lives with her daughter who she notes help care for her. She notes  since her fall she has not really had any mobility.   ---------------------------------------------------------------------------------------------------------------------   Review of Systems   Constitutional: Positive for fatigue.   HENT: Positive for congestion.    Eyes: Negative.    Respiratory: Positive for shortness of breath.    Cardiovascular: Negative.    Gastrointestinal: Negative.    Endocrine: Negative.    Genitourinary: Negative.    Musculoskeletal: Positive for arthralgias.   Skin: Negative.    Allergic/Immunologic: Negative.    Neurological: Negative.    Hematological: Negative.    Psychiatric/Behavioral: Negative.      ---------------------------------------------------------------------------------------------------------------------   Past Medical History:   Diagnosis Date   • A-fib (CMS/[facility])    • Anemia 1/13/2017   • Arthritis    • Asthma    • Cancer (CMS/[facility])    • CHF (congestive heart failure) (CMS/[facility])    • Chronic anticoagulation, Xarelto 7/30/2019   • Coronary artery disease    • GERD without esophagitis 7/30/2019   • History of colonic polyps 7/30/2019   • History of transfusion    • Hypertension    • Hypothyroidism 7/30/2019   • Peripheral neuropathy 7/30/2019     Past Surgical History:   Procedure Laterality Date   • BACK SURGERY     • BREAST CYST EXCISION Left    • CARDIAC CATHETERIZATION N/A 5/19/2020    Procedure: Left Heart Cath;  Surgeon: Jack Couch MD;  Location: [facility] CATH INVASIVE LOCATION;  Service: Cardiology;  Laterality: N/A;   • CERVICAL POLYPECTOMY     • COLONOSCOPY N/A 1/17/2017    Procedure: COLONOSCOPY;  Surgeon: Madi Zheng III, MD;  Location: [facility] OR;  Service:    • D & C AND LAPAROSCOPY     • ENDOSCOPY N/A 1/17/2017    Procedure: ESOPHAGOGASTRODUODENOSCOPY;  Surgeon: Madi Zheng III, MD;  Location: [facility];  Service:    • GALLBLADDER SURGERY     • VEIN LIGATION AND STRIPPING       Family History   Problem Relation Age of Onset   •  Heart disease Mother    • Heart attack Sister    • Heart disease Sister    • Heart attack Brother    • Heart disease Brother    • Heart disease Brother    • Heart attack Paternal Grandfather      Social History     Socioeconomic History   • Marital status:      Spouse name: Not on file   • Number of children: Not on file   • Years of education: Not on file   • Highest education level: Not on file   Tobacco Use   • Smoking status: Never Smoker   • Smokeless tobacco: Never Used   Substance and Sexual Activity   • Alcohol use: No   • Drug use: No   • Sexual activity: Defer     ---------------------------------------------------------------------------------------------------------------------   Allergies:  Adhesive tape  ---------------------------------------------------------------------------------------------------------------------   Medications below are reported home medications pulling from within the system; at this time, these medications have not been reconciled unless otherwise specified and are in the verification process for further verifcation as current home medications.    Prior to Admission Medications     Prescriptions Last Dose Informant Patient Reported? Taking?    bumetanide (BUMEX) 1 MG tablet  Family Member No No    Take 1 tablet by mouth Daily.    docusate sodium (COLACE) 250 MG capsule  Family Member Yes No    Take 250 mg by mouth 2 (Two) Times a Day As Needed for Constipation.    gabapentin (NEURONTIN) 300 MG capsule  Family Member Yes No    Take 300 mg by mouth 2 (Two) Times a Day.    hydrochlorothiazide (HYDRODIURIL) 25 MG tablet  Family Member Yes No    Take 25 mg by mouth Daily.    levothyroxine (SYNTHROID, LEVOTHROID) 100 MCG tablet  Family Member Yes No    Take 100 mcg by mouth Every Night.    metoprolol succinate XL (TOPROL-XL) 50 MG 24 hr tablet  Family Member Yes No    Take 50 mg by mouth Daily.    oxyCODONE-acetaminophen (PERCOCET)  MG per tablet   Yes No    Take 1  tablet by mouth 2 (Two) Times a Day As Needed for Moderate Pain .    pantoprazole (PROTONIX) 40 MG EC tablet  Family Member Yes No    Take 40 mg by mouth Daily.    rivaroxaban (XARELTO) 15 MG tablet  Family Member Yes No    Take 15 mg by mouth Every Night.        Objective     Vital Signs:  Temp:  [97.6 °F (36.4 °C)-98.3 °F (36.8 °C)] 98.3 °F (36.8 °C)  Heart Rate:  [] 86  Resp:  [16] 16  BP: ()/(40-87) 104/50    Mean Arterial Pressure (Non-Invasive) for the past 24 hrs (Last 3 readings):   Noninvasive MAP (mmHg)   09/24/21 1842 64   09/24/21 1836 72   09/24/21 1828 57     SpO2:  [97 %-100 %] 100 %  on   ;   Device (Oxygen Therapy): room air  Body mass index is 29.18 kg/m².    Wt Readings from Last 3 Encounters:   09/24/21 77.1 kg (170 lb)   09/22/21 77.1 kg (170 lb)   09/19/21 77.6 kg (171 lb)      ---------------------------------------------------------------------------------------------------------------------   Physical Exam:  Constitutional:  Well-developed and well-nourished.  No respiratory distress.      HENT:  Head: Normocephalic and atraumatic.  Mouth:  Moist mucous membranes.    Eyes:  Conjunctivae and EOM are normal.  Pupils are equal, round, and reactive to light.  No scleral icterus.  Cardiovascular:  Normal rate, regular rhythm and normal heart sounds with no murmur.  Pulmonary/Chest:  No respiratory distress, no wheezes, no crackles, with normal breath sounds and good air movement.  Abdominal:  Soft.  Bowel sounds are normal.  No distension and no tenderness.   Musculoskeletal:  L arm swelling and limited ROM at elbow.   Neurological:  Alert and oriented to person, place, and time.  No cranial nerve deficit.  No focal neurological deficit.   Skin:  Skin is warm and dry.  No rash noted.  No pallor.   Peripheral vascular:  No edema and strong pulses on all 4  extremities.    ---------------------------------------------------------------------------------------------------------------------  EKG:  No acute ischemic changes.   ECG 12 Lead         ECG 12 Lead             Telemetry:      I have personally looked at both the EKG and the telemetry strips.    Last echocardiogram:  Results for orders placed during the hospital encounter of 04/28/20    Adult Transthoracic Echo Complete W/ Cont if Necessary Per Protocol    Interpretation Summary  · Normal left ventricular cavity size noted. All left ventricular wall segments contract normally.  · Left ventricular wall thickness is consistent with mild concentric hypertrophy.  · Estimated EF appears to be in the range of 61 - 65%.  · The aortic valve is structurally normal. No aortic valve regurgitation is present. No aortic valve stenosis is present.  · The mitral valve is normal in structure. Moderate mitral valve regurgitation is present. No significant mitral valve stenosis is present.  · The tricuspid valve is normal. Mild tricuspid valve regurgitation is present. Estimated right ventricular systolic pressure from tricuspid regurgitation is normal (<35 mmHg).  · There is no evidence of pericardial effusion.    --------------------------------------------------------------------------------------------------------------------  Labs:  Results from last 7 days   Lab Units 09/24/21  1530 09/24/21  1407 09/22/21  1203 09/20/21  0618 09/19/21  2100 09/19/21  2100 09/19/21  1107 09/19/21  1107   LACTATE mmol/L 1.4  --   --   --   --   --   --  1.2   CRP mg/dL  --  18.88*  --   --   --   --   --   --    WBC 10*3/mm3  --  20.49* 6.57 5.49   < > 5.12   < > 4.90   HEMOGLOBIN g/dL  --  9.7* 9.8* 10.7*   < > 10.3*   < > 10.0*   HEMATOCRIT %  --  28.8* 30.0* 32.5*   < > 31.7*   < > 29.6*   MCV fL  --  89.4 90.1 88.1   < > 89.8   < > 87.6   MCHC g/dL  --  33.7 32.7 32.9   < > 32.5   < > 33.8   PLATELETS 10*3/mm3  --  309 308 227   < > 206    < > 203   INR   --   --  1.59*  --   --  1.14*  --   --     < > = values in this interval not displayed.     Results from last 7 days   Lab Units 09/24/21  1408   PH, ARTERIAL pH units 7.454*   PO2 ART mm Hg 71.8*   PCO2, ARTERIAL mm Hg 35.1   HCO3 ART mmol/L 24.6     Results from last 7 days   Lab Units 09/24/21  1407 09/22/21  1203 09/20/21  0618   SODIUM mmol/L 134* 135* 134*   POTASSIUM mmol/L 3.7 4.2 4.1   CHLORIDE mmol/L 96* 99 98   CO2 mmol/L 25.4 25.7 19.9*   BUN mg/dL 34* 25* 18   CREATININE mg/dL 2.48* 1.19* 0.89   EGFR IF NONAFRICN AM mL/min/1.73 18* 43* 60*   CALCIUM mg/dL 9.0 9.4 9.3   GLUCOSE mg/dL 140* 105* 144*   ALBUMIN g/dL 3.55 3.79 3.49*   BILIRUBIN mg/dL 1.5* 1.2 1.1   ALK PHOS U/L 78 78 78   AST (SGOT) U/L 19 19 16   ALT (SGPT) U/L 9 7 7   Estimated Creatinine Clearance: 16.1 mL/min (A) (by C-G formula based on SCr of 2.48 mg/dL (H)).    No results found for: AMMONIA  Results from last 7 days   Lab Units 09/24/21  1535 09/24/21  1407 09/20/21  0618   TROPONIN T ng/mL 0.046* 0.045* <0.010   PROBNP pg/mL  --  14,027.0*  --          No results found for: HGBA1C, POCGLU  Lab Results   Component Value Date    TSH 4.430 (H) 07/30/2019    FREET4 1.20 07/30/2019     No results found for: PREGTESTUR, PREGSERUM, HCG, HCGQUANT  Pain Management Panel    There is no flowsheet data to display.       Brief Urine Lab Results  (Last result in the past 365 days)      Color   Clarity   Blood   Leuk Est   Nitrite   Protein   CREAT   Urine HCG        09/24/21 1432 Dark Yellow Turbid Large (3+) Large (3+) Negative >=300 mg/dL (3+)             Blood Culture   Date Value Ref Range Status   09/19/2021 No growth at 5 days  Final   09/19/2021 No growth at 5 days  Final     No results found for: URINECX  No results found for: WOUNDCX  No results found for: STOOLCX    I have personally looked at the labs and they are summarized  above.  ----------------------------------------------------------------------------------------------------------------------  Detailed radiology reports for the last 24 hours:    Imaging Results (Last 24 Hours)     Procedure Component Value Units Date/Time    CT Abdomen Pelvis Without Contrast [[id number]] Collected: 09/24/21 1742     Updated: 09/24/21 1745    Narrative:      EXAM:    CT Abdomen and Pelvis Without Intravenous Contrast     EXAM DATE:    9/24/2021 4:46 PM     CLINICAL HISTORY:    renal failure     TECHNIQUE:    Axial computed tomography images of the abdomen and pelvis without  intravenous contrast.  Sagittal and coronal reformatted images were  created and reviewed.  This CT exam was performed using one or more of  the following dose reduction techniques:  automated exposure control,  adjustment of the mA and/or kV according to patient size, and/or use of  iterative reconstruction technique.     COMPARISON:    No relevant prior studies available.     FINDINGS:    LUNG BASES:  Unremarkable.  No mass.  No consolidation.      ABDOMEN:    LIVER:  Unremarkable.    GALLBLADDER AND BILE DUCTS:  Cholecystectomy clips.  No ductal  dilation.    PANCREAS:  Unremarkable.  No ductal dilation.    SPLEEN:  Unremarkable.  No splenomegaly.    ADRENALS:  Unremarkable.  No mass.    KIDNEYS AND URETERS:  Stranding around the left kidney that is  nonspecific with no definite obstructive urolithiasis demonstrated.   Chronic atrophy of the right kidney.    STOMACH AND BOWEL:  Unremarkable.  No obstruction.  No mucosal  thickening.      PELVIS:    APPENDIX:  No findings to suggest acute appendicitis.    BLADDER:  Unremarkable.  No stones.    REPRODUCTIVE:  Unremarkable as visualized.      ABDOMEN and PELVIS:    INTRAPERITONEAL SPACE:  Unremarkable.  No free air.  No significant  fluid collection.    BONES/JOINTS:  No acute fracture.  No dislocation.    SOFT TISSUES:  Unremarkable.    VASCULATURE:  Unremarkable.  No  abdominal aortic aneurysm.    LYMPH NODES:  Unremarkable.  No enlarged lymph nodes.       Impression:        Stranding around the left kidney that is nonspecific with no definite  obstructive urolithiasis demonstrated.     This report was finalized on 9/24/2021 5:43 PM by Dr. Mario Duffy MD.       CT Chest Without Contrast Diagnostic [[id number]] Collected: 09/24/21 1742     Updated: 09/24/21 1744    Narrative:      EXAM:    CT Chest Without Intravenous Contrast     EXAM DATE:    9/24/2021 4:47 PM     CLINICAL HISTORY:    SOB     TECHNIQUE:    Axial computed tomography images of the chest without intravenous  contrast.  Sagittal and coronal reformatted images were created and  reviewed.  This CT exam was performed using one or more of the following  dose reduction techniques:  automated exposure control, adjustment of  the mA and/or kV according to patient size, and/or use of iterative  reconstruction technique.     COMPARISON:    No relevant prior studies available.     FINDINGS:    LUNGS:  Unremarkable.  No mass.  No consolidation.    PLEURAL SPACE:  Unremarkable.  No pneumothorax.  No significant  effusion.    HEART:  Cardiomegaly.  No significant pericardial effusion.    MEDIASTINUM:  Small hiatal hernia.    BONES/JOINTS:  Unremarkable.  No acute fracture.  No dislocation.    SOFT TISSUES:  Unremarkable.    VASCULATURE:  Unremarkable.  No thoracic aortic aneurysm.    LYMPH NODES:  Unremarkable.  No enlarged lymph nodes.       Impression:        Cardiomegaly.     This report was finalized on 9/24/2021 5:42 PM by Dr. Mario Duffy MD.       XR Foot 2 View Left [[id number]] Collected: 09/24/21 1741     Updated: 09/24/21 1744    Narrative:      EXAM:    XR Left Foot Complete, 3 or More Views     EXAM DATE:    9/24/2021 5:21 PM     CLINICAL HISTORY:    foot wound     TECHNIQUE:    Frontal, lateral and oblique views of the left foot.     COMPARISON:    No relevant prior studies available.     FINDINGS:     BONES/JOINTS:  Unremarkable.  No acute fracture.  No dislocation.    SOFT TISSUES:  Unremarkable.  No radiopaque foreign body.       Impression:        No acute findings in the left foot.     This report was finalized on 9/24/2021 5:42 PM by Dr. Mario Duffy MD.       XR Chest 1 View [[id number]] Collected: 09/24/21 1542     Updated: 09/24/21 1544    Narrative:      EXAM:    XR Chest, 1 View     EXAM DATE:    9/24/2021 2:16 PM     CLINICAL HISTORY:    SOB     TECHNIQUE:    Frontal view of the chest.     COMPARISON:    No relevant prior studies available.     FINDINGS:    LUNGS:  Unremarkable.  No consolidation.    PLEURAL SPACE:  Unremarkable.  No pneumothorax.    HEART:  Unremarkable.  No cardiomegaly.    MEDIASTINUM:  Unremarkable.    BONES/JOINTS:  Unremarkable.       Impression:        Unremarkable exam. No acute cardiopulmonary findings identified.     This report was finalized on 9/24/2021 3:42 PM by Dr. Mario Duffy MD.           Final impressions for the last 30 days of radiology reports:    CT Abdomen Pelvis Without Contrast    Result Date: 9/24/2021    Stranding around the left kidney that is nonspecific with no definite obstructive urolithiasis demonstrated.  This report was finalized on 9/24/2021 5:43 PM by Dr. Mario Duffy MD.      XR Foot 2 View Left    Result Date: 9/24/2021    No acute findings in the left foot.  This report was finalized on 9/24/2021 5:42 PM by Dr. Mario Duffy MD.      CT Head Without Contrast    Result Date: 9/19/2021  1.  Senescent changes. 2.  No CT evidence of acute intracranial abnormality.  This report was finalized on 9/19/2021 11:54 AM by Dr. Walker Torres MD.      CT Chest Without Contrast Diagnostic    Result Date: 9/24/2021    Cardiomegaly.  This report was finalized on 9/24/2021 5:42 PM by Dr. Mario Duffy MD.      US Pelvis Complete    Result Date: 9/22/2021  1.  Neither ovary is visualized. 2.  Again noted is nonspecific for centimeter posterior bladder region  mass or debris.  This report was finalized on 9/22/2021 1:08 PM by Dr. Mario Duffy MD.      XR Chest 1 View    Result Date: 9/24/2021    Unremarkable exam. No acute cardiopulmonary findings identified.  This report was finalized on 9/24/2021 3:42 PM by Dr. Mario Duffy MD.      XR Chest 1 View    Result Date: 9/19/2021    Unremarkable exam. No acute cardiopulmonary findings identified.  This report was finalized on 9/19/2021 12:10 PM by Dr. Walker Torres MD.      CT Chest Pulmonary Embolism    Result Date: 9/19/2021  1. Findings suspicious for subtle peripheral filling defects in the left lower lobe and developing small lower lobe pulmonary infarct. No large filling defect on either side. 2. Obstructive lung disease and evidence of COVID- 19 pneumonia as described. 3. Cardiomegaly. 4. Hiatal hernia. NOTIFICATION: Critical Value/emergent results were called by telephone at the time of interpretation on 9/19/2021 7:38 PM to Dr. Consuelo MD who verbally acknowledged these results. Signer Name: Jose Raul Almaraz MD  Signed: 9/19/2021 7:39 PM  Workstation Name: [facility]  [facility]    CT Abdomen Pelvis Stone Protocol    Result Date: 9/22/2021  1.  4 cm right posterior bladder wall hyperdense lesion that could represent blood clot or mass. 2.  Bilateral renal collecting system prominence is again noted bilaterally. 3.  Left double collecting system again noted with some dilatation of the entire left ureter that appear stable.  This report was finalized on 9/22/2021 1:06 PM by Dr. Mario Duffy MD.      I have personally looked at the radiology images and read the final radiology report.    Assessment & Plan    #Sepsis 2/2 pyelonephritis   - Patient with tachycardia, leukocytosis and likely urinary source as evidenced by stranding around left kidney.   - Will start ceftriaxone and monitor response.   - Will monitor urine and blood cultures.     #ALEKSANDRA on CKD  - Baseline Cr appears to be around  1.1-1.2  - Cr on presentation 2.48. Suspect prerenal injury given history. S/p bolus in ED. Encourage PO intake.   - No obstruction seen on CT abdomen  - Will repeat in AM.     #COVID-19 infection   - On room air but high risk. Patient agreeable to REGEN-COV    #Elevated proBNP  #Troponin leak  - Suspect elevated heart enzymes from renal dysfunction. Will get echo  - Troponin with no significant delta. No new ischemic findings on EKG. No chest pain.     #Afib  - Rate controlled. Will continue metoprolol   - Will restart full dose anticoagulation prior to discharge based on renal improvement. No need to bridge.     #CAD  #HTN  - Will start ASA, statin.   - Will restart home antihypertensive regimen as needed.     #Hypothyroidism   -TSH pending. Continue home regimen.     #Failue to thrive  - Likely from COVID infection. Will encourage PO intake.     F:PO  E:Replace as needed  N:Regular     Code status: DNR/DNI    Dispo: Admit to tele     VTE Prophylaxis:   Mechanical Order History:     None      Pharmalogical Order History:      Ordered     Dose Route Frequency Stop    Signed and Held  heparin (porcine) 5000 UNIT/ML injection 5,000 Units      5,000 Units SC Every 8 Hours Scheduled --                Nathaniel Wright MD  [facility]  09/24/21  19:09 EDT    Electronically signed by Nathaniel Wright MD at 09/24/21 1936       Vital Signs (last day)     Date/Time   Temp   Temp src   Pulse   Resp   BP   Patient Position   SpO2    10/05/21 0700   97.4 (36.3)   Axillary   110   18   161/83   Lying   99    10/05/21 0100   97.6 (36.4)   Tympanic   90   18   125/71   Lying   97    10/04/21 1922   97.3 (36.3)   Tympanic   81   18   134/67   Lying   98    10/04/21 1300   97.1 (36.2)   Axillary   97   18   128/67   Lying   99    10/04/21 0700   97.6 (36.4)   Axillary   110   18   152/88   Sitting   98    10/04/21 0210   98.4 (36.9)   Tympanic   86   18   128/63   Lying   97              Lines, Drains & Airways     Active LDAs     None                  Current Facility-Administered Medications   Medication Dose Route Frequency Provider Last Rate Last Admin   • aspirin EC tablet 81 mg  81 mg Oral Daily Nathaniel Wright MD   81 mg at 10/05/21 0922   • atorvastatin (LIPITOR) tablet 40 mg  40 mg Oral Nightly Nathaniel Wright MD   40 mg at 10/04/21 2102   • cefepime 2 gm IVPB in 100 ml NS (VTB)  2 g Intravenous Q12H AnnetteHong bradshaw, DO   2 g at 10/04/21 2102   • diphenhydrAMINE (BENADRYL) capsule 50 mg  50 mg Oral Once PRN Nathaniel Wright MD        Or   • diphenhydrAMINE (BENADRYL) injection 50 mg  50 mg Intravenous Once PRN Nathaniel Wright MD       • docusate sodium (COLACE) capsule 100 mg  100 mg Oral BID AnnetteHong bradshaw, DO   100 mg at 10/05/21 0921   • docusate sodium (COLACE) capsule 200 mg  200 mg Oral BID PRN Patricia Goodman DO       • doxycycline (VIBRAMYCIN) 100 mg in sodium chloride 0.9 % 100 mL IVPB-VTB  100 mg Intravenous Q12H Hong Bryant, DO   100 mg at 10/04/21 2102   • EPINEPHrine (ADRENALIN) injection 0.3 mg  0.3 mg Intramuscular Once PRN Nathaniel Wright MD       • levothyroxine (SYNTHROID, LEVOTHROID) tablet 100 mcg  100 mcg Oral Nightly Patricia Goodman DO   100 mcg at 10/04/21 2102   • magic barrier cream 1 application  1 application Topical PRN Nathaniel Wright MD   1 application at 09/28/21 0925   • Magnesium Sulfate 2 gram Bolus, followed by 8 gram infusion (total Mg dose 10 grams)- Mg less than or equal to 1mg/dL  2 g Intravenous PRN Nathaniel Wright MD        Or   • Magnesium Sulfate 2 gram / 50mL Infusion (GIVE X 3 BAGS TO EQUAL 6GM TOTAL DOSE) - Mg 1.1 - 1.5 mg/dl  2 g Intravenous PRN Nathaniel Wright MD        Or   • Magnesium Sulfate 4 gram infusion- Mg 1.6-1.9 mg/dL  4 g Intravenous PRN Nathaniel Wright MD       • methylPREDNISolone sodium succinate (SOLU-Medrol) injection 125 mg  125 mg Intravenous Once PRN Nathaniel Wright MD       • metoprolol tartrate (LOPRESSOR)  tablet 25 mg  25 mg Oral Q12H Hong Bryant DO   25 mg at 10/05/21 0922   • nitroglycerin (NITROSTAT) SL tablet 0.4 mg  0.4 mg Sublingual Q5 Min PRN Nathaniel Wright MD   0.4 mg at 10/02/21 0820   • oxyCODONE-acetaminophen (PERCOCET)  MG per tablet 0.5 tablet  0.5 tablet Oral BID PRN Patricia Goodman DO   0.5 tablet at 10/04/21 1210   • pantoprazole (PROTONIX) EC tablet 40 mg  40 mg Oral Daily Patricia Goodman, DO   40 mg at 10/05/21 0922   • polyethylene glycol (MIRALAX) packet 17 g  17 g Oral Daily Hong Bryant DO   17 g at 10/05/21 0921   • potassium chloride (K-DUR,KLOR-CON) CR tablet 40 mEq  40 mEq Oral PRN Nathaniel Wright MD       • potassium chloride (KLOR-CON) packet 40 mEq  40 mEq Oral PRN Nathaniel Wright MD       • rivaroxaban (XARELTO) tablet 15 mg  15 mg Oral Daily With Dinner Hong Bryant DO   15 mg at 10/04/21 1745   • sodium chloride 0.9 % flush 10 mL  10 mL Intravenous PRN Davida Hammer PA       • sodium chloride 0.9 % flush 10 mL  10 mL Intravenous Q12H Nathaniel Wright MD   10 mL at 10/04/21 2103   • sodium chloride 0.9 % flush 10 mL  10 mL Intravenous PRN Nathaniel Wright MD           Lab Results (last 24 hours)     Procedure Component Value Units Date/Time    Blood Culture - Blood, Arm, Right [[id number]] Collected: 10/01/21 0847    Specimen: Blood from Arm, Right Updated: 10/05/21 0901     Blood Culture No growth at 4 days    Blood Culture - Blood, Hand, Right [[id number]] Collected: 10/01/21 0847    Specimen: Blood from Hand, Right Updated: 10/05/21 0901     Blood Culture No growth at 4 days    Sodium, Urine, Random - Urine, Clean Catch [[id number]] Collected: 10/05/21 0416    Specimen: Urine, Clean Catch Updated: 10/05/21 0418     Sodium, Urine 93 mmol/L     Narrative:      Reference intervals for random urine have not been established.  Clinical usage is dependent upon physician's interpretation in combination with other laboratory tests.        D-dimer, Quantitative [[id number]]  (Abnormal) Collected: 10/05/21 0152    Specimen: Blood Updated: 10/05/21 0328     D-Dimer, Quantitative 1.85 MCGFEU/mL     Narrative:      d-Dimer assay result is to be used in conjunction with a non-high clinical pretest probability (PTP) assessment tool to exclude PE and aid in diagnosis of VTE with cutoff of 0.5 MCGFEU/mL.    Comprehensive Metabolic Panel [[id number]]  (Abnormal) Collected: 10/05/21 0152    Specimen: Blood Updated: 10/05/21 0306     Glucose 88 mg/dL      BUN 17 mg/dL      Creatinine 0.94 mg/dL      Sodium 134 mmol/L      Potassium 4.3 mmol/L      Chloride 101 mmol/L      CO2 24.0 mmol/L      Calcium 8.8 mg/dL      Total Protein 6.3 g/dL      Albumin 2.36 g/dL      ALT (SGPT) <5 U/L      AST (SGOT) 14 U/L      Alkaline Phosphatase 71 U/L      Total Bilirubin 0.7 mg/dL      eGFR Non African Amer 56 mL/min/1.73      Globulin 3.9 gm/dL      A/G Ratio 0.6 g/dL      BUN/Creatinine Ratio 18.1     Anion Gap 9.0 mmol/L     Narrative:      GFR Normal >60  Chronic Kidney Disease <60  Kidney Failure <15      C-reactive Protein [[id number]]  (Abnormal) Collected: 10/05/21 0152    Specimen: Blood Updated: 10/05/21 0306     C-Reactive Protein 12.46 mg/dL     CBC & Differential [[id number]]  (Abnormal) Collected: 10/05/21 0152    Specimen: Blood Updated: 10/05/21 0244    Narrative:      The following orders were created for panel order CBC & Differential.  Procedure                               Abnormality         Status                     ---------                               -----------         ------                     CBC Auto Differential[[id number]]        Abnormal            Final result                 Please view results for these tests on the individual orders.    CBC Auto Differential [[id number]]  (Abnormal) Collected: 10/05/21 0152    Specimen: Blood Updated: 10/05/21 0244     WBC 9.83 10*3/mm3      RBC 2.56 10*6/mm3      Hemoglobin 7.3 g/dL      Hematocrit 23.6  %      MCV 92.2 fL      MCH 28.5 pg      MCHC 30.9 g/dL      RDW 16.2 %      RDW-SD 54.5 fl      MPV 12.0 fL      Platelets 207 10*3/mm3      Neutrophil % 79.5 %      Lymphocyte % 9.3 %      Monocyte % 8.4 %      Eosinophil % 0.7 %      Basophil % 0.1 %      Immature Grans % 2.0 %      Neutrophils, Absolute 7.81 10*3/mm3      Lymphocytes, Absolute 0.91 10*3/mm3      Monocytes, Absolute 0.83 10*3/mm3      Eosinophils, Absolute 0.07 10*3/mm3      Basophils, Absolute 0.01 10*3/mm3      Immature Grans, Absolute 0.20 10*3/mm3      nRBC 0.0 /100 WBC         Orders (last 24 hrs)      Start     Ordered    10/05/21 1241  Case Management  Consult  Once     Provider:  (Not yet assigned)    10/05/21 1243    10/05/21 0848  Inpatient Palliative Care MD Consult  Once     Specialty:  Hospice and Palliative Medicine  Provider:  Carissa Arredondo APRN    10/05/21 0847    10/05/21 0600  CBC & Differential  Morning Draw      10/04/21 1931    10/05/21 0600  Comprehensive Metabolic Panel  Morning Draw      10/04/21 1931    10/05/21 0600  C-reactive Protein  Morning Draw      10/04/21 1931    10/05/21 0600  CBC Auto Differential  PROCEDURE ONCE      10/04/21 2202    10/04/21 1925  Sodium, Urine, Random - Urine, Clean Catch  Once      10/04/21 1924    10/04/21 1600  Basic Metabolic Panel  Timed      10/04/21 1211    10/04/21 1300  sodium chloride 0.9 % infusion  Continuous,   Status:  Discontinued      10/04/21 1211    10/02/21 1800  Dietary Nutrition Supplements Magic Cup  Daily With Lunch & Dinner      10/02/21 1250    10/02/21 1200  Dietary Nutrition Supplements Boost Plus (Ensure Enlive, Ensure Plus)  Daily With Breakfast, Lunch & Dinner      10/02/21 0824    10/02/21 0900  cefepime 2 gm IVPB in 100 ml NS (VTB)  Every 12 Hours      10/01/21 1933    10/01/21 2200  doxycycline (VIBRAMYCIN) 100 mg in sodium chloride 0.9 % 100 mL IVPB-VTB  Every 12 Hours      10/01/21 1933    10/01/21 2100  docusate sodium (COLACE) capsule  100 mg  2 Times Daily      10/01/21 1941    10/01/21 2030  polyethylene glycol (MIRALAX) packet 17 g  Daily      10/01/21 1941    10/01/21 0000  aspirin 81 MG EC tablet  Daily      09/30/21 1634    09/30/21 2100  metoprolol tartrate (LOPRESSOR) tablet 25 mg  Every 12 Hours Scheduled      09/30/21 1806    09/30/21 0000  cefdinir (OMNICEF) 300 MG capsule  2 Times Daily      09/30/21 1634    09/30/21 0000  bumetanide (BUMEX) 1 MG tablet  Every 48 Hours PRN      09/30/21 1634    09/30/21 0000  Discharge Follow-up with PCP      09/30/21 1634    09/30/21 0000  Ambulatory Referral to Home Health      09/30/21 1634    09/30/21 0000  atorvastatin (LIPITOR) 40 MG tablet  Nightly      09/30/21 1634    09/28/21 1800  rivaroxaban (XARELTO) tablet 15 mg  Daily With Dinner      09/28/21 1645    09/26/21 0924  magic barrier cream 1 application  As Needed      09/26/21 0925    09/25/21 0900  pantoprazole (PROTONIX) EC tablet 40 mg  Daily      09/24/21 2252 09/25/21 0729  Patient Currently On Electrolyte Replacement Protocol - Please Refer to MAR for Protocol Details  Misc Nursing Order (Specify)  Daily     Comments: Patient Currently On Electrolyte Replacement Protocol - Please Refer to MAR for Protocol Details    09/25/21 0728    09/25/21 0727  Magnesium Sulfate 2 gram Bolus, followed by 8 gram infusion (total Mg dose 10 grams)- Mg less than or equal to 1mg/dL  As Needed      09/25/21 0728    09/25/21 0727  Magnesium Sulfate 2 gram / 50mL Infusion (GIVE X 3 BAGS TO EQUAL 6GM TOTAL DOSE) - Mg 1.1 - 1.5 mg/dl  As Needed      09/25/21 0728    09/25/21 0727  Magnesium Sulfate 4 gram infusion- Mg 1.6-1.9 mg/dL  As Needed      09/25/21 0728    09/25/21 0727  potassium chloride (K-DUR,KLOR-CON) CR tablet 40 mEq  As Needed      09/25/21 0728    09/25/21 0727  potassium chloride (KLOR-CON) packet 40 mEq  As Needed      09/25/21 0728    09/25/21 0600  D-dimer, Quantitative  Every Other Day      09/24/21 1914    09/24/21 4081   levothyroxine (SYNTHROID, LEVOTHROID) tablet 100 mcg  Nightly      09/24/21 2252 09/24/21 2251  oxyCODONE-acetaminophen (PERCOCET)  MG per tablet 0.5 tablet  2 Times Daily PRN      09/24/21 2252 09/24/21 2251  docusate sodium (COLACE) capsule 200 mg  2 Times Daily PRN      09/24/21 2252 09/24/21 2100  sodium chloride 0.9 % flush 10 mL  Every 12 Hours Scheduled      09/24/21 1914 09/24/21 2100  atorvastatin (LIPITOR) tablet 40 mg  Nightly      09/24/21 1929 09/24/21 2015  aspirin EC tablet 81 mg  Daily      09/24/21 1929 09/24/21 2000  Vital Signs  Every 4 Hours      09/24/21 1914 09/24/21 1931  EPINEPHrine (ADRENALIN) injection 0.3 mg  Once As Needed      09/24/21 1933 09/24/21 1931  diphenhydrAMINE (BENADRYL) capsule 50 mg  Once As Needed      09/24/21 1933 09/24/21 1931  diphenhydrAMINE (BENADRYL) injection 50 mg  Once As Needed      09/24/21 1933 09/24/21 1931  methylPREDNISolone sodium succinate (SOLU-Medrol) injection 125 mg  Once As Needed      09/24/21 1933 09/24/21 1915  Intake & Output  Every Shift      09/24/21 1914 09/24/21 1914  sodium chloride 0.9 % flush 10 mL  As Needed      09/24/21 1914 09/24/21 1914  nitroglycerin (NITROSTAT) SL tablet 0.4 mg  Every 5 Minutes PRN      09/24/21 1914 09/24/21 1357  sodium chloride 0.9 % flush 10 mL  As Needed      09/24/21 1357    Unscheduled  Telemetry - Pulse Oximetry  Continuous PRN     Comments: If Patient Develops Unresponsiveness, Acute Dyspnea, Cyanosis or Suspected Hypoxemia Start Continuous Pulse Ox Monitoring, Apply Oxygen & Notify Provider    09/24/21 1914    Unscheduled  Oxygen Therapy- Nasal Cannula; Titrate for SPO2: 90% - 95%  Continuous PRN     Comments: If Patient Develops Unresponsiveness, Acute Dyspnea, Cyanosis or Suspected Hypoxemia Start Continuous Pulse Ox Monitoring, Apply Oxygen & Notify Provider    09/24/21 1914    Unscheduled  ECG 12 Lead  As Needed     Comments: Nurse to Release if Patient  Expericences Acute Chest Pain or Dysrhythmias    09/24/21 1914    Unscheduled  Potassium  As Needed     Comments: For Ventricular Arrhythmias      09/24/21 1914    Unscheduled  Magnesium  As Needed     Comments: For Ventricular Arrhythmias      09/24/21 1914    Unscheduled  Troponin  As Needed     Comments: For Chest Pain      09/24/21 1914    Unscheduled  Digoxin Level  As Needed     Comments: For Atrial Arrhythmias      09/24/21 1914    Unscheduled  Blood Gas, Arterial -With Co-Ox Panel: Yes  As Needed     Comments: Per O2 PolicyNotify Physician      09/24/21 1914    Unscheduled  Magnesium  As Needed      09/25/21 0728    Unscheduled  Potassium  As Needed      09/25/21 0728    Unscheduled  Apply Moisture Barrier After Any Incontinence  As Needed      09/27/21 0902    --  SCANNED - TELEMETRY        09/24/21 0000    --  SCANNED - TELEMETRY        09/24/21 0000    --  SCANNED - TELEMETRY        09/24/21 0000    --  SCANNED - TELEMETRY        09/24/21 0000    --  SCANNED - TELEMETRY        09/24/21 0000    --  SCANNED - TELEMETRY        09/24/21 0000    --  SCANNED - TELEMETRY        09/24/21 0000    --  SCANNED - TELEMETRY        09/24/21 0000    --  SCANNED - TELEMETRY        09/24/21 0000                Operative/Procedure Notes (last 24 hours) (Notes from 10/04/21 1324 through 10/05/21 1324)    No notes of this type exist for this encounter.            Physician Progress Notes (last 24 hours) (Notes from 10/04/21 1325 through 10/05/21 1325)      Hong Bryant DO at 10/04/21 1923          Subjective     History:   Desiree Matos is a 87 y.o. female admitted on 9/24/2021 secondary to <principal problem not specified>     Procedures: None    CC: Follow up bacteremia, ALEKSANDRA    Patient seen and examined at bedside. Awake and alert. Reports generalized weakness and fatigue but feels improved from upon admission. Reports pleuritic pain. No reported nausea or vomiting. Appetite and PO intake remain poor. No  acute events overnight per RN.     History taken from: patient, chart, and RN.      Objective     Vital Signs  Temp:  [97.1 °F (36.2 °C)-98.4 °F (36.9 °C)] 97.1 °F (36.2 °C)  Heart Rate:  [] 97  Resp:  [18] 18  BP: (128-152)/(63-88) 128/67    Intake/Output Summary (Last 24 hours) at 10/4/2021 1923  Last data filed at 10/4/2021 1745  Gross per 24 hour   Intake 871 ml   Output 200 ml   Net 671 ml         Physical Exam: Unchanged from previous.   General:    Awake, alert, in no acute distress, chronically ill appearing    Heart:      Normal S1 and S2. Irregularly irregular.    Lungs:     Respirations regular, even and unlabored. Diminished breath sounds at bases. No wheezes, rales or rhonchi.   Abdomen:   Soft. Mild generalized TTP. No guarding, rebound tenderness or organomegaly noted. Bowel sounds present x 4.   Extremities:  No clubbing, cyanosis or edema noted. Moves UE and LE equally B/L.     Results Review:    Results from last 7 days   Lab Units 10/04/21  0929 10/03/21  0755 10/02/21  0430 10/01/21  0058 09/30/21  0537 09/29/21  0133 09/28/21  0057   WBC 10*3/mm3 9.00 8.22 10.91* 13.21* 9.52 7.72 7.72   HEMOGLOBIN g/dL 7.8* 7.3* 7.4* 7.6* 7.8* 7.8* 7.5*   PLATELETS 10*3/mm3 213 219 206 211 181 183 186     Results from last 7 days   Lab Units 10/04/21  1325 10/04/21  0929 10/03/21  0755 10/02/21  0430 10/01/21  0058 09/30/21  0537 09/29/21  0133   SODIUM mmol/L 131* 127* 129* 132* 138 135* 130*   POTASSIUM mmol/L 4.8 4.3 4.9 4.9 5.2 3.6 3.7   CHLORIDE mmol/L 100 95* 97* 100 105 102 95*   CO2 mmol/L 22.6 21.8* 25.3 23.4 21.7* 22.3 24.1   BUN mg/dL 17 17 18 18 16 14 18   CREATININE mg/dL 0.90 0.90 1.01* 1.03* 0.99 0.94 0.95   CALCIUM mg/dL 8.8 8.9 8.7 8.5* 8.6 8.3* 8.6   GLUCOSE mg/dL 100* 115* 99 109* 97 82 89     Results from last 7 days   Lab Units 10/04/21  0929 10/03/21  0755 10/02/21  0430 10/01/21  0058 09/30/21  0537 09/29/21  0133 09/28/21  0057   BILIRUBIN mg/dL 0.7 0.5 0.5 0.7 0.6 0.7 0.7   ALK  PHOS U/L 73 68 71 69 69 66 63   AST (SGOT) U/L 13 11 11 12 13 9 8   ALT (SGPT) U/L 5 <5 <5 5 5 <5 5     Results from last 7 days   Lab Units 10/03/21  0755 10/02/21  0430 10/01/21  0058 09/28/21  1430 09/28/21  0057 09/27/21  1927   MAGNESIUM mg/dL 2.1 2.3 1.9 2.3 2.4 1.7         Results from last 7 days   Lab Units 10/02/21  0849 09/30/21  1800   TROPONIN T ng/mL 0.025 0.027       Imaging Results (Last 24 Hours)     ** No results found for the last 24 hours. **            Medications:  aspirin, 81 mg, Oral, Daily  atorvastatin, 40 mg, Oral, Nightly  cefepime, 2 g, Intravenous, Q12H  docusate sodium, 100 mg, Oral, BID  doxycycline, 100 mg, Intravenous, Q12H  levothyroxine, 100 mcg, Oral, Nightly  metoprolol tartrate, 25 mg, Oral, Q12H  pantoprazole, 40 mg, Oral, Daily  polyethylene glycol, 17 g, Oral, Daily  rivaroxaban, 15 mg, Oral, Daily With Dinner  sodium chloride, 10 mL, Intravenous, Q12H      sodium chloride, 60 mL/hr, Last Rate: 60 mL/hr (10/04/21 1215)            Assessment/Plan   Sepsis: Likely 2/2 pyelonephritis and bacteremia upon admission with concern for development of LLL pneumonia. Afebrile and hemodynamically stable. Leukocytosis initially resolved but WBC began rising. WBC has now normalized again.  CRP elevated but improving. Lactate is normal. Procal is elevated. Repeat blood cultures with NGTD. Repeat UA remains abnormal with no growth. MRSA PCR positive. CT chest and abd/pelvis obtained revealing left basilar consolidation. Broadened antibiotic coverage to Doxycycline and Cefepime. Cont current regimen today. Repeat labs in the AM.     E coli bacteremia: Likely 2/2 left-sided pyelonephritis. Urine culture not initially collected with add on culture revealing mixed carlee. 2/2 initial blood cultures reveal pan sensitive E coli. Cont antibiotics as above.      ALEKSANDRA on CKD IIIb: Likely prerenal. S/P bolus in the ED. Creatine improved and stable today. Cont to encourage PO intake. Holding home Bumex  and HCTZ. Repeat labs in the AM.     Hyponatremia: Likely hypovolemic. Started IV fluids today as Na+ trended downward. Check urine Na+. Encourage PO intake.     COVID-19 infection: Respiratory status stable. O2 stable on RA. S/P REGEN-COV. Cont supportive treatment.     Afib: RVR improved with increasing metoprolol. Cont Xarelto for stroke prevention. Monitor on telemetry.     Abdominal pain: CT abd/pelvis reveals mild dilatation of the left collecting system but no distal stones, trace left effusion and left basilar consolidation. Added bowel regimen. Reports some improvement in her pain. Cont supportive treatment.     Essential HTN: BP stable. Cont metoprolol.     CAD: Appears clinically stable. Cont ASA, statin and metoprolol. Monitor on telemetry.     Normocytic anemia: Iron and iron saturation normal. Hgb low but remains >7 with no signs of bleeding. Possible dilutional component. Stable today. Cont to monitor.     Hypothyroidism: TSH is normal. Cont levothyroxine.     Generalized weakness: PT/OT    DVT PPX: Xarelto    Pt is at high risk 2/2 worsening sepsis, UTI/pyelonephritis, bacteremia, Afib with RVR, COVID-19 and functional decline.     Disposition: Hold D/C to rehab today 2/2 worsening hyponatremia. Reevaluate in the AM.        Hong Bryant DO  10/04/21  19:23 EDT    Electronically signed by Hong Bryant DO at 10/04/21 1930          Consult Notes (last 24 hours) (Notes from 10/04/21 1325 through 10/05/21 1325)      Carissa Arredondo, APRN at 10/05/21 1220      Consult Orders    1. Inpatient Palliative Care MD Consult [[id number]] ordered by Hong Bryant DO at 10/05/21 0847               Palliative Care Initial Consult     Attending Physician: Hong Bryant, *  Referring Provider: Hong Bryant     Palliative care reason for consult: GOC/ACP pain management  Code Status:   Code Status and Medical Interventions:   Ordered at: 09/24/21 1915     Limited  Support to NOT Include:    Intubation     Code Status:    No CPR     Medical Interventions (Level of Support Prior to Arrest):    Limited      Advanced Directives: Advance Directive Status: Patient does not have advance directive   Healthcare surrogate: Adult Children Talita Winslow, and Boy Baird Walter and Jim  Goals of Care: Pt states that she is tired and would prefer to just go home and doesn't want to do rehab.    HPI:  Desiree Matos is a 87 y.o. female admitted on 9/24/2021 with exposure to known illness and failure to thrive.She has a history of afib, HTN, hypothyroidism, CAD, CKD, hypothyroidism who presented with general malaise and mild dyspnea. Patient has had poor PO intake since a fall on 9/9. She was seen at [facility]. She had a left arm fracture with unclear ortho plan per patient. No cast or support device in place. Patient has went home and essentially reports failure to thrive. Patient reports some lower quadrant abdominal pain an dysuria. Patient notes urination have also decreased. She denies any chest pain or swelling.         ROS: Negative except as above in HPI.     Past Medical History:   Diagnosis Date   • A-fib (CMS/HCC)    • Anemia 1/13/2017   • Arthritis    • Asthma    • Cancer (CMS/HCC)    • CHF (congestive heart failure) (CMS/HCC)    • Chronic anticoagulation, Xarelto 7/30/2019   • Coronary artery disease    • GERD without esophagitis 7/30/2019   • History of colonic polyps 7/30/2019   • History of transfusion    • Hypertension    • Hypothyroidism 7/30/2019   • Peripheral neuropathy 7/30/2019     Past Surgical History:   Procedure Laterality Date   • BACK SURGERY     • BREAST CYST EXCISION Left    • CARDIAC CATHETERIZATION N/A 5/19/2020    Procedure: Left Heart Cath;  Surgeon: Jack Couch MD;  Location:  [facility];  Service: Cardiology;  Laterality: N/A;   • CERVICAL POLYPECTOMY     • COLONOSCOPY N/A 1/17/2017    Procedure: COLONOSCOPY;   Surgeon: Madi Zheng III, MD;  Location: [facility];  Service:    • D & C AND LAPAROSCOPY     • ENDOSCOPY N/A 1/17/2017    Procedure: ESOPHAGOGASTRODUODENOSCOPY;  Surgeon: Madi Zheng III, MD;  Location: [facility];  Service:    • GALLBLADDER SURGERY     • VEIN LIGATION AND STRIPPING       Social History     Socioeconomic History   • Marital status:      Spouse name: Not on file   • Number of children: Not on file   • Years of education: Not on file   • Highest education level: Not on file   Tobacco Use   • Smoking status: Never Smoker   • Smokeless tobacco: Never Used   Substance and Sexual Activity   • Alcohol use: No   • Drug use: No   • Sexual activity: Defer     Family History   Problem Relation Age of Onset   • Heart disease Mother    • Heart attack Sister    • Heart disease Sister    • Heart attack Brother    • Heart disease Brother    • Heart disease Brother    • Heart attack Paternal Grandfather        Allergies   Allergen Reactions   • Adhesive Tape Rash       Current Facility-Administered Medications   Medication Dose Route Frequency Provider Last Rate Last Admin   • aspirin EC tablet 81 mg  81 mg Oral Daily Nathaniel Wright MD   81 mg at 10/05/21 0922   • atorvastatin (LIPITOR) tablet 40 mg  40 mg Oral Nightly Nathaniel Wright MD   40 mg at 10/04/21 2102   • cefepime 2 gm IVPB in 100 ml NS (VTB)  2 g Intravenous Q12H Hong Bryant DO   2 g at 10/04/21 2102   • diphenhydrAMINE (BENADRYL) capsule 50 mg  50 mg Oral Once PRN Nathaniel Wright MD        Or   • diphenhydrAMINE (BENADRYL) injection 50 mg  50 mg Intravenous Once PRN Nathaniel Wright MD       • docusate sodium (COLACE) capsule 100 mg  100 mg Oral BID Hong Bryant DO   100 mg at 10/05/21 0921   • docusate sodium (COLACE) capsule 200 mg  200 mg Oral BID PRN Patricia Goodman DO       • doxycycline (VIBRAMYCIN) 100 mg in sodium chloride 0.9 % 100 mL IVPB-VTB  100 mg Intravenous Q12H Hong Bryant DO    100 mg at 10/04/21 2102   • EPINEPHrine (ADRENALIN) injection 0.3 mg  0.3 mg Intramuscular Once PRN Nathaniel Wright MD       • levothyroxine (SYNTHROID, LEVOTHROID) tablet 100 mcg  100 mcg Oral Nightly Patricia Goodman, DO   100 mcg at 10/04/21 2102   • magic barrier cream 1 application  1 application Topical PRN Nathaniel Wright MD   1 application at 09/28/21 0925   • Magnesium Sulfate 2 gram Bolus, followed by 8 gram infusion (total Mg dose 10 grams)- Mg less than or equal to 1mg/dL  2 g Intravenous PRN Nathaniel Wright MD        Or   • Magnesium Sulfate 2 gram / 50mL Infusion (GIVE X 3 BAGS TO EQUAL 6GM TOTAL DOSE) - Mg 1.1 - 1.5 mg/dl  2 g Intravenous PRN Nathaniel Wright MD        Or   • Magnesium Sulfate 4 gram infusion- Mg 1.6-1.9 mg/dL  4 g Intravenous PRN Nathaniel Wright MD       • methylPREDNISolone sodium succinate (SOLU-Medrol) injection 125 mg  125 mg Intravenous Once PRN Nathaniel Wright MD       • metoprolol tartrate (LOPRESSOR) tablet 25 mg  25 mg Oral Q12H Hong Bryant DO   25 mg at 10/05/21 0922   • nitroglycerin (NITROSTAT) SL tablet 0.4 mg  0.4 mg Sublingual Q5 Min PRN Nathaniel Wright MD   0.4 mg at 10/02/21 0820   • oxyCODONE-acetaminophen (PERCOCET)  MG per tablet 0.5 tablet  0.5 tablet Oral BID PRN Patricia Goodman DO   0.5 tablet at 10/04/21 1210   • pantoprazole (PROTONIX) EC tablet 40 mg  40 mg Oral Daily Patricia Goodman DO   40 mg at 10/05/21 0922   • polyethylene glycol (MIRALAX) packet 17 g  17 g Oral Daily Hong Bryant DO   17 g at 10/05/21 0921   • potassium chloride (K-DUR,KLOR-CON) CR tablet 40 mEq  40 mEq Oral PRN Nathaniel Wright MD       • potassium chloride (KLOR-CON) packet 40 mEq  40 mEq Oral PRN Nathaniel Wright MD       • rivaroxaban (XARELTO) tablet 15 mg  15 mg Oral Daily With Dinner Hong Bryant DO   15 mg at 10/04/21 1745   • sodium chloride 0.9 % flush 10 mL  10 mL Intravenous PRN Davida Hammer PA       • sodium  "chloride 0.9 % flush 10 mL  10 mL Intravenous Q12H Nathaniel Wright MD   10 mL at 10/04/21 2103   • sodium chloride 0.9 % flush 10 mL  10 mL Intravenous PRN Nathaniel Wright MD            diphenhydrAMINE **OR** diphenhydrAMINE  •  docusate sodium  •  EPINEPHrine  •  magic barrier cream  •  magnesium sulfate **OR** magnesium sulfate **OR** magnesium sulfate  •  methylPREDNISolone sodium succinate  •  nitroglycerin  •  oxyCODONE-acetaminophen  •  potassium chloride  •  potassium chloride  •  [COMPLETED] Insert peripheral IV **AND** sodium chloride  •  sodium chloride    Current medication reviewed for route, type, dose and frequency and are current per MAR.    Palliative Performance Scale Score:     /83 (BP Location: Right arm, Patient Position: Lying)   Pulse 110   Temp 97.4 °F (36.3 °C) (Axillary)   Resp 18   Ht 162.6 cm (64\")   Wt 77.1 kg (170 lb)   LMP  (LMP Unknown)   SpO2 99%   BMI 29.18 kg/m²     Intake/Output Summary (Last 24 hours) at 10/5/2021 1220  Last data filed at 10/5/2021 0420  Gross per 24 hour   Intake 971 ml   Output 600 ml   Net 371 ml       PE:  COVID RESTRICTIONS    Labs:   Results from last 7 days   Lab Units 10/05/21  0152   WBC 10*3/mm3 9.83   HEMOGLOBIN g/dL 7.3*   HEMATOCRIT % 23.6*   PLATELETS 10*3/mm3 207     Results from last 7 days   Lab Units 10/05/21  0152   SODIUM mmol/L 134*   POTASSIUM mmol/L 4.3   CHLORIDE mmol/L 101   CO2 mmol/L 24.0   BUN mg/dL 17   CREATININE mg/dL 0.94   GLUCOSE mg/dL 88   CALCIUM mg/dL 8.8     Results from last 7 days   Lab Units 10/05/21  0152   SODIUM mmol/L 134*   POTASSIUM mmol/L 4.3   CHLORIDE mmol/L 101   CO2 mmol/L 24.0   BUN mg/dL 17   CREATININE mg/dL 0.94   CALCIUM mg/dL 8.8   BILIRUBIN mg/dL 0.7   ALK PHOS U/L 71   ALT (SGPT) U/L <5   AST (SGOT) U/L 14   GLUCOSE mg/dL 88     Imaging Results (Last 72 Hours)     ** No results found for the last 72 hours. **          Diagnostics: Reviewed    A: Desiree Matos is a 87 y.o. female with  " exposure to known illness and failure to thrive.She has a history of afib, HTN, hypothyroidism, CAD, CKD, hypothyroidism who presented with general malaise and mild dyspnea. Patient has had poor PO intake since a fall on 9/9. She was seen at [facility]. She had a left arm fracture with unclear ortho plan per patient. No cast or support device in place. Patient has went home and essentially reports failure to thrive. Patient reports some lower quadrant abdominal pain an dysuria. Patient notes urination have also decreased. She denies any chest pain or swelling. Pt was positive for COVID upon arrival to hospital positive on 9/19/2021. P t was admitted to [facility]    Today 10/5/2021   Pt is afebrile, , and BP of 161/83 RR 18 and was saturating 99% on room air. Pt appeared weak and tired and stated I just want to go home and be with my family.       P:   Palliative was consulted to discuss GOC./ACP with patient and family. I was able to speak with Desiree at bedside as well as daughter Trey on the phone. Desiree states that she just wants to go home and is agreeable to hospice to provide services as she says my family needs help.Trey says that if coming home with hospice is what her mother wants she supports that and are ready for her to come home. I let her know that hospice would manage her care and that an RN would come out weekly as well as CNA's could come out 3-5 times a weeki for bathing. I let her know if she had any symptoms in between visit they could call 24/7 day or night and they would address her symptoms. I put hospice referral in and spoke with Lor in  as well as Dr. Bryant and RN Garima. Plan is for patient to return home with hospice, Dr. Bryant plans on discharging this afternoon.    We appreciate the consult and the opportunity to participate in Desiree Matos's care. We will continue to follow along. Please do not hesitate to contact us regarding further symptom management or goals of  care needs, including after hours or on weekends via our on call provider at [phone].     Time: 45 minutes spent reviewing medical and medication records, assessing and examining patient, discussing with family, answering questions, providing some guidance about a plan and documentation of care, and coordinating care with other healthcare members, with > 50% time spent face to face.     RUSTY Banerjee    10/5/2021      Electronically signed by Carissa Arredondo APRN at 10/05/21 1322       Physical Therapy Notes (last 24 hours) (Notes from 10/04/21 1325 through 10/05/21 1325)    No notes exist for this encounter.         Occupational Therapy Notes (last 24 hours) (Notes from 10/04/21 1325 through 10/05/21 132)    No notes exist for this encounter.         Speech Language Pathology Notes (last 24 hours) (Notes from 10/04/21 1325 through 10/05/21 132)    No notes exist for this encounter.         ADL Documentation (last day)     Date/Time Transferring Toileting Bathing Dressing Eating Communication Swallowing    10/04/21 2103  2 - assistive person  2 - assistive person  2 - assistive person  2 - assistive person  0 - independent  0 - understands/communicates without difficulty  0 - swallows foods/liquids without difficulty    10/04/21 0837  2 - assistive person  2 - assistive person  2 - assistive person  2 - assistive person  0 - independent  0 - understands/communicates without difficulty  0 - swallows foods/liquids without difficulty            [address] [address]  Phone:  [phone]  Fax:  [phone]        Patient: ROOM: [address] [place] [address]   Desiree Matos MRN:  [id number]   [address]  [facility] [address] :  10/13/1933  SSN:    Phone: [phone] Sex:  F   PCP: Duane Pollard                 Emergency Contact Information      Name Relation Home Work Mobile     COMFORT BUSTOS [phone]         Talita Moreno Daughter [phone]      Brie Santillan Daughter [phone]   [phone]          INSURANCE PAYOR PLAN GROUP # SUBSCRIBER ID   Primary:  Secondary:    MEDICARE [facility] [place] [id number]  [id number]      [id number]  [id number]   Admitting Diagnosis: ALEKSANDRA (acute kidney injury) (HCC) [N17.9]  Order Date:  Oct 5, 2021         Case Management  Consult       (Order ID: [id number])     Diagnosis:         Priority:  Routine Expected Date:   Expiration Date:        Interval:   Count:    Reason for Consult? home with hospice BGCN, dx protein calorie malnutrition Daughter Brie is contact stated does not need any equipment         Authorizing Provider:Carissa Arredondo APRN  Authorizing Provider's NPI: [id number]  Order Entered By: Carissa Arredondo APRN 10/5/2021 12:43 PM     Electronically signed by: Carissa Arredondo APRN 10/5/2021 12:43 PM          After Visit Summary  Desiree Matos  MRN: [id number]    Icon Date   9/24/2021 - 9/30/2021   Icon Location   [address]   Icon Checklist header    Your Next Steps    Icon ask where to get these medications   Ask   Icon Checkbox    Ask how to get these medications  1 bumetanide  Icon do   Do   Icon Checkbox     these medications from [facility] - [place], [place] - [address] [facility] - [phone] [facility] [phone] FX  1 aspirin  2 atorvastatin  3 cefdinir  Icon Location   Go  Oct 6 New Patient 9:10 AM   Ayan Lam MD  [facility] UROLOGY   [address] [facility] [address] [phone]  Bring all previous medical records and films, along with current medications and insurance information.   [employer]    View your After Visit Summary and more online at [email or web address].   After Visit Summary  Instructions     Icon medication changes this visit           Your medications have changed    Icon medications to start taking   START taking:   aspirin   Start taking on: October  1, 2021   atorvastatin (LIPITOR)    cefdinir (OMNICEF)   Icon medications to change how you take   CHANGE how you take:   bumetanide (BUMEX) -- when to take this, reasons to take this  Icon medications to stop taking   STOP taking:   gabapentin 300 MG capsule (NEURONTIN)    hydroCHLOROthiazide 25 MG tablet (HYDRODIURIL)   Review your updated medication list below.  Your Next Steps  Icon ask where to get these medications   Ask  Icon do   Do  Icon Location   Go      COVID-19 Vaccination Information  Why Get Vaccinated?  Building defenses against COVID-19 is a team effort, and you are a banks part of that team. Getting the COVID-19 vaccine adds one more layer of protection for you, your coworkers, and family. Here are ways you can build people’s confidence in the COVID-19 vaccines in your community and at home.     · Get vaccinated and enroll in the v-safe text messaging program to help CDC monitor vaccine safety.   · Tell others why you are getting vaccinated and encourage them to get vaccinated. Share your success story.    · Learn how to have conversations about COVID-19 vaccine with coworkers, family, and friends.  · https://www.cdc.gov/coronavirus/2019-ncov/vaccines/index.html     How do I schedule an appointment for a vaccine?  https://www.vaccines.gov/ helps you find locations that carry COVID-19 vaccines and their contact information. Because every location handles appointments differently, you will need to schedule your appointment directly with the location you choose.        If you have any questions about your recovery, please call the [facility] Nurse Call Center at [phone]. A registered nurse is available 24 hours a day 7 days a week to assist you.   If you have any COVID-19 related questions, please call [phone].   Icon Orders placed today                  Other instructions    Discharge Follow-up with PCP   Currently Documented PCP:   Duane Pollard MD   PCP Phone Number:    [phone]   What's Next    What's Next          Follow up with Duane Pollard MD  Follow up with Dr. Pollard in 1 week. [address] [facility] [facility] [address]  [phone]          Ambulatory Referral to Home Health   Home Health Services    Oct 6 New Patient with Ayan Lam MD  Wednesday Oct 6, 2021 9:10 AM  Bring all previous medical records and films, along with current medications and insurance information. [facility] GA[facility]OLOG[facility] UROLOGY  [address] [address]  [phone]   Your Allergies  Date Reviewed: 9/24/2021  Your Allergies   Allergen Reactions   Adhesive Tape Rash   Patient Belongings Returned    Document Return of Belongings Flowsheet    Were the patient bedside belongings sent home? --   Medications Retrieved from Pharmacy & Sent Home --   Belongings Sent to Safe --   Belongings sent with: --   Belongings Retrieved from Security & Sent Home --       [facility] Signup    Our records indicate that you have an active [employer] account.     You can view your After Visit Summary by going to [employer] and logging in with your [facility] username and password.  If you don't have a [facility] username and password but a parent or guardian has access to your record, the parent or guardian should login with their own [facility] username and password and access your record to view the After Visit Summary.     If you have questions, you can email [email or web address] or call [phone] or toll free number [phone] to talk to our [facility] staff.  Remember, [facility] is NOT to be used for urgent needs.  For medical emergencies, dial 911.     Medication List  Medication List     Morning Afternoon Evening Bedtime As Needed   Icon medications to start taking   aspirin 81 MG EC tablet  Start taking on: October 1, 2021  Take 1 tablet by mouth Daily.  Last time this was given: 81 mg on September 30, 2021  9:19 AM        Icon  medications to start taking   atorvastatin 40 MG tablet  Commonly known as: LIPITOR  Take 1 tablet by mouth Every Night.  Last time this was given: 40 mg on September 29, 2021  7:55 PM        Icon medications to change how you take   bumetanide 1 MG tablet  Commonly known as: BUMEX  Take 1 tablet by mouth Every Other Day As Needed (Increased swelling or shortness of breath).  What changed:   0 when to take this  0 reasons to take this        Icon medications to start taking   cefdinir 300 MG capsule  Commonly known as: OMNICEF  Take 1 capsule by mouth 2 (Two) Times a Day for 7 days.         docusate sodium 250 MG capsule  Commonly known as: COLACE  Take 250 mg by mouth 2 (Two) Times a Day As Needed for Constipation.         levothyroxine 100 MCG tablet  Commonly known as: SYNTHROID, LEVOTHROID  Take 100 mcg by mouth Every Night.  Last time this was given: 100 mcg on September 29, 2021  7:55 PM         metoprolol succinate XL 50 MG 24 hr tablet  Commonly known as: TOPROL-XL  Take 50 mg by mouth Daily.  Last time this was given: Ask your nurse or doctor         oxyCODONE-acetaminophen  MG per tablet  Commonly known as: PERCOCET  Take 1 tablet by mouth 2 (Two) Times a Day As Needed for Moderate Pain .  Last time this was given: 0.5 tablets on September 29, 2021  7:55 PM         pantoprazole 40 MG EC tablet  Commonly known as: PROTONIX  Take 40 mg by mouth Daily.  Last time this was given: 40 mg on September 30, 2021  9:19 AM         rivaroxaban 15 MG tablet  Commonly known as: XARELTO  Take 15 mg by mouth Every Night.  Last time this was given: 15 mg on September 29, 2021  5:20 PM        Where to  your medications     Icon medication  information                   these medications at [facility] [place] [facility] - [facility] - [phone]  - [phone] FX     aspirin • atorvastatin • cefdinir  Address: [address] [place] [address] [place] [address]   Phone:  [phone]    Icon ask where to get these medications    Ask your doctor where to  these medications     • bumetanide 1 MG tablet  Always carry an updated list of your medications with you. If there is an emergency, a responder can quickly see what medications you are taking. Take this paperwork with you the next time you see your health care provider.        MyChart        Opioid Resource    If you or someone you know needs information on substance abuse, please visit   https://www.find[place] Mills.org/ for listings of facilities and resources across [place].  Stroke Symptoms    · Call 911 or have someone take you to the Emergency Department if you have any of the following:  · Sudden numbness or weakness of your face, arm or leg especially on one side of the body  · Sudden confusion, difficulty speaking or trouble understanding   · Changes in your vision or loss of sight in one eye  · Sudden severe headache with no known cause  · Sudden dizziness, trouble walking, loss of balance or coordination     It is important to seek emergency care right away if you have further stroke symptoms. If you get emergency help quickly, the powerful clot-dissolving medicines can reduce the disabilities caused by a stroke.      For more information:  American Stroke Association  8-318-3-STROKE  www.strokeassociation.org  Smoking Cessation    IF YOU SMOKE OR USE TOBACCO PLEASE READ THE FOLLOWING:  Why is smoking bad for me?  Smoking increases the risk of heart disease, lung disease, vascular disease, stroke, and cancer. If you smoke, STOP!     For more information:  Quit Now [place]  1-800-QUIT-NOW  https://[place].quitlogix.org/en-US/  Suicidal Feelings    If you feel like life is too tough and are thinking of suicide or injuring yourself, get help right away!  · Call 911  · Call a suicide hotline to speak to a counselor. 6-282-064-TALK or 1-873-WSAKMDT   Patient Experience    Thank you for choosing [facility]. You may  receive a survey following your visit. Please take a moment to share what went well, where we need improvement, and which staff members deserve recognition. We value your input.           ? ?              YOU ARE THE MOST IMPORTANT FACTOR IN YOUR RECOVERY.      Follow all instructions carefully.      I have reviewed my discharge instructions with my nurse, including the following information, if applicable:                 Information about my illness and diagnosis              Follow up appointments (including lab draws)              Wound Care              Equipment Needs              Medications (new and continuing) along with side effects              Preventative information such as vaccines and smoking cessations              Diet              Pain              I know when to contact my Doctor's office or seek emergency care        I want my nurse to describe the side effects of my medications: YES NO   If the answer is no, I understand the side effects of my medications: YES NO   My nurse described the side effects of my medications in a way that I could understand: YES NO   I have taken my personal belongings and my own medications with me at discharge: YES NO       I have received this information and my questions have been answered. I have discussed any concerns I see with this plan with the nurse or physician. I understand these instructions.     Signature of Patient or Responsible Person: _____________________________________     Date: _________________  Time: __________________     Signature of Healthcare Provider: _______________________________________  Date: _________________  Time: __________________        [address] [address]  Phone:  [phone]  Fax:  [phone] Date: Sep 30, 2021      Ambulatory Referral to Home Health     Patient:  Desiree C Shawna MRN:  [id number]   [address] [address] :  10/13/1933  SSN:    Phone: [phone] Sex:  F       INSURANCE PAYOR PLAN GROUP # SUBSCRIBER ID   Primary:  Secondary:    MEDICARE  AETNA [facility] [id number]  [id number]      [id number]  [id number]      Referring Provider Information:  BERNARDA MALIK Phone: [phone] Fax: [phone]      Referral Information:   # Visits:  999 Referral Type: Home Health [42]   Urgency:  Routine Referral Reason: Specialty Services Required   Start Date: Sep 30, 2021 End Date:  To be determined by Insurer   Diagnosis: Functional assessment declined (Z53.20 [ICD-10-CM] V64.2 [ICD-9-CM])      Refer to Dept:   Refer to Provider:   Refer to Facility:       Face to Face Visit Date: 9/30/2021  Follow-up provider for Plan of Care? I treated the patient in an acute care facility and will not continue treatment after discharge.  Follow-up provider: OZZY TREVIZO [[id number]]  Reason/Clinical Findings: COVID, functional decline  Describe mobility limitations that make leaving home difficult: COVID, functional decline, generalized weakness  Nursing/Therapeutic Services Requested: Skilled Nursing  Nursing/Therapeutic Services Requested: Physical Therapy  Nursing/Therapeutic Services Requested: Occupational Therapy  Skilled nursing orders: Medication education  Skilled nursing orders: Cardiopulmonary assessments  Frequency: 1 Week 1     This document serves as a request of services and does not constitute Insurance authorization or approval of services.  To determine eligibility, please contact the members Insurance carrier to verify and review coverage.     If you have medical questions regarding this request for services. Please contact [address] at [phone] during normal business hours.       Authorizing Provider:Bernarda Malik DO  Authorizing Provider's NPI: [id number]  Order Entered By: Bernarda Malik DO 9/30/2021  4:34 PM     Electronically signed by: Bernarda Malik DO 9/30/2021  4:34 PM            Discharge Summary    No notes  of this type exist for this encounter.         Discharge Order (From admission, onward)     Start     Ordered    21 1606  Discharge patient  Once,   Status:  Canceled     Expected Discharge Date: 21    Discharge Disposition: Home or Self Care    Physician of Record for Attribution - Please select from Treatment Team: BERNARDA MALIK [[id number]]    Review needed by CMO to determine Physician of Record: No       Question Answer Comment   Physician of Record for Attribution - Please select from Treatment Team BERNARDA MALIK    Review needed by CMO to determine Physician of Record No        21 1634              [place] [address]  [place] [address]  Phone:  [phone]  Fax:  [phone]        Patient: ROOM: [address] [place] [address]   Desiree Matos MRN:  [id number]   [address]  [facility] [place] [address] :  10/13/1933  SSN:    Phone: [phone] Sex:  F   PCP: Duane Pollard                 Emergency Contact Information      Name Relation Home Work Mobile     COMFORT BUSTOS Son [phone]         Talita Moreno Daughter [phone]     Brie Bustos Daughter [phone]          INSURANCE PAYOR PLAN GROUP # SUBSCRIBER ID   Primary:  Secondary:    MEDICARE AETNA [facility] [place] [id number]  [id number]      [id number]  [id number]   Admitting Diagnosis: ALEKSANDRA (acute kidney injury) (HCC) [N17.9]  Order Date:  Oct 5, 2021         Case Management  Consult       (Order ID: [id number])     Diagnosis:         Priority:  Routine Expected Date:   Expiration Date:        Interval:   Count:    Reason for Consult? home with hospice BGCN, dx protein calorie malnutrition Daughter Brie is contact stated does not need any equipment         Authorizing Provider:Carissa Arredondo APRN  Authorizing Provider's NPI: [id number]  Order Entered By: Carissa Arredondo APRN 10/5/2021 12:43 PM     Electronically signed by: Carissa Arredondo APRN 10/5/2021 12:43 PM

## 2021-10-05 NOTE — CONSULTS
Palliative Care Initial Consult     Attending Physician: Hong Bryant, *  Referring Provider: Hong Bryant     Palliative care reason for consult: GOC/ACP pain management  Code Status:   Code Status and Medical Interventions:   Ordered at: 09/24/21 1915     Limited Support to NOT Include:    Intubation     Code Status:    No CPR     Medical Interventions (Level of Support Prior to Arrest):    Limited      Advanced Directives: Advance Directive Status: Patient does not have advance directive   Healthcare surrogate: Adult Children Talita Winslow, and Brie, Evaristo Brunson  and Rory  Goals of Care: Pt states that she is tired and would prefer to just go home and doesn't want to do rehab.    HPI:  Desiree Matos is a 87 y.o. female admitted on 9/24/2021 with exposure to known illness and failure to thrive.She has a history of afib, HTN, hypothyroidism, CAD, CKD, hypothyroidism who presented with general malaise and mild dyspnea. Patient has had poor PO intake since a fall on 9/9. She was seen at [facility]. She had a left arm fracture with unclear ortho plan per patient. No cast or support device in place. Patient has went home and essentially reports failure to thrive. Patient reports some lower quadrant abdominal pain an dysuria. Patient notes urination have also decreased. She denies any chest pain or swelling.         ROS: Negative except as above in HPI.     Past Medical History:   Diagnosis Date   • A-fib (CMS/HCC)    • Anemia 1/13/2017   • Arthritis    • Asthma    • Cancer (CMS/HCC)    • CHF (congestive heart failure) (CMS/[facility])    • Chronic anticoagulation, Xarelto 7/30/2019   • Coronary artery disease    • GERD without esophagitis 7/30/2019   • History of colonic polyps 7/30/2019   • History of transfusion    • Hypertension    • Hypothyroidism 7/30/2019   • Peripheral neuropathy 7/30/2019     Past Surgical History:   Procedure Laterality Date   • BACK SURGERY     • BREAST CYST EXCISION Left     • CARDIAC CATHETERIZATION N/A 5/19/2020    Procedure: Left Heart Cath;  Surgeon: Jack Couch MD;  Location:  [facility] CATH [facility];  Service: Cardiology;  Laterality: N/A;   • CERVICAL POLYPECTOMY     • COLONOSCOPY N/A 1/17/2017    Procedure: COLONOSCOPY;  Surgeon: Madi Zheng III, MD;  Location: [facility] OR;  Service:    • D & C AND LAPAROSCOPY     • ENDOSCOPY N/A 1/17/2017    Procedure: ESOPHAGOGASTRODUODENOSCOPY;  Surgeon: Madi Zheng III, MD;  Location:  [facility] OR;  Service:    • GALLBLADDER SURGERY     • VEIN LIGATION AND STRIPPING       Social History     Socioeconomic History   • Marital status:      Spouse name: Not on file   • Number of children: Not on file   • Years of education: Not on file   • Highest education level: Not on file   Tobacco Use   • Smoking status: Never Smoker   • Smokeless tobacco: Never Used   Substance and Sexual Activity   • Alcohol use: No   • Drug use: No   • Sexual activity: Defer     Family History   Problem Relation Age of Onset   • Heart disease Mother    • Heart attack Sister    • Heart disease Sister    • Heart attack Brother    • Heart disease Brother    • Heart disease Brother    • Heart attack Paternal Grandfather        Allergies   Allergen Reactions   • Adhesive Tape Rash       Current Facility-Administered Medications   Medication Dose Route Frequency Provider Last Rate Last Admin   • aspirin EC tablet 81 mg  81 mg Oral Daily Nathaniel Wright MD   81 mg at 10/05/21 0922   • atorvastatin (LIPITOR) tablet 40 mg  40 mg Oral Nightly Nathaniel Wright MD   40 mg at 10/04/21 2102   • cefepime 2 gm IVPB in 100 ml NS (VTB)  2 g Intravenous Q12H Hong Bryant DO   2 g at 10/04/21 2102   • diphenhydrAMINE (BENADRYL) capsule 50 mg  50 mg Oral Once PRN Nathaniel Wright MD        Or   • diphenhydrAMINE (BENADRYL) injection 50 mg  50 mg Intravenous Once PRN Nathaniel Wright MD       • docusate sodium (COLACE) capsule 100 mg  100 mg  Oral BID Hong Bryant DO   100 mg at 10/05/21 0921   • docusate sodium (COLACE) capsule 200 mg  200 mg Oral BID PRN Patricia Goodman DO       • doxycycline (VIBRAMYCIN) 100 mg in sodium chloride 0.9 % 100 mL IVPB-VTB  100 mg Intravenous Q12H Hong Bryant DO   100 mg at 10/04/21 2102   • EPINEPHrine (ADRENALIN) injection 0.3 mg  0.3 mg Intramuscular Once PRN Nathaniel Wright MD       • levothyroxine (SYNTHROID, LEVOTHROID) tablet 100 mcg  100 mcg Oral Nightly Patricia Goodman DO   100 mcg at 10/04/21 2102   • magic barrier cream 1 application  1 application Topical PRN Nathaniel Wright MD   1 application at 09/28/21 0925   • Magnesium Sulfate 2 gram Bolus, followed by 8 gram infusion (total Mg dose 10 grams)- Mg less than or equal to 1mg/dL  2 g Intravenous PRN Nathaniel Wright MD        Or   • Magnesium Sulfate 2 gram / 50mL Infusion (GIVE X 3 BAGS TO EQUAL 6GM TOTAL DOSE) - Mg 1.1 - 1.5 mg/dl  2 g Intravenous PRN Nathaniel Wright MD        Or   • Magnesium Sulfate 4 gram infusion- Mg 1.6-1.9 mg/dL  4 g Intravenous PRN Nathaniel Wright MD       • methylPREDNISolone sodium succinate (SOLU-Medrol) injection 125 mg  125 mg Intravenous Once PRN Nathaniel Wright MD       • metoprolol tartrate (LOPRESSOR) tablet 25 mg  25 mg Oral Q12H Hong Bryant DO   25 mg at 10/05/21 0922   • nitroglycerin (NITROSTAT) SL tablet 0.4 mg  0.4 mg Sublingual Q5 Min PRN Nathaniel Wright MD   0.4 mg at 10/02/21 0820   • oxyCODONE-acetaminophen (PERCOCET)  MG per tablet 0.5 tablet  0.5 tablet Oral BID PRN Patricia Goodman DO   0.5 tablet at 10/04/21 1210   • pantoprazole (PROTONIX) EC tablet 40 mg  40 mg Oral Daily Patricia Goodman DO   40 mg at 10/05/21 0922   • polyethylene glycol (MIRALAX) packet 17 g  17 g Oral Daily Hong Bryant DO   17 g at 10/05/21 0921   • potassium chloride (K-DUR,KLOR-CON) CR tablet 40 mEq  40 mEq Oral PRN Nathaniel Wright MD       • potassium chloride  "(KLOR-CON) packet 40 mEq  40 mEq Oral PRN Nathaniel Wright MD       • rivaroxaban (XARELTO) tablet 15 mg  15 mg Oral Daily With Dinner Hong Bryant DO   15 mg at 10/04/21 1745   • sodium chloride 0.9 % flush 10 mL  10 mL Intravenous PRN Davida Hammer PA       • sodium chloride 0.9 % flush 10 mL  10 mL Intravenous Q12H Nathaniel Wright MD   10 mL at 10/04/21 2103   • sodium chloride 0.9 % flush 10 mL  10 mL Intravenous PRN Nathaniel Wright MD            diphenhydrAMINE **OR** diphenhydrAMINE  •  docusate sodium  •  EPINEPHrine  •  magic barrier cream  •  magnesium sulfate **OR** magnesium sulfate **OR** magnesium sulfate  •  methylPREDNISolone sodium succinate  •  nitroglycerin  •  oxyCODONE-acetaminophen  •  potassium chloride  •  potassium chloride  •  [COMPLETED] Insert peripheral IV **AND** sodium chloride  •  sodium chloride    Current medication reviewed for route, type, dose and frequency and are current per MAR.    Palliative Performance Scale Score:     /83 (BP Location: Right arm, Patient Position: Lying)   Pulse 110   Temp 97.4 °F (36.3 °C) (Axillary)   Resp 18   Ht 162.6 cm (64\")   Wt 77.1 kg (170 lb)   LMP  (LMP Unknown)   SpO2 99%   BMI 29.18 kg/m²     Intake/Output Summary (Last 24 hours) at 10/5/2021 1220  Last data filed at 10/5/2021 0420  Gross per 24 hour   Intake 971 ml   Output 600 ml   Net 371 ml       PE:  COVID RESTRICTIONS    Labs:   Results from last 7 days   Lab Units 10/05/21  0152   WBC 10*3/mm3 9.83   HEMOGLOBIN g/dL 7.3*   HEMATOCRIT % 23.6*   PLATELETS 10*3/mm3 207     Results from last 7 days   Lab Units 10/05/21  0152   SODIUM mmol/L 134*   POTASSIUM mmol/L 4.3   CHLORIDE mmol/L 101   CO2 mmol/L 24.0   BUN mg/dL 17   CREATININE mg/dL 0.94   GLUCOSE mg/dL 88   CALCIUM mg/dL 8.8     Results from last 7 days   Lab Units 10/05/21  0152   SODIUM mmol/L 134*   POTASSIUM mmol/L 4.3   CHLORIDE mmol/L 101   CO2 mmol/L 24.0   BUN mg/dL 17   CREATININE mg/dL 0.94 "   CALCIUM mg/dL 8.8   BILIRUBIN mg/dL 0.7   ALK PHOS U/L 71   ALT (SGPT) U/L <5   AST (SGOT) U/L 14   GLUCOSE mg/dL 88     Imaging Results (Last 72 Hours)     ** No results found for the last 72 hours. **          Diagnostics: Reviewed    A: Desiree Matos is a 87 y.o. female with  exposure to known illness and failure to thrive.She has a history of afib, HTN, hypothyroidism, CAD, CKD, hypothyroidism who presented with general malaise and mild dyspnea. Patient has had poor PO intake since a fall on 9/9. She was seen at [facility]. She had a left arm fracture with unclear ortho plan per patient. No cast or support device in place. Patient has went home and essentially reports failure to thrive. Patient reports some lower quadrant abdominal pain an dysuria. Patient notes urination have also decreased. She denies any chest pain or swelling. Pt was positive for COVID upon arrival to hospital positive on 9/19/2021. P t was admitted to [facility]    Today 10/5/2021   Pt is afebrile, , and BP of 161/83 RR 18 and was saturating 99% on room air. Pt appeared weak and tired and stated I just want to go home and be with my family.       P:   Palliative was consulted to discuss GOC./ACP with patient and family. I was able to speak with Desiree at bedside as well as daughter Trey on the phone. Desiree states that she just wants to go home and is agreeable to hospice to provide services as she says my family needs help.Trey says that if coming home with hospice is what her mother wants she supports that and are ready for her to come home. I let her know that hospice would manage her care and that an RN would come out weekly as well as CNA's could come out 3-5 times a weeki for bathing. I let her know if she had any symptoms in between visit they could call 24/7 day or night and they would address her symptoms. I put hospice referral in and spoke with Lor in  as well as Dr. Bryant and RN Garima. Plan is for patient to  return home with hospice, Dr. Bryant plans on discharging this afternoon.    We appreciate the consult and the opportunity to participate in Desiree [place]'s care. We will continue to follow along. Please do not hesitate to contact us regarding further symptom management or goals of care needs, including after hours or on weekends via our on call provider at [phone].     Time: 45 minutes spent reviewing medical and medication records, assessing and examining patient, discussing with family, answering questions, providing some guidance about a plan and documentation of care, and coordinating care with other healthcare members, with > 50% time spent face to face.     Carissa Arredondo, APRN    10/5/2021

## 2021-10-05 NOTE — PLAN OF CARE
Goal Outcome Evaluation:  Pt's IV was occluded, informed pt of need for new IV as she has continuous fluids going. Pt refused and stated she did not want one and was going home tomorrow. Educated on importance of IV access and continuous fluids pertaining to her sodium level. Made pt aware that sodium level was pertinent to the decision to d/c her in AM. Pt stated she would be going home regardless and was tired of being in the hospital. Continued to refuse new IV being placed. MARIAH IZQUIERDO made aware. Will continue to follow plan of care.

## 2021-10-05 NOTE — CASE MANAGEMENT/SOCIAL WORK
Discharge Planning Assessment   [place]     Patient Name: Desiree Matos  MRN: [id number]  Today's Date: 10/5/2021    Admit Date: 9/24/2021        Discharge Plan     Row Name 10/05/21 1326       Plan    Plan  SS received consult per Palliative Care to arrange hospice.  SS spoke with pt's daughter Brie who is agreeable and stated no preference for hospice provider.  SS made referral to [facility] per Rosaura at [phone] and faxed pt information to [phone].  SS will follow.     Lor Euceda, CANELOW

## 2021-10-06 LAB
BACTERIA SPEC AEROBE CULT: NORMAL
BACTERIA SPEC AEROBE CULT: NORMAL

## 2021-11-17 ENCOUNTER — LAB REQUISITION (OUTPATIENT)
Dept: LAB | Facility: HOSPITAL | Age: 86
End: 2021-11-17

## 2021-11-17 DIAGNOSIS — C18.8 MALIGNANT NEOPLASM OF OVERLAPPING SITES OF COLON (HCC): ICD-10-CM

## 2021-11-17 LAB
BACTERIA UR QL AUTO: ABNORMAL /HPF
BILIRUB UR QL STRIP: ABNORMAL
CLARITY UR: ABNORMAL
COLOR UR: ABNORMAL
GLUCOSE UR STRIP-MCNC: NEGATIVE MG/DL
HGB UR QL STRIP.AUTO: ABNORMAL
HYALINE CASTS UR QL AUTO: ABNORMAL /LPF
KETONES UR QL STRIP: NEGATIVE
LEUKOCYTE ESTERASE UR QL STRIP.AUTO: ABNORMAL
NITRITE UR QL STRIP: POSITIVE
PH UR STRIP.AUTO: 6 [PH] (ref 5–8)
PROT UR QL STRIP: ABNORMAL
RBC # UR STRIP: ABNORMAL /HPF
REF LAB TEST METHOD: ABNORMAL
SP GR UR STRIP: 1.02 (ref 1–1.03)
SQUAMOUS #/AREA URNS HPF: ABNORMAL /HPF
UROBILINOGEN UR QL STRIP: ABNORMAL
WBC # UR STRIP: ABNORMAL /HPF

## 2021-11-17 PROCEDURE — 87086 URINE CULTURE/COLONY COUNT: CPT | Performed by: FAMILY MEDICINE

## 2021-11-17 PROCEDURE — 81001 URINALYSIS AUTO W/SCOPE: CPT | Performed by: FAMILY MEDICINE

## 2021-11-18 LAB — BACTERIA SPEC AEROBE CULT: NO GROWTH

## 2022-06-21 ENCOUNTER — LAB REQUISITION (OUTPATIENT)
Dept: LAB | Facility: HOSPITAL | Age: 87
End: 2022-06-21

## 2022-06-21 DIAGNOSIS — N17.9 ACUTE KIDNEY FAILURE, UNSPECIFIED: ICD-10-CM

## 2022-06-21 LAB
BACTERIA UR QL AUTO: ABNORMAL /HPF
BILIRUB UR QL STRIP: NEGATIVE
CLARITY UR: ABNORMAL
COLOR UR: ABNORMAL
GLUCOSE UR STRIP-MCNC: NEGATIVE MG/DL
HGB UR QL STRIP.AUTO: ABNORMAL
HYALINE CASTS UR QL AUTO: ABNORMAL /LPF
KETONES UR QL STRIP: NEGATIVE
LEUKOCYTE ESTERASE UR QL STRIP.AUTO: ABNORMAL
NITRITE UR QL STRIP: NEGATIVE
PH UR STRIP.AUTO: 7.5 [PH] (ref 5–8)
PROT UR QL STRIP: ABNORMAL
RBC # UR STRIP: ABNORMAL /HPF
REF LAB TEST METHOD: ABNORMAL
SP GR UR STRIP: 1.01 (ref 1–1.03)
SQUAMOUS #/AREA URNS HPF: ABNORMAL /HPF
UROBILINOGEN UR QL STRIP: ABNORMAL
WBC # UR STRIP: ABNORMAL /HPF

## 2022-06-21 PROCEDURE — 81001 URINALYSIS AUTO W/SCOPE: CPT | Performed by: FAMILY MEDICINE

## 2022-06-21 PROCEDURE — 87086 URINE CULTURE/COLONY COUNT: CPT | Performed by: FAMILY MEDICINE

## 2022-06-22 LAB — BACTERIA SPEC AEROBE CULT: NORMAL

## (undated) DEVICE — SYR LUERLOK 30CC

## (undated) DEVICE — GOWN,REINF,POLY,ECL,PP SLV,XL: Brand: MEDLINE

## (undated) DEVICE — MODEL AT P65, P/N 701554-001KIT CONTENTS: HAND CONTROLLER, 3-WAY HIGH-PRESSURE STOPCOCK WITH ROTATING END AND PREMIUM HIGH-PRESSURE TUBING: Brand: ANGIOTOUCH® KIT

## (undated) DEVICE — RADIFOCUS OPTITORQUE ANGIOGRAPHIC CATHETER: Brand: OPTITORQUE

## (undated) DEVICE — Device: Brand: ENDOGATOR

## (undated) DEVICE — CATH F5 INF 3DRC 100CM: Brand: INFINITI

## (undated) DEVICE — SKIN PREP TRAY W/CHG: Brand: MEDLINE INDUSTRIES, INC.

## (undated) DEVICE — SINGLE PORT MANIFOLD: Brand: NEPTUNE 2

## (undated) DEVICE — GW INQW FIX/CORE PTFE J/3MM .035 260CM

## (undated) DEVICE — Device

## (undated) DEVICE — THE BITE BLOCK MAXI, LATEX FREE STRAP IS USED TO PROTECT THE ENDOSCOPE INSERTION TUBE FROM BEING BITTEN BY THE PATIENT.

## (undated) DEVICE — ST EXT IV SMARTSITE 2VLV SP M LL 5ML IV1

## (undated) DEVICE — CANNULA,OXY,ADULT,SUPER SOFT,W/14'TUB,UC: Brand: MEDLINE INDUSTRIES, INC.

## (undated) DEVICE — CATH F5 INF PIG145 110CM 6SH: Brand: INFINITI

## (undated) DEVICE — FRCP BX RADJAW4 NDL 2.8 240CM LG OG BX40

## (undated) DEVICE — ADULT DISPOSABLE SINGLE-PATIENT USE PULSE OXIMETER SENSOR: Brand: NONIN

## (undated) DEVICE — PK CATH CARD 70

## (undated) DEVICE — SHEATH INTRO SUPERSHEATH JWIRE .035 5F 11CM

## (undated) DEVICE — GW INQWIRE FC PTFE J/3MM .035 180

## (undated) DEVICE — CONN Y IRR DISP 1P/U

## (undated) DEVICE — CATH F5 INF JL 4 100CM: Brand: INFINITI

## (undated) DEVICE — RUNWAY RADL W/TOP PAD

## (undated) DEVICE — ST INF PRI SMRTSTE 20DRP 2VLV 24ML 117

## (undated) DEVICE — DRAPE, RADIAL, STERILE: Brand: MEDLINE

## (undated) DEVICE — Device: Brand: DEFENDO AIR/WATER/SUCTION AND BIOPSY VALVE

## (undated) DEVICE — A2000 MULTI-USE SYRINGE KIT, P/N 701277-003KIT CONTENTS: 100ML CONTRAST RESERVOIR AND TUBING WITH CONTRAST SPIKE AND CLAMP: Brand: A2000 MULTI-USE SYRINGE KIT

## (undated) DEVICE — GLIDESHEATH SLENDER STAINLESS STEEL KIT: Brand: GLIDESHEATH SLENDER

## (undated) DEVICE — TUBING, SUCTION, 1/4" X 20', STRAIGHT: Brand: MEDLINE INDUSTRIES, INC.

## (undated) DEVICE — DRSNG SURESITE WNDW 4X4.5